# Patient Record
Sex: MALE | Race: WHITE | Employment: OTHER | URBAN - METROPOLITAN AREA
[De-identification: names, ages, dates, MRNs, and addresses within clinical notes are randomized per-mention and may not be internally consistent; named-entity substitution may affect disease eponyms.]

---

## 2018-03-02 RX ORDER — IBUPROFEN 600 MG/1
TABLET ORAL
Refills: 0 | COMMUNITY
Start: 2018-02-01 | End: 2018-05-08 | Stop reason: SDUPTHER

## 2018-03-02 RX ORDER — TIZANIDINE 4 MG/1
4 TABLET ORAL 3 TIMES DAILY PRN
Refills: 0 | Status: ON HOLD | COMMUNITY
Start: 2018-02-01 | End: 2020-11-05 | Stop reason: ALTCHOICE

## 2018-03-02 NOTE — PROGRESS NOTES
registration    [x] You can expect a call the business day prior to procedure to notify you of your arrival time    [x] If you receive a survey after surgery we would greatly appreciate your comments    [] Parent/guardian of a minor must accompany their child and remain on the premises  the entire time they are under our care     [] Pediatric patients may bring favorite toy, blanket or comfort item with them    [] A caregiver or family member must remain with the patient during their stay if they are mentally handicapped, have dementia, disoriented or unable to use a call light or would be a safety concern if left unattended    [x] Please notify surgeon if you develop any illness between now and time of surgery (cold, cough, sore throat, fever, nausea, vomiting) or any signs of infections  including skin, wounds, and dental.    [] Other instructions    EDUCATIONAL MATERIALS PROVIDED:    [] PAT Preoperative Education Packet/Booklet     [] Medication List    [] Fluoroscopy Information Pamphlet    [] Transfusion bracelet applied with instructions    [] Joint replacement video reviewed    [] Shower with soap, lather and rinse well, and use CHG wipes provided the evening before surgery as instructed

## 2018-03-13 RX ORDER — DICYCLOMINE HYDROCHLORIDE 10 MG/1
10 CAPSULE ORAL
Qty: 120 CAPSULE | Refills: 1 | Status: CANCELLED | OUTPATIENT
Start: 2018-03-13

## 2018-03-15 ENCOUNTER — ANESTHESIA (OUTPATIENT)
Dept: OPERATING ROOM | Age: 43
End: 2018-03-15
Payer: COMMERCIAL

## 2018-03-15 ENCOUNTER — ANESTHESIA EVENT (OUTPATIENT)
Dept: OPERATING ROOM | Age: 43
End: 2018-03-15
Payer: COMMERCIAL

## 2018-03-15 ENCOUNTER — HOSPITAL ENCOUNTER (OUTPATIENT)
Age: 43
Setting detail: OUTPATIENT SURGERY
Discharge: HOME | End: 2018-03-15
Attending: SURGERY | Admitting: SURGERY
Payer: COMMERCIAL

## 2018-03-15 VITALS
SYSTOLIC BLOOD PRESSURE: 115 MMHG | WEIGHT: 147 LBS | TEMPERATURE: 97.9 F | OXYGEN SATURATION: 97 % | HEIGHT: 68 IN | HEART RATE: 81 BPM | BODY MASS INDEX: 22.28 KG/M2 | RESPIRATION RATE: 16 BRPM | DIASTOLIC BLOOD PRESSURE: 80 MMHG

## 2018-03-15 VITALS — SYSTOLIC BLOOD PRESSURE: 99 MMHG | DIASTOLIC BLOOD PRESSURE: 54 MMHG | OXYGEN SATURATION: 99 %

## 2018-03-15 DIAGNOSIS — G89.18 ACUTE POST-OPERATIVE PAIN: Primary | ICD-10-CM

## 2018-03-15 PROCEDURE — 6370000000 HC RX 637 (ALT 250 FOR IP): Performed by: ANESTHESIOLOGY

## 2018-03-15 PROCEDURE — 3600000002 HC SURGERY LEVEL 2 BASE: Performed by: SURGERY

## 2018-03-15 PROCEDURE — 6360000002 HC RX W HCPCS: Performed by: SURGERY

## 2018-03-15 PROCEDURE — 2500000003 HC RX 250 WO HCPCS: Performed by: NURSE ANESTHETIST, CERTIFIED REGISTERED

## 2018-03-15 PROCEDURE — 3700000000 HC ANESTHESIA ATTENDED CARE: Performed by: SURGERY

## 2018-03-15 PROCEDURE — 2500000003 HC RX 250 WO HCPCS: Performed by: SURGERY

## 2018-03-15 PROCEDURE — 2580000003 HC RX 258: Performed by: SURGERY

## 2018-03-15 PROCEDURE — 7100000010 HC PHASE II RECOVERY - FIRST 15 MIN: Performed by: SURGERY

## 2018-03-15 PROCEDURE — 7100000011 HC PHASE II RECOVERY - ADDTL 15 MIN: Performed by: SURGERY

## 2018-03-15 PROCEDURE — 3700000001 HC ADD 15 MINUTES (ANESTHESIA): Performed by: SURGERY

## 2018-03-15 PROCEDURE — 3600000012 HC SURGERY LEVEL 2 ADDTL 15MIN: Performed by: SURGERY

## 2018-03-15 PROCEDURE — 6360000002 HC RX W HCPCS: Performed by: NURSE ANESTHETIST, CERTIFIED REGISTERED

## 2018-03-15 PROCEDURE — 10120 INC&RMVL FB SUBQ TISS SMPL: CPT | Performed by: SURGERY

## 2018-03-15 RX ORDER — OXYCODONE HYDROCHLORIDE AND ACETAMINOPHEN 5; 325 MG/1; MG/1
1 TABLET ORAL EVERY 6 HOURS PRN
Qty: 12 TABLET | Refills: 0 | Status: SHIPPED | OUTPATIENT
Start: 2018-03-15 | End: 2018-03-22

## 2018-03-15 RX ORDER — OXYCODONE HYDROCHLORIDE AND ACETAMINOPHEN 5; 325 MG/1; MG/1
1 TABLET ORAL ONCE
Status: COMPLETED | OUTPATIENT
Start: 2018-03-15 | End: 2018-03-15

## 2018-03-15 RX ORDER — ONDANSETRON 2 MG/ML
INJECTION INTRAMUSCULAR; INTRAVENOUS PRN
Status: DISCONTINUED | OUTPATIENT
Start: 2018-03-15 | End: 2018-03-15 | Stop reason: SDUPTHER

## 2018-03-15 RX ORDER — MIDAZOLAM HYDROCHLORIDE 1 MG/ML
INJECTION INTRAMUSCULAR; INTRAVENOUS PRN
Status: DISCONTINUED | OUTPATIENT
Start: 2018-03-15 | End: 2018-03-15 | Stop reason: SDUPTHER

## 2018-03-15 RX ORDER — FENTANYL CITRATE 50 UG/ML
INJECTION, SOLUTION INTRAMUSCULAR; INTRAVENOUS PRN
Status: DISCONTINUED | OUTPATIENT
Start: 2018-03-15 | End: 2018-03-15 | Stop reason: SDUPTHER

## 2018-03-15 RX ORDER — LIDOCAINE HYDROCHLORIDE 20 MG/ML
INJECTION, SOLUTION INFILTRATION; PERINEURAL PRN
Status: DISCONTINUED | OUTPATIENT
Start: 2018-03-15 | End: 2018-03-15 | Stop reason: SDUPTHER

## 2018-03-15 RX ORDER — SODIUM CHLORIDE 0.9 % (FLUSH) 0.9 %
10 SYRINGE (ML) INJECTION PRN
Status: DISCONTINUED | OUTPATIENT
Start: 2018-03-15 | End: 2018-03-15 | Stop reason: HOSPADM

## 2018-03-15 RX ORDER — ONDANSETRON 2 MG/ML
4 INJECTION INTRAMUSCULAR; INTRAVENOUS
Status: DISCONTINUED | OUTPATIENT
Start: 2018-03-15 | End: 2018-03-15 | Stop reason: HOSPADM

## 2018-03-15 RX ORDER — DEXAMETHASONE SODIUM PHOSPHATE 4 MG/ML
INJECTION, SOLUTION INTRA-ARTICULAR; INTRALESIONAL; INTRAMUSCULAR; INTRAVENOUS; SOFT TISSUE PRN
Status: DISCONTINUED | OUTPATIENT
Start: 2018-03-15 | End: 2018-03-15 | Stop reason: SDUPTHER

## 2018-03-15 RX ORDER — SODIUM CHLORIDE 9 MG/ML
INJECTION, SOLUTION INTRAVENOUS CONTINUOUS
Status: DISCONTINUED | OUTPATIENT
Start: 2018-03-15 | End: 2018-03-15 | Stop reason: HOSPADM

## 2018-03-15 RX ORDER — CEPHALEXIN 500 MG/1
500 CAPSULE ORAL 4 TIMES DAILY
Qty: 20 CAPSULE | Refills: 0 | Status: SHIPPED | OUTPATIENT
Start: 2018-03-15 | End: 2018-03-20

## 2018-03-15 RX ORDER — SODIUM CHLORIDE 0.9 % (FLUSH) 0.9 %
10 SYRINGE (ML) INJECTION EVERY 12 HOURS SCHEDULED
Status: DISCONTINUED | OUTPATIENT
Start: 2018-03-15 | End: 2018-03-15 | Stop reason: HOSPADM

## 2018-03-15 RX ORDER — PROPOFOL 10 MG/ML
INJECTION, EMULSION INTRAVENOUS CONTINUOUS PRN
Status: DISCONTINUED | OUTPATIENT
Start: 2018-03-15 | End: 2018-03-15 | Stop reason: SDUPTHER

## 2018-03-15 RX ORDER — BUPIVACAINE HYDROCHLORIDE 5 MG/ML
INJECTION, SOLUTION EPIDURAL; INTRACAUDAL PRN
Status: DISCONTINUED | OUTPATIENT
Start: 2018-03-15 | End: 2018-03-15 | Stop reason: HOSPADM

## 2018-03-15 RX ADMIN — SODIUM CHLORIDE: 9 INJECTION, SOLUTION INTRAVENOUS at 12:30

## 2018-03-15 RX ADMIN — PROPOFOL 130 MCG/KG/MIN: 10 INJECTION, EMULSION INTRAVENOUS at 12:44

## 2018-03-15 RX ADMIN — LIDOCAINE HYDROCHLORIDE 40 MG: 20 INJECTION, SOLUTION INFILTRATION; PERINEURAL at 12:34

## 2018-03-15 RX ADMIN — ONDANSETRON 4 MG: 2 INJECTION, SOLUTION INTRAMUSCULAR; INTRAVENOUS at 12:30

## 2018-03-15 RX ADMIN — MIDAZOLAM HYDROCHLORIDE 2 MG: 1 INJECTION, SOLUTION INTRAMUSCULAR; INTRAVENOUS at 12:34

## 2018-03-15 RX ADMIN — FENTANYL CITRATE 50 MCG: 50 INJECTION, SOLUTION INTRAMUSCULAR; INTRAVENOUS at 12:30

## 2018-03-15 RX ADMIN — DEXAMETHASONE SODIUM PHOSPHATE 10 MG: 4 INJECTION, SOLUTION INTRA-ARTICULAR; INTRALESIONAL; INTRAMUSCULAR; INTRAVENOUS; SOFT TISSUE at 12:35

## 2018-03-15 RX ADMIN — CEFAZOLIN SODIUM 2 G: 2 SOLUTION INTRAVENOUS at 12:30

## 2018-03-15 RX ADMIN — OXYCODONE HYDROCHLORIDE AND ACETAMINOPHEN 1 TABLET: 5; 325 TABLET ORAL at 13:38

## 2018-03-15 RX ADMIN — FENTANYL CITRATE 50 MCG: 50 INJECTION, SOLUTION INTRAMUSCULAR; INTRAVENOUS at 12:34

## 2018-03-15 ASSESSMENT — PAIN DESCRIPTION - PAIN TYPE
TYPE: SURGICAL PAIN

## 2018-03-15 ASSESSMENT — PULMONARY FUNCTION TESTS
PIF_VALUE: 0
PIF_VALUE: 1
PIF_VALUE: 0

## 2018-03-15 ASSESSMENT — PAIN DESCRIPTION - DESCRIPTORS: DESCRIPTORS: SORE

## 2018-03-15 ASSESSMENT — LIFESTYLE VARIABLES: SMOKING_STATUS: 1

## 2018-03-15 ASSESSMENT — PAIN SCALES - GENERAL
PAINLEVEL_OUTOF10: 4
PAINLEVEL_OUTOF10: 2

## 2018-03-15 ASSESSMENT — PAIN DESCRIPTION - LOCATION
LOCATION: ABDOMEN
LOCATION: ABDOMEN

## 2018-03-15 NOTE — H&P
Progress Notes  Encounter Date: 2/26/2018  Marion Tanner MD   General Surgery   Expand All Collapse All    []Hide copied text  []Hover for attribution information     Patient's Name/Date of Birth: Greg Hunt / 1975     Date: 2/26/2018     PCP: Sarah Dial DO          Chief Complaint   Patient presents with    Hernia       pt. complains of a hard plastic object protruding out from hernia repair very painful         HPI:  Patient seen for evaluation of 2 problems:   1: epigastric pain , bloating and weight loss (30 lbs over the past 6 months). Denies N&V, but C/O heartburn, no dysphagia.   2: suture granuloma umbilical region at site of previous umbilical hernia repair     Patient's medications, allergies, past medical, surgical, social and family histories were reviewed and updated as appropriate.           Allergies   Allergen Reactions    Effexor [Venlafaxine Hydrochloride] Palpitations       Passed out         Past Medical History        Past Medical History:   Diagnosis Date    Depression      Hand pain, right      Panic attacks      Schizophrenia ([facility] [address]       mild             Past Surgical History         Past Surgical History:   Procedure Laterality Date    COLONOSCOPY   2016    HERNIA REPAIR   09/19/2017                   Social History   Substance Use Topics    Smoking status: Current Every Day Smoker       Packs/day: 1.00       Years: 18.00       Types: Cigarettes    Smokeless tobacco: Never Used         Comment: Before up to 2 ppd    Alcohol use No         Comment: occasional; quit heavy drinking         Current Facility-Administered Medications          Current Outpatient Prescriptions   Medication Sig Dispense Refill    QUEtiapine (SEROQUEL XR) 200 MG extended release tablet TK 1 T PO QHS   2    rOPINIRole (REQUIP) 0.5 MG tablet TK 1 T PO ONE HOUR PRIOR TO BED   3    omeprazole (PRILOSEC) 40 MG delayed release capsule          ibuprofen (ADVIL;MOTRIN) 800 MG tablet Take 1 tablet by mouth every 6 hours as needed for Pain 60 tablet 1    dicyclomine (BENTYL) 10 MG capsule Take 1 capsule by mouth 4 times daily (before meals and nightly) 120 capsule 3    clonazePAM (KLONOPIN) 1 MG tablet Take 1 mg by mouth 2 times daily as needed Indications: uses at night Take am of surgery if needed        quetiapine (SEROQUEL) 200 MG tablet Take 200 mg by mouth nightly         naproxen (NAPROSYN) 500 MG tablet Take 1 tablet by mouth 2 times daily for 7 days 14 tablet 0      No current facility-administered medications for this visit.             Review of Systems  Constitutional: negative  Eyes: negative  Ears, nose, mouth, throat, and face: negative  Respiratory: negative  Cardiovascular: negative  Gastrointestinal: negative  Genitourinary:negative  Integument/breast: negative  Hematologic/lymphatic: negative  Musculoskeletal:negative  Neurological: negative  Allergic/Immunologic: negative     Physical exam:  /79   Pulse 74   Temp 98 °F (36.7 °C) (Oral)   Resp 16   Ht 5' 8\" (1.727 m)   Wt 147 lb (66.7 kg)   BMI 22.35 kg/m²   General appearance: no acute distress  Head:NCAT, EOMI, PERRLA, conjunctiva pink  Neck: no masses, supple  Lungs: CTABL  Heart: RRR  Abdomen: soft, nondistended, diffusely tender, no guarding, no peritoneal signs, normoactive bowel sounds. Suture granuloma umbilicus  Extremities:no edema  Neuro exam: normal  Skin: no lesions, no rashes  Assessment/Plan:  .1: excision suture granuloma umbilicus  2: EGD     The procedure risks, benfits, possible complications and alternative options where explained to the patient, he understands and agrees to proceed with surgery.     Return in about 4 weeks (around 3/26/2018).      Liborio Garza MD        Send copy of H&P to PCP, Letty Zapata DO                   Office Visit on 2/26/2018            Detailed Report        Progress Notes Info     Author Note Status Last Update User   Liborio Garza MD Signed Liborio Garza MD   Last Update Date/Time: 2/26/2018  5:34 PM   Chart Review Routing History     Routing history could not be found for this note. This is because the note has never been routed or because communication     Patient's History and Physical was reviewed. Patient examined. There has been no change.

## 2018-04-04 ENCOUNTER — OFFICE VISIT (OUTPATIENT)
Dept: SURGERY | Age: 43
End: 2018-04-04
Payer: COMMERCIAL

## 2018-04-04 VITALS
DIASTOLIC BLOOD PRESSURE: 86 MMHG | HEART RATE: 92 BPM | RESPIRATION RATE: 16 BRPM | BODY MASS INDEX: 23.19 KG/M2 | WEIGHT: 153 LBS | TEMPERATURE: 97.1 F | SYSTOLIC BLOOD PRESSURE: 116 MMHG | HEIGHT: 68 IN | OXYGEN SATURATION: 97 %

## 2018-04-04 DIAGNOSIS — R63.4 WEIGHT LOSS: ICD-10-CM

## 2018-04-04 DIAGNOSIS — G89.29 ABDOMINAL PAIN, CHRONIC, EPIGASTRIC: Primary | ICD-10-CM

## 2018-04-04 DIAGNOSIS — R10.13 ABDOMINAL PAIN, CHRONIC, EPIGASTRIC: Primary | ICD-10-CM

## 2018-04-04 PROCEDURE — G8420 CALC BMI NORM PARAMETERS: HCPCS | Performed by: SURGERY

## 2018-04-04 PROCEDURE — 99213 OFFICE O/P EST LOW 20 MIN: CPT | Performed by: SURGERY

## 2018-04-04 PROCEDURE — G8427 DOCREV CUR MEDS BY ELIG CLIN: HCPCS | Performed by: SURGERY

## 2018-04-04 PROCEDURE — 4004F PT TOBACCO SCREEN RCVD TLK: CPT | Performed by: SURGERY

## 2018-04-04 RX ORDER — CHLORDIAZEPOXIDE HYDROCHLORIDE AND CLIDINIUM BROMIDE 5; 2.5 MG/1; MG/1
1 CAPSULE ORAL
Qty: 120 CAPSULE | Refills: 3 | OUTPATIENT
Start: 2018-04-04 | End: 2018-05-08 | Stop reason: SDUPTHER

## 2018-04-09 ENCOUNTER — HOSPITAL ENCOUNTER (OUTPATIENT)
Age: 43
Setting detail: OUTPATIENT SURGERY
Discharge: HOME | End: 2018-04-09
Attending: SURGERY | Admitting: SURGERY
Payer: COMMERCIAL

## 2018-04-09 ENCOUNTER — ANESTHESIA (OUTPATIENT)
Dept: ENDOSCOPY | Age: 43
End: 2018-04-09
Payer: COMMERCIAL

## 2018-04-09 ENCOUNTER — ANESTHESIA EVENT (OUTPATIENT)
Dept: ENDOSCOPY | Age: 43
End: 2018-04-09
Payer: COMMERCIAL

## 2018-04-09 VITALS
BODY MASS INDEX: 23.19 KG/M2 | RESPIRATION RATE: 16 BRPM | OXYGEN SATURATION: 95 % | HEART RATE: 82 BPM | SYSTOLIC BLOOD PRESSURE: 101 MMHG | TEMPERATURE: 97 F | HEIGHT: 68 IN | DIASTOLIC BLOOD PRESSURE: 72 MMHG | WEIGHT: 153 LBS

## 2018-04-09 VITALS — DIASTOLIC BLOOD PRESSURE: 66 MMHG | OXYGEN SATURATION: 97 % | SYSTOLIC BLOOD PRESSURE: 101 MMHG

## 2018-04-09 PROCEDURE — 43239 EGD BIOPSY SINGLE/MULTIPLE: CPT | Performed by: SURGERY

## 2018-04-09 PROCEDURE — 2500000003 HC RX 250 WO HCPCS: Performed by: NURSE ANESTHETIST, CERTIFIED REGISTERED

## 2018-04-09 PROCEDURE — 3700000000 HC ANESTHESIA ATTENDED CARE: Performed by: SURGERY

## 2018-04-09 PROCEDURE — 88342 IMHCHEM/IMCYTCHM 1ST ANTB: CPT

## 2018-04-09 PROCEDURE — 2580000003 HC RX 258: Performed by: SURGERY

## 2018-04-09 PROCEDURE — 88305 TISSUE EXAM BY PATHOLOGIST: CPT

## 2018-04-09 PROCEDURE — 7100000010 HC PHASE II RECOVERY - FIRST 15 MIN: Performed by: SURGERY

## 2018-04-09 PROCEDURE — 6360000002 HC RX W HCPCS: Performed by: NURSE ANESTHETIST, CERTIFIED REGISTERED

## 2018-04-09 PROCEDURE — 3609012400 HC EGD TRANSORAL BIOPSY SINGLE/MULTIPLE: Performed by: SURGERY

## 2018-04-09 PROCEDURE — 2709999900 HC NON-CHARGEABLE SUPPLY: Performed by: SURGERY

## 2018-04-09 PROCEDURE — 7100000011 HC PHASE II RECOVERY - ADDTL 15 MIN: Performed by: SURGERY

## 2018-04-09 PROCEDURE — 3700000001 HC ADD 15 MINUTES (ANESTHESIA): Performed by: SURGERY

## 2018-04-09 RX ORDER — 0.9 % SODIUM CHLORIDE 0.9 %
10 VIAL (ML) INJECTION EVERY 12 HOURS SCHEDULED
Status: DISCONTINUED | OUTPATIENT
Start: 2018-04-09 | End: 2018-04-09 | Stop reason: HOSPADM

## 2018-04-09 RX ORDER — MIDAZOLAM HYDROCHLORIDE 1 MG/ML
INJECTION INTRAMUSCULAR; INTRAVENOUS PRN
Status: DISCONTINUED | OUTPATIENT
Start: 2018-04-09 | End: 2018-04-09 | Stop reason: SDUPTHER

## 2018-04-09 RX ORDER — LIDOCAINE HYDROCHLORIDE 20 MG/ML
INJECTION, SOLUTION INFILTRATION; PERINEURAL PRN
Status: DISCONTINUED | OUTPATIENT
Start: 2018-04-09 | End: 2018-04-09 | Stop reason: SDUPTHER

## 2018-04-09 RX ORDER — ACETAMINOPHEN 325 MG/1
650 TABLET ORAL EVERY 6 HOURS PRN
Status: ON HOLD | COMMUNITY
End: 2021-07-24 | Stop reason: HOSPADM

## 2018-04-09 RX ORDER — SODIUM CHLORIDE 9 MG/ML
INJECTION, SOLUTION INTRAVENOUS CONTINUOUS
Status: DISCONTINUED | OUTPATIENT
Start: 2018-04-09 | End: 2018-04-09 | Stop reason: HOSPADM

## 2018-04-09 RX ORDER — ONDANSETRON 2 MG/ML
INJECTION INTRAMUSCULAR; INTRAVENOUS PRN
Status: DISCONTINUED | OUTPATIENT
Start: 2018-04-09 | End: 2018-04-09 | Stop reason: SDUPTHER

## 2018-04-09 RX ORDER — PROPOFOL 10 MG/ML
INJECTION, EMULSION INTRAVENOUS PRN
Status: DISCONTINUED | OUTPATIENT
Start: 2018-04-09 | End: 2018-04-09 | Stop reason: SDUPTHER

## 2018-04-09 RX ORDER — 0.9 % SODIUM CHLORIDE 0.9 %
10 VIAL (ML) INJECTION PRN
Status: DISCONTINUED | OUTPATIENT
Start: 2018-04-09 | End: 2018-04-09 | Stop reason: HOSPADM

## 2018-04-09 RX ADMIN — LIDOCAINE HYDROCHLORIDE 60 MG: 20 INJECTION, SOLUTION INFILTRATION; PERINEURAL at 10:22

## 2018-04-09 RX ADMIN — PROPOFOL 150 MG: 10 INJECTION, EMULSION INTRAVENOUS at 10:44

## 2018-04-09 RX ADMIN — ONDANSETRON 4 MG: 2 INJECTION, SOLUTION INTRAMUSCULAR; INTRAVENOUS at 10:15

## 2018-04-09 RX ADMIN — SODIUM CHLORIDE: 9 INJECTION, SOLUTION INTRAVENOUS at 10:19

## 2018-04-09 RX ADMIN — MIDAZOLAM HYDROCHLORIDE 2 MG: 1 INJECTION, SOLUTION INTRAMUSCULAR; INTRAVENOUS at 10:15

## 2018-04-09 ASSESSMENT — PAIN - FUNCTIONAL ASSESSMENT: PAIN_FUNCTIONAL_ASSESSMENT: 0-10

## 2018-04-09 ASSESSMENT — LIFESTYLE VARIABLES: SMOKING_STATUS: 1

## 2018-04-12 ENCOUNTER — HOSPITAL ENCOUNTER (OUTPATIENT)
Dept: ULTRASOUND IMAGING | Age: 43
Discharge: HOME | End: 2018-04-14
Payer: COMMERCIAL

## 2018-04-12 DIAGNOSIS — R10.13 ABDOMINAL PAIN, EPIGASTRIC: ICD-10-CM

## 2018-04-12 DIAGNOSIS — R10.13 EPIGASTRIC PAIN: Primary | ICD-10-CM

## 2018-04-12 PROCEDURE — 76705 ECHO EXAM OF ABDOMEN: CPT

## 2018-04-18 ENCOUNTER — OFFICE VISIT (OUTPATIENT)
Dept: SURGERY | Age: 43
End: 2018-04-18
Payer: COMMERCIAL

## 2018-04-18 VITALS
WEIGHT: 151 LBS | DIASTOLIC BLOOD PRESSURE: 78 MMHG | RESPIRATION RATE: 16 BRPM | BODY MASS INDEX: 22.88 KG/M2 | TEMPERATURE: 98.2 F | SYSTOLIC BLOOD PRESSURE: 118 MMHG | OXYGEN SATURATION: 97 % | HEIGHT: 68 IN | HEART RATE: 89 BPM

## 2018-04-18 DIAGNOSIS — K80.20 SYMPTOMATIC CHOLELITHIASIS: ICD-10-CM

## 2018-04-18 DIAGNOSIS — B96.81 H PYLORI ULCER: Primary | ICD-10-CM

## 2018-04-18 DIAGNOSIS — K27.9 H PYLORI ULCER: Primary | ICD-10-CM

## 2018-04-18 PROCEDURE — G8427 DOCREV CUR MEDS BY ELIG CLIN: HCPCS | Performed by: SURGERY

## 2018-04-18 PROCEDURE — 4004F PT TOBACCO SCREEN RCVD TLK: CPT | Performed by: SURGERY

## 2018-04-18 PROCEDURE — G8420 CALC BMI NORM PARAMETERS: HCPCS | Performed by: SURGERY

## 2018-04-18 PROCEDURE — 99214 OFFICE O/P EST MOD 30 MIN: CPT | Performed by: SURGERY

## 2018-04-20 ENCOUNTER — TELEPHONE (OUTPATIENT)
Dept: SURGERY | Age: 43
End: 2018-04-20

## 2018-04-25 ENCOUNTER — TELEPHONE (OUTPATIENT)
Dept: SURGERY | Age: 43
End: 2018-04-25

## 2018-05-06 ENCOUNTER — HOSPITAL ENCOUNTER (EMERGENCY)
Age: 43
Discharge: HOME | End: 2018-05-06
Attending: EMERGENCY MEDICINE
Payer: COMMERCIAL

## 2018-05-06 VITALS
DIASTOLIC BLOOD PRESSURE: 64 MMHG | HEIGHT: 68 IN | TEMPERATURE: 98.1 F | WEIGHT: 157 LBS | BODY MASS INDEX: 23.79 KG/M2 | RESPIRATION RATE: 16 BRPM | SYSTOLIC BLOOD PRESSURE: 99 MMHG | HEART RATE: 94 BPM | OXYGEN SATURATION: 96 %

## 2018-05-06 DIAGNOSIS — S05.02XA ABRASION OF LEFT CORNEA, INITIAL ENCOUNTER: Primary | ICD-10-CM

## 2018-05-06 PROCEDURE — 6370000000 HC RX 637 (ALT 250 FOR IP): Performed by: EMERGENCY MEDICINE

## 2018-05-06 PROCEDURE — 99283 EMERGENCY DEPT VISIT LOW MDM: CPT

## 2018-05-06 RX ORDER — TETRACAINE HYDROCHLORIDE 5 MG/ML
2 SOLUTION OPHTHALMIC ONCE
Status: COMPLETED | OUTPATIENT
Start: 2018-05-06 | End: 2018-05-06

## 2018-05-06 RX ORDER — MOXIFLOXACIN 5 MG/ML
1 SOLUTION/ DROPS OPHTHALMIC 3 TIMES DAILY
Status: DISCONTINUED | OUTPATIENT
Start: 2018-05-06 | End: 2018-05-06 | Stop reason: HOSPADM

## 2018-05-06 RX ADMIN — FLUORESCEIN SODIUM 1 STRIP: 0.6 STRIP OPHTHALMIC at 09:42

## 2018-05-06 RX ADMIN — TETRACAINE HYDROCHLORIDE 2 DROP: 5 SOLUTION OPHTHALMIC at 09:41

## 2018-05-06 RX ADMIN — Medication 1 DROP: at 10:14

## 2018-05-06 ASSESSMENT — PAIN DESCRIPTION - FREQUENCY: FREQUENCY: CONTINUOUS

## 2018-05-06 ASSESSMENT — VISUAL ACUITY
OU: 20/40
OS: 20/40
OD: 20/70

## 2018-05-06 ASSESSMENT — PAIN DESCRIPTION - DESCRIPTORS: DESCRIPTORS: BURNING

## 2018-05-06 ASSESSMENT — PAIN DESCRIPTION - ORIENTATION: ORIENTATION: LEFT

## 2018-05-06 ASSESSMENT — PAIN DESCRIPTION - LOCATION: LOCATION: EYE

## 2018-05-06 ASSESSMENT — PAIN SCALES - GENERAL: PAINLEVEL_OUTOF10: 5

## 2018-05-06 ASSESSMENT — PAIN DESCRIPTION - PAIN TYPE: TYPE: ACUTE PAIN

## 2018-05-08 RX ORDER — OMEPRAZOLE 20 MG/1
40 CAPSULE, DELAYED RELEASE ORAL DAILY
COMMUNITY
End: 2019-10-31

## 2018-05-08 RX ORDER — IBUPROFEN 600 MG/1
600 TABLET ORAL EVERY 6 HOURS PRN
COMMUNITY
End: 2019-08-04 | Stop reason: ALTCHOICE

## 2018-05-08 RX ORDER — CHLORDIAZEPOXIDE HYDROCHLORIDE AND CLIDINIUM BROMIDE 5; 2.5 MG/1; MG/1
1 CAPSULE ORAL
COMMUNITY
End: 2018-09-13

## 2018-05-21 ENCOUNTER — ANESTHESIA EVENT (OUTPATIENT)
Dept: ENDOSCOPY | Age: 43
End: 2018-05-21
Payer: COMMERCIAL

## 2018-05-21 ENCOUNTER — ANESTHESIA (OUTPATIENT)
Dept: ENDOSCOPY | Age: 43
End: 2018-05-21
Payer: COMMERCIAL

## 2018-05-21 ENCOUNTER — HOSPITAL ENCOUNTER (OUTPATIENT)
Age: 43
Setting detail: OUTPATIENT SURGERY
Discharge: HOME | End: 2018-05-21
Attending: SURGERY | Admitting: SURGERY
Payer: COMMERCIAL

## 2018-05-21 VITALS
WEIGHT: 150 LBS | DIASTOLIC BLOOD PRESSURE: 76 MMHG | BODY MASS INDEX: 22.73 KG/M2 | RESPIRATION RATE: 16 BRPM | HEART RATE: 76 BPM | SYSTOLIC BLOOD PRESSURE: 108 MMHG | TEMPERATURE: 96.8 F | OXYGEN SATURATION: 96 % | HEIGHT: 68 IN

## 2018-05-21 VITALS
RESPIRATION RATE: 26 BRPM | OXYGEN SATURATION: 99 % | DIASTOLIC BLOOD PRESSURE: 56 MMHG | SYSTOLIC BLOOD PRESSURE: 103 MMHG

## 2018-05-21 PROCEDURE — 3609012400 HC EGD TRANSORAL BIOPSY SINGLE/MULTIPLE: Performed by: SURGERY

## 2018-05-21 PROCEDURE — 43239 EGD BIOPSY SINGLE/MULTIPLE: CPT | Performed by: SURGERY

## 2018-05-21 PROCEDURE — 3700000001 HC ADD 15 MINUTES (ANESTHESIA): Performed by: SURGERY

## 2018-05-21 PROCEDURE — 3700000000 HC ANESTHESIA ATTENDED CARE: Performed by: SURGERY

## 2018-05-21 PROCEDURE — 88305 TISSUE EXAM BY PATHOLOGIST: CPT

## 2018-05-21 PROCEDURE — 7100000011 HC PHASE II RECOVERY - ADDTL 15 MIN: Performed by: SURGERY

## 2018-05-21 PROCEDURE — 2580000003 HC RX 258: Performed by: SURGERY

## 2018-05-21 PROCEDURE — 6360000002 HC RX W HCPCS: Performed by: NURSE ANESTHETIST, CERTIFIED REGISTERED

## 2018-05-21 PROCEDURE — 88342 IMHCHEM/IMCYTCHM 1ST ANTB: CPT

## 2018-05-21 PROCEDURE — 7100000010 HC PHASE II RECOVERY - FIRST 15 MIN: Performed by: SURGERY

## 2018-05-21 RX ORDER — SODIUM CHLORIDE 9 MG/ML
INJECTION, SOLUTION INTRAVENOUS CONTINUOUS
Status: DISCONTINUED | OUTPATIENT
Start: 2018-05-21 | End: 2018-05-21 | Stop reason: HOSPADM

## 2018-05-21 RX ORDER — FENTANYL CITRATE 50 UG/ML
INJECTION, SOLUTION INTRAMUSCULAR; INTRAVENOUS PRN
Status: DISCONTINUED | OUTPATIENT
Start: 2018-05-21 | End: 2018-05-21 | Stop reason: SDUPTHER

## 2018-05-21 RX ORDER — 0.9 % SODIUM CHLORIDE 0.9 %
10 VIAL (ML) INJECTION PRN
Status: DISCONTINUED | OUTPATIENT
Start: 2018-05-21 | End: 2018-05-21 | Stop reason: HOSPADM

## 2018-05-21 RX ORDER — 0.9 % SODIUM CHLORIDE 0.9 %
10 VIAL (ML) INJECTION EVERY 12 HOURS SCHEDULED
Status: DISCONTINUED | OUTPATIENT
Start: 2018-05-21 | End: 2018-05-21 | Stop reason: HOSPADM

## 2018-05-21 RX ORDER — PROPOFOL 10 MG/ML
INJECTION, EMULSION INTRAVENOUS PRN
Status: DISCONTINUED | OUTPATIENT
Start: 2018-05-21 | End: 2018-05-21 | Stop reason: SDUPTHER

## 2018-05-21 RX ADMIN — SODIUM CHLORIDE: 9 INJECTION, SOLUTION INTRAVENOUS at 09:02

## 2018-05-21 RX ADMIN — FENTANYL CITRATE 50 MCG: 50 INJECTION, SOLUTION INTRAMUSCULAR; INTRAVENOUS at 09:07

## 2018-05-21 RX ADMIN — PROPOFOL 100 MG: 10 INJECTION, EMULSION INTRAVENOUS at 09:02

## 2018-05-21 RX ADMIN — FENTANYL CITRATE 50 MCG: 50 INJECTION, SOLUTION INTRAMUSCULAR; INTRAVENOUS at 09:02

## 2018-05-21 ASSESSMENT — PAIN SCALES - GENERAL: PAINLEVEL_OUTOF10: 0

## 2018-05-21 ASSESSMENT — LIFESTYLE VARIABLES: SMOKING_STATUS: 1

## 2018-05-28 ENCOUNTER — ANESTHESIA EVENT (OUTPATIENT)
Dept: OPERATING ROOM | Age: 43
End: 2018-05-28
Payer: COMMERCIAL

## 2018-05-29 ENCOUNTER — HOSPITAL ENCOUNTER (OUTPATIENT)
Age: 43
Setting detail: OUTPATIENT SURGERY
Discharge: HOME | End: 2018-05-29
Attending: SURGERY | Admitting: SURGERY
Payer: COMMERCIAL

## 2018-05-29 ENCOUNTER — ANESTHESIA (OUTPATIENT)
Dept: OPERATING ROOM | Age: 43
End: 2018-05-29
Payer: COMMERCIAL

## 2018-05-29 ENCOUNTER — HOSPITAL ENCOUNTER (OUTPATIENT)
Dept: GENERAL RADIOLOGY | Age: 43
Discharge: HOME | End: 2018-05-31
Attending: SURGERY
Payer: COMMERCIAL

## 2018-05-29 VITALS
BODY MASS INDEX: 22.73 KG/M2 | HEART RATE: 82 BPM | OXYGEN SATURATION: 98 % | RESPIRATION RATE: 14 BRPM | SYSTOLIC BLOOD PRESSURE: 131 MMHG | DIASTOLIC BLOOD PRESSURE: 77 MMHG | TEMPERATURE: 97.2 F | HEIGHT: 68 IN | WEIGHT: 150 LBS

## 2018-05-29 VITALS
OXYGEN SATURATION: 92 % | TEMPERATURE: 95.4 F | SYSTOLIC BLOOD PRESSURE: 111 MMHG | RESPIRATION RATE: 2 BRPM | DIASTOLIC BLOOD PRESSURE: 58 MMHG

## 2018-05-29 DIAGNOSIS — R52 PAIN: ICD-10-CM

## 2018-05-29 DIAGNOSIS — K80.20 SYMPTOMATIC CHOLELITHIASIS: Primary | ICD-10-CM

## 2018-05-29 DIAGNOSIS — Z90.49 S/P LAPAROSCOPIC CHOLECYSTECTOMY: ICD-10-CM

## 2018-05-29 PROCEDURE — 6360000002 HC RX W HCPCS: Performed by: SURGERY

## 2018-05-29 PROCEDURE — 2500000003 HC RX 250 WO HCPCS: Performed by: NURSE ANESTHETIST, CERTIFIED REGISTERED

## 2018-05-29 PROCEDURE — 7100000001 HC PACU RECOVERY - ADDTL 15 MIN: Performed by: SURGERY

## 2018-05-29 PROCEDURE — 7100000011 HC PHASE II RECOVERY - ADDTL 15 MIN: Performed by: SURGERY

## 2018-05-29 PROCEDURE — 3600000004 HC SURGERY LEVEL 4 BASE: Performed by: SURGERY

## 2018-05-29 PROCEDURE — 3700000000 HC ANESTHESIA ATTENDED CARE: Performed by: SURGERY

## 2018-05-29 PROCEDURE — 7100000010 HC PHASE II RECOVERY - FIRST 15 MIN: Performed by: SURGERY

## 2018-05-29 PROCEDURE — 3600000014 HC SURGERY LEVEL 4 ADDTL 15MIN: Performed by: SURGERY

## 2018-05-29 PROCEDURE — 2500000003 HC RX 250 WO HCPCS: Performed by: SURGERY

## 2018-05-29 PROCEDURE — 3700000001 HC ADD 15 MINUTES (ANESTHESIA): Performed by: SURGERY

## 2018-05-29 PROCEDURE — 7100000000 HC PACU RECOVERY - FIRST 15 MIN: Performed by: SURGERY

## 2018-05-29 PROCEDURE — 2580000003 HC RX 258: Performed by: SURGERY

## 2018-05-29 PROCEDURE — 88304 TISSUE EXAM BY PATHOLOGIST: CPT

## 2018-05-29 PROCEDURE — 2709999900 HC NON-CHARGEABLE SUPPLY: Performed by: SURGERY

## 2018-05-29 PROCEDURE — 47562 LAPAROSCOPIC CHOLECYSTECTOMY: CPT | Performed by: SURGERY

## 2018-05-29 PROCEDURE — 6360000002 HC RX W HCPCS: Performed by: NURSE ANESTHETIST, CERTIFIED REGISTERED

## 2018-05-29 PROCEDURE — 6360000002 HC RX W HCPCS: Performed by: ANESTHESIOLOGY

## 2018-05-29 RX ORDER — LIDOCAINE HYDROCHLORIDE 20 MG/ML
INJECTION, SOLUTION EPIDURAL; INFILTRATION; INTRACAUDAL; PERINEURAL PRN
Status: DISCONTINUED | OUTPATIENT
Start: 2018-05-29 | End: 2018-05-29 | Stop reason: SDUPTHER

## 2018-05-29 RX ORDER — PROPOFOL 10 MG/ML
INJECTION, EMULSION INTRAVENOUS PRN
Status: DISCONTINUED | OUTPATIENT
Start: 2018-05-29 | End: 2018-05-29 | Stop reason: SDUPTHER

## 2018-05-29 RX ORDER — SODIUM CHLORIDE 0.9 % (FLUSH) 0.9 %
10 SYRINGE (ML) INJECTION PRN
Status: DISCONTINUED | OUTPATIENT
Start: 2018-05-29 | End: 2018-05-29 | Stop reason: HOSPADM

## 2018-05-29 RX ORDER — FENTANYL CITRATE 50 UG/ML
INJECTION, SOLUTION INTRAMUSCULAR; INTRAVENOUS PRN
Status: DISCONTINUED | OUTPATIENT
Start: 2018-05-29 | End: 2018-05-29 | Stop reason: SDUPTHER

## 2018-05-29 RX ORDER — BUPIVACAINE HYDROCHLORIDE AND EPINEPHRINE 2.5; 5 MG/ML; UG/ML
INJECTION, SOLUTION EPIDURAL; INFILTRATION; INTRACAUDAL; PERINEURAL PRN
Status: DISCONTINUED | OUTPATIENT
Start: 2018-05-29 | End: 2018-05-29 | Stop reason: HOSPADM

## 2018-05-29 RX ORDER — ONDANSETRON 2 MG/ML
INJECTION INTRAMUSCULAR; INTRAVENOUS PRN
Status: DISCONTINUED | OUTPATIENT
Start: 2018-05-29 | End: 2018-05-29 | Stop reason: SDUPTHER

## 2018-05-29 RX ORDER — SODIUM CHLORIDE 9 MG/ML
INJECTION, SOLUTION INTRAVENOUS CONTINUOUS
Status: DISCONTINUED | OUTPATIENT
Start: 2018-05-29 | End: 2018-05-29 | Stop reason: HOSPADM

## 2018-05-29 RX ORDER — MEPERIDINE HYDROCHLORIDE 25 MG/ML
12.5 INJECTION INTRAMUSCULAR; INTRAVENOUS; SUBCUTANEOUS EVERY 5 MIN PRN
Status: DISCONTINUED | OUTPATIENT
Start: 2018-05-29 | End: 2018-05-29 | Stop reason: HOSPADM

## 2018-05-29 RX ORDER — DEXAMETHASONE SODIUM PHOSPHATE 4 MG/ML
INJECTION, SOLUTION INTRA-ARTICULAR; INTRALESIONAL; INTRAMUSCULAR; INTRAVENOUS; SOFT TISSUE PRN
Status: DISCONTINUED | OUTPATIENT
Start: 2018-05-29 | End: 2018-05-29 | Stop reason: SDUPTHER

## 2018-05-29 RX ORDER — NEOSTIGMINE METHYLSULFATE 1 MG/ML
INJECTION, SOLUTION INTRAVENOUS PRN
Status: DISCONTINUED | OUTPATIENT
Start: 2018-05-29 | End: 2018-05-29 | Stop reason: SDUPTHER

## 2018-05-29 RX ORDER — ROCURONIUM BROMIDE 10 MG/ML
INJECTION, SOLUTION INTRAVENOUS PRN
Status: DISCONTINUED | OUTPATIENT
Start: 2018-05-29 | End: 2018-05-29 | Stop reason: SDUPTHER

## 2018-05-29 RX ORDER — OXYCODONE HYDROCHLORIDE AND ACETAMINOPHEN 5; 325 MG/1; MG/1
1 TABLET ORAL EVERY 6 HOURS PRN
Qty: 28 TABLET | Refills: 0 | Status: SHIPPED | OUTPATIENT
Start: 2018-05-29 | End: 2018-06-08

## 2018-05-29 RX ORDER — OXYCODONE HYDROCHLORIDE AND ACETAMINOPHEN 5; 325 MG/1; MG/1
1 TABLET ORAL
Status: DISCONTINUED | OUTPATIENT
Start: 2018-05-29 | End: 2018-05-29 | Stop reason: HOSPADM

## 2018-05-29 RX ORDER — SODIUM CHLORIDE 0.9 % (FLUSH) 0.9 %
10 SYRINGE (ML) INJECTION EVERY 12 HOURS SCHEDULED
Status: DISCONTINUED | OUTPATIENT
Start: 2018-05-29 | End: 2018-05-29 | Stop reason: HOSPADM

## 2018-05-29 RX ORDER — PROMETHAZINE HYDROCHLORIDE 25 MG/ML
6.25 INJECTION, SOLUTION INTRAMUSCULAR; INTRAVENOUS
Status: DISCONTINUED | OUTPATIENT
Start: 2018-05-29 | End: 2018-05-29 | Stop reason: HOSPADM

## 2018-05-29 RX ORDER — MIDAZOLAM HYDROCHLORIDE 1 MG/ML
INJECTION INTRAMUSCULAR; INTRAVENOUS PRN
Status: DISCONTINUED | OUTPATIENT
Start: 2018-05-29 | End: 2018-05-29 | Stop reason: SDUPTHER

## 2018-05-29 RX ORDER — GLYCOPYRROLATE 0.2 MG/ML
INJECTION INTRAMUSCULAR; INTRAVENOUS PRN
Status: DISCONTINUED | OUTPATIENT
Start: 2018-05-29 | End: 2018-05-29 | Stop reason: SDUPTHER

## 2018-05-29 RX ORDER — FENTANYL CITRATE 50 UG/ML
50 INJECTION, SOLUTION INTRAMUSCULAR; INTRAVENOUS EVERY 5 MIN PRN
Status: COMPLETED | OUTPATIENT
Start: 2018-05-29 | End: 2018-05-29

## 2018-05-29 RX ADMIN — PROPOFOL 200 MG: 10 INJECTION, EMULSION INTRAVENOUS at 09:56

## 2018-05-29 RX ADMIN — HYDROMORPHONE HYDROCHLORIDE 0.5 MG: 1 INJECTION, SOLUTION INTRAMUSCULAR; INTRAVENOUS; SUBCUTANEOUS at 11:27

## 2018-05-29 RX ADMIN — Medication 3 MG: at 10:36

## 2018-05-29 RX ADMIN — SODIUM CHLORIDE: 9 INJECTION, SOLUTION INTRAVENOUS at 09:55

## 2018-05-29 RX ADMIN — Medication 0.6 MG: at 10:36

## 2018-05-29 RX ADMIN — ROCURONIUM BROMIDE 30 MG: 10 SOLUTION INTRAVENOUS at 09:56

## 2018-05-29 RX ADMIN — MIDAZOLAM HYDROCHLORIDE 2 MG: 1 INJECTION, SOLUTION INTRAMUSCULAR; INTRAVENOUS at 09:55

## 2018-05-29 RX ADMIN — FENTANYL CITRATE 50 MCG: 50 INJECTION, SOLUTION INTRAMUSCULAR; INTRAVENOUS at 10:15

## 2018-05-29 RX ADMIN — FENTANYL CITRATE: 50 INJECTION, SOLUTION INTRAMUSCULAR; INTRAVENOUS at 11:19

## 2018-05-29 RX ADMIN — HYDROMORPHONE HYDROCHLORIDE 0.5 MG: 1 INJECTION, SOLUTION INTRAMUSCULAR; INTRAVENOUS; SUBCUTANEOUS at 11:42

## 2018-05-29 RX ADMIN — CEFAZOLIN SODIUM 2 G: 2 SOLUTION INTRAVENOUS at 10:12

## 2018-05-29 RX ADMIN — FENTANYL CITRATE 100 MCG: 50 INJECTION, SOLUTION INTRAMUSCULAR; INTRAVENOUS at 09:56

## 2018-05-29 RX ADMIN — LIDOCAINE HYDROCHLORIDE 100 MG: 20 INJECTION, SOLUTION EPIDURAL; INFILTRATION; INTRACAUDAL; PERINEURAL at 09:56

## 2018-05-29 RX ADMIN — DEXAMETHASONE SODIUM PHOSPHATE 8 MG: 4 INJECTION, SOLUTION INTRA-ARTICULAR; INTRALESIONAL; INTRAMUSCULAR; INTRAVENOUS; SOFT TISSUE at 10:11

## 2018-05-29 RX ADMIN — ONDANSETRON 4 MG: 2 INJECTION, SOLUTION INTRAMUSCULAR; INTRAVENOUS at 10:11

## 2018-05-29 RX ADMIN — HYDROMORPHONE HYDROCHLORIDE 0.5 MG: 1 INJECTION, SOLUTION INTRAMUSCULAR; INTRAVENOUS; SUBCUTANEOUS at 11:57

## 2018-05-29 RX ADMIN — FENTANYL CITRATE: 50 INJECTION, SOLUTION INTRAMUSCULAR; INTRAVENOUS at 11:12

## 2018-05-29 ASSESSMENT — PAIN SCALES - GENERAL
PAINLEVEL_OUTOF10: 8
PAINLEVEL_OUTOF10: 6
PAINLEVEL_OUTOF10: 8
PAINLEVEL_OUTOF10: 8
PAINLEVEL_OUTOF10: 5

## 2018-05-29 ASSESSMENT — PULMONARY FUNCTION TESTS
PIF_VALUE: 24
PIF_VALUE: 19
PIF_VALUE: 25
PIF_VALUE: 19
PIF_VALUE: 16
PIF_VALUE: 19
PIF_VALUE: 22
PIF_VALUE: 25
PIF_VALUE: 19
PIF_VALUE: 26
PIF_VALUE: 19
PIF_VALUE: 25
PIF_VALUE: 16
PIF_VALUE: 25
PIF_VALUE: 24
PIF_VALUE: 17
PIF_VALUE: 19
PIF_VALUE: 29
PIF_VALUE: 16
PIF_VALUE: 0
PIF_VALUE: 22
PIF_VALUE: 18
PIF_VALUE: 0
PIF_VALUE: 24
PIF_VALUE: 25
PIF_VALUE: 21
PIF_VALUE: 20
PIF_VALUE: 17
PIF_VALUE: 2
PIF_VALUE: 21
PIF_VALUE: 16
PIF_VALUE: 26
PIF_VALUE: 0
PIF_VALUE: 15
PIF_VALUE: 24
PIF_VALUE: 17
PIF_VALUE: 25
PIF_VALUE: 19
PIF_VALUE: 17
PIF_VALUE: 24
PIF_VALUE: 26
PIF_VALUE: 26
PIF_VALUE: 2
PIF_VALUE: 16
PIF_VALUE: 18
PIF_VALUE: 26
PIF_VALUE: 26
PIF_VALUE: 16
PIF_VALUE: 19
PIF_VALUE: 26
PIF_VALUE: 24

## 2018-05-29 ASSESSMENT — PAIN - FUNCTIONAL ASSESSMENT: PAIN_FUNCTIONAL_ASSESSMENT: 0-10

## 2018-05-29 ASSESSMENT — LIFESTYLE VARIABLES: SMOKING_STATUS: 1

## 2018-05-30 ENCOUNTER — TELEPHONE (OUTPATIENT)
Dept: SURGERY | Age: 43
End: 2018-05-30

## 2018-05-31 RX ORDER — DOCUSATE SODIUM 100 MG/1
100 CAPSULE, LIQUID FILLED ORAL 2 TIMES DAILY
Qty: 30 CAPSULE | Refills: 1 | Status: CANCELLED | OUTPATIENT
Start: 2018-05-31

## 2018-06-12 ENCOUNTER — OFFICE VISIT (OUTPATIENT)
Dept: SURGERY | Age: 43
End: 2018-06-12

## 2018-06-12 VITALS
SYSTOLIC BLOOD PRESSURE: 111 MMHG | HEIGHT: 68 IN | RESPIRATION RATE: 16 BRPM | OXYGEN SATURATION: 97 % | HEART RATE: 84 BPM | TEMPERATURE: 98 F | WEIGHT: 154 LBS | DIASTOLIC BLOOD PRESSURE: 71 MMHG | BODY MASS INDEX: 23.34 KG/M2

## 2018-06-12 DIAGNOSIS — Z09 FOLLOW UP: Primary | ICD-10-CM

## 2018-06-12 PROCEDURE — 99024 POSTOP FOLLOW-UP VISIT: CPT | Performed by: SURGERY

## 2018-08-07 ENCOUNTER — HOSPITAL ENCOUNTER (EMERGENCY)
Age: 43
Discharge: HOME | End: 2018-08-07
Attending: EMERGENCY MEDICINE
Payer: COMMERCIAL

## 2018-08-07 VITALS
RESPIRATION RATE: 18 BRPM | OXYGEN SATURATION: 97 % | BODY MASS INDEX: 24.25 KG/M2 | TEMPERATURE: 98.4 F | WEIGHT: 160 LBS | HEIGHT: 68 IN | HEART RATE: 100 BPM | DIASTOLIC BLOOD PRESSURE: 73 MMHG | SYSTOLIC BLOOD PRESSURE: 104 MMHG

## 2018-08-07 DIAGNOSIS — Z20.2 STD EXPOSURE: Primary | ICD-10-CM

## 2018-08-07 LAB
BACTERIA: ABNORMAL /HPF
BILIRUBIN URINE: NEGATIVE
BLOOD, URINE: NEGATIVE
CLARITY: CLEAR
COLOR: YELLOW
EPITHELIAL CELLS, UA: ABNORMAL /HPF
GLUCOSE URINE: NEGATIVE MG/DL
KETONES, URINE: NEGATIVE MG/DL
LEUKOCYTE ESTERASE, URINE: ABNORMAL
NITRITE, URINE: NEGATIVE
PH UA: 7 (ref 5–9)
PROTEIN UA: NEGATIVE MG/DL
RBC UA: ABNORMAL /HPF (ref 0–2)
SPECIFIC GRAVITY UA: 1.01 (ref 1–1.03)
UROBILINOGEN, URINE: 0.2 E.U./DL
WBC UA: ABNORMAL /HPF (ref 0–5)

## 2018-08-07 PROCEDURE — 87140 CULTURE TYPE IMMUNOFLUORESC: CPT

## 2018-08-07 PROCEDURE — 87088 URINE BACTERIA CULTURE: CPT

## 2018-08-07 PROCEDURE — 99283 EMERGENCY DEPT VISIT LOW MDM: CPT

## 2018-08-07 PROCEDURE — 87255 GENET VIRUS ISOLATE HSV: CPT

## 2018-08-07 PROCEDURE — 87491 CHLMYD TRACH DNA AMP PROBE: CPT

## 2018-08-07 PROCEDURE — 96372 THER/PROPH/DIAG INJ SC/IM: CPT

## 2018-08-07 PROCEDURE — 87591 N.GONORRHOEAE DNA AMP PROB: CPT

## 2018-08-07 PROCEDURE — 6360000002 HC RX W HCPCS: Performed by: PHYSICIAN ASSISTANT

## 2018-08-07 PROCEDURE — 81001 URINALYSIS AUTO W/SCOPE: CPT

## 2018-08-07 RX ORDER — ACYCLOVIR 400 MG/1
400 TABLET ORAL
Qty: 50 TABLET | Refills: 0 | Status: SHIPPED | OUTPATIENT
Start: 2018-08-07 | End: 2018-08-17

## 2018-08-07 RX ORDER — DOXYCYCLINE HYCLATE 100 MG
100 TABLET ORAL 2 TIMES DAILY
Qty: 20 TABLET | Refills: 0 | Status: SHIPPED | OUTPATIENT
Start: 2018-08-07 | End: 2018-08-17

## 2018-08-07 RX ORDER — CEFTRIAXONE SODIUM 250 MG/1
250 INJECTION, POWDER, FOR SOLUTION INTRAMUSCULAR; INTRAVENOUS ONCE
Status: COMPLETED | OUTPATIENT
Start: 2018-08-07 | End: 2018-08-07

## 2018-08-07 RX ADMIN — CEFTRIAXONE SODIUM 250 MG: 250 INJECTION, POWDER, FOR SOLUTION INTRAMUSCULAR; INTRAVENOUS at 11:04

## 2018-08-07 NOTE — ED PROVIDER NOTES
-------------------------------------------------  All laboratory and radiology results have been personally reviewed by myself   LABS:  Results for orders placed or performed during the hospital encounter of 08/07/18   Urinalysis   Result Value Ref Range    Color, UA Yellow Straw/Yellow    Clarity, UA Clear Clear    Glucose, Ur Negative Negative mg/dL    Bilirubin Urine Negative Negative    Ketones, Urine Negative Negative mg/dL    Specific Gravity, UA 1.010 1.005 - 1.030    Blood, Urine Negative Negative    pH, UA 7.0 5.0 - 9.0    Protein, UA Negative Negative mg/dL    Urobilinogen, Urine 0.2 <2.0 E.U./dL    Nitrite, Urine Negative Negative    Leukocyte Esterase, Urine SMALL (A) Negative   Microscopic Urinalysis   Result Value Ref Range    WBC, UA 5-10 0 - 5 /HPF    RBC, UA NONE 0 - 2 /HPF    Epi Cells NONE /HPF    Bacteria, UA FEW (A) /HPF       RADIOLOGY:  Interpreted by Radiologist.  No orders to display       ------------------------- NURSING NOTES AND VITALS REVIEWED ---------------------------   The nursing notes within the ED encounter and vital signs as below have been reviewed. /73   Pulse 100   Temp 98.4 °F (36.9 °C) (Oral)   Resp 18   Ht 5' 8\" (1.727 m)   Wt 160 lb (72.6 kg)   SpO2 97%   BMI 24.33 kg/m²   Oxygen Saturation Interpretation: Normal      ---------------------------------------------------PHYSICAL EXAM--------------------------------------      Constitutional/General: Alert and oriented x3, well appearing, non toxic in NAD  Head: NC/AT  Eyes: PERRL, EOMI  Mouth: Oropharynx clear, handling secretions, no trismus  Neck: Supple, full ROM, no meningeal signs  Pulmonary: Lungs clear to auscultation bilaterally, no wheezes, rales, or rhonchi. Not in respiratory distress  Cardiovascular:  Regular rate and rhythm, no murmurs, gallops, or rubs. 2+ distal pulses  Abdomen: Soft, non tender, non distended,   : there are 3 very small erythematous areas that are flat on the penile shaft.

## 2018-08-09 LAB
ORGANISM: ABNORMAL
URINE CULTURE, ROUTINE: NORMAL

## 2018-08-10 LAB
CHLAMYDIA TRACHOMATIS AMPLIFIED DET: NORMAL
N GONORRHOEAE AMPLIFIED DET: NORMAL

## 2018-09-13 ENCOUNTER — OFFICE VISIT (OUTPATIENT)
Dept: SURGERY | Age: 43
End: 2018-09-13
Payer: COMMERCIAL

## 2018-09-13 VITALS
DIASTOLIC BLOOD PRESSURE: 88 MMHG | BODY MASS INDEX: 24.25 KG/M2 | HEART RATE: 100 BPM | WEIGHT: 160 LBS | OXYGEN SATURATION: 97 % | RESPIRATION RATE: 18 BRPM | SYSTOLIC BLOOD PRESSURE: 130 MMHG | HEIGHT: 68 IN | TEMPERATURE: 97.9 F

## 2018-09-13 DIAGNOSIS — R10.33 UMBILICAL PAIN: ICD-10-CM

## 2018-09-13 DIAGNOSIS — K58.2 IRRITABLE BOWEL SYNDROME WITH BOTH CONSTIPATION AND DIARRHEA: ICD-10-CM

## 2018-09-13 DIAGNOSIS — R10.13 EPIGASTRIC PAIN: Primary | ICD-10-CM

## 2018-09-13 PROCEDURE — G8420 CALC BMI NORM PARAMETERS: HCPCS | Performed by: SURGERY

## 2018-09-13 PROCEDURE — 4004F PT TOBACCO SCREEN RCVD TLK: CPT | Performed by: SURGERY

## 2018-09-13 PROCEDURE — G8427 DOCREV CUR MEDS BY ELIG CLIN: HCPCS | Performed by: SURGERY

## 2018-09-13 PROCEDURE — 99213 OFFICE O/P EST LOW 20 MIN: CPT | Performed by: SURGERY

## 2018-09-13 RX ORDER — DICYCLOMINE HYDROCHLORIDE 10 MG/1
10 CAPSULE ORAL 4 TIMES DAILY
Qty: 120 CAPSULE | Refills: 3 | Status: SHIPPED | OUTPATIENT
Start: 2018-09-13 | End: 2019-10-31

## 2018-09-23 ENCOUNTER — HOSPITAL ENCOUNTER (EMERGENCY)
Age: 43
Discharge: HOME | End: 2018-09-23
Attending: EMERGENCY MEDICINE
Payer: COMMERCIAL

## 2018-09-23 VITALS
WEIGHT: 163 LBS | RESPIRATION RATE: 14 BRPM | BODY MASS INDEX: 24.71 KG/M2 | OXYGEN SATURATION: 97 % | HEART RATE: 76 BPM | DIASTOLIC BLOOD PRESSURE: 74 MMHG | SYSTOLIC BLOOD PRESSURE: 133 MMHG | HEIGHT: 68 IN | TEMPERATURE: 98.4 F

## 2018-09-23 DIAGNOSIS — E86.0 DEHYDRATION: ICD-10-CM

## 2018-09-23 DIAGNOSIS — R42 DIZZINESS: Primary | ICD-10-CM

## 2018-09-23 LAB
ALBUMIN SERPL-MCNC: 4.2 G/DL (ref 3.5–5.2)
ALP BLD-CCNC: 84 U/L (ref 40–129)
ALT SERPL-CCNC: 32 U/L (ref 0–40)
AMPHETAMINE SCREEN, URINE: NOT DETECTED
ANION GAP SERPL CALCULATED.3IONS-SCNC: 13 MMOL/L (ref 7–16)
AST SERPL-CCNC: 22 U/L (ref 0–39)
BARBITURATE SCREEN URINE: NOT DETECTED
BASOPHILS ABSOLUTE: 0.07 E9/L (ref 0–0.2)
BASOPHILS RELATIVE PERCENT: 0.6 % (ref 0–2)
BENZODIAZEPINE SCREEN, URINE: NOT DETECTED
BILIRUB SERPL-MCNC: 0.4 MG/DL (ref 0–1.2)
BILIRUBIN URINE: NEGATIVE
BLOOD, URINE: NEGATIVE
BUN BLDV-MCNC: 12 MG/DL (ref 6–20)
CALCIUM SERPL-MCNC: 8.9 MG/DL (ref 8.6–10.2)
CANNABINOID SCREEN URINE: NOT DETECTED
CHLORIDE BLD-SCNC: 100 MMOL/L (ref 98–107)
CHP ED QC CHECK: NORMAL
CLARITY: CLEAR
CO2: 24 MMOL/L (ref 22–29)
COCAINE METABOLITE SCREEN URINE: NOT DETECTED
COLOR: YELLOW
CREAT SERPL-MCNC: 0.9 MG/DL (ref 0.7–1.2)
EKG ATRIAL RATE: 81 BPM
EKG P AXIS: 88 DEGREES
EKG P-R INTERVAL: 144 MS
EKG Q-T INTERVAL: 356 MS
EKG QRS DURATION: 86 MS
EKG QTC CALCULATION (BAZETT): 413 MS
EKG R AXIS: 88 DEGREES
EKG T AXIS: 65 DEGREES
EKG VENTRICULAR RATE: 81 BPM
EOSINOPHILS ABSOLUTE: 0.11 E9/L (ref 0.05–0.5)
EOSINOPHILS RELATIVE PERCENT: 0.9 % (ref 0–6)
GFR AFRICAN AMERICAN: >60
GFR NON-AFRICAN AMERICAN: >60 ML/MIN/1.73
GLUCOSE BLD-MCNC: 89 MG/DL (ref 74–109)
GLUCOSE BLD-MCNC: 94 MG/DL
GLUCOSE URINE: NEGATIVE MG/DL
HCT VFR BLD CALC: 44.2 % (ref 37–54)
HEMOGLOBIN: 15.1 G/DL (ref 12.5–16.5)
IMMATURE GRANULOCYTES #: 0.09 E9/L
IMMATURE GRANULOCYTES %: 0.7 % (ref 0–5)
KETONES, URINE: NEGATIVE MG/DL
LACTIC ACID: 0.9 MMOL/L (ref 0.5–2.2)
LEUKOCYTE ESTERASE, URINE: NEGATIVE
LIPASE: 20 U/L (ref 13–60)
LYMPHOCYTES ABSOLUTE: 2.66 E9/L (ref 1.5–4)
LYMPHOCYTES RELATIVE PERCENT: 21.3 % (ref 20–42)
MCH RBC QN AUTO: 30.9 PG (ref 26–35)
MCHC RBC AUTO-ENTMCNC: 34.2 % (ref 32–34.5)
MCV RBC AUTO: 90.6 FL (ref 80–99.9)
METER GLUCOSE: 94 MG/DL (ref 70–110)
METHADONE SCREEN, URINE: NOT DETECTED
MONOCYTES ABSOLUTE: 0.66 E9/L (ref 0.1–0.95)
MONOCYTES RELATIVE PERCENT: 5.3 % (ref 2–12)
NEUTROPHILS ABSOLUTE: 8.87 E9/L (ref 1.8–7.3)
NEUTROPHILS RELATIVE PERCENT: 71.2 % (ref 43–80)
NITRITE, URINE: NEGATIVE
OPIATE SCREEN URINE: NOT DETECTED
PDW BLD-RTO: 14.2 FL (ref 11.5–15)
PH UA: 6 (ref 5–9)
PHENCYCLIDINE SCREEN URINE: NOT DETECTED
PLATELET # BLD: 304 E9/L (ref 130–450)
PMV BLD AUTO: 9.8 FL (ref 7–12)
POTASSIUM SERPL-SCNC: 3.9 MMOL/L (ref 3.5–5)
PROPOXYPHENE SCREEN: NOT DETECTED
PROTEIN UA: NEGATIVE MG/DL
RBC # BLD: 4.88 E12/L (ref 3.8–5.8)
SODIUM BLD-SCNC: 137 MMOL/L (ref 132–146)
SPECIFIC GRAVITY UA: 1.01 (ref 1–1.03)
TOTAL PROTEIN: 7.1 G/DL (ref 6.4–8.3)
TROPONIN: <0.01 NG/ML (ref 0–0.03)
UROBILINOGEN, URINE: 0.2 E.U./DL
WBC # BLD: 12.5 E9/L (ref 4.5–11.5)

## 2018-09-23 PROCEDURE — 36415 COLL VENOUS BLD VENIPUNCTURE: CPT

## 2018-09-23 PROCEDURE — 84484 ASSAY OF TROPONIN QUANT: CPT

## 2018-09-23 PROCEDURE — 99284 EMERGENCY DEPT VISIT MOD MDM: CPT

## 2018-09-23 PROCEDURE — 81003 URINALYSIS AUTO W/O SCOPE: CPT

## 2018-09-23 PROCEDURE — 82962 GLUCOSE BLOOD TEST: CPT

## 2018-09-23 PROCEDURE — 2580000003 HC RX 258: Performed by: EMERGENCY MEDICINE

## 2018-09-23 PROCEDURE — 85025 COMPLETE CBC W/AUTO DIFF WBC: CPT

## 2018-09-23 PROCEDURE — 83605 ASSAY OF LACTIC ACID: CPT

## 2018-09-23 PROCEDURE — 80307 DRUG TEST PRSMV CHEM ANLYZR: CPT

## 2018-09-23 PROCEDURE — 96360 HYDRATION IV INFUSION INIT: CPT

## 2018-09-23 PROCEDURE — 83690 ASSAY OF LIPASE: CPT

## 2018-09-23 PROCEDURE — 80053 COMPREHEN METABOLIC PANEL: CPT

## 2018-09-23 RX ORDER — 0.9 % SODIUM CHLORIDE 0.9 %
1000 INTRAVENOUS SOLUTION INTRAVENOUS ONCE
Status: COMPLETED | OUTPATIENT
Start: 2018-09-23 | End: 2018-09-23

## 2018-09-23 RX ORDER — ONDANSETRON 4 MG/1
4 TABLET, ORALLY DISINTEGRATING ORAL EVERY 8 HOURS PRN
Qty: 24 TABLET | Refills: 0 | Status: ON HOLD | OUTPATIENT
Start: 2018-09-23 | End: 2020-11-05 | Stop reason: ALTCHOICE

## 2018-09-23 RX ORDER — SODIUM CHLORIDE 0.9 % (FLUSH) 0.9 %
10 SYRINGE (ML) INJECTION PRN
Status: DISCONTINUED | OUTPATIENT
Start: 2018-09-23 | End: 2018-09-23 | Stop reason: HOSPADM

## 2018-09-23 RX ADMIN — SODIUM CHLORIDE 1000 ML: 9 INJECTION, SOLUTION INTRAVENOUS at 16:27

## 2018-09-23 ASSESSMENT — ENCOUNTER SYMPTOMS
COUGH: 0
WHEEZING: 0
EYE REDNESS: 0
BACK PAIN: 0
SHORTNESS OF BREATH: 0
VOMITING: 0
EYE DISCHARGE: 0
ABDOMINAL PAIN: 1
DIARRHEA: 1
EYE PAIN: 0
SORE THROAT: 0
NAUSEA: 1
SINUS PRESSURE: 0

## 2018-09-23 NOTE — ED PROVIDER NOTES
(09/19/2017); pr secd clos surg wnd exten/complic (N/A, 0/37/4797); Upper gastrointestinal endoscopy (4/9/2018); Abdomen surgery (N/A, 03/15/2018); Upper gastrointestinal endoscopy (N/A, 5/21/2018); and pr lap,cholecystectomy (N/A, 5/29/2018). Social History:  reports that he has been smoking Cigarettes. He has a 13.50 pack-year smoking history. He has never used smokeless tobacco. He reports that he drinks alcohol. He reports that he does not use drugs. Family History: family history includes Crohn's Disease in his sister; High Blood Pressure in his father; Mental Illness in his father. The patients home medications have been reviewed.     Allergies: Effexor [venlafaxine hydrochloride] and Biaxin [clarithromycin]    -------------------------------------------------- RESULTS -------------------------------------------------  Labs:  Results for orders placed or performed during the hospital encounter of 09/23/18   CBC Auto Differential   Result Value Ref Range    WBC 12.5 (H) 4.5 - 11.5 E9/L    RBC 4.88 3.80 - 5.80 E12/L    Hemoglobin 15.1 12.5 - 16.5 g/dL    Hematocrit 44.2 37.0 - 54.0 %    MCV 90.6 80.0 - 99.9 fL    MCH 30.9 26.0 - 35.0 pg    MCHC 34.2 32.0 - 34.5 %    RDW 14.2 11.5 - 15.0 fL    Platelets 765 293 - 617 E9/L    MPV 9.8 7.0 - 12.0 fL    Neutrophils % 71.2 43.0 - 80.0 %    Immature Granulocytes % 0.7 0.0 - 5.0 %    Lymphocytes % 21.3 20.0 - 42.0 %    Monocytes % 5.3 2.0 - 12.0 %    Eosinophils % 0.9 0.0 - 6.0 %    Basophils % 0.6 0.0 - 2.0 %    Neutrophils # 8.87 (H) 1.80 - 7.30 E9/L    Immature Granulocytes # 0.09 E9/L    Lymphocytes # 2.66 1.50 - 4.00 E9/L    Monocytes # 0.66 0.10 - 0.95 E9/L    Eosinophils # 0.11 0.05 - 0.50 E9/L    Basophils # 0.07 0.00 - 0.20 E9/L   Comprehensive Metabolic Panel   Result Value Ref Range    Sodium 137 132 - 146 mmol/L    Potassium 3.9 3.5 - 5.0 mmol/L    Chloride 100 98 - 107 mmol/L    CO2 24 22 - 29 mmol/L    Anion Gap 13 7 - 16 mmol/L    Glucose 89 74

## 2018-11-27 ENCOUNTER — APPOINTMENT (OUTPATIENT)
Dept: GENERAL RADIOLOGY | Age: 43
End: 2018-11-27
Payer: COMMERCIAL

## 2018-11-27 ENCOUNTER — HOSPITAL ENCOUNTER (EMERGENCY)
Age: 43
Discharge: HOME | End: 2018-11-27
Attending: EMERGENCY MEDICINE
Payer: COMMERCIAL

## 2018-11-27 VITALS
BODY MASS INDEX: 24.71 KG/M2 | WEIGHT: 163 LBS | HEIGHT: 68 IN | DIASTOLIC BLOOD PRESSURE: 62 MMHG | SYSTOLIC BLOOD PRESSURE: 132 MMHG | HEART RATE: 77 BPM | RESPIRATION RATE: 18 BRPM | OXYGEN SATURATION: 100 % | TEMPERATURE: 98.2 F

## 2018-11-27 DIAGNOSIS — R07.89 CHEST WALL PAIN: ICD-10-CM

## 2018-11-27 DIAGNOSIS — R00.2 PALPITATIONS: Primary | ICD-10-CM

## 2018-11-27 LAB
ACETAMINOPHEN LEVEL: <5 MCG/ML (ref 10–30)
ALBUMIN SERPL-MCNC: 4.6 G/DL (ref 3.5–5.2)
ALP BLD-CCNC: 80 U/L (ref 40–129)
ALT SERPL-CCNC: 27 U/L (ref 0–40)
ANION GAP SERPL CALCULATED.3IONS-SCNC: 13 MMOL/L (ref 7–16)
AST SERPL-CCNC: 16 U/L (ref 0–39)
BASOPHILS ABSOLUTE: 0.08 E9/L (ref 0–0.2)
BASOPHILS RELATIVE PERCENT: 0.9 % (ref 0–2)
BILIRUB SERPL-MCNC: 0.2 MG/DL (ref 0–1.2)
BILIRUBIN DIRECT: <0.2 MG/DL (ref 0–0.3)
BILIRUBIN, INDIRECT: NORMAL MG/DL (ref 0–1)
BUN BLDV-MCNC: 11 MG/DL (ref 6–20)
CALCIUM SERPL-MCNC: 9.1 MG/DL (ref 8.6–10.2)
CHLORIDE BLD-SCNC: 103 MMOL/L (ref 98–107)
CO2: 22 MMOL/L (ref 22–29)
CREAT SERPL-MCNC: 1 MG/DL (ref 0.7–1.2)
D DIMER: <200 NG/ML DDU
EKG ATRIAL RATE: 92 BPM
EKG P AXIS: 71 DEGREES
EKG P-R INTERVAL: 138 MS
EKG Q-T INTERVAL: 346 MS
EKG QRS DURATION: 88 MS
EKG QTC CALCULATION (BAZETT): 427 MS
EKG R AXIS: 75 DEGREES
EKG T AXIS: 57 DEGREES
EKG VENTRICULAR RATE: 92 BPM
EOSINOPHILS ABSOLUTE: 0.23 E9/L (ref 0.05–0.5)
EOSINOPHILS RELATIVE PERCENT: 2.5 % (ref 0–6)
ETHANOL: <10 MG/DL (ref 0–0.08)
GFR AFRICAN AMERICAN: >60
GFR NON-AFRICAN AMERICAN: >60 ML/MIN/1.73
GLUCOSE BLD-MCNC: 117 MG/DL (ref 74–99)
HCT VFR BLD CALC: 45.7 % (ref 37–54)
HEMOGLOBIN: 15 G/DL (ref 12.5–16.5)
IMMATURE GRANULOCYTES #: 0.05 E9/L
IMMATURE GRANULOCYTES %: 0.5 % (ref 0–5)
LYMPHOCYTES ABSOLUTE: 2.81 E9/L (ref 1.5–4)
LYMPHOCYTES RELATIVE PERCENT: 30.5 % (ref 20–42)
MCH RBC QN AUTO: 29.9 PG (ref 26–35)
MCHC RBC AUTO-ENTMCNC: 32.8 % (ref 32–34.5)
MCV RBC AUTO: 91.2 FL (ref 80–99.9)
MONOCYTES ABSOLUTE: 0.61 E9/L (ref 0.1–0.95)
MONOCYTES RELATIVE PERCENT: 6.6 % (ref 2–12)
NEUTROPHILS ABSOLUTE: 5.43 E9/L (ref 1.8–7.3)
NEUTROPHILS RELATIVE PERCENT: 59 % (ref 43–80)
PDW BLD-RTO: 13.7 FL (ref 11.5–15)
PLATELET # BLD: 283 E9/L (ref 130–450)
PMV BLD AUTO: 10.4 FL (ref 7–12)
POTASSIUM REFLEX MAGNESIUM: 4.1 MMOL/L (ref 3.5–5)
RBC # BLD: 5.01 E12/L (ref 3.8–5.8)
SALICYLATE, SERUM: <0.3 MG/DL (ref 0–30)
SODIUM BLD-SCNC: 138 MMOL/L (ref 132–146)
TOTAL PROTEIN: 7.1 G/DL (ref 6.4–8.3)
TRICYCLIC ANTIDEPRESSANTS SCREEN SERUM: NEGATIVE NG/ML
TROPONIN: <0.01 NG/ML (ref 0–0.03)
TSH SERPL DL<=0.05 MIU/L-ACNC: 1.13 UIU/ML (ref 0.27–4.2)
WBC # BLD: 9.2 E9/L (ref 4.5–11.5)

## 2018-11-27 PROCEDURE — 99285 EMERGENCY DEPT VISIT HI MDM: CPT

## 2018-11-27 PROCEDURE — 71046 X-RAY EXAM CHEST 2 VIEWS: CPT

## 2018-11-27 PROCEDURE — 80076 HEPATIC FUNCTION PANEL: CPT

## 2018-11-27 PROCEDURE — 85025 COMPLETE CBC W/AUTO DIFF WBC: CPT

## 2018-11-27 PROCEDURE — 84443 ASSAY THYROID STIM HORMONE: CPT

## 2018-11-27 PROCEDURE — 85378 FIBRIN DEGRADE SEMIQUANT: CPT

## 2018-11-27 PROCEDURE — 84484 ASSAY OF TROPONIN QUANT: CPT

## 2018-11-27 PROCEDURE — 80048 BASIC METABOLIC PNL TOTAL CA: CPT

## 2018-11-27 PROCEDURE — 80307 DRUG TEST PRSMV CHEM ANLYZR: CPT

## 2018-11-27 PROCEDURE — G0480 DRUG TEST DEF 1-7 CLASSES: HCPCS

## 2018-11-27 ASSESSMENT — PAIN DESCRIPTION - LOCATION: LOCATION: CHEST

## 2018-11-27 ASSESSMENT — PAIN SCALES - GENERAL: PAINLEVEL_OUTOF10: 5

## 2018-11-27 ASSESSMENT — PAIN DESCRIPTION - PAIN TYPE: TYPE: ACUTE PAIN

## 2018-11-27 NOTE — ED PROVIDER NOTES
Pressure in his father; Mental Illness in his father. The patients home medications have been reviewed.     Allergies: Effexor [venlafaxine hydrochloride] and Biaxin [clarithromycin]    -------------------------------------------------- RESULTS -------------------------------------------------  All laboratory and radiology results have been personally reviewed by myself   LABS:  Results for orders placed or performed during the hospital encounter of 11/27/18   CBC Auto Differential   Result Value Ref Range    WBC 9.2 4.5 - 11.5 E9/L    RBC 5.01 3.80 - 5.80 E12/L    Hemoglobin 15.0 12.5 - 16.5 g/dL    Hematocrit 45.7 37.0 - 54.0 %    MCV 91.2 80.0 - 99.9 fL    MCH 29.9 26.0 - 35.0 pg    MCHC 32.8 32.0 - 34.5 %    RDW 13.7 11.5 - 15.0 fL    Platelets 699 343 - 806 E9/L    MPV 10.4 7.0 - 12.0 fL    Neutrophils % 59.0 43.0 - 80.0 %    Immature Granulocytes % 0.5 0.0 - 5.0 %    Lymphocytes % 30.5 20.0 - 42.0 %    Monocytes % 6.6 2.0 - 12.0 %    Eosinophils % 2.5 0.0 - 6.0 %    Basophils % 0.9 0.0 - 2.0 %    Neutrophils # 5.43 1.80 - 7.30 E9/L    Immature Granulocytes # 0.05 E9/L    Lymphocytes # 2.81 1.50 - 4.00 E9/L    Monocytes # 0.61 0.10 - 0.95 E9/L    Eosinophils # 0.23 0.05 - 0.50 E9/L    Basophils # 0.08 0.00 - 0.20 X2/H   Basic Metabolic Panel w/ Reflex to MG   Result Value Ref Range    Sodium 138 132 - 146 mmol/L    Potassium reflex Magnesium 4.1 3.5 - 5.0 mmol/L    Chloride 103 98 - 107 mmol/L    CO2 22 22 - 29 mmol/L    Anion Gap 13 7 - 16 mmol/L    Glucose 117 (H) 74 - 99 mg/dL    BUN 11 6 - 20 mg/dL    CREATININE 1.0 0.7 - 1.2 mg/dL    GFR Non-African American >60 >=60 mL/min/1.73    GFR African American >60     Calcium 9.1 8.6 - 10.2 mg/dL   Troponin   Result Value Ref Range    Troponin <0.01 0.00 - 0.03 ng/mL   D-Dimer, Quantitative   Result Value Ref Range    D-Dimer, Quant <200 ng/mL DDU   TSH without Reflex   Result Value Ref Range    TSH 1.130 0.270 - 4.200 uIU/mL   Serum Drug Screen   Result Value perfused, a negative Homans and Kyle sign no lower extremity edema  Skin: warm and dry without rash  Neurologic: GCS 15, normal gait and ambulation. Psych: Anxious and odd      ------------------------------ ED COURSE/MEDICAL DECISION MAKING----------------------  Medications - No data to display    EKG: This EKG is signed and interpreted by me. [id number]  Rate: 92  Rhythm: Sinus  Interpretation: no acute changes  Comparison: no previous EKG available      ED COURSE:       Medical Decision Making:    Patient has chest wall pain is reproducible touch. He's also had palpitations. Telemetry and all testing here were unremarkable. We instructed patient follow-up in 2-3 days with his primary care physician for reevaluation and recheck. We instructed him that he should discuss with his PCP Holter monitoring. We gave him warning signs for when to return. Counseling: The emergency provider has spoken with the patient and discussed todays results, in addition to providing specific details for the plan of care and counseling regarding the diagnosis and prognosis. Questions are answered at this time and they are agreeable with the plan.      --------------------------------- IMPRESSION AND DISPOSITION ---------------------------------    IMPRESSION  1. Palpitations    2. Chest wall pain        DISPOSITION  Disposition: Discharge to home  Patient condition is good      NOTE: This report was transcribed using voice recognition software.  Every effort was made to ensure accuracy; however, inadvertent computerized transcription errors may be present       David Fraser DO  11/27/18 [id number]

## 2019-08-04 ENCOUNTER — HOSPITAL ENCOUNTER (EMERGENCY)
Age: 44
Discharge: HOME | End: 2019-08-04
Payer: COMMERCIAL

## 2019-08-04 VITALS
SYSTOLIC BLOOD PRESSURE: 132 MMHG | TEMPERATURE: 98 F | RESPIRATION RATE: 18 BRPM | OXYGEN SATURATION: 98 % | DIASTOLIC BLOOD PRESSURE: 89 MMHG | HEIGHT: 68 IN | BODY MASS INDEX: 27.58 KG/M2 | HEART RATE: 76 BPM | WEIGHT: 182 LBS
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
## 2019-08-04 DIAGNOSIS — Z23 NEED FOR TDAP VACCINATION: ICD-10-CM

## 2019-08-04 DIAGNOSIS — S60.862A: Primary | ICD-10-CM

## 2019-08-04 PROCEDURE — 6370000000 HC RX 637 (ALT 250 FOR IP): Performed by: NURSE PRACTITIONER

## 2019-08-04 PROCEDURE — 90471 IMMUNIZATION ADMIN: CPT | Performed by: NURSE PRACTITIONER

## 2019-08-04 PROCEDURE — 90715 TDAP VACCINE 7 YRS/> IM: CPT | Performed by: NURSE PRACTITIONER

## 2019-08-04 PROCEDURE — 6360000002 HC RX W HCPCS: Performed by: NURSE PRACTITIONER

## 2019-08-04 PROCEDURE — 99283 EMERGENCY DEPT VISIT LOW MDM: CPT

## 2019-08-04 RX ORDER — IBUPROFEN 800 MG/1
800 TABLET ORAL ONCE
Status: COMPLETED | OUTPATIENT
Start: 2019-08-04 | End: 2019-08-04

## 2019-08-04 RX ORDER — DIPHENHYDRAMINE HCL 25 MG
25 TABLET ORAL EVERY 6 HOURS PRN
Qty: 10 TABLET | Refills: 0 | Status: SHIPPED | OUTPATIENT
Start: 2019-08-04 | End: 2019-10-31

## 2019-08-04 RX ORDER — SULFAMETHOXAZOLE AND TRIMETHOPRIM 800; 160 MG/1; MG/1
1 TABLET ORAL 2 TIMES DAILY
Qty: 20 TABLET | Refills: 0 | Status: SHIPPED | OUTPATIENT
Start: 2019-08-04 | End: 2019-08-14

## 2019-08-04 RX ORDER — NAPROXEN 500 MG/1
500 TABLET ORAL 2 TIMES DAILY PRN
Qty: 14 TABLET | Refills: 0 | Status: ON HOLD | OUTPATIENT
Start: 2019-08-04 | End: 2020-11-06 | Stop reason: HOSPADM

## 2019-08-04 RX ADMIN — IBUPROFEN 800 MG: 800 TABLET, FILM COATED ORAL at 08:56

## 2019-08-04 RX ADMIN — TETANUS TOXOID, REDUCED DIPHTHERIA TOXOID AND ACELLULAR PERTUSSIS VACCINE, ADSORBED 0.5 ML: 5; 2.5; 8; 8; 2.5 SUSPENSION INTRAMUSCULAR at 08:57

## 2019-08-04 ASSESSMENT — PAIN SCALES - GENERAL
PAINLEVEL_OUTOF10: 2
PAINLEVEL_OUTOF10: 2

## 2019-08-04 NOTE — ED PROVIDER NOTES
[facility]           Department of Emergency Medicine   ED  Provider Note  Admit Date/RoomTime: 8/4/2019  8:44 AM  ED Room: 35/  Chief Complaint   Insect Bite (possible spider bite to left wrist yesterday- hand N/T) and Hand Numbness    History of Present Illness   Source of history provided by:  patient. History/Exam Limitations: none. Obdulia Dudley is a 41 y.o. old male presenting to the emergency department by private vehicle, for insect bite to left wrist on the ulnar aspect that is has slight erythema and states that he woke up this morning and he had some swelling to the hand with numbness and tingling in the fingers. Cause of complaint: He was working outside yesterday thinks he was bitten by a spider. There has been a history of no prior problems with this area in the past.   He is right handed. The patients tetanus status is unknown. Since onset the symptoms have been minimal in degree. His denies any pain. He states he did not want to come to the ED but his girlfriend made him come said there was some drainage to the site. Denies any previous history of MRSA. He denies any fever, chills, shortness of breath, wheezing, tongue swelling or any other symptoms. He denies any tick removals. ROS    Pertinent positives and negatives are stated within HPI, all other systems reviewed and are negative. Past Surgical History:  has a past surgical history that includes Colonoscopy (2016); hernia repair (09/19/2017); pr secd clos surg wnd exten/complic (N/A, 8/95/6946); Upper gastrointestinal endoscopy (4/9/2018); Abdomen surgery (N/A, 03/15/2018); Upper gastrointestinal endoscopy (N/A, 5/21/2018); and pr lap,cholecystectomy (N/A, 5/29/2018). Social History:  reports that he has been smoking cigarettes. He has a 9.00 pack-year smoking history. He has never used smokeless tobacco. He reports that he drinks alcohol. He reports that he does not use drugs.   Family History: family history 8/4/19 [id number])        Consult(s):   None    Procedure(s):   none    MDM:    Insect bite with local reaction patient concern for infection we will treat with Bactroban and Bactrim there is no cellulitis. Full sensation intact to the fingers even though patient reports he has numbness tingling. His tetanus was updated today. He was instructed to follow-up with his PCP for reevaluation of any worsening symptoms. Patient has no signs of allergic reaction or respiratory distress. Counseling: The emergency provider has spoken with the patient and discussed todays results, in addition to providing specific details for the plan of care and counseling regarding the diagnosis and prognosis. Questions are answered at this time and they are agreeable with the plan. Assessment      1. Insect bite of left wrist with local reaction, initial encounter    2. Need for Tdap vaccination      Plan   Discharge to home  Patient condition is good    New Medications     New Prescriptions    DIPHENHYDRAMINE (BENADRYL ALLERGY) 25 MG TABLET    Take 1 tablet by mouth every 6 hours as needed for Itching    MUPIROCIN (BACTROBAN) 2 % OINTMENT    Apply topically to affected area 3 times daily for 10 days. Dispense QS. No refills    NAPROXEN (NAPROSYN) 500 MG TABLET    Take 1 tablet by mouth 2 times daily as needed for Pain (take with food and full glass of water.)    SULFAMETHOXAZOLE-TRIMETHOPRIM (BACTRIM DS) 800-160 MG PER TABLET    Take 1 tablet by mouth 2 times daily for 10 days     Electronically signed by BECK Pinedo CNP   DD: 8/4/19  **This report was transcribed using voice recognition software. Every effort was made to ensure accuracy; however, inadvertent computerized transcription errors may be present.   END OF ED PROVIDER NOTE      BECK Pinedo CNP  08/04/19 [id number]

## 2019-10-31 ENCOUNTER — OFFICE VISIT (OUTPATIENT)
Dept: SURGERY | Age: 44
End: 2019-10-31
Payer: COMMERCIAL

## 2019-10-31 VITALS
WEIGHT: 185.2 LBS | TEMPERATURE: 98.4 F | HEIGHT: 68 IN | BODY MASS INDEX: 28.07 KG/M2 | RESPIRATION RATE: 16 BRPM | DIASTOLIC BLOOD PRESSURE: 86 MMHG | HEART RATE: 106 BPM | OXYGEN SATURATION: 96 % | SYSTOLIC BLOOD PRESSURE: 125 MMHG

## 2019-10-31 DIAGNOSIS — K90.9 DIARRHEA DUE TO MALABSORPTION: ICD-10-CM

## 2019-10-31 DIAGNOSIS — R19.7 DIARRHEA DUE TO MALABSORPTION: ICD-10-CM

## 2019-10-31 DIAGNOSIS — R10.84 ABDOMINAL PAIN, GENERALIZED: Primary | ICD-10-CM

## 2019-10-31 PROCEDURE — G8419 CALC BMI OUT NRM PARAM NOF/U: HCPCS | Performed by: SURGERY

## 2019-10-31 PROCEDURE — 4004F PT TOBACCO SCREEN RCVD TLK: CPT | Performed by: SURGERY

## 2019-10-31 PROCEDURE — 99214 OFFICE O/P EST MOD 30 MIN: CPT | Performed by: SURGERY

## 2019-10-31 PROCEDURE — G8427 DOCREV CUR MEDS BY ELIG CLIN: HCPCS | Performed by: SURGERY

## 2019-10-31 PROCEDURE — G8484 FLU IMMUNIZE NO ADMIN: HCPCS | Performed by: SURGERY

## 2019-10-31 RX ORDER — DIPHENHYDRAMINE HYDROCHLORIDE 25 MG/1
CAPSULE ORAL
Refills: 0 | Status: ON HOLD | COMMUNITY
Start: 2019-08-04 | End: 2020-11-05 | Stop reason: ALTCHOICE

## 2019-10-31 RX ORDER — IBUPROFEN 600 MG/1
TABLET ORAL
Refills: 0 | Status: ON HOLD | COMMUNITY
Start: 2019-09-11 | End: 2020-11-06 | Stop reason: HOSPADM

## 2019-10-31 RX ORDER — OMEPRAZOLE 40 MG/1
40 CAPSULE, DELAYED RELEASE ORAL
Refills: 0 | COMMUNITY
Start: 2019-10-25

## 2019-10-31 RX ORDER — DEXAMETHASONE 4 MG/1
TABLET ORAL
Refills: 5 | COMMUNITY
Start: 2019-10-16 | End: 2021-01-30

## 2019-10-31 RX ORDER — AMOXICILLIN 500 MG/1
CAPSULE ORAL
Refills: 0 | Status: ON HOLD | COMMUNITY
Start: 2019-09-11 | End: 2020-11-05 | Stop reason: ALTCHOICE

## 2019-10-31 RX ORDER — METFORMIN HYDROCHLORIDE 500 MG/1
500 TABLET, EXTENDED RELEASE ORAL
COMMUNITY
Start: 2019-10-29 | End: 2021-01-30

## 2019-10-31 RX ORDER — CHOLESTYRAMINE 4 G/9G
1 POWDER, FOR SUSPENSION ORAL 2 TIMES DAILY
Qty: 90 PACKET | Refills: 3 | Status: SHIPPED | OUTPATIENT
Start: 2019-10-31 | End: 2020-08-26

## 2019-10-31 RX ORDER — CHLORHEXIDINE GLUCONATE 0.12 MG/ML
RINSE ORAL
Refills: 0 | Status: ON HOLD | COMMUNITY
Start: 2019-09-11 | End: 2020-11-05 | Stop reason: ALTCHOICE

## 2019-10-31 RX ORDER — BUSPIRONE HYDROCHLORIDE 15 MG/1
15 TABLET ORAL NIGHTLY
COMMUNITY
Start: 2019-10-30

## 2019-10-31 RX ORDER — ACETAMINOPHEN AND CODEINE PHOSPHATE 300; 30 MG/1; MG/1
TABLET ORAL
Refills: 0 | Status: ON HOLD | COMMUNITY
Start: 2019-09-11 | End: 2020-11-05 | Stop reason: ALTCHOICE

## 2019-11-13 ENCOUNTER — HOSPITAL ENCOUNTER (OUTPATIENT)
Dept: CT IMAGING | Age: 44
Discharge: HOME | End: 2019-11-15
Payer: COMMERCIAL

## 2019-11-13 DIAGNOSIS — R10.84 ABDOMINAL PAIN, GENERALIZED: ICD-10-CM

## 2019-11-13 PROCEDURE — 6360000004 HC RX CONTRAST MEDICATION: Performed by: RADIOLOGY

## 2019-11-13 PROCEDURE — 2580000003 HC RX 258: Performed by: RADIOLOGY

## 2019-11-13 PROCEDURE — 74177 CT ABD & PELVIS W/CONTRAST: CPT

## 2019-11-13 RX ORDER — SODIUM CHLORIDE 0.9 % (FLUSH) 0.9 %
10 SYRINGE (ML) INJECTION PRN
Status: COMPLETED | OUTPATIENT
Start: 2019-11-13 | End: 2019-11-13

## 2019-11-13 RX ADMIN — Medication 10 ML: at 16:19

## 2019-11-13 RX ADMIN — IOPAMIDOL 110 ML: 755 INJECTION, SOLUTION INTRAVENOUS at 16:21

## 2019-11-13 RX ADMIN — IOHEXOL 50 ML: 240 INJECTION, SOLUTION INTRATHECAL; INTRAVASCULAR; INTRAVENOUS; ORAL at 15:20

## 2019-11-14 ENCOUNTER — HOSPITAL ENCOUNTER (OUTPATIENT)
Age: 44
Discharge: HOME | End: 2019-11-16
Payer: COMMERCIAL

## 2019-11-14 ENCOUNTER — OFFICE VISIT (OUTPATIENT)
Dept: SURGERY | Age: 44
End: 2019-11-14
Payer: COMMERCIAL

## 2019-11-14 VITALS
DIASTOLIC BLOOD PRESSURE: 76 MMHG | HEART RATE: 94 BPM | TEMPERATURE: 98.3 F | OXYGEN SATURATION: 93 % | SYSTOLIC BLOOD PRESSURE: 119 MMHG | HEIGHT: 68 IN | RESPIRATION RATE: 18 BRPM | WEIGHT: 178 LBS | BODY MASS INDEX: 26.98 KG/M2

## 2019-11-14 DIAGNOSIS — K90.9 DIARRHEA DUE TO MALABSORPTION: ICD-10-CM

## 2019-11-14 DIAGNOSIS — R11.0 NAUSEA: ICD-10-CM

## 2019-11-14 DIAGNOSIS — R10.84 ABDOMINAL PAIN, GENERALIZED: Primary | ICD-10-CM

## 2019-11-14 DIAGNOSIS — R14.0 BLOATING: Primary | ICD-10-CM

## 2019-11-14 DIAGNOSIS — R19.7 DIARRHEA DUE TO MALABSORPTION: ICD-10-CM

## 2019-11-14 PROCEDURE — 4004F PT TOBACCO SCREEN RCVD TLK: CPT | Performed by: SURGERY

## 2019-11-14 PROCEDURE — 99213 OFFICE O/P EST LOW 20 MIN: CPT | Performed by: SURGERY

## 2019-11-14 PROCEDURE — G8419 CALC BMI OUT NRM PARAM NOF/U: HCPCS | Performed by: SURGERY

## 2019-11-14 PROCEDURE — G8427 DOCREV CUR MEDS BY ELIG CLIN: HCPCS | Performed by: SURGERY

## 2019-11-14 PROCEDURE — G8484 FLU IMMUNIZE NO ADMIN: HCPCS | Performed by: SURGERY

## 2019-11-15 ENCOUNTER — TELEPHONE (OUTPATIENT)
Dept: SURGERY | Age: 44
End: 2019-11-15

## 2019-11-27 ENCOUNTER — HOSPITAL ENCOUNTER (OUTPATIENT)
Age: 44
Discharge: HOME | End: 2019-11-27
Payer: COMMERCIAL

## 2019-11-27 ENCOUNTER — HOSPITAL ENCOUNTER (OUTPATIENT)
Dept: NUCLEAR MEDICINE | Age: 44
Discharge: HOME | End: 2019-11-27
Payer: COMMERCIAL

## 2019-11-27 DIAGNOSIS — R11.0 NAUSEA: ICD-10-CM

## 2019-11-27 DIAGNOSIS — R14.0 BLOATING: ICD-10-CM

## 2019-11-27 DIAGNOSIS — R10.84 ABDOMINAL PAIN, GENERALIZED: ICD-10-CM

## 2019-11-27 PROCEDURE — 78264 GASTRIC EMPTYING IMG STUDY: CPT

## 2019-11-27 PROCEDURE — A9541 TC99M SULFUR COLLOID: HCPCS | Performed by: RADIOLOGY

## 2019-11-27 PROCEDURE — 80074 ACUTE HEPATITIS PANEL: CPT

## 2019-11-27 PROCEDURE — 36415 COLL VENOUS BLD VENIPUNCTURE: CPT

## 2019-11-27 PROCEDURE — 3430000000 HC RX DIAGNOSTIC RADIOPHARMACEUTICAL: Performed by: RADIOLOGY

## 2019-11-27 RX ADMIN — Medication 1 MILLICURIE: at 10:10

## 2019-11-29 LAB
HAV IGM SER IA-ACNC: NORMAL
HEPATITIS B CORE IGM ANTIBODY: NORMAL
HEPATITIS B SURFACE ANTIGEN INTERPRETATION: NORMAL
HEPATITIS C ANTIBODY INTERPRETATION: NORMAL

## 2019-12-05 ENCOUNTER — OFFICE VISIT (OUTPATIENT)
Dept: SURGERY | Age: 44
End: 2019-12-05
Payer: COMMERCIAL

## 2019-12-05 VITALS
BODY MASS INDEX: 27.28 KG/M2 | SYSTOLIC BLOOD PRESSURE: 129 MMHG | HEIGHT: 68 IN | HEART RATE: 102 BPM | WEIGHT: 180 LBS | TEMPERATURE: 97.2 F | OXYGEN SATURATION: 97 % | RESPIRATION RATE: 20 BRPM | DIASTOLIC BLOOD PRESSURE: 77 MMHG

## 2019-12-05 DIAGNOSIS — K31.84 GASTROPARESIS: Primary | ICD-10-CM

## 2019-12-05 PROCEDURE — 99213 OFFICE O/P EST LOW 20 MIN: CPT | Performed by: SURGERY

## 2019-12-05 PROCEDURE — 4004F PT TOBACCO SCREEN RCVD TLK: CPT | Performed by: SURGERY

## 2019-12-05 PROCEDURE — G8427 DOCREV CUR MEDS BY ELIG CLIN: HCPCS | Performed by: SURGERY

## 2019-12-05 PROCEDURE — G8484 FLU IMMUNIZE NO ADMIN: HCPCS | Performed by: SURGERY

## 2019-12-05 PROCEDURE — G8419 CALC BMI OUT NRM PARAM NOF/U: HCPCS | Performed by: SURGERY

## 2019-12-05 RX ORDER — METOCLOPRAMIDE 10 MG/1
10 TABLET ORAL 4 TIMES DAILY PRN
Qty: 120 TABLET | Refills: 3 | Status: SHIPPED | OUTPATIENT
Start: 2019-12-05 | End: 2021-08-23

## 2020-08-19 ENCOUNTER — TELEPHONE (OUTPATIENT)
Dept: SURGERY | Age: 45
End: 2020-08-19

## 2020-08-26 RX ORDER — CHOLESTYRAMINE 4 G/9G
1 POWDER, FOR SUSPENSION ORAL DAILY
Qty: 90 PACKET | Refills: 0 | OUTPATIENT
Start: 2020-08-26 | End: 2020-11-10

## 2020-11-05 ENCOUNTER — HOSPITAL ENCOUNTER (OUTPATIENT)
Age: 45
Setting detail: OBSERVATION
Discharge: HOME | End: 2020-11-06
Attending: EMERGENCY MEDICINE | Admitting: HOSPITALIST
Payer: COMMERCIAL

## 2020-11-05 ENCOUNTER — APPOINTMENT (OUTPATIENT)
Dept: GENERAL RADIOLOGY | Age: 45
End: 2020-11-05
Payer: COMMERCIAL

## 2020-11-05 PROBLEM — I48.91 ATRIAL FIBRILLATION (HCC): Status: ACTIVE | Noted: 2020-11-05

## 2020-11-05 PROBLEM — I48.91 A-FIB (HCC): Status: ACTIVE | Noted: 2020-11-05

## 2020-11-05 LAB
ANION GAP SERPL CALCULATED.3IONS-SCNC: 11 MMOL/L (ref 7–16)
BASOPHILS ABSOLUTE: 0.1 E9/L (ref 0–0.2)
BASOPHILS RELATIVE PERCENT: 0.7 % (ref 0–2)
BUN BLDV-MCNC: 13 MG/DL (ref 6–20)
CALCIUM SERPL-MCNC: 9.6 MG/DL (ref 8.6–10.2)
CHLORIDE BLD-SCNC: 102 MMOL/L (ref 98–107)
CO2: 24 MMOL/L (ref 22–29)
CREAT SERPL-MCNC: 1.1 MG/DL (ref 0.7–1.2)
EOSINOPHILS ABSOLUTE: 0.34 E9/L (ref 0.05–0.5)
EOSINOPHILS RELATIVE PERCENT: 2.5 % (ref 0–6)
GFR AFRICAN AMERICAN: >60
GFR NON-AFRICAN AMERICAN: >60 ML/MIN/1.73
GLUCOSE BLD-MCNC: 88 MG/DL (ref 74–99)
HCT VFR BLD CALC: 46.9 % (ref 37–54)
HEMOGLOBIN: 15.9 G/DL (ref 12.5–16.5)
IMMATURE GRANULOCYTES #: 0.05 E9/L
IMMATURE GRANULOCYTES %: 0.4 % (ref 0–5)
LYMPHOCYTES ABSOLUTE: 4.97 E9/L (ref 1.5–4)
LYMPHOCYTES RELATIVE PERCENT: 35.9 % (ref 20–42)
MCH RBC QN AUTO: 30.1 PG (ref 26–35)
MCHC RBC AUTO-ENTMCNC: 33.9 % (ref 32–34.5)
MCV RBC AUTO: 88.7 FL (ref 80–99.9)
MONOCYTES ABSOLUTE: 0.88 E9/L (ref 0.1–0.95)
MONOCYTES RELATIVE PERCENT: 6.3 % (ref 2–12)
NEUTROPHILS ABSOLUTE: 7.52 E9/L (ref 1.8–7.3)
NEUTROPHILS RELATIVE PERCENT: 54.2 % (ref 43–80)
PDW BLD-RTO: 14.1 FL (ref 11.5–15)
PLATELET # BLD: 334 E9/L (ref 130–450)
PMV BLD AUTO: 10.4 FL (ref 7–12)
POTASSIUM REFLEX MAGNESIUM: 3.7 MMOL/L (ref 3.5–5)
RBC # BLD: 5.29 E12/L (ref 3.8–5.8)
SODIUM BLD-SCNC: 137 MMOL/L (ref 132–146)
TROPONIN: <0.01 NG/ML (ref 0–0.03)
TSH SERPL DL<=0.05 MIU/L-ACNC: 1.66 UIU/ML (ref 0.27–4.2)
WBC # BLD: 13.9 E9/L (ref 4.5–11.5)

## 2020-11-05 PROCEDURE — 2580000003 HC RX 258: Performed by: STUDENT IN AN ORGANIZED HEALTH CARE EDUCATION/TRAINING PROGRAM

## 2020-11-05 PROCEDURE — 99220 PR INITIAL OBSERVATION CARE/DAY 70 MINUTES: CPT | Performed by: HOSPITALIST

## 2020-11-05 PROCEDURE — 80048 BASIC METABOLIC PNL TOTAL CA: CPT

## 2020-11-05 PROCEDURE — 99284 EMERGENCY DEPT VISIT MOD MDM: CPT

## 2020-11-05 PROCEDURE — 93005 ELECTROCARDIOGRAM TRACING: CPT | Performed by: STUDENT IN AN ORGANIZED HEALTH CARE EDUCATION/TRAINING PROGRAM

## 2020-11-05 PROCEDURE — 84443 ASSAY THYROID STIM HORMONE: CPT

## 2020-11-05 PROCEDURE — 6370000000 HC RX 637 (ALT 250 FOR IP): Performed by: HOSPITALIST

## 2020-11-05 PROCEDURE — 85025 COMPLETE CBC W/AUTO DIFF WBC: CPT

## 2020-11-05 PROCEDURE — 84484 ASSAY OF TROPONIN QUANT: CPT

## 2020-11-05 PROCEDURE — 2500000003 HC RX 250 WO HCPCS: Performed by: STUDENT IN AN ORGANIZED HEALTH CARE EDUCATION/TRAINING PROGRAM

## 2020-11-05 PROCEDURE — 96374 THER/PROPH/DIAG INJ IV PUSH: CPT

## 2020-11-05 PROCEDURE — 96365 THER/PROPH/DIAG IV INF INIT: CPT

## 2020-11-05 PROCEDURE — G0378 HOSPITAL OBSERVATION PER HR: HCPCS

## 2020-11-05 PROCEDURE — 71046 X-RAY EXAM CHEST 2 VIEWS: CPT

## 2020-11-05 PROCEDURE — 2580000003 HC RX 258: Performed by: HOSPITALIST

## 2020-11-05 RX ORDER — ROPINIROLE 0.5 MG/1
0.5 TABLET, FILM COATED ORAL NIGHTLY
Status: DISCONTINUED | OUTPATIENT
Start: 2020-11-05 | End: 2020-11-06 | Stop reason: HOSPADM

## 2020-11-05 RX ORDER — PROMETHAZINE HYDROCHLORIDE 25 MG/1
12.5 TABLET ORAL EVERY 6 HOURS PRN
Status: DISCONTINUED | OUTPATIENT
Start: 2020-11-05 | End: 2020-11-06 | Stop reason: HOSPADM

## 2020-11-05 RX ORDER — POLYETHYLENE GLYCOL 3350 17 G/17G
17 POWDER, FOR SOLUTION ORAL DAILY PRN
Status: DISCONTINUED | OUTPATIENT
Start: 2020-11-05 | End: 2020-11-06 | Stop reason: HOSPADM

## 2020-11-05 RX ORDER — TIZANIDINE 4 MG/1
4 TABLET ORAL 3 TIMES DAILY PRN
Status: DISCONTINUED | OUTPATIENT
Start: 2020-11-05 | End: 2020-11-06 | Stop reason: HOSPADM

## 2020-11-05 RX ORDER — CHOLESTYRAMINE 4 G/9G
1 POWDER, FOR SUSPENSION ORAL DAILY
Status: DISCONTINUED | OUTPATIENT
Start: 2020-11-06 | End: 2020-11-06 | Stop reason: HOSPADM

## 2020-11-05 RX ORDER — ACETAMINOPHEN 650 MG/1
650 SUPPOSITORY RECTAL EVERY 6 HOURS PRN
Status: DISCONTINUED | OUTPATIENT
Start: 2020-11-05 | End: 2020-11-06 | Stop reason: HOSPADM

## 2020-11-05 RX ORDER — SODIUM CHLORIDE 0.9 % (FLUSH) 0.9 %
10 SYRINGE (ML) INJECTION EVERY 12 HOURS SCHEDULED
Status: DISCONTINUED | OUTPATIENT
Start: 2020-11-05 | End: 2020-11-06 | Stop reason: HOSPADM

## 2020-11-05 RX ORDER — ACETAMINOPHEN 325 MG/1
650 TABLET ORAL EVERY 6 HOURS PRN
Status: DISCONTINUED | OUTPATIENT
Start: 2020-11-05 | End: 2020-11-06 | Stop reason: HOSPADM

## 2020-11-05 RX ORDER — BUSPIRONE HYDROCHLORIDE 5 MG/1
15 TABLET ORAL 2 TIMES DAILY
Status: DISCONTINUED | OUTPATIENT
Start: 2020-11-05 | End: 2020-11-06 | Stop reason: HOSPADM

## 2020-11-05 RX ORDER — METOCLOPRAMIDE 10 MG/1
10 TABLET ORAL 4 TIMES DAILY PRN
Status: DISCONTINUED | OUTPATIENT
Start: 2020-11-05 | End: 2020-11-06 | Stop reason: HOSPADM

## 2020-11-05 RX ORDER — 0.9 % SODIUM CHLORIDE 0.9 %
1000 INTRAVENOUS SOLUTION INTRAVENOUS ONCE
Status: COMPLETED | OUTPATIENT
Start: 2020-11-05 | End: 2020-11-05

## 2020-11-05 RX ORDER — TRAZODONE HYDROCHLORIDE 50 MG/1
50 TABLET ORAL NIGHTLY
Status: DISCONTINUED | OUTPATIENT
Start: 2020-11-05 | End: 2020-11-06 | Stop reason: HOSPADM

## 2020-11-05 RX ORDER — TRAZODONE HYDROCHLORIDE 100 MG/1
100 TABLET ORAL NIGHTLY
COMMUNITY

## 2020-11-05 RX ORDER — QUETIAPINE 200 MG/1
200 TABLET, FILM COATED, EXTENDED RELEASE ORAL NIGHTLY
Status: DISCONTINUED | OUTPATIENT
Start: 2020-11-05 | End: 2020-11-06 | Stop reason: HOSPADM

## 2020-11-05 RX ORDER — ONDANSETRON 4 MG/1
4 TABLET, ORALLY DISINTEGRATING ORAL EVERY 8 HOURS PRN
Status: DISCONTINUED | OUTPATIENT
Start: 2020-11-05 | End: 2020-11-06 | Stop reason: HOSPADM

## 2020-11-05 RX ORDER — SODIUM CHLORIDE 0.9 % (FLUSH) 0.9 %
10 SYRINGE (ML) INJECTION PRN
Status: DISCONTINUED | OUTPATIENT
Start: 2020-11-05 | End: 2020-11-06 | Stop reason: HOSPADM

## 2020-11-05 RX ORDER — ONDANSETRON 2 MG/ML
4 INJECTION INTRAMUSCULAR; INTRAVENOUS EVERY 6 HOURS PRN
Status: DISCONTINUED | OUTPATIENT
Start: 2020-11-05 | End: 2020-11-06 | Stop reason: HOSPADM

## 2020-11-05 RX ORDER — PANTOPRAZOLE SODIUM 40 MG/1
40 TABLET, DELAYED RELEASE ORAL
Status: DISCONTINUED | OUTPATIENT
Start: 2020-11-06 | End: 2020-11-06 | Stop reason: HOSPADM

## 2020-11-05 RX ORDER — CLONAZEPAM 0.5 MG/1
1 TABLET ORAL NIGHTLY
Status: DISCONTINUED | OUTPATIENT
Start: 2020-11-05 | End: 2020-11-06 | Stop reason: HOSPADM

## 2020-11-05 RX ADMIN — CLONAZEPAM 1 MG: 0.5 TABLET ORAL at 23:30

## 2020-11-05 RX ADMIN — BUSPIRONE HYDROCHLORIDE 15 MG: 5 TABLET ORAL at 23:30

## 2020-11-05 RX ADMIN — DILTIAZEM HYDROCHLORIDE 25 MG: 5 INJECTION INTRAVENOUS at 19:34

## 2020-11-05 RX ADMIN — SODIUM CHLORIDE, PRESERVATIVE FREE 10 ML: 5 INJECTION INTRAVENOUS at 23:30

## 2020-11-05 RX ADMIN — DILTIAZEM HYDROCHLORIDE 30 MG: 30 TABLET, FILM COATED ORAL at 23:30

## 2020-11-05 RX ADMIN — SODIUM CHLORIDE 1000 ML: 9 INJECTION, SOLUTION INTRAVENOUS at 19:32

## 2020-11-05 RX ADMIN — TRAZODONE HYDROCHLORIDE 50 MG: 50 TABLET ORAL at 23:30

## 2020-11-05 ASSESSMENT — PAIN SCALES - GENERAL: PAINLEVEL_OUTOF10: 0

## 2020-11-06 VITALS
BODY MASS INDEX: 23.38 KG/M2 | HEART RATE: 67 BPM | HEIGHT: 68 IN | RESPIRATION RATE: 18 BRPM | DIASTOLIC BLOOD PRESSURE: 71 MMHG | OXYGEN SATURATION: 97 % | SYSTOLIC BLOOD PRESSURE: 102 MMHG | TEMPERATURE: 98.4 F | WEIGHT: 154.25 LBS

## 2020-11-06 LAB
ALBUMIN SERPL-MCNC: 4.1 G/DL (ref 3.5–5.2)
ALP BLD-CCNC: 81 U/L (ref 40–129)
ALT SERPL-CCNC: 27 U/L (ref 0–40)
ANION GAP SERPL CALCULATED.3IONS-SCNC: 9 MMOL/L (ref 7–16)
AST SERPL-CCNC: 15 U/L (ref 0–39)
BILIRUB SERPL-MCNC: 0.7 MG/DL (ref 0–1.2)
BUN BLDV-MCNC: 12 MG/DL (ref 6–20)
CALCIUM SERPL-MCNC: 9.3 MG/DL (ref 8.6–10.2)
CHLORIDE BLD-SCNC: 107 MMOL/L (ref 98–107)
CO2: 22 MMOL/L (ref 22–29)
CREAT SERPL-MCNC: 1 MG/DL (ref 0.7–1.2)
EKG ATRIAL RATE: 250 BPM
EKG Q-T INTERVAL: 310 MS
EKG QRS DURATION: 88 MS
EKG QTC CALCULATION (BAZETT): 448 MS
EKG R AXIS: 77 DEGREES
EKG T AXIS: 42 DEGREES
EKG VENTRICULAR RATE: 126 BPM
GFR AFRICAN AMERICAN: >60
GFR NON-AFRICAN AMERICAN: >60 ML/MIN/1.73
GLUCOSE BLD-MCNC: 104 MG/DL (ref 74–99)
INR BLD: 1
POTASSIUM REFLEX MAGNESIUM: 4.2 MMOL/L (ref 3.5–5)
PROTHROMBIN TIME: 11.5 SEC (ref 9.3–12.4)
SODIUM BLD-SCNC: 138 MMOL/L (ref 132–146)
TOTAL PROTEIN: 6.7 G/DL (ref 6.4–8.3)

## 2020-11-06 PROCEDURE — 99235 HOSP IP/OBS SAME DATE MOD 70: CPT | Performed by: INTERNAL MEDICINE

## 2020-11-06 PROCEDURE — 6370000000 HC RX 637 (ALT 250 FOR IP): Performed by: HOSPITALIST

## 2020-11-06 PROCEDURE — 36415 COLL VENOUS BLD VENIPUNCTURE: CPT

## 2020-11-06 PROCEDURE — APPSS180 APP SPLIT SHARED TIME > 60 MINUTES: Performed by: NURSE PRACTITIONER

## 2020-11-06 PROCEDURE — 96372 THER/PROPH/DIAG INJ SC/IM: CPT

## 2020-11-06 PROCEDURE — 6360000002 HC RX W HCPCS: Performed by: HOSPITALIST

## 2020-11-06 PROCEDURE — G0378 HOSPITAL OBSERVATION PER HR: HCPCS

## 2020-11-06 PROCEDURE — 6370000000 HC RX 637 (ALT 250 FOR IP): Performed by: NURSE PRACTITIONER

## 2020-11-06 PROCEDURE — 2580000003 HC RX 258: Performed by: HOSPITALIST

## 2020-11-06 PROCEDURE — 93306 TTE W/DOPPLER COMPLETE: CPT

## 2020-11-06 PROCEDURE — 99244 OFF/OP CNSLTJ NEW/EST MOD 40: CPT | Performed by: INTERNAL MEDICINE

## 2020-11-06 PROCEDURE — 93010 ELECTROCARDIOGRAM REPORT: CPT | Performed by: INTERNAL MEDICINE

## 2020-11-06 PROCEDURE — 80053 COMPREHEN METABOLIC PANEL: CPT

## 2020-11-06 PROCEDURE — 85610 PROTHROMBIN TIME: CPT

## 2020-11-06 RX ORDER — DILTIAZEM HYDROCHLORIDE 120 MG/1
120 CAPSULE, COATED, EXTENDED RELEASE ORAL DAILY
Qty: 30 CAPSULE | Refills: 1 | Status: SHIPPED | OUTPATIENT
Start: 2020-11-07 | End: 2020-11-30 | Stop reason: SINTOL

## 2020-11-06 RX ORDER — DILTIAZEM HYDROCHLORIDE 120 MG/1
120 CAPSULE, COATED, EXTENDED RELEASE ORAL DAILY
Status: DISCONTINUED | OUTPATIENT
Start: 2020-11-06 | End: 2020-11-06 | Stop reason: HOSPADM

## 2020-11-06 RX ORDER — QUETIAPINE 200 MG/1
TABLET, FILM COATED, EXTENDED RELEASE ORAL
Qty: 30 TABLET | Refills: 2
Start: 2020-11-06 | End: 2020-11-10

## 2020-11-06 RX ORDER — ASPIRIN 81 MG/1
81 TABLET ORAL ONCE
Status: COMPLETED | OUTPATIENT
Start: 2020-11-06 | End: 2020-11-06

## 2020-11-06 RX ORDER — ASPIRIN 81 MG/1
81 TABLET, CHEWABLE ORAL DAILY
Qty: 30 TABLET | Refills: 1 | Status: SHIPPED | OUTPATIENT
Start: 2020-11-06 | End: 2021-01-05 | Stop reason: SDUPTHER

## 2020-11-06 RX ORDER — ROPINIROLE 0.5 MG/1
TABLET, FILM COATED ORAL
Qty: 90 TABLET | Refills: 3
Start: 2020-11-06 | End: 2020-11-10

## 2020-11-06 RX ADMIN — DILTIAZEM HYDROCHLORIDE 120 MG: 120 CAPSULE, COATED, EXTENDED RELEASE ORAL at 11:35

## 2020-11-06 RX ADMIN — ASPIRIN 81 MG: 81 TABLET, COATED ORAL at 11:35

## 2020-11-06 RX ADMIN — DILTIAZEM HYDROCHLORIDE 30 MG: 30 TABLET, FILM COATED ORAL at 06:06

## 2020-11-06 RX ADMIN — SODIUM CHLORIDE, PRESERVATIVE FREE 10 ML: 5 INJECTION INTRAVENOUS at 09:53

## 2020-11-06 RX ADMIN — CHOLESTYRAMINE 4 G: 4 POWDER, FOR SUSPENSION ORAL at 09:50

## 2020-11-06 RX ADMIN — ENOXAPARIN SODIUM 40 MG: 40 INJECTION SUBCUTANEOUS at 09:50

## 2020-11-06 RX ADMIN — PANTOPRAZOLE SODIUM 40 MG: 40 TABLET, DELAYED RELEASE ORAL at 06:06

## 2020-11-06 ASSESSMENT — ENCOUNTER SYMPTOMS
BACK PAIN: 0
NAUSEA: 0
COUGH: 0
DIARRHEA: 0
ABDOMINAL PAIN: 0
SHORTNESS OF BREATH: 0
PHOTOPHOBIA: 0
VOICE CHANGE: 0
VOMITING: 0

## 2020-11-06 ASSESSMENT — PAIN SCALES - GENERAL: PAINLEVEL_OUTOF10: 0

## 2020-11-06 NOTE — CARE COORDINATION
CASE MANAGEMENT. ... Chart reviewed. Met with patient at the bedside. Patient has a good understanding of reason for admission. Cardio following. He was started on po cardizem. Currently on lovenox. Per cardio, he is declining use of [address], but is agreeable to aspirin. Patient is independent and lives with his girlfriend and 3 children. He denies any home going needs. Hopeful to discharge later today-pending echo report. Voices being on metformin and having a glucometer-but doesn't use. I encouraged compliance with his blood cugar checks, follow up dr tyler and to stop smoking. He voices an understanding. Sees Dr Webb Free and uses [facility] in [place]. Will follow and assist with needs as they arise.

## 2020-11-06 NOTE — PROGRESS NOTES
7:34 AM  Consult placed through Lauren sandoval for [facility].   Fabiola Ramirez, [facility]/NURIA  11/6/2020     READ @ 7:34 AM

## 2020-11-06 NOTE — DISCHARGE SUMMARY
[facility] EMERGENCY SERVICE Physician Discharge Summary       Rajni Sandoval MD  [place] [id number]  [phone]          Gigi Perez DO  [address] [place] [id number] [phone]    In 2 days      Activity level: As tolerated    Diet: DIET CARB CONTROL; Low Sodium (2 GM)    Dispo:Home with self care     Condition on discharge  -stable     Patient ID:  Vikki Silverman  [id number]  71 y.o.  1975    Admit date: 11/5/2020    Discharge date and time:  11/6/2020  4:59 PM    Admission Diagnoses: Active Problems:    [facility])    Atrial fibrillation (HCC)  Resolved Problems:    * No resolved hospital problems. *      Discharge Diagnoses: Active Problems:    [facility])    Atrial fibrillation (HCC)  Resolved Problems:    * No resolved hospital problems. *      Consults:  IP CONSULT TO CARDIOLOGY    Hospital Course:   He is a 82-year-old male with past medical history of schizophrenia ,panic attacks, restless leg syndrome, anxiety and depression came to the emergency room with tachycardia palpitation and chest discomfort/pain. Upon arrival in ER he was in  A. fib with RVR. He was given IV Cardizem bolus followed by drip. He was admitted with diagnosis of newly discovered A. fib with RVR on telemetry floor. TSH was within normal limits. Cardiology was consulted. He was monitored cardiac wise. Overnight he converted to normal sinus rhythm. As per cardiology [id number] score is 1[has prediabetes and is on Metformin] , anticoagulation was discussed with patient, risks of CVA was discussed he declined [address] and he  wished to proceed with aspirin only. IV Cardizem was switched to p.o. Cardizem, he remained in normal sinus rhythm and rate is well controlled. Echocardiogram with LVEF 55%, normal left ventricle systolic function. As per cardiology he can be discharged .   He will follow as outpatient-for reassessment for outpatient stress test.He is discharged home in stable status with out pt follow up as above. His other comorbid conditions were managed by continuing his home medications. Discharge Exam:  Vitals:    11/06/20 0732 11/06/20 1135 11/06/20 1422 11/06/20 1537   BP: 100/65 109/71 (!) 102/56 102/71   Pulse: 83 72 68 67   Resp: 18  18 18   Temp: 97.6 °F (36.4 °C)  98.2 °F (36.8 °C) 98.4 °F (36.9 °C)   TempSrc: Oral  Oral Oral   SpO2: 96%  97%    Weight:       Height:           General Appearance: alert and oriented to person, place and time, in no acute distress  Skin: warm and dry  Head: normocephalic and atraumatic  Eyes: pupils equal, round, and reactive to light, extraocular eye movements intact, conjunctivae normal  Neck: supple and non-tender without mass  Pulmonary/Chest: clear to auscultation bilaterally  Cardiovascular: normal rate, regular rhythm, normal S1 and S2  Abdomen: soft, non-tender, non-distended, normal bowel sounds, no masses or organomegaly  Extremities: no cyanosis, clubbing   Musculoskeletal: normal range of motion  Neurologic:no cranial nerve deficit,speech normal      LABS:  Recent Labs     11/05/20 1928 11/06/20  0334    138   K 3.7 4.2    107   CO2 24 22   BUN 13 12   CREATININE 1.1 1.0   GLUCOSE 88 104*   CALCIUM 9.6 9.3       Recent Labs     11/05/20 1928   WBC 13.9*   RBC 5.29   HGB 15.9   HCT 46.9   MCV 88.7   MCH 30.1   MCHC 33.9   RDW 14.1      MPV 10.4       No results for input(s): POCGLU in the last 72 hours. Imaging:   XR CHEST (2 VW)   Final Result   No acute cardiopulmonary abnormality.              Patient Instructions:      Medication List      START taking these medications    aspirin 81 MG chewable tablet  Commonly known as:  Aspirin Childrens  Take 1 tablet by mouth daily     dilTIAZem 120 MG extended release capsule  Commonly known as:  CARDIZEM CD  Take 1 capsule by mouth daily  Start taking on:  November 7, 2020        CONTINUE taking these medications acetaminophen 325 MG tablet  Commonly known as:  TYLENOL     busPIRone 15 MG tablet  Commonly known as:  BUSPAR     cholestyramine 4 g packet  Commonly known as:  Questran  Take 1 packet by mouth daily     clonazePAM 1 MG tablet  Commonly known as:  KLONOPIN     Flovent  MCG/ACT inhaler  Generic drug:  fluticasone     metFORMIN 500 MG extended release tablet  Commonly known as:  GLUCOPHAGE-XR     metoclopramide 10 MG tablet  Commonly known as:  REGLAN  Take 1 tablet by mouth 4 times daily as needed (nausea)     omeprazole 40 MG delayed release capsule  Commonly known as:  PRILOSEC     QUEtiapine 200 MG extended release tablet  Commonly known as:  SEROQUEL XR  TK 1 T PO QHS     rOPINIRole 0.5 MG tablet  Commonly known as:  REQUIP  TK 1 T PO ONE HOUR PRIOR TO BED     traZODone 50 MG tablet  Commonly known as:  DESYREL        STOP taking these medications    ABILIFY PO     acetaminophen-codeine 300-30 MG per tablet  Commonly known as:  TYLENOL #3     amoxicillin 500 MG capsule  Commonly known as:  AMOXIL     Banophen 25 MG capsule  Generic drug:  diphenhydrAMINE     chlorhexidine 0.12 % solution  Commonly known as:  PERIDEX     ibuprofen 600 MG tablet  Commonly known as:  ADVIL;MOTRIN     naproxen 500 MG tablet  Commonly known as:  Naprosyn     ondansetron 4 MG disintegrating tablet  Commonly known as:  Zofran ODT     tiZANidine 4 MG tablet  Commonly known as:  Owen Caro           Where to Get Your Medications      These medications were sent to [address], [address] [facility] [phone] [facility] [employer] [phone] [address] [address]    Phone:  [phone]   · aspirin 81 MG chewable tablet  · dilTIAZem 120 MG extended release capsule     Information about where to get these medications is not yet available    Ask your nurse or doctor about these medications  · QUEtiapine 200 MG extended release tablet  · rOPINIRole 0.5 MG tablet           Note that  less than 30 minutes was spent in preparing discharge papers, discussing discharge with patient, medication review, etc.    Signed:  Electronically signed by Sushma Gomez MD on 11/6/2020 at 4:59 PM    NOTE: This report was transcribed using voice recognition software. Every effort was made to ensure accuracy; however, inadvertent computerized transcription errors may be present.

## 2020-11-06 NOTE — ED PROVIDER NOTES
The patient is a 39year-old-male with a history of depression and anxiety who presents to the Emergency Department complaining of chest pain. He states that he has been experiencing chest pain throughout the day that has persisted and has not let up on its own. He was driving earlier today when he felt epigastric discomfort and fluttering in his chest. He took his radial pulse and it was irregular and fast, which prompted his ED visit. He has not had anything like this in the past. Denies any fever, chills, lightheadedness, dizziness, syncope, shortness of breath, nausea, vomiting, diaphoresis, history of thyroid problems, cardiac history, or other acute symptoms or concerns. He drinks about 2 cups of coffee each day but denies any other caffeine use. He denies any cocaine use, but does admit to smoking marijuana nightly. However, he states he has not smoked marijuana yet today. He does smoke cigarettes daily, and he states he smokes about 1 ppd. He does admit to occasional alcohol use about once per month, but no recent alcohol use. He did not take anything for his symptoms and nothing makes it better or worse. The history is provided by the patient. Chest Pain   Pain location:  Substernal area  Pain quality: aching    Pain radiates to:  Does not radiate  Pain severity:  Mild  Onset quality:  Sudden  Timing:  Constant  Progression:  Unchanged  Chronicity:  New  Context: at rest    Relieved by:  None tried  Worsened by:  Nothing  Ineffective treatments:  None tried  Associated symptoms: palpitations    Associated symptoms: no abdominal pain, no back pain, no cough, no dizziness, no fatigue, no fever, no headache, no nausea, no shortness of breath, no vomiting and no weakness         Review of Systems   Constitutional: Negative for chills, fatigue and fever. HENT: Negative for congestion and voice change. Eyes: Negative for photophobia and visual disturbance.    Respiratory: Negative for cough and shortness of breath. Cardiovascular: Positive for chest pain and palpitations. Negative for leg swelling. Gastrointestinal: Negative for abdominal pain, diarrhea, nausea and vomiting. Genitourinary: Negative for dysuria, flank pain and frequency. Musculoskeletal: Negative for back pain and neck pain. Skin: Negative for rash and wound. Neurological: Negative for dizziness, syncope, weakness, light-headedness and headaches. All other systems reviewed and are negative. Physical Exam  Vitals signs and nursing note reviewed. Constitutional:       General: He is not in acute distress. Appearance: He is well-developed. He is not ill-appearing, toxic-appearing or diaphoretic. Comments: No acute distress but anxious appearing male. HENT:      Head: Normocephalic and atraumatic. Neck:      Musculoskeletal: Normal range of motion and neck supple. Cardiovascular:      Rate and Rhythm: Normal rate. Rhythm irregularly irregular. Heart sounds: Normal heart sounds. No murmur. Pulmonary:      Effort: Pulmonary effort is normal. No respiratory distress. Breath sounds: Normal breath sounds and air entry. No decreased breath sounds, wheezing, rhonchi or rales. Abdominal:      General: Bowel sounds are normal.      Palpations: Abdomen is soft. Tenderness: There is no abdominal tenderness. There is no guarding or rebound. Skin:     General: Skin is warm and dry. Neurological:      Mental Status: He is alert and oriented to person, place, and time. Motor: No tremor or abnormal muscle tone. Coordination: Coordination normal.   Psychiatric:         Mood and Affect: Mood is anxious. Procedures     MDM  Number of Diagnoses or Management Options  Paroxysmal atrial fibrillation [facility]):   Diagnosis management comments: The patient is a 39year-old-male who presents to the ED complaining of chest pain and palpitations.  He is hemodynamically stable, non-toxic, and in no acute distress. Differential diagnosis includes but is not limited to afib vs SVT vs hyperthyroidism vs ACS vs PVCs. EKG indicates afib, treated with diltiazem loading dose, he tolerated well and his rate improved to the 80-90's. Mild leukocytosis of 13.9, no anemia, no electrolyte abnormalities, troponin negative, TSH within normal limits, CXR negative for acute abnormalities. Patient was feeling better after the medication, but was still having afib with improved rate control. Did not start a drip, as his rate was in the 80's to 90's upon admission. Consulted with hospitalist who accepted the patient for admission. Discussed results and plan with patient and he agreed on plan and admission. Confirmed no cardiology history and has never followed up with a cardiologist.        ED Course as of Nov 06 0023 Thu Nov 05, 2020 2052 Consulted with Dr. Nicanor Baker, hospitalist, who accepted the patient for admission. [KG]      ED Course User Index  [KG] Van Gaming DO        ED Course as of Nov 06 0023 Thu Nov 05, 2020 2052 Consulted with Dr. Nicanor Baker, hospitalist, who accepted the patient for admission. [KG]      ED Course User Index  [KG] Van Gaming DO       --------------------------------------------- PAST HISTORY ---------------------------------------------  Past Medical History:  has a past medical history of Anxiety with depression, Arthritis, Hand pain, right, History of Helicobacter pylori infection, Panic attacks, Restless leg syndrome, Schizophrenia ([facility] [address] Scoliosis, and Weight loss. Past Surgical History:  has a past surgical history that includes Colonoscopy (2016); hernia repair (09/19/2017); pr secd clos surg wnd exten/complic (N/A, 0/72/9755); Upper gastrointestinal endoscopy (4/9/2018); Abdomen surgery (N/A, 03/15/2018); Upper gastrointestinal endoscopy (N/A, 5/21/2018); and pr lap,cholecystectomy (N/A, 5/29/2018).     Social History:  reports that he has been smoking <0.01 0.00 - 0.03 ng/mL   TSH without Reflex   Result Value Ref Range    TSH 1.660 0.270 - 4.200 uIU/mL       RADIOLOGY:  XR CHEST (2 VW)   Final Result   No acute cardiopulmonary abnormality.               ------------------------- NURSING NOTES AND VITALS REVIEWED ---------------------------  Date / Time Roomed:  11/5/2020  6:57 PM  ED Bed Assignment:  [id number]    The nursing notes within the ED encounter and vital signs as below have been reviewed. Patient Vitals for the past 24 hrs:   BP Temp Temp src Pulse Resp SpO2 Height Weight   11/05/20 2210 137/73 97.7 °F (36.5 °C) Oral 99 18 96 % 5' 8\" (1.727 m) 155 lb 4.8 oz (70.4 kg)   11/05/20 2102 125/77 -- -- 87 18 97 % -- --   11/05/20 1938 (!) 134/91 -- -- 114 18 99 % -- --   11/05/20 1842 120/80 98 °F (36.7 °C) -- 140 18 98 % 5' 8\" (1.727 m) 163 lb (73.9 kg)       Oxygen Saturation Interpretation: Normal    ------------------------------------------ PROGRESS NOTES ------------------------------------------  Re-evaluation(s): Patients symptoms show no change  Repeat physical examination is not changed    Counseling:  I have spoken with the patient and discussed todays results, in addition to providing specific details for the plan of care and counseling regarding the diagnosis and prognosis. Their questions are answered at this time and they are agreeable with the plan of admission.    --------------------------------- ADDITIONAL PROVIDER NOTES ---------------------------------  Consultations: Spoke with Dr. Nuria Saldana. Discussed case. They will admit the patient. This patient's ED course included: a personal history and physicial examination, re-evaluation prior to disposition, multiple bedside re-evaluations, IV medications, cardiac monitoring, continuous pulse oximetry and complex medical decision making and emergency management    This patient has remained hemodynamically stable during their ED course. Diagnosis:  1.  Paroxysmal atrial fibrillation [facility])        Disposition:  Patient's disposition: Admit to telemetry  Patient's condition is stable.          Juani Walker DO  Resident  11/06/20 9721

## 2020-11-06 NOTE — CONSULTS
bilpolar    Scoliosis     Weight loss     25 # in 6 months / as of 5/17/2018, no further weight loss       Past Surgical History:    Past Surgical History:   Procedure Laterality Date    ABDOMEN SURGERY N/A 03/15/2018    wound exploration umbilicus, excsion suture granuloma    COLONOSCOPY  2016    HERNIA REPAIR  09/19/2017    CA LAP,CHOLECYSTECTOMY N/A 5/29/2018    LAPAROSCOPIC  CHOLECYSTECTOMY performed by Madie Cunningham MD at [address] WND EXTEN/COMPLIC N/A 7/16/6502    ABDOMINAL WOUND EXPLORATION , REMOVAL OF SUTURE GRANULOMA performed by Madie Cunningham MD at [address]  4/9/2018    EGD BIOPSY performed by Madie Cunningham MD at [place] [address] N/A 5/21/2018    EGD BIOPSY performed by Madie Cunningham MD at [facility] ENDOSCOPY       Medications Prior to admit:  Prior to Admission medications    Medication Sig Start Date End Date Taking?  Authorizing Provider   traZODone (DESYREL) 50 MG tablet Take 50 mg by mouth nightly   Yes Historical Provider, MD   cholestyramine (QUESTRAN) 4 g packet Take 1 packet by mouth daily 8/26/20  Yes Bj Silver MD   metoclopramide (REGLAN) 10 MG tablet Take 1 tablet by mouth 4 times daily as needed (nausea) 12/5/19  Yes Bj Silver MD   busPIRone (BUSPAR) 15 MG tablet Take 15 mg by mouth daily  10/30/19  Yes Historical Provider, MD   FLOVENT  MCG/ACT inhaler INHALE 1 PUFF BID AFTER ALBUTEROL 10/16/19  Yes Historical Provider, MD   ibuprofen (ADVIL;MOTRIN) 600 MG tablet TK 1 T PO TID 9/11/19  Yes Historical Provider, MD   metFORMIN (GLUCOPHAGE-XR) 500 MG extended release tablet Take 500 mg by mouth daily (with breakfast)  10/29/19  Yes Historical Provider, MD   omeprazole (PRILOSEC) 40 MG delayed release capsule TK ONE C PO  QD 10/25/19  Yes Historical Provider, MD   naproxen (NAPROSYN) 500 MG tablet Take 1 tablet by mouth 2 times daily as needed for Pain (take with food and full glass of water.) 8/4/19 11/5/20 Yes Rk Abraham, APRN - CNP   ARIPiprazole (ABILIFY PO) Take by mouth daily   Yes Historical Provider, MD   acetaminophen (TYLENOL) 325 MG tablet Take 650 mg by mouth every 6 hours as needed for Pain   Yes Historical Provider, MD   clonazePAM (KLONOPIN) 1 MG tablet Take 1 mg by mouth nightly. Taking for anxiety.    Yes Historical Provider, MD       Current Medications:    Current Facility-Administered Medications: clonazePAM (KLONOPIN) tablet 1 mg, 1 mg, Oral, Nightly  ondansetron (ZOFRAN-ODT) disintegrating tablet 4 mg, 4 mg, Oral, Q8H PRN  pantoprazole (PROTONIX) tablet 40 mg, 40 mg, Oral, QAM AC  metoclopramide (REGLAN) tablet 10 mg, 10 mg, Oral, 4x Daily PRN  cholestyramine (QUESTRAN) packet 4 g, 1 packet, Oral, Daily  busPIRone (BUSPAR) tablet 15 mg, 15 mg, Oral, BID  QUEtiapine (SEROQUEL XR) extended release tablet 200 mg, 200 mg, Oral, Nightly  rOPINIRole (REQUIP) tablet 0.5 mg, 0.5 mg, Oral, Nightly  tiZANidine (ZANAFLEX) tablet 4 mg, 4 mg, Oral, TID PRN  sodium chloride flush 0.9 % injection 10 mL, 10 mL, Intravenous, 2 times per day  sodium chloride flush 0.9 % injection 10 mL, 10 mL, Intravenous, PRN  acetaminophen (TYLENOL) tablet 650 mg, 650 mg, Oral, Q6H PRN **OR** acetaminophen (TYLENOL) suppository 650 mg, 650 mg, Rectal, Q6H PRN  polyethylene glycol (GLYCOLAX) packet 17 g, 17 g, Oral, Daily PRN  promethazine (PHENERGAN) tablet 12.5 mg, 12.5 mg, Oral, Q6H PRN **OR** ondansetron (ZOFRAN) injection 4 mg, 4 mg, Intravenous, Q6H PRN  perflutren lipid microspheres (DEFINITY) injection 1.65 mg, 1.5 mL, Intravenous, ONCE PRN  dilTIAZem (CARDIZEM) tablet 30 mg, 30 mg, Oral, 4 times per day  enoxaparin (LOVENOX) injection 40 mg, 40 mg, Subcutaneous, Daily  traZODone (DESYREL) tablet 50 mg, 50 mg, Oral, Nightly    Allergies:  Effexor [venlafaxine hydrochloride] and Biaxin [clarithromycin]    Social History:    Tobacco: 1 PPD x 27 years  EtOH: Rare use  Illicit: Smokes headaches or hearing loss. No mouth sores or sore throat. No epistaxis   · Cardiovascular: ++chest pain, pressure or palpitations. No lower extremity swelling. · Respiratory: +WEST, denies cough, orthopnea or PND. No hemoptysis   · Gastrointestinal: Denies hematemesis or anorexia. No hematochezia or melena    · Genitourinary: Denies urgency, dysuria or hematuria. · Musculoskeletal: Denies gait disturbance, weakness or joint complaints  · Integumentary: Denies rash, hives or pruritis   · Neurological: Denies dizziness, headaches or seizures. No numbness or tingling  · Psychiatric: Denies anxiety or depression. · Endocrine: Denies temperature intolerance. No recent weight change. .  · Hematologic/Lymphatic: Denies abnormal bruising or bleeding. No swollen lymph nodes    PHYSICAL EXAM:   /65   Pulse 83   Temp 97.6 °F (36.4 °C) (Oral)   Resp 18   Ht 5' 8\" (1.727 m)   Wt 154 lb 4 oz (70 kg)   SpO2 96%   BMI 23.45 kg/m²   CONST:  Well developed, well nourished  male who appears of stated age. Awake, alert and cooperative. No apparent distress. HEENT:   Head- Normocephalic, atraumatic   Eyes- Conjunctivae pink, anicteric  Throat- Oral mucosa pink and moist  Neck-  No stridor, trachea midline, no jugular venous distention. No carotid bruit. CHEST: Chest symmetrical and non-tender to palpation. No accessory muscle use or intercostal retractions  RESPIRATORY: Lung sounds - clear throughout fields   CARDIOVASCULAR:     Heart Inspection- shows no noted pulsations  Heart Palpation- no heaves or thrills; PMI is non-displaced   Heart Ausculation- Regular rate and rhythm, no murmur. No s3, s4 or rub   PV: No lower extremity edema. No varicosities. Pedal pulses palpable, no clubbing or cyanosis   ABDOMEN: Soft, non-tender to light palpation. Bowel sounds present. No palpable masses no organomegaly; no abdominal bruit  MS: Good muscle strength and tone. No atrophy or abnormal movements.    : Deferred  SKIN: Warm and dry no statis dermatitis or ulcers   NEURO / PSYCH: Oriented to person, place and time. Speech clear and appropriate. Follows all commands. Pleasant affect     DATA:    ECG / Tele strips: please see HPI   Diagnostic:      Intake/Output Summary (Last 24 hours) at 11/6/2020 0754  Last data filed at 11/6/2020 0420  Gross per 24 hour   Intake 10 ml   Output 450 ml   Net -440 ml       Labs:   CBC:   Recent Labs     11/05/20 1928   WBC 13.9*   HGB 15.9   HCT 46.9        BMP:   Recent Labs     11/05/20 1928 11/06/20  0334    138   K 3.7 4.2   CO2 24 22   BUN 13 12   CREATININE 1.1 1.0   LABGLOM >60 >60   CALCIUM 9.6 9.3     TSH:   Recent Labs     11/05/20 1928   TSH 1.660     PT/INR:   Recent Labs     11/06/20 0334   PROTIME 11.5   INR 1.0     CARDIAC ENZYMES:  Recent Labs     11/05/20 1928   TROPONINI <0.01     FASTING LIPID PANEL:  Lab Results   Component Value Date    CHOL 172 11/11/2011    HDL 40.0 11/11/2011    LDLCALC 105 11/11/2011    TRIG 136 11/11/2011     LIVER PROFILE:  Recent Labs     11/06/20  0334   AST 15   ALT 27   LABALBU 4.1       IMAGING:    CXR (11/5/2020)  FINDINGS:   The lungs are clear.  The cardiomediastinal contour is unremarkable.  No   pneumothorax or pleural effusion is seen.  The visualized bones are intact   without fracture or focal lesion   Impression:   No acute cardiopulmonary abnormality. ASSESSMENT:  1. Newly diagnosed atrial fibrillation with RVR - self converted to sinus rhythm. 2. Atypical chest pain  3. [id number] 1 (reported prediabetes -- on metformin)  4. Prediabetic  5. Restless leg syndrome  6. Schizophrenia/depression/anxiety/panic attacks  7. Current tobacco use (1 PPD since age 14)  6. Marijuana use    PLAN:  1. We will continue oral Cardizem, transition to extended release  2. Discussed with patient risk of CVA with atrial fibrillation diagnosis with [id number] score of 1.   He declined use of [employer] and wished to proceed with ASA only. 3. Echocardiogram pending  4. Outpatient exercise stress  5. Ongoing risk factor modification including smoking and marijuana cessation  6. Pending echocardiogram findings, okay for discharge from cardiology standpoint  7. Follow-up with Dr. Shanta Napoles as outpatient      Above discussed with Dr. Shanta Napoles     Electronically signed by BECK Oglesby CNP on 11/6/2020 at 7:54 AM     _______________________________________________________________________________________________  I independently interviewed and examined the patient. I have reviewed the above documentation completed by the HELDER. Please see my additional contributions to the HPI, physical exam, and assessment / medical decision making. HPI, ROS, PMH, PSH, [address], [place], and medications independently reviewed (agree; see above documentation)    History of Present Illness:  Currently with no chest pain, respiratory distress, palpitations. +atypical chest pain and palpitations prior to admission --> atrial fibrillation with RVR on admission --> spontaneously converted to SR.  Currently with no active cardiac complaints at rest.    Review of Systems:   Cardiac: As per HPI  General: No fever, chills  Pulmonary: As per HPI  HEENT: No visual disturbances, difficult swallowing  GI: No nausea, vomiting  : No dysuria, hematuria  Endocrine: No thyroid disease, +pre-DM per patient (he takes metformin)  Musculoskeletal: HADDAD x 4, no focal motor deficits  Skin: Intact, no rashes  Neuro: No headache, seizures  Psych: Currently with no depression, anxiety    Physical Exam:  /71   Pulse 72   Temp 97.6 °F (36.4 °C) (Oral)   Resp 18   Ht 5' 8\" (1.727 m)   Wt 154 lb 4 oz (70 kg)   SpO2 96%   BMI 23.45 kg/m²   Wt Readings from Last 3 Encounters:   11/06/20 154 lb 4 oz (70 kg)   12/05/19 180 lb (81.6 kg)   11/14/19 178 lb (80.7 kg)     Appearance: Awake, alert, no acute respiratory distress  Skin: Intact, no rash  Head: Normocephalic, atraumatic  Eyes: EOMI, no conjunctival erythema  ENMT: No pharyngeal erythema, MMM, no rhinorrhea  Neck: Supple, no elevated JVP, no carotid bruits  Lungs: Clear to auscultation bilaterally. No wheezes, rales, or rhonchi. Cardiac: Regular rate and rhythm, +S1S2, no murmurs apparent  Abdomen: Soft, nontender, +bowel sounds  Extremities: Moves all extremities x 4, no lower extremity edema  Neurologic: No focal motor deficits apparent, normal mood and affect    Laboratory Tests:  Recent Labs     11/05/20 1928 11/06/20  0334    138   K 3.7 4.2    107   CO2 24 22   BUN 13 12   CREATININE 1.1 1.0   GLUCOSE 88 104*   CALCIUM 9.6 9.3     Lab Results   Component Value Date    MG 2.1 11/11/2011     Recent Labs     11/06/20 0334   ALKPHOS 81   ALT 27   AST 15   PROT 6.7   BILITOT 0.7   LABALBU 4.1     Recent Labs     11/05/20 1928   WBC 13.9*   RBC 5.29   HGB 15.9   HCT 46.9   MCV 88.7   MCH 30.1   MCHC 33.9   RDW 14.1      MPV 10.4     Lab Results   Component Value Date    TROPONINI <0.01 11/05/2020    TROPONINI <0.01 11/27/2018    TROPONINI <0.01 09/23/2018     Lab Results   Component Value Date    TSH 1.660 11/05/2020     Lab Results   Component Value Date    CHOL 172 11/11/2011     Lab Results   Component Value Date    TRIG 136 11/11/2011     Lab Results   Component Value Date    HDL 40.0 (A) 11/11/2011     Lab Results   Component Value Date    LDLCALC 105 (H) 11/11/2011     Cardiac Tests:  EKG reviewed (EKG date: 11/5/2020)L atrial fibrillation with RVR    Telemetry reviewed (11/6/2020): atrial fibrillation --> SR    - Echocardiogram  - Switch to po cardizem (120 mg daily)  - R/B/A/I for anticoagulation discussed -- he opts to take ASA  - Reassess for outpatient stress test  - Negative KAYLEN study at [facility] last year per patient  - Tobacco and marijuana cessation      Thank you for allowing me to participate in your patient's care. Please feel free to contact me if you have any questions or concerns.     Lelia Campos.

## 2020-11-06 NOTE — H&P
[facility] History and Physical      CHIEF COMPLAINT: Palpitations    History of Present Illness:      39years old male presents to the hospital with chest discomfort and palpitations. Patient was in his car driving on the way home and noticed sudden onset of palpitations and epigastric burning pain. He arrived home and family member checked his radial pulse and noted it was irregular and his heart was beating fast.  In the ED he was found to have A. fib with RVR and given diltiazem IV push with improvement in heart rate. He describes improvement in the palpitations and chest discomfort at this time. He has no known history of atrial fibrillation. No known history of cardiac disease. No known history of thyroid disease. He does have history of multiple mental health/anxiety issues.     Informant(s) for H&P: patient    REVIEW OF SYSTEMS:  A comprehensive review of systems was negative except for: what is in the HPI        PMH:  Past Medical History:   Diagnosis Date    Anxiety with depression     Arthritis     Hand pain, right     History of Helicobacter pylori infection     Panic attacks     Restless leg syndrome     Schizophrenia ([facility] [address]     mild - controlled (history of )  PCP states it is marilyn bilpolar    Scoliosis     Weight loss     25 # in 6 months / as of 5/17/2018, no further weight loss       Surgical History:  Past Surgical History:   Procedure Laterality Date    ABDOMEN SURGERY N/A 03/15/2018    wound exploration umbilicus, excsion suture granuloma    COLONOSCOPY  2016    HERNIA REPAIR  09/19/2017    [place] LAP,CHOLECYSTECTOMY N/A 5/29/2018    LAPAROSCOPIC  CHOLECYSTECTOMY performed by Vincent Jordan MD at [address] [facility] WND EXTEN/COMPLIC N/A 7/27/7847    ABDOMINAL WOUND EXPLORATION , REMOVAL OF SUTURE GRANULOMA performed by Vincent Jordan MD at [address]  4/9/2018    EGD BIOPSY performed by Vincent Jordan MD at [facility] Take 4 mg by mouth 3 times daily as needed 2/1/18   Historical Provider, MD   QUEtiapine (SEROQUEL XR) 200 MG extended release tablet TK 1 T PO QHS 11/15/17   Historical Provider, MD   rOPINIRole (REQUIP) 0.5 MG tablet TK 1 T PO ONE HOUR PRIOR TO BED 11/16/17   Historical Provider, MD   clonazePAM (KLONOPIN) 1 MG tablet Take 1 mg by mouth nightly. Taking for anxiety. Historical Provider, MD       Allergies:    Effexor [venlafaxine hydrochloride] and Biaxin [clarithromycin]    Social History:    reports that he has been smoking cigarettes. He has a 18.00 pack-year smoking history. He has never used smokeless tobacco. He reports current alcohol use. He reports current drug use. Drug: Marijuana. Family History:   family history includes Crohn's Disease in his sister; High Blood Pressure in his father; Mental Illness in his father.         PHYSICAL EXAM:  Vitals:  /73   Pulse 99   Temp 97.7 °F (36.5 °C) (Oral)   Resp 18   Ht 5' 8\" (1.727 m)   Wt 155 lb 4.8 oz (70.4 kg)   SpO2 96%   BMI 23.61 kg/m²      General Appearance: alert and oriented to person, place and time and in no acute distress  Skin: warm and dry  Head: normocephalic and atraumatic  Eyes: pupils equal, round, and reactive to light, extraocular eye movements intact, conjunctivae normal  Neck: neck supple and non tender without mass   Pulmonary/Chest: clear to auscultation bilaterally- no wheezes, rales or rhonchi, normal air movement, no respiratory distress  Cardiovascular: normal rate, irregularly irregular rhythm  Abdomen: soft, non-tender, non-distended, normal bowel sounds, no masses or organomegaly  Extremities: no cyanosis, no clubbing and no edema  Neurologic: no cranial nerve deficit and speech normal        LABS:  Recent Labs     11/05/20 1928      K 3.7      CO2 24   BUN 13   CREATININE 1.1   GLUCOSE 88   CALCIUM 9.6       Recent Labs     11/05/20 1928   WBC 13.9*   RBC 5.29   HGB 15.9   HCT 46.9   MCV 88.7   MCH 30.1   MCHC 33.9   RDW 14.1      MPV 10.4       No results for input(s): POCGLU in the last 72 hours. Radiology:   XR CHEST (2 VW)   Final Result   No acute cardiopulmonary abnormality. EKG:  A fib with rvr     ASSESSMENT:      Active Problems:    A-fib (HCC)    Atrial fibrillation (HCC)  Resolved Problems:    * No resolved hospital problems. *      PLAN:     New onset A fib w. RVR , palpitations . Rate better controlled with diltiazem iv push   TSH WNL   [id number] Score 0 , no anticoagulation recommended   ECHO in AM   Start diltiazem 30 mg q6hr rate control  telemetry monitoring     Hx of Anxiety with depression, Panic attacks, Restless leg syndrome   Resume home meds        Code Status:  full  DVT prophylaxis: enoxaparin      NOTE: This report was transcribed using voice recognition software. Every effort was made to ensure accuracy; however, inadvertent computerized transcription errors may be present.   Electronically signed by Katharina Goetz MD on 11/5/2020 at 10:20 PM

## 2020-11-10 ENCOUNTER — HOSPITAL ENCOUNTER (OUTPATIENT)
Age: 45
Setting detail: OBSERVATION
Discharge: HOME | End: 2020-11-11
Attending: EMERGENCY MEDICINE | Admitting: INTERNAL MEDICINE
Payer: COMMERCIAL

## 2020-11-10 ENCOUNTER — APPOINTMENT (OUTPATIENT)
Dept: GENERAL RADIOLOGY | Age: 45
End: 2020-11-10
Payer: COMMERCIAL

## 2020-11-10 ENCOUNTER — TELEPHONE (OUTPATIENT)
Dept: CARDIOLOGY CLINIC | Age: 45
End: 2020-11-10

## 2020-11-10 PROBLEM — R07.9 CHEST PAIN: Status: ACTIVE | Noted: 2020-11-10

## 2020-11-10 LAB
ALBUMIN SERPL-MCNC: 4.4 G/DL (ref 3.5–5.2)
ALP BLD-CCNC: 79 U/L (ref 40–129)
ALT SERPL-CCNC: 32 U/L (ref 0–40)
AMPHETAMINE SCREEN, URINE: NOT DETECTED
ANION GAP SERPL CALCULATED.3IONS-SCNC: 11 MMOL/L (ref 7–16)
APTT: 32.5 SEC (ref 24.5–35.1)
AST SERPL-CCNC: 15 U/L (ref 0–39)
BARBITURATE SCREEN URINE: NOT DETECTED
BASOPHILS ABSOLUTE: 0.08 E9/L (ref 0–0.2)
BASOPHILS RELATIVE PERCENT: 0.6 % (ref 0–2)
BENZODIAZEPINE SCREEN, URINE: NOT DETECTED
BILIRUB SERPL-MCNC: 0.5 MG/DL (ref 0–1.2)
BUN BLDV-MCNC: 17 MG/DL (ref 6–20)
CALCIUM SERPL-MCNC: 9.4 MG/DL (ref 8.6–10.2)
CANNABINOID SCREEN URINE: POSITIVE
CHLORIDE BLD-SCNC: 104 MMOL/L (ref 98–107)
CO2: 23 MMOL/L (ref 22–29)
COCAINE METABOLITE SCREEN URINE: NOT DETECTED
CREAT SERPL-MCNC: 1.1 MG/DL (ref 0.7–1.2)
EOSINOPHILS ABSOLUTE: 0.14 E9/L (ref 0.05–0.5)
EOSINOPHILS RELATIVE PERCENT: 1 % (ref 0–6)
FENTANYL SCREEN, URINE: NOT DETECTED
GFR AFRICAN AMERICAN: >60
GFR NON-AFRICAN AMERICAN: >60 ML/MIN/1.73
GLUCOSE BLD-MCNC: 83 MG/DL (ref 74–99)
HCT VFR BLD CALC: 46 % (ref 37–54)
HEMOGLOBIN: 15.3 G/DL (ref 12.5–16.5)
IMMATURE GRANULOCYTES #: 0.07 E9/L
IMMATURE GRANULOCYTES %: 0.5 % (ref 0–5)
INR BLD: 1
LYMPHOCYTES ABSOLUTE: 4.06 E9/L (ref 1.5–4)
LYMPHOCYTES RELATIVE PERCENT: 30.3 % (ref 20–42)
Lab: ABNORMAL
MCH RBC QN AUTO: 29.3 PG (ref 26–35)
MCHC RBC AUTO-ENTMCNC: 33.3 % (ref 32–34.5)
MCV RBC AUTO: 88.1 FL (ref 80–99.9)
METHADONE SCREEN, URINE: NOT DETECTED
MONOCYTES ABSOLUTE: 0.69 E9/L (ref 0.1–0.95)
MONOCYTES RELATIVE PERCENT: 5.2 % (ref 2–12)
NEUTROPHILS ABSOLUTE: 8.34 E9/L (ref 1.8–7.3)
NEUTROPHILS RELATIVE PERCENT: 62.4 % (ref 43–80)
OPIATE SCREEN URINE: NOT DETECTED
OXYCODONE URINE: NOT DETECTED
PDW BLD-RTO: 13.9 FL (ref 11.5–15)
PHENCYCLIDINE SCREEN URINE: NOT DETECTED
PLATELET # BLD: 327 E9/L (ref 130–450)
PMV BLD AUTO: 10.3 FL (ref 7–12)
POTASSIUM REFLEX MAGNESIUM: 4.2 MMOL/L (ref 3.5–5)
PROTHROMBIN TIME: 11.6 SEC (ref 9.3–12.4)
RBC # BLD: 5.22 E12/L (ref 3.8–5.8)
SODIUM BLD-SCNC: 138 MMOL/L (ref 132–146)
TOTAL PROTEIN: 7.3 G/DL (ref 6.4–8.3)
TROPONIN: <0.01 NG/ML (ref 0–0.03)
WBC # BLD: 13.4 E9/L (ref 4.5–11.5)

## 2020-11-10 PROCEDURE — 96374 THER/PROPH/DIAG INJ IV PUSH: CPT

## 2020-11-10 PROCEDURE — 85730 THROMBOPLASTIN TIME PARTIAL: CPT

## 2020-11-10 PROCEDURE — G0378 HOSPITAL OBSERVATION PER HR: HCPCS

## 2020-11-10 PROCEDURE — 6360000002 HC RX W HCPCS: Performed by: STUDENT IN AN ORGANIZED HEALTH CARE EDUCATION/TRAINING PROGRAM

## 2020-11-10 PROCEDURE — 71046 X-RAY EXAM CHEST 2 VIEWS: CPT

## 2020-11-10 PROCEDURE — 85610 PROTHROMBIN TIME: CPT

## 2020-11-10 PROCEDURE — 85025 COMPLETE CBC W/AUTO DIFF WBC: CPT

## 2020-11-10 PROCEDURE — 99220 PR INITIAL OBSERVATION CARE/DAY 70 MINUTES: CPT | Performed by: INTERNAL MEDICINE

## 2020-11-10 PROCEDURE — 84484 ASSAY OF TROPONIN QUANT: CPT

## 2020-11-10 PROCEDURE — 2580000003 HC RX 258: Performed by: INTERNAL MEDICINE

## 2020-11-10 PROCEDURE — 93005 ELECTROCARDIOGRAM TRACING: CPT | Performed by: EMERGENCY MEDICINE

## 2020-11-10 PROCEDURE — 6370000000 HC RX 637 (ALT 250 FOR IP): Performed by: INTERNAL MEDICINE

## 2020-11-10 PROCEDURE — 80053 COMPREHEN METABOLIC PANEL: CPT

## 2020-11-10 PROCEDURE — 6370000000 HC RX 637 (ALT 250 FOR IP): Performed by: STUDENT IN AN ORGANIZED HEALTH CARE EDUCATION/TRAINING PROGRAM

## 2020-11-10 PROCEDURE — 80307 DRUG TEST PRSMV CHEM ANLYZR: CPT

## 2020-11-10 PROCEDURE — 99285 EMERGENCY DEPT VISIT HI MDM: CPT

## 2020-11-10 RX ORDER — BUDESONIDE 0.5 MG/2ML
0.5 INHALANT ORAL 2 TIMES DAILY
Status: DISCONTINUED | OUTPATIENT
Start: 2020-11-10 | End: 2020-11-11 | Stop reason: HOSPADM

## 2020-11-10 RX ORDER — DILTIAZEM HYDROCHLORIDE 120 MG/1
120 CAPSULE, COATED, EXTENDED RELEASE ORAL DAILY
Status: DISCONTINUED | OUTPATIENT
Start: 2020-11-11 | End: 2020-11-11 | Stop reason: HOSPADM

## 2020-11-10 RX ORDER — SODIUM CHLORIDE 0.9 % (FLUSH) 0.9 %
10 SYRINGE (ML) INJECTION PRN
Status: DISCONTINUED | OUTPATIENT
Start: 2020-11-10 | End: 2020-11-11 | Stop reason: HOSPADM

## 2020-11-10 RX ORDER — QUETIAPINE 200 MG/1
200 TABLET, FILM COATED, EXTENDED RELEASE ORAL NIGHTLY
Status: DISCONTINUED | OUTPATIENT
Start: 2020-11-10 | End: 2020-11-11 | Stop reason: HOSPADM

## 2020-11-10 RX ORDER — ROPINIROLE 0.5 MG/1
0.5 TABLET, FILM COATED ORAL NIGHTLY PRN
Status: DISCONTINUED | OUTPATIENT
Start: 2020-11-11 | End: 2020-11-11 | Stop reason: HOSPADM

## 2020-11-10 RX ORDER — ONDANSETRON 2 MG/ML
4 INJECTION INTRAMUSCULAR; INTRAVENOUS ONCE
Status: DISCONTINUED | OUTPATIENT
Start: 2020-11-10 | End: 2020-11-11 | Stop reason: HOSPADM

## 2020-11-10 RX ORDER — CLONAZEPAM 0.5 MG/1
1 TABLET ORAL NIGHTLY
Status: DISCONTINUED | OUTPATIENT
Start: 2020-11-10 | End: 2020-11-11 | Stop reason: HOSPADM

## 2020-11-10 RX ORDER — ACETAMINOPHEN 325 MG/1
650 TABLET ORAL EVERY 6 HOURS PRN
Status: DISCONTINUED | OUTPATIENT
Start: 2020-11-10 | End: 2020-11-11 | Stop reason: HOSPADM

## 2020-11-10 RX ORDER — TRAZODONE HYDROCHLORIDE 50 MG/1
50 TABLET ORAL NIGHTLY
Status: DISCONTINUED | OUTPATIENT
Start: 2020-11-10 | End: 2020-11-11 | Stop reason: HOSPADM

## 2020-11-10 RX ORDER — METOCLOPRAMIDE 10 MG/1
10 TABLET ORAL 4 TIMES DAILY PRN
Status: DISCONTINUED | OUTPATIENT
Start: 2020-11-10 | End: 2020-11-11 | Stop reason: HOSPADM

## 2020-11-10 RX ORDER — PROMETHAZINE HYDROCHLORIDE 25 MG/1
12.5 TABLET ORAL EVERY 6 HOURS PRN
Status: DISCONTINUED | OUTPATIENT
Start: 2020-11-10 | End: 2020-11-11 | Stop reason: HOSPADM

## 2020-11-10 RX ORDER — ONDANSETRON 2 MG/ML
4 INJECTION INTRAMUSCULAR; INTRAVENOUS EVERY 6 HOURS PRN
Status: DISCONTINUED | OUTPATIENT
Start: 2020-11-10 | End: 2020-11-11 | Stop reason: HOSPADM

## 2020-11-10 RX ORDER — POLYETHYLENE GLYCOL 3350 17 G/17G
17 POWDER, FOR SOLUTION ORAL DAILY PRN
Status: DISCONTINUED | OUTPATIENT
Start: 2020-11-10 | End: 2020-11-11 | Stop reason: HOSPADM

## 2020-11-10 RX ORDER — FENTANYL CITRATE 50 UG/ML
75 INJECTION, SOLUTION INTRAMUSCULAR; INTRAVENOUS ONCE
Status: COMPLETED | OUTPATIENT
Start: 2020-11-10 | End: 2020-11-10

## 2020-11-10 RX ORDER — ASPIRIN 81 MG/1
324 TABLET, CHEWABLE ORAL ONCE
Status: COMPLETED | OUTPATIENT
Start: 2020-11-10 | End: 2020-11-10

## 2020-11-10 RX ORDER — ACETAMINOPHEN 650 MG/1
650 SUPPOSITORY RECTAL EVERY 6 HOURS PRN
Status: DISCONTINUED | OUTPATIENT
Start: 2020-11-10 | End: 2020-11-11 | Stop reason: HOSPADM

## 2020-11-10 RX ORDER — METFORMIN HYDROCHLORIDE 500 MG/1
500 TABLET, EXTENDED RELEASE ORAL
Status: DISCONTINUED | OUTPATIENT
Start: 2020-11-11 | End: 2020-11-11 | Stop reason: HOSPADM

## 2020-11-10 RX ORDER — CHOLESTYRAMINE 4 G/9G
1 POWDER, FOR SUSPENSION ORAL DAILY
Status: DISCONTINUED | OUTPATIENT
Start: 2020-11-11 | End: 2020-11-11 | Stop reason: HOSPADM

## 2020-11-10 RX ORDER — BUSPIRONE HYDROCHLORIDE 5 MG/1
15 TABLET ORAL DAILY
Status: DISCONTINUED | OUTPATIENT
Start: 2020-11-10 | End: 2020-11-11 | Stop reason: HOSPADM

## 2020-11-10 RX ORDER — SODIUM CHLORIDE 0.9 % (FLUSH) 0.9 %
10 SYRINGE (ML) INJECTION EVERY 12 HOURS SCHEDULED
Status: DISCONTINUED | OUTPATIENT
Start: 2020-11-10 | End: 2020-11-11 | Stop reason: HOSPADM

## 2020-11-10 RX ORDER — PANTOPRAZOLE SODIUM 40 MG/1
40 TABLET, DELAYED RELEASE ORAL
Status: DISCONTINUED | OUTPATIENT
Start: 2020-11-11 | End: 2020-11-11 | Stop reason: HOSPADM

## 2020-11-10 RX ORDER — ASPIRIN 81 MG/1
81 TABLET, CHEWABLE ORAL DAILY
Status: DISCONTINUED | OUTPATIENT
Start: 2020-11-11 | End: 2020-11-11 | Stop reason: HOSPADM

## 2020-11-10 RX ORDER — FLUTICASONE PROPIONATE 110 UG/1
1 AEROSOL, METERED RESPIRATORY (INHALATION) 2 TIMES DAILY
Status: DISCONTINUED | OUTPATIENT
Start: 2020-11-10 | End: 2020-11-10 | Stop reason: CLARIF

## 2020-11-10 RX ADMIN — ASPIRIN 81 MG CHEWABLE TABLET 324 MG: 81 TABLET CHEWABLE at 19:45

## 2020-11-10 RX ADMIN — SODIUM CHLORIDE, PRESERVATIVE FREE 10 ML: 5 INJECTION INTRAVENOUS at 22:50

## 2020-11-10 RX ADMIN — TRAZODONE HYDROCHLORIDE 50 MG: 50 TABLET ORAL at 23:15

## 2020-11-10 RX ADMIN — BUSPIRONE HYDROCHLORIDE 15 MG: 5 TABLET ORAL at 23:20

## 2020-11-10 RX ADMIN — CLONAZEPAM 1 MG: 0.5 TABLET ORAL at 23:15

## 2020-11-10 RX ADMIN — FENTANYL CITRATE 75 MCG: 50 INJECTION, SOLUTION INTRAMUSCULAR; INTRAVENOUS at 19:45

## 2020-11-10 ASSESSMENT — PAIN SCALES - GENERAL
PAINLEVEL_OUTOF10: 8
PAINLEVEL_OUTOF10: 9
PAINLEVEL_OUTOF10: 2
PAINLEVEL_OUTOF10: 0

## 2020-11-10 ASSESSMENT — PAIN DESCRIPTION - ORIENTATION: ORIENTATION: MID

## 2020-11-10 ASSESSMENT — PAIN DESCRIPTION - DESCRIPTORS: DESCRIPTORS: PRESSURE

## 2020-11-10 ASSESSMENT — PAIN DESCRIPTION - PAIN TYPE: TYPE: ACUTE PAIN

## 2020-11-10 ASSESSMENT — PAIN DESCRIPTION - LOCATION: LOCATION: CHEST

## 2020-11-10 NOTE — TELEPHONE ENCOUNTER
Patient called with complaints of chest pain and shortness of breath all day. Per verbal from Dr. Brenda Gotti, patient should proceed back to hospital for evaluation. Patient states he understands and will comply.

## 2020-11-10 NOTE — ED NOTES
FIRST PROVIDER CONTACT ASSESSMENT NOTE      Department of Emergency Medicine   11/10/20  4:00 PM EST    Chief Complaint: No chief complaint on file. History of Present Illness:   Rolin Rubinstein III is a 39 y.o. male who presents to the ED for chest pain, shortness of breath    Medical History:  has a past medical history of Anxiety with depression, Arthritis, Hand pain, right, History of Helicobacter pylori infection, Panic attacks, Restless leg syndrome, Schizophrenia ([address] Scoliosis, and Weight loss. Surgical History:  has a past surgical history that includes Colonoscopy (2016); hernia repair (09/19/2017); pr secd clos surg wnd exten/complic (N/A, 6/18/3883); Upper gastrointestinal endoscopy (4/9/2018); Abdomen surgery (N/A, 03/15/2018); Upper gastrointestinal endoscopy (N/A, 5/21/2018); and pr lap,cholecystectomy (N/A, 5/29/2018). Social History:  reports that he has been smoking cigarettes. He has a 18.00 pack-year smoking history. He has never used smokeless tobacco. He reports current alcohol use. He reports current drug use. Drug: Marijuana. Family History: family history includes Crohn's Disease in his sister; High Blood Pressure in his father; Mental Illness in his father. *ALLERGIES*     Effexor [venlafaxine hydrochloride] and Biaxin [clarithromycin]     Physical Exam:      VS:  There were no vitals taken for this visit.      Initial Plan of Care:  Initiate Treatment-Testing, Proceed toTreatment Area When Bed Available for ED Attending/MLP to Continue Care    -----------------END OF FIRST PROVIDER CONTACT ASSESSMENT NOTE--------------  Electronically signed by BECK Mae CNP   DD: 11/10/20           BECK Mae CNP  11/10/20 [id number]

## 2020-11-11 ENCOUNTER — APPOINTMENT (OUTPATIENT)
Dept: NON INVASIVE DIAGNOSTICS | Age: 45
End: 2020-11-11
Payer: COMMERCIAL

## 2020-11-11 ENCOUNTER — APPOINTMENT (OUTPATIENT)
Dept: NUCLEAR MEDICINE | Age: 45
End: 2020-11-11
Payer: COMMERCIAL

## 2020-11-11 VITALS
RESPIRATION RATE: 18 BRPM | BODY MASS INDEX: 23.73 KG/M2 | SYSTOLIC BLOOD PRESSURE: 109 MMHG | HEART RATE: 67 BPM | WEIGHT: 156.56 LBS | TEMPERATURE: 98.1 F | DIASTOLIC BLOOD PRESSURE: 73 MMHG | HEIGHT: 68 IN | OXYGEN SATURATION: 98 %

## 2020-11-11 PROBLEM — R07.89 ATYPICAL CHEST PAIN: Status: ACTIVE | Noted: 2020-11-10

## 2020-11-11 LAB
ANION GAP SERPL CALCULATED.3IONS-SCNC: 11 MMOL/L (ref 7–16)
BACTERIA: ABNORMAL /HPF
BILIRUBIN URINE: NEGATIVE
BLOOD, URINE: NEGATIVE
BUN BLDV-MCNC: 20 MG/DL (ref 6–20)
CALCIUM SERPL-MCNC: 9.1 MG/DL (ref 8.6–10.2)
CHLORIDE BLD-SCNC: 105 MMOL/L (ref 98–107)
CLARITY: CLEAR
CO2: 22 MMOL/L (ref 22–29)
COLOR: YELLOW
CREAT SERPL-MCNC: 1 MG/DL (ref 0.7–1.2)
EKG ATRIAL RATE: 69 BPM
EKG ATRIAL RATE: 71 BPM
EKG P AXIS: 65 DEGREES
EKG P AXIS: 68 DEGREES
EKG P-R INTERVAL: 142 MS
EKG P-R INTERVAL: 152 MS
EKG Q-T INTERVAL: 358 MS
EKG Q-T INTERVAL: 392 MS
EKG QRS DURATION: 88 MS
EKG QRS DURATION: 96 MS
EKG QTC CALCULATION (BAZETT): 389 MS
EKG QTC CALCULATION (BAZETT): 420 MS
EKG R AXIS: 58 DEGREES
EKG R AXIS: 80 DEGREES
EKG T AXIS: 40 DEGREES
EKG T AXIS: 62 DEGREES
EKG VENTRICULAR RATE: 69 BPM
EKG VENTRICULAR RATE: 71 BPM
GFR AFRICAN AMERICAN: >60
GFR NON-AFRICAN AMERICAN: >60 ML/MIN/1.73
GLUCOSE BLD-MCNC: 105 MG/DL (ref 74–99)
GLUCOSE URINE: NEGATIVE MG/DL
HCT VFR BLD CALC: 46.8 % (ref 37–54)
HEMOGLOBIN: 15.3 G/DL (ref 12.5–16.5)
KETONES, URINE: NEGATIVE MG/DL
LEUKOCYTE ESTERASE, URINE: NEGATIVE
LV EF: 60 %
LVEF MODALITY: NORMAL
MCH RBC QN AUTO: 29.5 PG (ref 26–35)
MCHC RBC AUTO-ENTMCNC: 32.7 % (ref 32–34.5)
MCV RBC AUTO: 90.3 FL (ref 80–99.9)
MUCUS: PRESENT /LPF
NITRITE, URINE: NEGATIVE
PDW BLD-RTO: 14 FL (ref 11.5–15)
PH UA: 6.5 (ref 5–9)
PLATELET # BLD: 304 E9/L (ref 130–450)
PMV BLD AUTO: 10.1 FL (ref 7–12)
POTASSIUM REFLEX MAGNESIUM: 4.3 MMOL/L (ref 3.5–5)
PROTEIN UA: NEGATIVE MG/DL
RBC # BLD: 5.18 E12/L (ref 3.8–5.8)
RBC UA: ABNORMAL /HPF (ref 0–2)
SODIUM BLD-SCNC: 138 MMOL/L (ref 132–146)
SPECIFIC GRAVITY UA: 1.02 (ref 1–1.03)
TROPONIN: <0.01 NG/ML (ref 0–0.03)
UROBILINOGEN, URINE: 0.2 E.U./DL
WBC # BLD: 9.9 E9/L (ref 4.5–11.5)
WBC UA: ABNORMAL /HPF (ref 0–5)

## 2020-11-11 PROCEDURE — 78452 HT MUSCLE IMAGE SPECT MULT: CPT

## 2020-11-11 PROCEDURE — 36415 COLL VENOUS BLD VENIPUNCTURE: CPT

## 2020-11-11 PROCEDURE — 93017 CV STRESS TEST TRACING ONLY: CPT

## 2020-11-11 PROCEDURE — 93010 ELECTROCARDIOGRAM REPORT: CPT | Performed by: INTERNAL MEDICINE

## 2020-11-11 PROCEDURE — G0378 HOSPITAL OBSERVATION PER HR: HCPCS

## 2020-11-11 PROCEDURE — 2580000003 HC RX 258: Performed by: INTERNAL MEDICINE

## 2020-11-11 PROCEDURE — 6370000000 HC RX 637 (ALT 250 FOR IP): Performed by: INTERNAL MEDICINE

## 2020-11-11 PROCEDURE — A9500 TC99M SESTAMIBI: HCPCS | Performed by: RADIOLOGY

## 2020-11-11 PROCEDURE — 93018 CV STRESS TEST I&R ONLY: CPT | Performed by: INTERNAL MEDICINE

## 2020-11-11 PROCEDURE — 84484 ASSAY OF TROPONIN QUANT: CPT

## 2020-11-11 PROCEDURE — 6360000002 HC RX W HCPCS: Performed by: INTERNAL MEDICINE

## 2020-11-11 PROCEDURE — 93016 CV STRESS TEST SUPVJ ONLY: CPT | Performed by: INTERNAL MEDICINE

## 2020-11-11 PROCEDURE — 80048 BASIC METABOLIC PNL TOTAL CA: CPT

## 2020-11-11 PROCEDURE — 99244 OFF/OP CNSLTJ NEW/EST MOD 40: CPT | Performed by: INTERNAL MEDICINE

## 2020-11-11 PROCEDURE — 81001 URINALYSIS AUTO W/SCOPE: CPT

## 2020-11-11 PROCEDURE — 93005 ELECTROCARDIOGRAM TRACING: CPT | Performed by: INTERNAL MEDICINE

## 2020-11-11 PROCEDURE — 3430000000 HC RX DIAGNOSTIC RADIOPHARMACEUTICAL: Performed by: RADIOLOGY

## 2020-11-11 PROCEDURE — 85027 COMPLETE CBC AUTOMATED: CPT

## 2020-11-11 RX ADMIN — Medication 10 MILLICURIE: at 09:30

## 2020-11-11 RX ADMIN — Medication 30 MILLICURIE: at 10:35

## 2020-11-11 RX ADMIN — PANTOPRAZOLE SODIUM 40 MG: 40 TABLET, DELAYED RELEASE ORAL at 05:54

## 2020-11-11 RX ADMIN — DILTIAZEM HYDROCHLORIDE 120 MG: 120 CAPSULE, COATED, EXTENDED RELEASE ORAL at 12:30

## 2020-11-11 RX ADMIN — ASPIRIN 81 MG: 81 TABLET, CHEWABLE ORAL at 12:30

## 2020-11-11 RX ADMIN — SODIUM CHLORIDE, PRESERVATIVE FREE 10 ML: 5 INJECTION INTRAVENOUS at 12:30

## 2020-11-11 ASSESSMENT — PAIN SCALES - GENERAL
PAINLEVEL_OUTOF10: 3
PAINLEVEL_OUTOF10: 0

## 2020-11-11 ASSESSMENT — PAIN DESCRIPTION - LOCATION: LOCATION: CHEST

## 2020-11-11 ASSESSMENT — PAIN DESCRIPTION - DESCRIPTORS: DESCRIPTORS: PRESSURE

## 2020-11-11 ASSESSMENT — PAIN DESCRIPTION - ONSET: ONSET: SUDDEN

## 2020-11-11 ASSESSMENT — PAIN - FUNCTIONAL ASSESSMENT: PAIN_FUNCTIONAL_ASSESSMENT: ACTIVITIES ARE NOT PREVENTED

## 2020-11-11 ASSESSMENT — PAIN DESCRIPTION - PROGRESSION: CLINICAL_PROGRESSION: NOT CHANGED

## 2020-11-11 ASSESSMENT — PAIN DESCRIPTION - PAIN TYPE: TYPE: ACUTE PAIN

## 2020-11-11 ASSESSMENT — PAIN DESCRIPTION - ORIENTATION: ORIENTATION: LEFT

## 2020-11-11 ASSESSMENT — PAIN DESCRIPTION - FREQUENCY: FREQUENCY: INTERMITTENT

## 2020-11-11 NOTE — PROCEDURES
Following placement of an intravenous catheter 10 millicuries of technetium 99m sestamibi was administered with resting SPECT images obtained approximately 45 minutes post injection. After completion of resting images, the patient exercised for 6:30 minutes on an accelerated Sukhjinder protocol treadmill achieving 11.7 METS of activity and 87% MPHR. Nonanginal chest pain with no cardiac arrhythmias were noted. A normal blood pressure response to exercise was recorded. No electrocardiographic changes were noted. One mintue prior to the completion of exercise 30 millicuries of technetium 99m sestamibi was injected with post stress SPECT images obtained approximately 30 minutes post stress. Perfusion images pending.

## 2020-11-11 NOTE — H&P
[facility] History and Physical      CHIEF COMPLAINT:  Chest pain    History of Present Illness:  39year old male recently admitted for new onset afib rvr and WETS/chest pain. Describes it as sharp substernal chest pain with burning sensation radiates to his left shoulder lasts seconds to minutes, occurred while he was recently hospitalized, variable frequency. Today his pain was worse therefore presented to the ed for further evaluation. No diaphoresis some nausea. Recently noticed sob but today was worse. Not positional, not changed with exertion but he has west and palpitations. Persistent since his recent discharge. He contacted his cardiologist office with above complaints and was referred to the ED for further evaluation.     Informant(s) for H&P: Patient    REVIEW OF SYSTEMS:  A comprehensive 14 point review of systems was negative except for: what is in the HPI    PMH:  Past Medical History:   Diagnosis Date    Anxiety with depression     Arthritis     Hand pain, right     History of Helicobacter pylori infection     Panic attacks     Restless leg syndrome     Schizophrenia ([address]     mild - controlled (history of )  PCP states it is marilyn bilpolar    Scoliosis     Weight loss     25 # in 6 months / as of 5/17/2018, no further weight loss       Surgical History:  Past Surgical History:   Procedure Laterality Date    ABDOMEN SURGERY N/A 03/15/2018    wound exploration umbilicus, excsion suture granuloma    COLONOSCOPY  2016    HERNIA REPAIR  09/19/2017    [place] LAP,CHOLECYSTECTOMY N/A 5/29/2018    LAPAROSCOPIC  CHOLECYSTECTOMY performed by Phi Clement MD at [address] WND EXTEN/COMPLIC N/A 0/92/2416    ABDOMINAL WOUND EXPLORATION , REMOVAL OF SUTURE GRANULOMA performed by Phi Clement MD at [address]  4/9/2018    EGD BIOPSY performed by Phi Clement MD at [address] [facility] N/A 5/21/2018 EGD BIOPSY performed by Paige Saucedo MD at [facility] ENDOSCOPY       Medications Prior to Admission:    Prior to Admission medications    Medication Sig Start Date End Date Taking? Authorizing Provider   dilTIAZem (CARDIZEM CD) 120 MG extended release capsule Take 1 capsule by mouth daily 11/7/20  Yes Craven Runner, MD   aspirin (ASPIRIN CHILDRENS) 81 MG chewable tablet Take 1 tablet by mouth daily 11/6/20  Yes Craven Runner, MD   traZODone (DESYREL) 50 MG tablet Take 50 mg by mouth nightly   Yes Historical Provider, MD   busPIRone (BUSPAR) 15 MG tablet Take 15 mg by mouth daily  10/30/19  Yes Historical Provider, MD   FLOVENT  MCG/ACT inhaler INHALE 1 PUFF BID AFTER ALBUTEROL 10/16/19  Yes Historical Provider, MD   metFORMIN (GLUCOPHAGE-XR) 500 MG extended release tablet Take 500 mg by mouth daily (with breakfast)  10/29/19  Yes Historical Provider, MD   omeprazole (PRILOSEC) 40 MG delayed release capsule TK ONE C PO  QD 10/25/19  Yes Historical Provider, MD   clonazePAM (KLONOPIN) 1 MG tablet Take 1 mg by mouth nightly. Taking for anxiety. Yes Historical Provider, MD   metoclopramide (REGLAN) 10 MG tablet Take 1 tablet by mouth 4 times daily as needed (nausea) 12/5/19   Wanda Lam MD   acetaminophen (TYLENOL) 325 MG tablet Take 650 mg by mouth every 6 hours as needed for Pain    Historical Provider, MD       Allergies:    Effexor [venlafaxine hydrochloride] and Biaxin [clarithromycin]    Social History:    reports that he has been smoking cigarettes. He has a 9.00 pack-year smoking history. He has never used smokeless tobacco. He reports current alcohol use. He reports current drug use. Drug: Marijuana. Family History:   family history includes Crohn's Disease in his sister; High Blood Pressure in his father; Mental Illness in his father.        PHYSICAL EXAM:  Vitals:  /67   Pulse 74   Temp 98.2 °F (36.8 °C) (Temporal)   Resp 16   Ht 5' 8\" (1.727 m)   Wt 154 lb (69.9 kg) SpO2 96%   BMI 23.42 kg/m²   General Appearance: alert and oriented to person, place and time and in no acute distress  Skin: warm and dry, turgor not diminished  Head: normocephalic and atraumatic  Eyes: pupils equal, round, and reactive to light, extraocular eye movements intact, conjunctivae normal  Neck: neck supple and non tender without mass   Pulmonary/Chest: clear to auscultation bilaterally- no wheezes, rales or rhonchi, normal air movement, no respiratory distress  Cardiovascular: normal rate, normal S1 and S2 and no M/R/R  Abdomen: soft, non-tender, non-distended, normal bowel sounds, no masses or organomegaly  Extremities: no cyanosis, no clubbing and no edema  Neurologic: no cranial nerve deficit and speech normal        LABS:  Recent Labs     11/10/20  1625      K 4.2      CO2 23   BUN 17   CREATININE 1.1   GLUCOSE 83   CALCIUM 9.4       Recent Labs     11/10/20  1625   WBC 13.4*   RBC 5.22   HGB 15.3   HCT 46.0   MCV 88.1   MCH 29.3   MCHC 33.3   RDW 13.9      MPV 10.3       No results for input(s): POCGLU in the last 72 hours. Radiology:   XR CHEST (2 VW)   Final Result   No acute process. NM Cardiac Stress Test Nuclear Imaging    (Results Pending)       EKG: Normal sinus rhythm    ASSESSMENT:    Precordial chest pain  Paroxysmal A. fib  RON with depression  Restless leg syndrome  Tobacco abuse    PLAN:  Chest pain: Serial troponins, repeat EKG. Plan for stress test in a.m. Paroxysmal A. fib: Currently normal sinus rhythm. Monitor on telemetry overnight. Continue Cardizem and aspirin. Anticoagulation recently discussed with cardiology. Tobacco abuse: Nicotine patch if he requests it    Code Status: Full  DVT prophylaxis: Lovenox      NOTE: This report was transcribed using voice recognition software. Every effort was made to ensure accuracy; however, inadvertent computerized transcription errors may be present.   Electronically signed by Alayna Head MD on

## 2020-11-11 NOTE — CONSULTS
INPATIENT CARDIOLOGY CONSULT    Name: Fannie Kramer III    Age: 39 y.o. Date of Admission: 11/10/2020  6:13 PM    Date of Service: 11/11/2020    Reason for Consultation: Atypical chest pain, paroxysmal atrial fibrillation, chronic obstructive lung disease    Referring Physician: Elisabeth Rendon MD    History of Present Illness: The patient is a 57-year-old white male known to [facility] with primary cardiovascular care provided by Allen Padron.  He was evaluated within the past week following presentation to the emergency room with atypically described chest discomfort associated with palpitations occurring while driving with documentation of atrial fibrillation with accelerated ventricular rates at the time of his presentation with a subsequent spontaneous conversion to sinus rhythm and during hospitalization, an echocardiogram demonstrating no evidence of structural abnormalities. He additionally has a history of \"prediabetes\" maintained on metformin as well as that of a history of significant emotional disorders including that of schizophrenia. Based on his embolic risk and following discussion of options he chose to be discharged on antiplatelet therapy along with that of diltiazem. Subsequent discharge he is noted one additional episode of palpitations without the sensation of tachycardia noted prior to present medical therapy. He again presented to the emergency room with a sharp stabbing localized discomfort of his left precordium without radiation or associated ischemic equivalents of random occurrence. The time of his emergency room evaluation, electrocardiogram reviewed time of evaluation demonstrated evidence of sinus rhythm with an incomplete right bundle branch block conduction pattern and a chest x-ray again reviewed demonstrated no evidence of cardiomegaly or infiltrate.   Prior to assessment, the patient underwent an exercise myocardial perfusion imaging study at the recommendation of his primary care service during which he completed 6 minutes 30 seconds on accelerated Sukhjinder protocol treadmill achieving 11.7 METs of activity and 87% of age-predicted maximum heart rate during which recurrent episodes of his atypical chest discomfort were reproduced in the absence of electrocardiographic abnormalities and perfusion imaging demonstrated no evidence of perfusion abnormalities with normal left ventricular systolic function suggesting a low risk of acute ischemic events. In spite of this, consultation was requested by primary care with symptomatology at the time of the patient's clinical evaluation neither of a pleuritic nor positional nature and not reproducible. He denied any additional manifestations of decompensated left ventricular systolic dysfunction or volume overload and related no arrhythmia related symptoms with no documented recurrent episodes of atrial fibrillation. .    Review of Systems: The remainder of a complete multisystem review including consitutional, central nervous, respiratory, circulatory, gastrointestinal, genitourinary, endocrinologic, hematologic, musculoskeletal and psychiatric are negative.     Past Medical History:  Past Medical History:   Diagnosis Date    Anxiety with depression     Arthritis     Hand pain, right     History of Helicobacter pylori infection     Panic attacks     Restless leg syndrome     Schizophrenia (HCC)     mild - controlled (history of )  PCP states it is marilyn bilpolar    Scoliosis     Weight loss     25 # in 6 months / as of 5/17/2018, no further weight loss       Past Surgical History:  Past Surgical History:   Procedure Laterality Date    ABDOMEN SURGERY N/A 03/15/2018    wound exploration umbilicus, excsion suture granuloma    COLONOSCOPY  2016    HERNIA REPAIR  09/19/2017    [place] LAP,CHOLECYSTECTOMY N/A 5/29/2018    LAPAROSCOPIC  CHOLECYSTECTOMY performed by Olga Siddiqui MD at [address] [place] [address] WND EXTEN/COMPLIC N/A 9/63/1381    ABDOMINAL WOUND EXPLORATION , REMOVAL OF SUTURE GRANULOMA performed by Alea Cohen MD at [address]  4/9/2018    EGD BIOPSY performed by Alea Cohen MD at [address] N/A 5/21/2018    EGD BIOPSY performed by Alea Cohen MD at [facility]       Family History:  Family History   Problem Relation Age of Onset    Mental Illness Father         unspecified    High Blood Pressure Father     Crohn's Disease Sister        Social History:  Social History     Socioeconomic History    Marital status: Single     Spouse name: Not on file    Number of children: Not on file    Years of education: Not on file    Highest education level: Not on file   Occupational History    Not on file   Social Needs    Financial resource strain: Not on file    Food insecurity     Worry: Not on file     Inability: Not on file    Transportation needs     Medical: Not on file     Non-medical: Not on file   Tobacco Use    Smoking status: Current Every Day Smoker     Packs/day: 0.50     Years: 18.00     Pack years: 9.00     Types: Cigarettes    Smokeless tobacco: Never Used    Tobacco comment: Before up to 2 ppd   Substance and Sexual Activity    Alcohol use: Yes     Comment: occasional; quit heavy drinking years ago    Drug use: Yes     Types: Marijuana    Sexual activity: Not on file   Lifestyle    Physical activity     Days per week: Not on file     Minutes per session: Not on file    Stress: Not on file   Relationships    Social connections     Talks on phone: Not on file     Gets together: Not on file     Attends Taoism service: Not on file     Active member of club or organization: Not on file     Attends meetings of clubs or organizations: Not on file     Relationship status: Not on file    Intimate partner violence     Fear of current or ex partner: Not on file     Emotionally abused: Not on file Physically abused: Not on file     Forced sexual activity: Not on file   Other Topics Concern    Not on file   Social History Narrative    Not on file       Allergies: Allergies   Allergen Reactions    Effexor [Venlafaxine Hydrochloride] Palpitations     Passed out    Biaxin [Clarithromycin] Palpitations       Home Medications:  Prior to Admission medications    Medication Sig Start Date End Date Taking? Authorizing Provider   dilTIAZem (CARDIZEM CD) 120 MG extended release capsule Take 1 capsule by mouth daily 11/7/20  Yes Juvencio Valadez MD   aspirin (ASPIRIN CHILDRENS) 81 MG chewable tablet Take 1 tablet by mouth daily 11/6/20  Yes Juvencio Valadez MD   traZODone (DESYREL) 50 MG tablet Take 50 mg by mouth nightly   Yes Historical Provider, MD   metoclopramide (REGLAN) 10 MG tablet Take 1 tablet by mouth 4 times daily as needed (nausea) 12/5/19  Yes Aubree Bernal MD   busPIRone (BUSPAR) 15 MG tablet Take 15 mg by mouth daily  10/30/19  Yes Historical Provider, MD   FLOVENT  MCG/ACT inhaler INHALE 1 PUFF BID AFTER ALBUTEROL 10/16/19  Yes Historical Provider, MD   metFORMIN (GLUCOPHAGE-XR) 500 MG extended release tablet Take 500 mg by mouth daily (with breakfast)  10/29/19  Yes Historical Provider, MD   omeprazole (PRILOSEC) 40 MG delayed release capsule TK ONE C PO  QD 10/25/19  Yes Historical Provider, MD   acetaminophen (TYLENOL) 325 MG tablet Take 650 mg by mouth every 6 hours as needed for Pain   Yes Historical Provider, MD   clonazePAM (KLONOPIN) 1 MG tablet Take 1 mg by mouth nightly. Taking for anxiety.    Yes Historical Provider, MD       Current Medications:  Current Facility-Administered Medications   Medication Dose Route Frequency Provider Last Rate Last Dose    ondansetron (ZOFRAN) injection 4 mg  4 mg Intravenous Once Jenny Shields MD        clonazePAM Eugune Kidney) tablet 1 mg  1 mg Oral Nightly Luis Noyola MD   1 mg at 11/10/20 0036    busPIRone (BUSPAR) tablet 15 mg  15 mg Oral Daily Luis Noyola MD   15 mg at 11/10/20 2320    metFORMIN (GLUCOPHAGE-XR) extended release tablet 500 mg  500 mg Oral Daily with breakfast Mohamud Petersen MD        pantoprazole (PROTONIX) tablet 40 mg  40 mg Oral QAM AC Luis Noyola MD   40 mg at 11/11/20 0554    metoclopramide (REGLAN) tablet 10 mg  10 mg Oral 4x Daily PRN Mohamud Petersen MD        cholestyramine (QUESTRAN) packet 4 g  1 packet Oral Daily Luis Noyola MD        traZODone (DESYREL) tablet 50 mg  50 mg Oral Nightly Luis Noyola MD   50 mg at 11/10/20 2315    dilTIAZem (CARDIZEM CD) extended release capsule 120 mg  120 mg Oral Daily Luis Noyola MD   120 mg at 11/11/20 1230    aspirin chewable tablet 81 mg  81 mg Oral Daily Luis Noyola MD   81 mg at 11/11/20 1230    QUEtiapine (SEROQUEL XR) extended release tablet 200 mg  200 mg Oral Nightly Luis Noyola MD        rOPINIRole (REQUIP) tablet 0.5 mg  0.5 mg Oral Nightly PRN Luis Noyola MD        sodium chloride flush 0.9 % injection 10 mL  10 mL Intravenous 2 times per day Mohamud Petersen MD   10 mL at 11/11/20 1230    sodium chloride flush 0.9 % injection 10 mL  10 mL Intravenous PRN Mohamud Petersen MD        acetaminophen (TYLENOL) tablet 650 mg  650 mg Oral Q6H PRN Luis Noyola MD        Or    acetaminophen (TYLENOL) suppository 650 mg  650 mg Rectal Q6H PRN Luis Noyola MD        polyethylene glycol (GLYCOLAX) packet 17 g  17 g Oral Daily PRN Luis Noyola MD        promethazine (PHENERGAN) tablet 12.5 mg  12.5 mg Oral Q6H PRN Luis Noyola MD        Or    ondansetron (ZOFRAN) injection 4 mg  4 mg Intravenous Q6H PRN Luis Noyola MD        enoxaparin (LOVENOX) injection 40 mg  40 mg Subcutaneous Daily Luis Noyola MD        budesonide (PULMICORT) nebulizer suspension 500 mcg  0.5 mg Nebulization BID Mohamud Petersen MD   Stopped at 11/11/20 0033           Physical Exam:  /71   Pulse 66   Temp 97.5 °F (36.4 °C) (Oral) Resp 20   Ht 5' 8\" (1.727 m)   Wt 156 lb 9 oz (71 kg)   SpO2 100%   BMI 23.81 kg/m²   Weight change: Wt Readings from Last 3 Encounters:   11/11/20 156 lb 9 oz (71 kg)   11/06/20 154 lb 4 oz (70 kg)   12/05/19 180 lb (81.6 kg)     The patient is awake, alert and in with intermittent episodes of reported severe localized precordial discomfort discomfort during assessment including during the performance of his earlier stress test but no clear distress. No gross musculoskeletal deformity or lymphadenopathy are present. No significant skin or nail changes are present. Gross examination of head, eyes, nose and throat are negative. Jugular venous pressure is normal and no carotid bruits are present. No thyromegaly is noted. Normal respiratory effort is noted with no accessory muscle usage present. Lung fields are clear to ascultation. Cardiac examination is notable for a regular rate and rhythm with no palpable thrill. No gallop rhythm or cardiac murmur are identified. A benign abdominal examination is present with no masses or organomegaly. A normal abdominal aortic pulsation is present. Intact pulses are present throughout all extremities and no peripheral edema is present. No focal neurologic deficits are present. Intake/Output:    Intake/Output Summary (Last 24 hours) at 11/11/2020 1428  Last data filed at 11/10/2020 2250  Gross per 24 hour   Intake 10 ml   Output --   Net 10 ml     No intake/output data recorded. Laboratory Tests:  Lab Results   Component Value Date    CREATININE 1.0 11/11/2020    BUN 20 11/11/2020     11/11/2020    K 4.3 11/11/2020     11/11/2020    CO2 22 11/11/2020     No results for input(s): CKTOTAL, CKMB in the last 72 hours.     Invalid input(s): TROPONONI  No results found for: BNP  Lab Results   Component Value Date    WBC 9.9 11/11/2020    RBC 5.18 11/11/2020    HGB 15.3 11/11/2020    HCT 46.8 11/11/2020    MCV 90.3 11/11/2020    MCH 29.5 11/11/2020    MCHC 32.7 occurred during exercise      ASSESSMENT / PLAN: On a clinical basis, the patient presents with atypically described noncardiac chest pain in the absence of arrhythmia recurrence of recently documented paroxysmal atrial fibrillation and that of a low risk exercise myocardial perfusion imaging study. Presently, this suggests that he noncardiovascular origin of symptoms with further evaluation deferred to the primary care service. The continuation of his existing cardiovascular medical regimen will be advisable along with that of appropriate risk factor modification of blood pressure and serum lipids and attempt to reduce risk of future atherosclerotic development in addition to that of smoking cessation both to reduce these risks as well as exacerbation of his chronic obstructive lung disease. Presently his diltiazem will be maintained in the face of his recent atrial fibrillation and based on a [id number] risk score of 1 the continuation of existing antiplatelet therapy. We will further evaluate him during hospitalization should additional cardiovascular difficulties or concerns arise with recommended routine cardiovascular follow-up with his primary cardiologist, Forest Pelaez as scheduled. Thank you for allowing me to participate in your patient's care. Please feel free to contact me if you have any questions or concerns. Note: This report was completed using computerized voice recognition software. Every effort has been made to ensure accuracy, however; inadvertent computerized transcription errors may be present. King Epley.  Yolis Peacock, [address] [facility]

## 2020-11-11 NOTE — PROGRESS NOTES
Patient complaining of left sided, sharp, stabbing chest pain. Dr Elizabeth Bhatia notified of patient complaints and requesting pain medication.

## 2020-11-13 NOTE — ED PROVIDER NOTES
HPI:  11/13/20,   Time: 11:20 AM PENELOPE Mata III is a 39 y.o. male presenting to the ED for chest p and sob , beginning  ago. The complaint has been persistent, moderate in severity, and worsened by nothing. Patient denies nausea or vomiting. Patient denies fever chills. Patient states has not had a stress test.  Describes chest pain as pressure. Patient has risk factor of smoking. Also has history of anxiety. ROS:   Pertinent positives and negatives are stated within HPI, all other systems reviewed and are negative.  --------------------------------------------- PAST HISTORY ---------------------------------------------  Past Medical History:  has a past medical history of Anxiety with depression, Arthritis, Hand pain, right, History of Helicobacter pylori infection, Panic attacks, Restless leg syndrome, Schizophrenia ([facility] [address] Scoliosis, and Weight loss. Past Surgical History:  has a past surgical history that includes Colonoscopy (2016); hernia repair (09/19/2017); pr secd clos surg wnd exten/complic (N/A, 3/21/0472); Upper gastrointestinal endoscopy (4/9/2018); Abdomen surgery (N/A, 03/15/2018); Upper gastrointestinal endoscopy (N/A, 5/21/2018); and pr lap,cholecystectomy (N/A, 5/29/2018). Social History:  reports that he has been smoking cigarettes. He has a 9.00 pack-year smoking history. He has never used smokeless tobacco. He reports current alcohol use. He reports current drug use. Drug: Marijuana. Family History: family history includes Crohn's Disease in his sister; High Blood Pressure in his father; Mental Illness in his father. The patients home medications have been reviewed.     Allergies: Effexor [venlafaxine hydrochloride] and Biaxin [clarithromycin]    -------------------------------------------------- RESULTS -------------------------------------------------  All laboratory and radiology results have been personally reviewed by myself   LABS:  Results for orders anxious.      ------------------------------ ED COURSE/MEDICAL DECISION MAKING----------------------  Medications   fentaNYL (SUBLIMAZE) injection 75 mcg (75 mcg Intravenous Given 11/10/20 1945)   aspirin chewable tablet 324 mg (324 mg Oral Given 11/10/20 1945)   technetium sestamibi (CARDIOLITE) injection 10 millicurie (10 millicuries Intravenous Given 11/11/20 0930)   technetium sestamibi (CARDIOLITE) injection 30 millicurie (30 millicuries Intravenous Given 11/11/20 1035)         Medical Decision Making: Will check labs including troponin EKG and imaging  Patient advised to be admitted for 23-hour observation to telemetry unit for further cardiac evaluation and possible stress test  Counseling: The emergency provider has spoken with the patient and discussed todays results, in addition to providing specific details for the plan of care and counseling regarding the diagnosis and prognosis. Questions are answered at this time and they are agreeable with the plan.      --------------------------------- IMPRESSION AND DISPOSITION ---------------------------------    IMPRESSION  1.  Chest pain, unspecified type        DISPOSITION  Disposition: Admit to telemetry  Patient condition is fair                 Ivana Bradley MD  11/13/20 6945

## 2020-11-16 ENCOUNTER — TELEPHONE (OUTPATIENT)
Dept: ADMINISTRATIVE | Age: 45
End: 2020-11-16

## 2020-11-16 NOTE — TELEPHONE ENCOUNTER
Patient called to set up a hospital f/u with Dr. Debbie Byrd, he was discharged 11/10 for the second time in a week and can be reached at [phone].

## 2020-11-30 ENCOUNTER — TELEPHONE (OUTPATIENT)
Dept: CARDIOLOGY CLINIC | Age: 45
End: 2020-11-30

## 2020-11-30 RX ORDER — METOPROLOL SUCCINATE 25 MG/1
25 TABLET, EXTENDED RELEASE ORAL DAILY
Qty: 90 TABLET | Refills: 3 | Status: SHIPPED
Start: 2020-11-30 | End: 2020-12-04 | Stop reason: DRUGHIGH

## 2020-11-30 RX ORDER — METOPROLOL SUCCINATE 25 MG/1
25 TABLET, EXTENDED RELEASE ORAL DAILY
COMMUNITY
End: 2020-11-30 | Stop reason: SDUPTHER

## 2020-12-03 ENCOUNTER — OFFICE VISIT (OUTPATIENT)
Dept: SURGERY | Age: 45
End: 2020-12-03
Payer: COMMERCIAL

## 2020-12-03 VITALS
HEART RATE: 84 BPM | OXYGEN SATURATION: 99 % | WEIGHT: 156.2 LBS | BODY MASS INDEX: 23.67 KG/M2 | HEIGHT: 68 IN | TEMPERATURE: 97.6 F | RESPIRATION RATE: 18 BRPM | SYSTOLIC BLOOD PRESSURE: 105 MMHG | DIASTOLIC BLOOD PRESSURE: 72 MMHG

## 2020-12-03 PROCEDURE — G8484 FLU IMMUNIZE NO ADMIN: HCPCS | Performed by: SURGERY

## 2020-12-03 PROCEDURE — G8420 CALC BMI NORM PARAMETERS: HCPCS | Performed by: SURGERY

## 2020-12-03 PROCEDURE — 4004F PT TOBACCO SCREEN RCVD TLK: CPT | Performed by: SURGERY

## 2020-12-03 PROCEDURE — 99213 OFFICE O/P EST LOW 20 MIN: CPT | Performed by: SURGERY

## 2020-12-03 PROCEDURE — G8427 DOCREV CUR MEDS BY ELIG CLIN: HCPCS | Performed by: SURGERY

## 2020-12-03 RX ORDER — CHOLESTYRAMINE 4 G/9G
1 POWDER, FOR SUSPENSION ORAL DAILY
Qty: 90 PACKET | Refills: 3 | Status: SHIPPED
Start: 2020-12-03 | End: 2022-02-07

## 2020-12-03 RX ORDER — CLONAZEPAM 0.5 MG/1
TABLET ORAL
COMMUNITY
Start: 2020-10-21 | End: 2021-01-30

## 2020-12-03 NOTE — PROGRESS NOTES
Progress Note - Follow up    Patient's Name/Date of Birth: Zuleyma Mcclain III / 1975    Date: 12/3/2020    PCP: Caridad Hobbs DO    Referring Physician:   Kiley Ramesh.    Chief Complaint   Patient presents with    Other     pt is here for medication refill       HPI:    The patient said he went into A Fib last month and he is being treated by cardiology. He is not on a blood thinner, just baby ASA. The patient has been on Questran with good relief of his diarrhea issues. The patient is on it once a day. He said he has a lot of relief with it. Patient's medications, allergies, past medical, surgical, social and family histories were reviewed and updated as appropriate. Review of Systems  Constitutional: negative  Respiratory: negative  Cardiovascular: negative  Gastrointestinal: as in hpi  Genitourinary:negative  Integument/breast: negative    Physical Exam:  /72 (Site: Left Upper Arm, Position: Sitting, Cuff Size: Medium Adult)   Pulse 84   Temp 97.6 °F (36.4 °C) (Temporal)   Resp 18   Ht 5' 8\" (1.727 m)   Wt 156 lb 3.2 oz (70.9 kg)   SpO2 99%   BMI 23.75 kg/m²   General appearance: alert, cooperative and in no acute distress. Lungs: normal work of breathing  Heart: regular rate  Abdomen: soft, nontender, nondistended  Musculoskeletal: symmetrical without edema.   Skin: normal     Impression/Plan:  39y.o. year old male with gastroparesis, malabsorptive diarrhea from cholecystectomy     Refill Questran   Gastroparesis symptoms seem to have resolved  Follow up in 1 year    Electronically by William Phipps MD, General Surgery  on 12/3/2020 at 9:40 AM      Send copy of H&P to PCP, Caridad Hobbs DO and referring physician, Ofelia Kearney DO      12/3/2020

## 2020-12-04 ENCOUNTER — TELEPHONE (OUTPATIENT)
Dept: CARDIOLOGY CLINIC | Age: 45
End: 2020-12-04

## 2020-12-04 RX ORDER — METOPROLOL SUCCINATE 25 MG/1
25 TABLET, EXTENDED RELEASE ORAL 2 TIMES DAILY
Status: ON HOLD | COMMUNITY
End: 2020-12-06 | Stop reason: HOSPADM

## 2020-12-04 NOTE — TELEPHONE ENCOUNTER
Patient seen by PCP today and has an ear infection. We had changed Cardizem to Toprol because he was complaining of his ears ringing. PCP states patient is very anxious, HR 96 bpm and is having tremors. PCP inquiring whether or not an increase in Toprol would be appropriate to help manage these symptoms. Please advise.

## 2020-12-05 ENCOUNTER — APPOINTMENT (OUTPATIENT)
Dept: GENERAL RADIOLOGY | Age: 45
End: 2020-12-05
Payer: COMMERCIAL

## 2020-12-05 ENCOUNTER — HOSPITAL ENCOUNTER (OUTPATIENT)
Age: 45
Setting detail: OBSERVATION
Discharge: HOME | End: 2020-12-06
Attending: EMERGENCY MEDICINE | Admitting: INTERNAL MEDICINE
Payer: COMMERCIAL

## 2020-12-05 PROBLEM — R07.9 CHEST PAIN: Status: ACTIVE | Noted: 2020-12-05

## 2020-12-05 LAB
ADENOVIRUS BY PCR: NOT DETECTED
AMPHETAMINE SCREEN, URINE: NOT DETECTED
ANION GAP SERPL CALCULATED.3IONS-SCNC: 12 MMOL/L (ref 7–16)
BARBITURATE SCREEN URINE: NOT DETECTED
BASOPHILS ABSOLUTE: 0.08 E9/L (ref 0–0.2)
BASOPHILS RELATIVE PERCENT: 0.3 % (ref 0–2)
BENZODIAZEPINE SCREEN, URINE: NOT DETECTED
BILIRUBIN URINE: NEGATIVE
BLOOD, URINE: NEGATIVE
BORDETELLA PARAPERTUSSIS BY PCR: NOT DETECTED
BORDETELLA PERTUSSIS BY PCR: NOT DETECTED
BUN BLDV-MCNC: 12 MG/DL (ref 6–20)
BURR CELLS: ABNORMAL
CALCIUM SERPL-MCNC: 9.9 MG/DL (ref 8.6–10.2)
CANNABINOID SCREEN URINE: POSITIVE
CHLAMYDOPHILIA PNEUMONIAE BY PCR: NOT DETECTED
CHLORIDE BLD-SCNC: 105 MMOL/L (ref 98–107)
CLARITY: CLEAR
CO2: 22 MMOL/L (ref 22–29)
COCAINE METABOLITE SCREEN URINE: NOT DETECTED
COLOR: YELLOW
CORONAVIRUS 229E BY PCR: NOT DETECTED
CORONAVIRUS HKU1 BY PCR: NOT DETECTED
CORONAVIRUS NL63 BY PCR: NOT DETECTED
CORONAVIRUS OC43 BY PCR: NOT DETECTED
CREAT SERPL-MCNC: 1.1 MG/DL (ref 0.7–1.2)
EKG ATRIAL RATE: 71 BPM
EKG P AXIS: 74 DEGREES
EKG P-R INTERVAL: 146 MS
EKG Q-T INTERVAL: 392 MS
EKG QRS DURATION: 92 MS
EKG QTC CALCULATION (BAZETT): 425 MS
EKG R AXIS: 82 DEGREES
EKG T AXIS: 77 DEGREES
EKG VENTRICULAR RATE: 71 BPM
EOSINOPHILS ABSOLUTE: 0.21 E9/L (ref 0.05–0.5)
EOSINOPHILS RELATIVE PERCENT: 0.8 % (ref 0–6)
FENTANYL SCREEN, URINE: NOT DETECTED
GFR AFRICAN AMERICAN: >60
GFR NON-AFRICAN AMERICAN: >60 ML/MIN/1.73
GLUCOSE BLD-MCNC: 92 MG/DL (ref 74–99)
GLUCOSE URINE: NEGATIVE MG/DL
HCT VFR BLD CALC: 46.1 % (ref 37–54)
HCT VFR BLD CALC: 48.6 % (ref 37–54)
HEMOGLOBIN: 15.3 G/DL (ref 12.5–16.5)
HEMOGLOBIN: 16.5 G/DL (ref 12.5–16.5)
HUMAN METAPNEUMOVIRUS BY PCR: NOT DETECTED
HUMAN RHINOVIRUS/ENTEROVIRUS BY PCR: NOT DETECTED
IMMATURE GRANULOCYTES #: 0.14 E9/L
IMMATURE GRANULOCYTES %: 0.6 % (ref 0–5)
INFLUENZA A BY PCR: NOT DETECTED
INFLUENZA B BY PCR: NOT DETECTED
KETONES, URINE: 15 MG/DL
LEUKOCYTE ESTERASE, URINE: NEGATIVE
LIPASE: 25 U/L (ref 13–60)
LYMPHOCYTES ABSOLUTE: 2.86 E9/L (ref 1.5–4)
LYMPHOCYTES RELATIVE PERCENT: 11.5 % (ref 20–42)
Lab: ABNORMAL
MCH RBC QN AUTO: 29.5 PG (ref 26–35)
MCH RBC QN AUTO: 30.3 PG (ref 26–35)
MCHC RBC AUTO-ENTMCNC: 33.2 % (ref 32–34.5)
MCHC RBC AUTO-ENTMCNC: 34 % (ref 32–34.5)
MCV RBC AUTO: 88.8 FL (ref 80–99.9)
MCV RBC AUTO: 89.3 FL (ref 80–99.9)
METHADONE SCREEN, URINE: NOT DETECTED
MONOCYTES ABSOLUTE: 1.55 E9/L (ref 0.1–0.95)
MONOCYTES RELATIVE PERCENT: 6.2 % (ref 2–12)
MYCOPLASMA PNEUMONIAE BY PCR: NOT DETECTED
NEUTROPHILS ABSOLUTE: 20.06 E9/L (ref 1.8–7.3)
NEUTROPHILS RELATIVE PERCENT: 80.6 % (ref 43–80)
NITRITE, URINE: NEGATIVE
OPIATE SCREEN URINE: NOT DETECTED
OXYCODONE URINE: NOT DETECTED
PARAINFLUENZA VIRUS 1 BY PCR: NOT DETECTED
PARAINFLUENZA VIRUS 2 BY PCR: NOT DETECTED
PARAINFLUENZA VIRUS 3 BY PCR: NOT DETECTED
PARAINFLUENZA VIRUS 4 BY PCR: NOT DETECTED
PDW BLD-RTO: 13.7 FL (ref 11.5–15)
PDW BLD-RTO: 13.8 FL (ref 11.5–15)
PH UA: 6 (ref 5–9)
PHENCYCLIDINE SCREEN URINE: NOT DETECTED
PLATELET # BLD: 262 E9/L (ref 130–450)
PLATELET # BLD: 314 E9/L (ref 130–450)
PMV BLD AUTO: 10.4 FL (ref 7–12)
PMV BLD AUTO: 11.1 FL (ref 7–12)
POIKILOCYTES: ABNORMAL
POTASSIUM REFLEX MAGNESIUM: 5 MMOL/L (ref 3.5–5)
PROTEIN UA: NEGATIVE MG/DL
RBC # BLD: 5.19 E12/L (ref 3.8–5.8)
RBC # BLD: 5.44 E12/L (ref 3.8–5.8)
RESPIRATORY SYNCYTIAL VIRUS BY PCR: NOT DETECTED
SARS-COV-2, PCR: NOT DETECTED
SODIUM BLD-SCNC: 139 MMOL/L (ref 132–146)
SPECIFIC GRAVITY UA: >=1.03 (ref 1–1.03)
TROPONIN: <0.01 NG/ML (ref 0–0.03)
UROBILINOGEN, URINE: 0.2 E.U./DL
WBC # BLD: 16.9 E9/L (ref 4.5–11.5)
WBC # BLD: 24.9 E9/L (ref 4.5–11.5)

## 2020-12-05 PROCEDURE — 81003 URINALYSIS AUTO W/O SCOPE: CPT

## 2020-12-05 PROCEDURE — 93005 ELECTROCARDIOGRAM TRACING: CPT | Performed by: EMERGENCY MEDICINE

## 2020-12-05 PROCEDURE — G0378 HOSPITAL OBSERVATION PER HR: HCPCS

## 2020-12-05 PROCEDURE — 36415 COLL VENOUS BLD VENIPUNCTURE: CPT

## 2020-12-05 PROCEDURE — 99244 OFF/OP CNSLTJ NEW/EST MOD 40: CPT | Performed by: INTERNAL MEDICINE

## 2020-12-05 PROCEDURE — 0202U NFCT DS 22 TRGT SARS-COV-2: CPT

## 2020-12-05 PROCEDURE — 85027 COMPLETE CBC AUTOMATED: CPT

## 2020-12-05 PROCEDURE — 6370000000 HC RX 637 (ALT 250 FOR IP): Performed by: NURSE PRACTITIONER

## 2020-12-05 PROCEDURE — 96360 HYDRATION IV INFUSION INIT: CPT

## 2020-12-05 PROCEDURE — 80048 BASIC METABOLIC PNL TOTAL CA: CPT

## 2020-12-05 PROCEDURE — 99220 PR INITIAL OBSERVATION CARE/DAY 70 MINUTES: CPT | Performed by: INTERNAL MEDICINE

## 2020-12-05 PROCEDURE — 87040 BLOOD CULTURE FOR BACTERIA: CPT

## 2020-12-05 PROCEDURE — 85025 COMPLETE CBC W/AUTO DIFF WBC: CPT

## 2020-12-05 PROCEDURE — 6370000000 HC RX 637 (ALT 250 FOR IP): Performed by: INTERNAL MEDICINE

## 2020-12-05 PROCEDURE — 93010 ELECTROCARDIOGRAM REPORT: CPT | Performed by: INTERNAL MEDICINE

## 2020-12-05 PROCEDURE — 71045 X-RAY EXAM CHEST 1 VIEW: CPT

## 2020-12-05 PROCEDURE — 2580000003 HC RX 258: Performed by: EMERGENCY MEDICINE

## 2020-12-05 PROCEDURE — APPSS60 APP SPLIT SHARED TIME 46-60 MINUTES: Performed by: PHYSICIAN ASSISTANT

## 2020-12-05 PROCEDURE — 84484 ASSAY OF TROPONIN QUANT: CPT

## 2020-12-05 PROCEDURE — 80307 DRUG TEST PRSMV CHEM ANLYZR: CPT

## 2020-12-05 PROCEDURE — 83690 ASSAY OF LIPASE: CPT

## 2020-12-05 PROCEDURE — 99284 EMERGENCY DEPT VISIT MOD MDM: CPT

## 2020-12-05 RX ORDER — METFORMIN HYDROCHLORIDE 500 MG/1
500 TABLET, EXTENDED RELEASE ORAL
Status: DISCONTINUED | OUTPATIENT
Start: 2020-12-06 | End: 2020-12-06 | Stop reason: HOSPADM

## 2020-12-05 RX ORDER — METOPROLOL SUCCINATE 25 MG/1
25 TABLET, EXTENDED RELEASE ORAL 2 TIMES DAILY
Status: DISCONTINUED | OUTPATIENT
Start: 2020-12-05 | End: 2020-12-05

## 2020-12-05 RX ORDER — 0.9 % SODIUM CHLORIDE 0.9 %
1000 INTRAVENOUS SOLUTION INTRAVENOUS ONCE
Status: COMPLETED | OUTPATIENT
Start: 2020-12-05 | End: 2020-12-05

## 2020-12-05 RX ORDER — PANTOPRAZOLE SODIUM 40 MG/1
40 TABLET, DELAYED RELEASE ORAL
Status: DISCONTINUED | OUTPATIENT
Start: 2020-12-05 | End: 2020-12-06 | Stop reason: HOSPADM

## 2020-12-05 RX ORDER — ASPIRIN 81 MG/1
81 TABLET, CHEWABLE ORAL DAILY
Status: DISCONTINUED | OUTPATIENT
Start: 2020-12-05 | End: 2020-12-06 | Stop reason: HOSPADM

## 2020-12-05 RX ORDER — DILTIAZEM HYDROCHLORIDE 120 MG/1
120 CAPSULE, COATED, EXTENDED RELEASE ORAL DAILY
Status: DISCONTINUED | OUTPATIENT
Start: 2020-12-05 | End: 2020-12-06 | Stop reason: HOSPADM

## 2020-12-05 RX ORDER — METOCLOPRAMIDE 10 MG/1
10 TABLET ORAL 4 TIMES DAILY PRN
Status: DISCONTINUED | OUTPATIENT
Start: 2020-12-05 | End: 2020-12-06 | Stop reason: HOSPADM

## 2020-12-05 RX ORDER — BUSPIRONE HYDROCHLORIDE 5 MG/1
15 TABLET ORAL DAILY
Status: DISCONTINUED | OUTPATIENT
Start: 2020-12-05 | End: 2020-12-06 | Stop reason: HOSPADM

## 2020-12-05 RX ORDER — TRAZODONE HYDROCHLORIDE 50 MG/1
50 TABLET ORAL NIGHTLY
Status: DISCONTINUED | OUTPATIENT
Start: 2020-12-05 | End: 2020-12-06 | Stop reason: HOSPADM

## 2020-12-05 RX ORDER — AMOXICILLIN 250 MG/1
500 CAPSULE ORAL EVERY 12 HOURS SCHEDULED
Status: DISCONTINUED | OUTPATIENT
Start: 2020-12-05 | End: 2020-12-06 | Stop reason: HOSPADM

## 2020-12-05 RX ORDER — FLUTICASONE PROPIONATE 110 UG/1
1 AEROSOL, METERED RESPIRATORY (INHALATION) 2 TIMES DAILY
Status: DISCONTINUED | OUTPATIENT
Start: 2020-12-05 | End: 2020-12-06 | Stop reason: HOSPADM

## 2020-12-05 RX ORDER — DILTIAZEM HYDROCHLORIDE 120 MG/1
120 CAPSULE, COATED, EXTENDED RELEASE ORAL DAILY
Status: DISCONTINUED | OUTPATIENT
Start: 2020-12-06 | End: 2020-12-05

## 2020-12-05 RX ORDER — CHOLESTYRAMINE 4 G/9G
1 POWDER, FOR SUSPENSION ORAL DAILY
Status: DISCONTINUED | OUTPATIENT
Start: 2020-12-05 | End: 2020-12-06 | Stop reason: HOSPADM

## 2020-12-05 RX ORDER — PANTOPRAZOLE SODIUM 40 MG/1
40 TABLET, DELAYED RELEASE ORAL
Status: DISCONTINUED | OUTPATIENT
Start: 2020-12-06 | End: 2020-12-05

## 2020-12-05 RX ADMIN — DILTIAZEM HYDROCHLORIDE 120 MG: 120 CAPSULE, COATED, EXTENDED RELEASE ORAL at 16:03

## 2020-12-05 RX ADMIN — ASPIRIN 81 MG CHEWABLE TABLET 81 MG: 81 TABLET CHEWABLE at 12:30

## 2020-12-05 RX ADMIN — BUSPIRONE HYDROCHLORIDE 15 MG: 5 TABLET ORAL at 21:47

## 2020-12-05 RX ADMIN — PANTOPRAZOLE SODIUM 40 MG: 40 TABLET, DELAYED RELEASE ORAL at 18:03

## 2020-12-05 RX ADMIN — SODIUM CHLORIDE 1000 ML: 9 INJECTION, SOLUTION INTRAVENOUS at 07:08

## 2020-12-05 RX ADMIN — TRAZODONE HYDROCHLORIDE 50 MG: 50 TABLET ORAL at 21:47

## 2020-12-05 RX ADMIN — AMOXICILLIN 500 MG: 250 CAPSULE ORAL at 21:47

## 2020-12-05 ASSESSMENT — PAIN SCALES - GENERAL
PAINLEVEL_OUTOF10: 0
PAINLEVEL_OUTOF10: 6
PAINLEVEL_OUTOF10: 0

## 2020-12-05 ASSESSMENT — PAIN DESCRIPTION - PAIN TYPE: TYPE: ACUTE PAIN

## 2020-12-05 NOTE — ED PROVIDER NOTES
HPI:  12/5/20,   Time: 6:57 AM PENELOPE Menezes III is a 39 y.o. male presenting to the ED for Chest pain and Palpitations, beginning 24 hours ago. The complaint has been intermittent, moderate in severity, and worsened by nothing. Patient stated he started feeling palpitations and chest burning around 5 PM yesterday. Is been intermittent. Radiates up into his chest.  He does have a history of A. fib. He did speak to his PCP yesterday who contacted his cardiologist and increased his dose of metoprolol. Patient does have history of A. fib also chronic obstructive pulmonary disease and anxiety. No fevers chills or Covid exposures reported. Does state his heart rate was above 100. He has no cough or shortness of breath. He denies nausea vomiting diarrhea. He does complain epigastric pain. 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 And symptomatic cholelithiasis. ROS:   Pertinent positives and negatives are stated within HPI, all other systems reviewed and are negative.  --------------------------------------------- PAST HISTORY ---------------------------------------------  Past Medical History:  has a past medical history of Anxiety with depression, Arthritis, Hand pain, right, History of Helicobacter pylori infection, Panic attacks, Restless leg syndrome, Schizophrenia ([facility] [address] Scoliosis, and Weight loss. Past Surgical History:  has a past surgical history that includes Colonoscopy (2016); hernia repair (09/19/2017); pr secd clos surg wnd exten/complic (N/A, 5/27/3896); Upper gastrointestinal endoscopy (4/9/2018); Abdomen surgery (N/A, 03/15/2018); Upper gastrointestinal endoscopy (N/A, 5/21/2018); and pr lap,cholecystectomy (N/A, 5/29/2018). Social History:  reports that he has been smoking cigarettes. He has a 9.00 pack-year smoking history. He has never used smokeless tobacco. He reports current alcohol use. He reports current drug use. Drug: Marijuana.     Family History: family history includes Crohn's Disease in his sister; High Blood Pressure in his father; Mental Illness in his father. The patients home medications have been reviewed.     Allergies: Effexor [venlafaxine hydrochloride]; Cardizem [diltiazem hcl]; and Biaxin [clarithromycin]    -------------------------------------------------- RESULTS -------------------------------------------------  All laboratory and radiology results have been personally reviewed by myself   LABS:  Results for orders placed or performed during the hospital encounter of 12/05/20   CBC Auto Differential   Result Value Ref Range    WBC 24.9 (H) 4.5 - 11.5 E9/L    RBC 5.44 3.80 - 5.80 E12/L    Hemoglobin 16.5 12.5 - 16.5 g/dL    Hematocrit 48.6 37.0 - 54.0 %    MCV 89.3 80.0 - 99.9 fL    MCH 30.3 26.0 - 35.0 pg    MCHC 34.0 32.0 - 34.5 %    RDW 13.8 11.5 - 15.0 fL    Platelets 417 788 - 987 E9/L    MPV 11.1 7.0 - 12.0 fL   Basic Metabolic Panel w/ Reflex to MG   Result Value Ref Range    Sodium 139 132 - 146 mmol/L    Potassium reflex Magnesium 5.0 3.5 - 5.0 mmol/L    Chloride 105 98 - 107 mmol/L    CO2 22 22 - 29 mmol/L    Anion Gap 12 7 - 16 mmol/L    Glucose 92 74 - 99 mg/dL    BUN 12 6 - 20 mg/dL    CREATININE 1.1 0.7 - 1.2 mg/dL    GFR Non-African American >60 >=60 mL/min/1.73    GFR African American >60     Calcium 9.9 8.6 - 10.2 mg/dL   Troponin   Result Value Ref Range    Troponin <0.01 0.00 - 0.03 ng/mL   EKG 12 Lead   Result Value Ref Range    Ventricular Rate 71 BPM    Atrial Rate 71 BPM    P-R Interval 146 ms    QRS Duration 92 ms    Q-T Interval 392 ms    QTc Calculation (Bazett) 425 ms    P Axis 74 degrees    R Axis 82 degrees    T Axis 77 degrees       RADIOLOGY:  Interpreted by Radiologist.  XR CHEST PORTABLE   Final Result   No acute cardiopulmonary disease.          ------------------------- NURSING NOTES AND VITALS REVIEWED ---------------------------   The nursing notes within the ED encounter and vital signs as below have been reviewed. /63   Pulse 84   Temp 97.1 °F (36.2 °C)   Resp 18   Ht 5' 8\" (1.727 m)   Wt 156 lb (70.8 kg)   SpO2 97%   BMI 23.72 kg/m²   Oxygen Saturation Interpretation: Normal      ---------------------------------------------------PHYSICAL EXAM--------------------------------------      Constitutional/General: Alert and oriented x3, well appearing, non toxic in NAD  Head: NC/AT  Eyes: PERRL, EOMI  Mouth: Oropharynx clear, handling secretions, no trismus  Neck: Supple, full ROM, no meningeal signs  Pulmonary: Lungs clear to auscultation bilaterally, no wheezes, rales, or rhonchi. Not in respiratory distress  Cardiovascular:  Regular rate and rhythm, no murmurs, gallops, or rubs. 2+ distal pulses  Abdomen: Soft, non tender, non distended,   Extremities: Moves all extremities x 4. Warm and well perfused  Skin: warm and dry without rash  Neurologic: GCS 15,  Psych: Normal Affect, Anxious       ------------------------------ ED COURSE/MEDICAL DECISION MAKING----------------------  Medications   0.9 % sodium chloride bolus (1,000 mLs Intravenous New Bag 12/5/20 0708)         Medical Decision Making:    EKG #1:  Interpreted by emergency department physician unless otherwise noted. Time:  0525    Rate: 71 BPM  Rhythm: Sinus. Interpretation: normal sinus rhythm. Patient presents with chest pain rating up into his chest describes it as a burning sensation. Onset was 24 hours ago. He did speak with his PCP yesterday they increased his metoprolol to twice daily because of ongoing concerns of palpitations. 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 Patient's work-up was concerning for large leukocytosis. Cardiac work-up was negative EKG is described as above. Patient did take aspirin prior to arrival.  Recommend admission to the hospital for further cardiac evaluation and repeat CBC to evaluate his leukocytosis. He has no fevers chills or Covid exposures.     Discussed admission with hospitalist service patient be admitted to telemetry for further evaluation. Counseling: The emergency provider has spoken with the patient and discussed todays results, in addition to providing specific details for the plan of care and counseling regarding the diagnosis and prognosis. Questions are answered at this time and they are agreeable with the plan.      --------------------------------- IMPRESSION AND DISPOSITION ---------------------------------    IMPRESSION  1. Chest pain, unspecified type    2.  Leukocytosis, unspecified type        DISPOSITION  Disposition: Admit to telemetry  Patient condition is stable                Susan Ricks DO  12/05/20 9942

## 2020-12-05 NOTE — PROGRESS NOTES
Per Diego Craig, MARY, she spoke to Dr. Kirstie De La Vega cardiology already, no need to call out consult.

## 2020-12-05 NOTE — PROGRESS NOTES
Perfect served, Valencia Covarrubias for Dr Jose Villanueva, okay to give cardizem, please start today. Patient refused, let Valencia Covarrubias know, no new orders received.

## 2020-12-05 NOTE — H&P
[facility] History and Physical      CHIEF COMPLAINT:  Chest pain, palpitations, \" anxiety\"     History of Present Illness:     Patient is a 38 yo male patient with a past medical history of afib, schizophrenia, panic attacks, restless leg syndrome, gastroparesis, anxiety, and depression. Patient presented to the ER around 4 am. Per patient he saw his PCP yesterday am and \" felt fine. \"He was told that his heart rate was running \" fast.\" His PCP contacted his cardiologist and he was told to double dose of his beta blocker. He also reported that he has been having ringing in his ears and was started on amoxicillin for double ear infections. Per patient he started having a lot of anxiety regarding dosage increase and felt concerned that \" it was going to be too much medication. \" He reported he took the increased dosage around 5pm last night and anxiety worsened with chest pain/ palpitations. He woke up at 4 am and chest pain was midsternal and he was having palpitations. He then presented to ER for evaluation. Pt reporting he feels like \" it is anxiety. \" He is upset that he is hospitalized and feels that the \" isolation\" is making his anxiety thru the roof. Denies ill contacts. Reporting he has been staying at home. Denies fevers, chills, nausea, vomiting, dysuria, hematuria, hematochezia. He had a stress test 11/11/20 which was normal per pt. Reporting he has been eating and drinking well at home.      Informant(s) for H&P:patient, chart review     REVIEW OF SYSTEMS:  A comprehensive review of systems was negative except for: what is in the HPI      PMH:  Past Medical History:   Diagnosis Date    Anxiety with depression     Arthritis     Hand pain, right     History of Helicobacter pylori infection     Panic attacks     Restless leg syndrome     Schizophrenia (HCC)     mild - controlled (history of )  PCP states it is marilyn bilpolar    Scoliosis     Weight loss     25 # in 6 months / capsule TK ONE C PO  QD 10/25/19   Historical Provider, MD   acetaminophen (TYLENOL) 325 MG tablet Take 650 mg by mouth every 6 hours as needed for Pain    Historical Provider, MD       Allergies:    Effexor [venlafaxine hydrochloride]; Cardizem [diltiazem hcl]; and Biaxin [clarithromycin]    Social History:    reports that he has been smoking cigarettes. He has a 9.00 pack-year smoking history. He has never used smokeless tobacco. He reports current alcohol use. He reports current drug use. Drug: Marijuana. Reporting he smokes marijuana. Family History:   family history includes Crohn's Disease in his sister; High Blood Pressure in his father; Mental Illness in his father. PHYSICAL EXAM:  Vitals:  /64   Pulse 61   Temp 98 °F (36.7 °C) (Oral)   Resp 16   Ht 5' 8\" (1.727 m)   Wt 156 lb (70.8 kg)   SpO2 96%   BMI 23.72 kg/m²   General Appearance: alert and oriented to person, place and time and in no acute distress  Skin: warm and dry  Head: normocephalic and atraumatic  Neck: neck supple and non tender without mass   Pulmonary/Chest: clear to auscultation bilaterally- faint exp wheeze   Cardiovascular: normal rate, normal S1 and S2 and no carotid bruits  Abdomen: soft, non-tender, non-distended, normal bowel sounds, no masses or organomegaly  Extremities: no cyanosis, no clubbing and no edema  Neurologic: speech normal         LABS:  Recent Labs     12/05/20  0527      K 5.0      CO2 22   BUN 12   CREATININE 1.1   GLUCOSE 92   CALCIUM 9.9       Recent Labs     12/05/20  0527   WBC 24.9*   RBC 5.44   HGB 16.5   HCT 48.6   MCV 89.3   MCH 30.3   MCHC 34.0   RDW 13.8      MPV 11.1       No results for input(s): POCGLU in the last 72 hours. Radiology:   XR CHEST PORTABLE   Final Result   No acute cardiopulmonary disease. ASSESSMENT:      Active Problems:    Chest pain  Resolved Problems:    * No resolved hospital problems. *      PLAN:    1.   Leukocytosis: etiology unknown: pt reported to ER due to chest pain/ palpitations starting last night around 5 pm after taking increased dose of BB as instructed by cardiology. He saw PCP earlier in the day/ who called cardiology and was told to increase BB dosing. Per pt this made him very anxious. Reporting he was started on amoxicillin for bilateral ear infections. Reporting ringing in his ears. WBC 24.9 on arrival. CXR clear/ [facility] sent. Check . UA. Being treated for bilateral ear infection. Repeat CBC now. 2. Palpitations / chest discomfort: may be related to anxiety. Recent negative stress test. However- he was noted to have tachycardia in PCP office. Cardiology instructed to change his metoprolol 25mg from daily to BID- which made him very anxious. He is asking to speak with cardiology. 3. H/o paroxysmal afib: currently SR on monitor    4. R/o COVID- 19 low suspicion: isolation is making pt very anxious/ tearful    5. + cannabis use: cessation    6. Schizophrenia/ panic attacks: continue meds    Code Status: FULL  DVT prophylaxis: lovenox    NOTE: This report was transcribed using voice recognition software. Every effort was made to ensure accuracy; however, inadvertent computerized transcription errors may be present.   Electronically signed by MASON Bran on 12/5/2020 at 9:41 AM

## 2020-12-05 NOTE — CONSULTS
Inpatient Cardiology Consultation      Reason for Consult: Atypical chest pain, palpitations    Consulting Physician: Dr. Flaca Edge    Requesting Physician: MASON Sparks     Date of Consultation: 12/5/2020    HISTORY OF PRESENT ILLNESS:   Patient is a 39year old WM who known to Dr. Flaca Edge through inpatient consultation in November 2020. During that hospitalization, he was noted to have new onset atrial fibrillation with RVR. He converted spontaneously to normal sinus rhythm. Echocardiogram during that admission revealed no significant abnormalities. [facility] was discussed with the patient given his CHADsVASc score of one, however patient refused and wanted to proceed with aspirin therapy only. He was also initiated on oral Cardizem therapy. He was discharged home in stable condition. He then represented to the hospital on November 10, 2020 due to complaints of atypical chest discomfort. He was noted to be in normal sinus rhythm at that time. He underwent exercise nuclear stress testing, which revealed no significant abnormality. He again was discharged home in stable condition, and chest discomfort was deemed noncardiac in etiology. Through chart review, patient called into our [facility] office stating that he was having tinnitus, which he deemed a side effect of his Cardizem. He was switched to Toprol 25 mg daily at that time as per Dr. Flaca Edge.  He was then seen in the office yesterday by his PCP 12/4 per the patient, and was noted to have an ear infection. Patient's primary care provider also noted that patient was very anxious, and Toprol-XL was increased to BID. Patient then presented to [facility] ED due to atypical chest discomfort on December 5, 2020. Due to patient being in droplet plus isolation for COVID-19 rule out, telephone interview was performed in order to limit exposure and preserve PPE. Case was also discussed with patient's nurse.   Per the patient, after he took his second dose of Toprol-XL yesterday, shortly after he began to notice chest discomfort -- describes it as a pressure and burning sensation located mid-sternally no radiation. He also began to feel palpitations, but denied any other associated symptoms. Overnight, he continued to have intermittent episodes of chest discomfort, and when he continued to notice it early this AM (12/5), he became concerned and decided to come to the ED for evaluation. Patient states he is currently asymptomatic from a cardiac perspective and denies any current chest pain or palpitations. He states he is very anxious and is upset about him being COVID tested. Social and family history as noted below. Labs and diagnostic testing as noted below. Please note: past medical records were reviewed per electronic medical record (EMR) - see detailed reports under Past Medical/ Surgical History. PAST MEDICAL HISTORY:    1. History of schizophrenia and depression. 2. Severe anxiety with history of panic attacks. 3. Prediabetes, on metformin. 4. Gastroesophageal reflux disease. 5. Restless leg syndrome. 6. Tobacco and marijuana abuse. 7. Paroxysmal atrial fibrillation. 8. Newly diagnosed atrial fibrillation with RVR 11/6/2020. Converted spontaneously to normal sinus rhythm. CHADsVASc score of 1 (prediabetes) -- this was discussed with the patient during his prior hospitalization and patient declined [facility] and wished to proceed only with aspirin. He was initiated on oral Cardizem therapy. 9. Rehospitalization on 11/11/2020 for atypical chest discomfort. Underwent stress testing as noted below. He was noted to be in normal sinus rhythm at that time. CARDIAC TESTING:  Echocardiogram 11/6/2020 (Dr. Viet Chavis)   Summary:   Normal left ventricular systolic function. Ejection fraction is visually estimated at 55%. Normal right ventricular size and function (TAPSE 1.8 cm).    Normal left ventricular diastolic filling pattern for age. No evidence of hemodynamically significant valve disease. Exercise Nuclear Stress Test 11/11/2020  Impression: The myocardial perfusion imaging was normal   Overall left ventricular systolic function was normal, EF 60%   No recent stress test available for comparison. Following placement of an intravenous catheter 10 millicuries of technetium 99m sestamibi was administered with resting SPECT images obtained approximately 45 minutes post injection. After completion of resting images, the patient exercised for 6:30 minutes on an accelerated Sukhjinder protocol treadmill achieving 11.7 METS of activity and 87% MPHR. Nonanginal chest pain with no cardiac arrhythmias were noted. A normal blood pressure response to exercise was recorded. No electrocardiographic changes were noted. One mintue prior to the completion of exercise 30 millicuries of technetium 99m sestamibi was injected with post stress SPECT images obtained approximately 30 minutes post stress. Perfusion images pending. PAST SURGICAL HISTORY:    Past Surgical History:   Procedure Laterality Date    ABDOMEN SURGERY N/A 03/15/2018    wound exploration umbilicus, excsion suture granuloma    COLONOSCOPY  2016    HERNIA REPAIR  09/19/2017    DE LAP,CHOLECYSTECTOMY N/A 5/29/2018    LAPAROSCOPIC  CHOLECYSTECTOMY performed by Paige Saucedo MD at [facility] WND EXTEN/COMPLIC N/A 2/99/4366    ABDOMINAL WOUND EXPLORATION , REMOVAL OF SUTURE GRANULOMA performed by Paige Saucedo MD at [address]  4/9/2018    EGD BIOPSY performed by Paige Saucedo MD at [address] [facility] N/A 5/21/2018    EGD BIOPSY performed by Paige Saucedo MD at [address] [place] [address] Drive:  Prior to Admission medications    Medication Sig Start Date End Date Taking?  Authorizing Provider   metoprolol succinate (TOPROL XL) 25 MG extended release tablet Take 25 mg by mouth 2 times daily mg, 50 mg, Oral, Nightly, MASON Mary    metoprolol succinate (TOPROL XL) extended release tablet 25 mg, 25 mg, Oral, BID, MASON Mary      ALLERGIES:  Effexor [venlafaxine hydrochloride]; Cardizem [diltiazem hcl]; and Biaxin [clarithromycin]    SOCIAL HISTORY:    Tobacco: 1 PPD x 27 years  EtOH: Rare use  Illicit: Smokes marijuana  Drinks 2 cups of coffee daily, denies any energy drinks. FAMILY HISTORY:   No pertinent cardiac family medical history per the patient. REVIEW OF SYSTEMS:     · Constitutional: Denies fevers, chills, night sweats, and generalized fatigue. Denies significant weight loss or weight gain. · HEENT: Denies headaches, nose bleeds, rhinorrhea, sore throat. Denies blurred vision. Denies dysphagia, odynophagia. · Musculoskeletal: Denies falls, pain to BLE with ambulation. Denies muscle weakness. · Neurological: Denies dizziness and lightheadedness, numbness and tingling. Denies focal neurological deficits. · Cardiovascular: +chest pressure/burning, +palpitations. Denies diaphoresis. Denies syncope. Denies PND, orthopnea, peripheral edema. · Respiratory: Denies shortness of breath at rest or with exertion. Denies cough, hemoptysis. · Gastrointestinal: Denies nausea/vomiting, diarrhea and constipation, black/bloody, and tarry stools. · Genitourinary: Denies dysuria and hematuria. · Hematologic: Denies excessive bruising or bleeding. · Endocrine: Denies excessive thirst. Denies intolerance to hot and cold. · Psychiatric: +anxiety and depression. +schizophrenia. PHYSICAL EXAM:   /64   Pulse 62   Temp 99 °F (37.2 °C) (Oral)   Resp 14   Ht 5' 8\" (1.727 m)   Wt 156 lb (70.8 kg)   SpO2 96%   BMI 23.72 kg/m²   Due to patient's COVID-19 rule out status and him being in isolation, physical exam was not performed in order to preserve PPE and limit exposure.       DATA:    Telemetry: SB with HR in the 50s/NSR with HR in the 60s    Diagnostic:  All diagnostic testing and lab work thus far this admission reviewed in detail. CXR 12/5/2020: Impression:  No acute cardiopulmonary disease. No intake or output data in the 24 hours ending 12/05/20 1056    Labs:   CBC:   Recent Labs     12/05/20 0527   WBC 24.9*   HGB 16.5   HCT 48.6        BMP:   Recent Labs     12/05/20 0527      K 5.0   CO2 22   BUN 12   CREATININE 1.1   LABGLOM >60   CALCIUM 9.9     CARDIAC ENZYMES:  Recent Labs     12/05/20 0527   TROPONINI <0.01     FASTING LIPID PANEL:  Lab Results   Component Value Date    CHOL 172 11/11/2011    HDL 40.0 11/11/2011    LDLCALC 105 11/11/2011    TRIG 136 11/11/2011       ASSESSMENT:  1. Atypical chest pain. Troponin negative x 1. No acute ST changes on EKG. Exercise nuclear stress test 11/2020 revealed no ischemia, normal LV function and no wall motion abnormalities. Patient is currently chest pain free. 2. Leukocytosis (improved). COVID-19 result pending. 3. Paroxysmal atrial fibrillation. Currently maintaining NSR/SB. On ASA therapy. 4. Pre-diabetes mellitus. 5. Anxiety, depression, schizophrenia. 6. Tobacco and marijuana abuse. 7. Gastroesophageal reflux disease. 8. Restless leg syndrome. RECOMMENDATIONS:  1. Stop Toprol-XL. 2. Patient is requesting to re-try Cardizem  mg daily. 3. Recent echocardiogram and stress testing has been reviewed. 4. No need for additional cardiac testing at this time. Patient is okay for discharge from a cardiology standpoint. 5. Cardiology service will sign off. Please call with any questions or concerns. The above case and recommendations were made in collaboration with Dr. Josh Garduno -- further recommendations as per him.     Loren White, [address] [place] [address], [place] [address] Cardiology    Electronically signed by Harrison Madrid PA-C on 12/5/2020 at 10:56 AM     ______________________________________________________________________________________________________________  I independently interviewed and examined the patient. I have reviewed the above documentation completed by the HELDER. Please see my additional contributions to the HPI, physical exam, and assessment / medical decision making.     HPI, ROS, PMH, PSH, [address], [place], and medications independently reviewed (agree; see above documentation)     History of Present Illness:  No exertional chest pain or respiratory distress. +atypical chest pain and palpitations this admission (SR this admission). +recent increase in anxiety per patient.     Review of Systems:   Cardiac: As per HPI  General: No fever, chills  Pulmonary: As per HPI  HEENT: No visual disturbances, difficult swallowing  GI: No nausea, vomiting  : No dysuria, hematuria  Endocrine: No thyroid disease, +pre-DM per patient (he takes metformin)  Musculoskeletal: HADDAD x 4, no focal motor deficits  Skin: Intact, no rashes  Neuro: No headache, seizures  Psych: Currently with no depression, +anxiety     Physical Exam:  /64   Pulse 57   Temp 99 °F (37.2 °C) (Oral)   Resp 14   Ht 5' 8\" (1.727 m)   Wt 156 lb (70.8 kg)   SpO2 96%   BMI 23.72 kg/m²   Appearance: Awake, alert, no acute respiratory distress  Skin: Intact, no rash  Head: Normocephalic, atraumatic  Eyes: EOMI, no conjunctival erythema  ENMT: No pharyngeal erythema, MMM, no rhinorrhea  Neck: Supple, no elevated JVP, no carotid bruits  Lungs: Clear to auscultation bilaterally. No wheezes, rales, or rhonchi. Cardiac: Regular rate and rhythm, +S1S2, no murmurs apparent  Abdomen: Soft, nontender, +bowel sounds  Extremities: Moves all extremities x 4, no lower extremity edema  Neurologic: No focal motor deficits apparent, normal mood and affect    Cardiac Tests:  Telemetry reviewed (11/6/2020): atrial fibrillation --> SR    Telemetry reviewed (12/5/2020): SR, rate 60's    Echocardiogram: 11/6/2020   Normal left ventricular systolic function. Ejection fraction is visually estimated at 55%.    Normal right ventricular size and function (TAPSE 1.8 cm). Normal left ventricular diastolic filling pattern for age. No evidence of hemodynamically significant valve disease. Exercise nuclear stress test: 11/11/2020  The patient exercised for 6:30 minutes on an accelerated Sukhjinder protocol treadmill achieving 11.7 METS of activity and 87% MPHR. Nonanginal chest pain with no cardiac arrhythmias were noted. A normal blood pressure response to exercise was recorded. No electrocardiographic changes were noted. The myocardial perfusion imaging was normal         Overall left ventricular systolic function was normal, EF 60%        - Cardizem  mg recently switched to Toprol XL as an outpatient because the patient thought cardizem was contributing to ringing in his ears (\"felt better on cardizem\"). He was subsequently diagnosed with an ear infection by his PCP per patient. - Admitted for work-up of leukocytosis. COVID-19 result pending.  - Recent stress test and echocardiogram results outlined above  - Will switch back to cardizem 120 mg daily  - R/B/A/I for anticoagulation recently discussed -- he opts to continue ASA  - Negative KAYLEN study at [facility] last year per patient  - Tobacco and marijuana cessation  - Outpatient cardiology follow-up / will follow PRN        Thank you for allowing me to participate in your patient's care.  Please feel free to contact me if you have any questions or concerns.     Paddy Hector MD  [facility]) Cardiology

## 2020-12-06 VITALS
OXYGEN SATURATION: 98 % | SYSTOLIC BLOOD PRESSURE: 107 MMHG | TEMPERATURE: 98.5 F | WEIGHT: 151.13 LBS | DIASTOLIC BLOOD PRESSURE: 65 MMHG | HEIGHT: 68 IN | HEART RATE: 80 BPM | RESPIRATION RATE: 16 BRPM | BODY MASS INDEX: 22.9 KG/M2

## 2020-12-06 LAB
HCT VFR BLD CALC: 46.2 % (ref 37–54)
HEMOGLOBIN: 15 G/DL (ref 12.5–16.5)
MCH RBC QN AUTO: 29.4 PG (ref 26–35)
MCHC RBC AUTO-ENTMCNC: 32.5 % (ref 32–34.5)
MCV RBC AUTO: 90.6 FL (ref 80–99.9)
PDW BLD-RTO: 13.6 FL (ref 11.5–15)
PLATELET # BLD: 263 E9/L (ref 130–450)
PMV BLD AUTO: 10.5 FL (ref 7–12)
RBC # BLD: 5.1 E12/L (ref 3.8–5.8)
WBC # BLD: 16.9 E9/L (ref 4.5–11.5)

## 2020-12-06 PROCEDURE — 6370000000 HC RX 637 (ALT 250 FOR IP): Performed by: INTERNAL MEDICINE

## 2020-12-06 PROCEDURE — 96374 THER/PROPH/DIAG INJ IV PUSH: CPT

## 2020-12-06 PROCEDURE — 6370000000 HC RX 637 (ALT 250 FOR IP): Performed by: NURSE PRACTITIONER

## 2020-12-06 PROCEDURE — 99217 PR OBSERVATION CARE DISCHARGE MANAGEMENT: CPT | Performed by: INTERNAL MEDICINE

## 2020-12-06 PROCEDURE — 6360000002 HC RX W HCPCS: Performed by: INTERNAL MEDICINE

## 2020-12-06 PROCEDURE — 85027 COMPLETE CBC AUTOMATED: CPT

## 2020-12-06 PROCEDURE — G0378 HOSPITAL OBSERVATION PER HR: HCPCS

## 2020-12-06 PROCEDURE — 36415 COLL VENOUS BLD VENIPUNCTURE: CPT

## 2020-12-06 RX ORDER — DILTIAZEM HYDROCHLORIDE 120 MG/1
120 CAPSULE, COATED, EXTENDED RELEASE ORAL DAILY
Qty: 30 CAPSULE | Refills: 0 | Status: SHIPPED | OUTPATIENT
Start: 2020-12-07 | End: 2020-12-22 | Stop reason: SINTOL

## 2020-12-06 RX ORDER — LORAZEPAM 2 MG/ML
0.5 INJECTION INTRAMUSCULAR ONCE
Status: COMPLETED | OUTPATIENT
Start: 2020-12-06 | End: 2020-12-06

## 2020-12-06 RX ORDER — CLONAZEPAM 0.5 MG/1
0.5 TABLET ORAL PRN
Status: DISCONTINUED | OUTPATIENT
Start: 2020-12-06 | End: 2020-12-06

## 2020-12-06 RX ORDER — CLONAZEPAM 0.5 MG/1
0.5 TABLET ORAL DAILY PRN
Status: DISCONTINUED | OUTPATIENT
Start: 2020-12-06 | End: 2020-12-06 | Stop reason: HOSPADM

## 2020-12-06 RX ORDER — AMOXICILLIN 500 MG/1
500 CAPSULE ORAL EVERY 12 HOURS SCHEDULED
Qty: 20 CAPSULE | Refills: 0
Start: 2020-12-06 | End: 2020-12-16

## 2020-12-06 RX ADMIN — AMOXICILLIN 500 MG: 250 CAPSULE ORAL at 08:45

## 2020-12-06 RX ADMIN — LORAZEPAM 0.5 MG: 2 INJECTION INTRAMUSCULAR; INTRAVENOUS at 09:01

## 2020-12-06 RX ADMIN — CLONAZEPAM 0.5 MG: 0.5 TABLET ORAL at 01:12

## 2020-12-06 RX ADMIN — ASPIRIN 81 MG CHEWABLE TABLET 81 MG: 81 TABLET CHEWABLE at 08:45

## 2020-12-06 RX ADMIN — BUSPIRONE HYDROCHLORIDE 15 MG: 5 TABLET ORAL at 08:45

## 2020-12-06 RX ADMIN — DILTIAZEM HYDROCHLORIDE 120 MG: 120 CAPSULE, COATED, EXTENDED RELEASE ORAL at 08:45

## 2020-12-06 NOTE — DISCHARGE SUMMARY
[facility] EMERGENCY SERVICE Physician Discharge Summary             cardiology as suggested    Apoorva Barrios DO  [address] [place] [address]  [phone]            Activity level: As tolerated    Diet: DIET CARDIAC; Dispo: Home with self    Condition on discharge-stable    Patient ID:  Duyen Mercado  [id number]  89 y.o.  1975    Admit date: 12/5/2020    Discharge date and time:  12/6/2020  9:30 AM    Admission Diagnoses: Active Problems:    Chest pain    Leukocytosis  Resolved Problems:    * No resolved hospital problems. *      Discharge Diagnoses: Active Problems:    Chest pain    Leukocytosis  Resolved Problems:    * No resolved hospital problems. *      Consults:  IP CONSULT TO CARDIOLOGY     Hospital Course:   He is a 61-year-old male with past medical history of atrial fibrillation, schizophrenia, panic attacks, restless leg syndrome, gastroparesis, anxiety and depression, came to ER with tachycardia palpitations/chest discomfort in spite of doubling the dose of beta-blockers as suggested by his cardiology. He was recently started on amoxicillin for bilateral ear infections. It was noted that on admission as well as during hospital stay he had high level of anxiety. He felt that because he took increased dose of beta-blockers -his anxiety worsened as well as had midsternal chest pain/discomfort with palpitations. He denied any fever chills cough short of breath nausea vomiting or abdominal pain. Initial work-up with labs unremarkable, troponin neg, chest x-ray negative, but was noted to have leukocytosis with white count 24.9. He was increasingly anxious and wanted to speak to cardiology. So Dr. Can Calles was consulted. He was switch back to Cardizem  mg daily. He recently had a stress test which was negative. Recent echocardiogram with LVEF 55% no regional wall motion abnormality. He had negative KAYLEN study at [facility] last year.   He is advised to follow-up with cardiology as outpatient. He remained stable on telemetry floor and a normal sinus rhythm with well-controlled rate. His white count down trended to 16.9 . Which could be secondary to his ear infections. He is advised to follow-up with PCP for both elevated white count as well as ear infection. He was advised to completed amoxicillin course. Covid was negative. Drug screen was positive for cannabinoid. Urine analysis was clear. Other comorbid conditions were managed by continuing home medications. He is discharged with outpatient follow-up as above.     Discharge Exam:  Vitals:    12/05/20 2145 12/05/20 2345 12/06/20 0247 12/06/20 0830   BP: 98/66 113/70  107/65   Pulse: 54 76  80   Resp: 16 16  16   Temp: 98.7 °F (37.1 °C)   98.5 °F (36.9 °C)   TempSrc: Oral   Oral   SpO2: 96% 98%  98%   Weight:   151 lb 2 oz (68.5 kg)    Height:           General Appearance: alert and oriented to person, place and time, in no acute distress  Skin: warm and dry  Head: normocephalic and atraumatic  Eyes: pupils equal, round, and reactive to light, extraocular eye movements intact, conjunctivae normal  Neck: supple and non-tender without mass  Pulmonary/Chest: clear to auscultation bilaterally  Cardiovascular: normal rate, regular rhythm, normal S1 and S2  Abdomen: soft, non-tender, non-distended, normal bowel sounds, no masses or organomegaly  Extremities: no cyanosis, clubbing   Musculoskeletal: normal range of motion  Neurologic:no cranial nerve deficit,speech normal        LABS:  Recent Labs     12/05/20  0527      K 5.0      CO2 22   BUN 12   CREATININE 1.1   GLUCOSE 92   CALCIUM 9.9       Recent Labs     12/05/20  0527 12/05/20  1144 12/06/20  0405   WBC 24.9* 16.9* 16.9*   RBC 5.44 5.19 5.10   HGB 16.5 15.3 15.0   HCT 48.6 46.1 46.2   MCV 89.3 88.8 90.6   MCH 30.3 29.5 29.4   MCHC 34.0 33.2 32.5   RDW 13.8 13.7 13.6    262 263   MPV 11.1 10.4 10.5       No results for input(s): POCGLU in the last 72 hours. Imaging:   XR CHEST PORTABLE   Final Result   No acute cardiopulmonary disease.           Patient Instructions:      Medication List      START taking these medications    amoxicillin 500 MG capsule  Commonly known as:  AMOXIL  Take 1 capsule by mouth every 12 hours for 10 days     dilTIAZem 120 MG extended release capsule  Commonly known as:  CARDIZEM CD  Take 1 capsule by mouth daily  Start taking on:  December 7, 2020        CONTINUE taking these medications    acetaminophen 325 MG tablet  Commonly known as:  TYLENOL     aspirin 81 MG chewable tablet  Commonly known as:  Aspirin Childrens  Take 1 tablet by mouth daily     busPIRone 15 MG tablet  Commonly known as:  BUSPAR     cholestyramine 4 g packet  Commonly known as:  Questran  Take 1 packet by mouth daily     clonazePAM 0.5 MG tablet  Commonly known as:  KLONOPIN     Flovent  MCG/ACT inhaler  Generic drug:  fluticasone     metFORMIN 500 MG extended release tablet  Commonly known as:  GLUCOPHAGE-XR     metoclopramide 10 MG tablet  Commonly known as:  REGLAN  Take 1 tablet by mouth 4 times daily as needed (nausea)     omeprazole 40 MG delayed release capsule  Commonly known as:  PRILOSEC     traZODone 50 MG tablet  Commonly known as:  DESYREL        STOP taking these medications    metoprolol succinate 25 MG extended release tablet  Commonly known as:  TOPROL XL           Where to Get Your Medications      These medications were sent to [address], [address] [place] [address] [address] [facility]    Phone:  [phone]   · dilTIAZem 120 MG extended release capsule     Information about where to get these medications is not yet available    Ask your nurse or doctor about these medications  · amoxicillin 500 MG capsule           Note that  30 minutes was spent in preparing discharge papers, discussing discharge with patient, medication review, etc.    Signed:  Electronically signed by Paco Jimenez MD on 12/6/2020 at 9:30 AM    NOTE: This report was transcribed using voice recognition software. Every effort was made to ensure accuracy; however, inadvertent computerized transcription errors may be present.

## 2020-12-10 LAB
BLOOD CULTURE, ROUTINE: NORMAL
CULTURE, BLOOD 2: NORMAL

## 2020-12-21 ENCOUNTER — TELEPHONE (OUTPATIENT)
Dept: CARDIOLOGY CLINIC | Age: 45
End: 2020-12-21

## 2020-12-21 NOTE — TELEPHONE ENCOUNTER
Patient notified of Dr. Vahid Avery recommendations. Patient will think about it and get back to me tomorrow.

## 2020-12-21 NOTE — TELEPHONE ENCOUNTER
Patient called stating he was on Cardizem, started having ringing in his ears and was changed to Toprol. Was found to have an ear infection, had a reaction to Toprol and was seen in the ER. Ear infection has been resolved for a while and he has now started having the ringing in his ears again on the Cardizem. Please advise.

## 2020-12-22 RX ORDER — METOPROLOL SUCCINATE 25 MG/1
25 TABLET, EXTENDED RELEASE ORAL DAILY
Qty: 30 TABLET | Refills: 11 | Status: SHIPPED
Start: 2020-12-22 | End: 2021-05-03 | Stop reason: DRUGHIGH

## 2020-12-22 RX ORDER — METOPROLOL SUCCINATE 25 MG/1
25 TABLET, EXTENDED RELEASE ORAL DAILY
COMMUNITY
End: 2020-12-22 | Stop reason: SDUPTHER

## 2021-01-04 ENCOUNTER — OFFICE VISIT (OUTPATIENT)
Dept: CARDIOLOGY CLINIC | Age: 46
End: 2021-01-04
Payer: COMMERCIAL

## 2021-01-04 VITALS
DIASTOLIC BLOOD PRESSURE: 64 MMHG | HEIGHT: 68 IN | RESPIRATION RATE: 18 BRPM | HEART RATE: 72 BPM | WEIGHT: 148.6 LBS | SYSTOLIC BLOOD PRESSURE: 102 MMHG | BODY MASS INDEX: 22.52 KG/M2

## 2021-01-04 DIAGNOSIS — R07.89 ATYPICAL CHEST PAIN: ICD-10-CM

## 2021-01-04 DIAGNOSIS — Z72.0 TOBACCO ABUSE: ICD-10-CM

## 2021-01-04 DIAGNOSIS — R00.2 PALPITATIONS: ICD-10-CM

## 2021-01-04 DIAGNOSIS — F20.9 SCHIZOPHRENIA, UNSPECIFIED TYPE (HCC): ICD-10-CM

## 2021-01-04 DIAGNOSIS — I48.0 PAROXYSMAL ATRIAL FIBRILLATION (HCC): Primary | ICD-10-CM

## 2021-01-04 PROCEDURE — 99214 OFFICE O/P EST MOD 30 MIN: CPT | Performed by: INTERNAL MEDICINE

## 2021-01-04 PROCEDURE — 93000 ELECTROCARDIOGRAM COMPLETE: CPT | Performed by: INTERNAL MEDICINE

## 2021-01-04 PROCEDURE — G8484 FLU IMMUNIZE NO ADMIN: HCPCS | Performed by: INTERNAL MEDICINE

## 2021-01-04 PROCEDURE — G8427 DOCREV CUR MEDS BY ELIG CLIN: HCPCS | Performed by: INTERNAL MEDICINE

## 2021-01-04 PROCEDURE — G8420 CALC BMI NORM PARAMETERS: HCPCS | Performed by: INTERNAL MEDICINE

## 2021-01-04 PROCEDURE — 4004F PT TOBACCO SCREEN RCVD TLK: CPT | Performed by: INTERNAL MEDICINE

## 2021-01-04 RX ORDER — DIAZEPAM 5 MG/1
10 TABLET ORAL NIGHTLY
COMMUNITY

## 2021-01-04 NOTE — PROGRESS NOTES
OUTPATIENT CARDIOLOGY FOLLOW-UP    Name: Roxana Romero III    Age: 39 y.o. Primary Care Physician: Michaela Betancur DO    Date of Service: 1/4/2021    Chief Complaint: Follow-up for paroxysmal atrial fibrillation, atypical chest pain    Interim History:  +recent increase in anxiety per patient. No exertional chest pain or SOB. +occasional palpitations. He denies recent syncope, PND, or orthopnea. SR on EKG. He feels better on Toprol XL (+sided effects on cardizem CD).     Review of Systems:   Cardiac: As per HPI  General: No fever, chills  Pulmonary: As per HPI  HEENT: No visual disturbances, difficult swallowing  GI: No nausea, vomiting  : No dysuria, hematuria  Endocrine: No thyroid disease, +pre-DM per patient (he takes metformin)  Musculoskeletal: HADDAD x 4, no focal motor deficits  Skin: Intact, no rashes  Neuro: No headache, seizures  Psych: Currently with no depression, +anxiety    Past Medical History:  Past Medical History:   Diagnosis Date    Anxiety with depression     Arthritis     Hand pain, right     History of Helicobacter pylori infection     Panic attacks     Restless leg syndrome     Schizophrenia (HCC)     mild - controlled (history of )  PCP states it is marilyn bilpolar    Scoliosis     Weight loss     25 # in 6 months / as of 5/17/2018, no further weight loss       Past Surgical History:  Past Surgical History:   Procedure Laterality Date    ABDOMEN SURGERY N/A 03/15/2018    wound exploration umbilicus, excsion suture granuloma    COLONOSCOPY  2016    HERNIA REPAIR  09/19/2017    AR LAP,CHOLECYSTECTOMY N/A 5/29/2018    LAPAROSCOPIC  CHOLECYSTECTOMY performed by Zoraida Beaulieu MD at [address] [facility] WND EXTEN/COMPLIC N/A 8/46/7048    ABDOMINAL WOUND EXPLORATION , REMOVAL OF SUTURE GRANULOMA performed by Zoraida Beaulieu MD at [address]  4/9/2018    EGD BIOPSY performed by Zoraida Beaulieu MD at [address] [facility] ENDOSCOPY N/A 5/21/2018    EGD BIOPSY performed by Homar Aguirre MD at [facility] ENDOSCOPY       Family History:  Family History   Problem Relation Age of Onset    Mental Illness Father         unspecified    High Blood Pressure Father     Crohn's Disease Sister        Social History:  Social History     Socioeconomic History    Marital status: Single     Spouse name: Not on file    Number of children: Not on file    Years of education: Not on file    Highest education level: Not on file   Occupational History    Not on file   Social Needs    Financial resource strain: Not on file    Food insecurity     Worry: Not on file     Inability: Not on file    Transportation needs     Medical: Not on file     Non-medical: Not on file   Tobacco Use    Smoking status: Current Every Day Smoker     Packs/day: 1.00     Years: 18.00     Pack years: 18.00     Types: Cigarettes    Smokeless tobacco: Never Used    Tobacco comment: Before up to 2 ppd   Substance and Sexual Activity    Alcohol use: Not Currently    Drug use: Yes     Types: Marijuana    Sexual activity: Not on file   Lifestyle    Physical activity     Days per week: Not on file     Minutes per session: Not on file    Stress: Not on file   Relationships    Social connections     Talks on phone: Not on file     Gets together: Not on file     Attends Church service: Not on file     Active member of club or organization: Not on file     Attends meetings of clubs or organizations: Not on file     Relationship status: Not on file    Intimate partner violence     Fear of current or ex partner: Not on file     Emotionally abused: Not on file     Physically abused: Not on file     Forced sexual activity: Not on file   Other Topics Concern    Not on file   Social History Narrative    Not on file       Allergies:   Allergies   Allergen Reactions    Effexor [Venlafaxine Hydrochloride] Palpitations     Passed out    Cardizem [Diltiazem Hcl] Other (See Comments) Ringing in the ears    Biaxin [Clarithromycin] Palpitations       Current Medications:  Current Outpatient Medications   Medication Sig Dispense Refill    diazePAM (VALIUM) 5 MG tablet Take 5 mg by mouth 2 times daily.  metoprolol succinate (TOPROL XL) 25 MG extended release tablet Take 1 tablet by mouth daily 30 tablet 11    cholestyramine (QUESTRAN) 4 g packet Take 1 packet by mouth daily 90 packet 3    aspirin (ASPIRIN CHILDRENS) 81 MG chewable tablet Take 1 tablet by mouth daily 30 tablet 1    traZODone (DESYREL) 50 MG tablet Take 50 mg by mouth nightly      metoclopramide (REGLAN) 10 MG tablet Take 1 tablet by mouth 4 times daily as needed (nausea) 120 tablet 3    busPIRone (BUSPAR) 15 MG tablet Take 15 mg by mouth daily       FLOVENT  MCG/ACT inhaler INHALE 1 PUFF BID AFTER ALBUTEROL  5    omeprazole (PRILOSEC) 40 MG delayed release capsule TK ONE C PO  QD  0    acetaminophen (TYLENOL) 325 MG tablet Take 650 mg by mouth every 6 hours as needed for Pain      clonazePAM (KLONOPIN) 0.5 MG tablet take 1 tablet by mouth at bedtime      metFORMIN (GLUCOPHAGE-XR) 500 MG extended release tablet Take 500 mg by mouth daily (with breakfast)        No current facility-administered medications for this visit. Physical Exam:  /64   Pulse 72   Resp 18   Ht 5' 8\" (1.727 m)   Wt 148 lb 9.6 oz (67.4 kg)   BMI 22.59 kg/m²   Wt Readings from Last 3 Encounters:   01/04/21 148 lb 9.6 oz (67.4 kg)   12/06/20 151 lb 2 oz (68.5 kg)   12/03/20 156 lb 3.2 oz (70.9 kg)     Appearance: Awake, alert and oriented x 3, no acute respiratory distress  Skin: Intact, no rash  Head: Normocephalic, atraumatic  Eyes: EOMI, no conjunctival erythema  ENMT: No pharyngeal erythema, MMM, no rhinorrhea  Neck: Supple, no elevated JVP, no carotid bruits  Lungs: Clear to auscultation bilaterally. No wheezes, rales, or rhonchi.   Cardiac: Regular rate and rhythm, +S1S2, no murmurs apparent  Abdomen: Soft, nontender, +bowel sounds  Extremities: Moves all extremities x 4, no lower extremity edema  Neurologic: No focal motor deficits apparent, normal mood and affect, alert and oriented x 3  Peripheral Pulses: Intact posterior tibial pulses bilaterally    Laboratory Tests:  Lab Results   Component Value Date    CREATININE 1.1 12/05/2020    BUN 12 12/05/2020     12/05/2020    K 5.0 12/05/2020     12/05/2020    CO2 22 12/05/2020     Lab Results   Component Value Date    MG 2.1 11/11/2011     Lab Results   Component Value Date    WBC 16.9 (H) 12/06/2020    HGB 15.0 12/06/2020    HCT 46.2 12/06/2020    MCV 90.6 12/06/2020     12/06/2020     Lab Results   Component Value Date    ALT 32 11/10/2020    AST 15 11/10/2020    ALKPHOS 79 11/10/2020    BILITOT 0.5 11/10/2020     Lab Results   Component Value Date    TROPONINI <0.01 12/05/2020    TROPONINI <0.01 11/11/2020    TROPONINI <0.01 11/10/2020     Lab Results   Component Value Date    INR 1.0 11/10/2020    INR 1.0 11/06/2020    PROTIME 11.6 11/10/2020    PROTIME 11.5 11/06/2020     Lab Results   Component Value Date    TSH 1.660 11/05/2020     No results found for: LABA1C  No results found for: EAG  Lab Results   Component Value Date    CHOL 172 11/11/2011     Lab Results   Component Value Date    TRIG 136 11/11/2011     Lab Results   Component Value Date    HDL 40.0 (A) 11/11/2011     Lab Results   Component Value Date    LDLCALC 105 (H) 11/11/2011     No results found for: LABVLDL, VLDL  No results found for: CHOLHDLRATIO  No results for input(s): PROBNP in the last 72 hours.     Cardiac Tests:  EKG reviewed (EKG date: 1/4/2021): SR, rate 72, no acute STT changes    Telemetry reviewed (11/6/2020): atrial fibrillation --> SR     Telemetry reviewed (12/5/2020): SR, rate 60's     Echocardiogram: 11/6/2020   Normal left ventricular systolic function.   Ejection fraction is visually estimated at 55%.   Normal right ventricular size and function (TAPSE 1.8 cm).   Normal left ventricular diastolic filling pattern for age.  Annye Novato evidence of hemodynamically significant valve disease.     Exercise nuclear stress test: 11/11/2020  The patient exercised for 6:30 minutes on an accelerated Sukhjinder protocol treadmill achieving 11.7 METS of activity and 87% MPHR. Nonanginal chest pain with no cardiac arrhythmias were noted. A normal blood pressure response to exercise was recorded. No electrocardiographic changes were noted. The myocardial perfusion imaging was normal         Overall left ventricular systolic function was normal, EF 60%        ASSESSMENT / PLAN:  1. Atypical chest pain  2. Paroxysmal atrial fibrillation (diagnosed in 11/2020; spontaneously converted to SR) -- maintaining SR  3. Pre-DM  4. Anxiety, depression, schizophrenia  5. Tobacco (1 PPD since age 14) and marijuana use  6. Gastroesophageal reflux disease  7.  Restless leg syndrome    - Cardizem  mg recently switched to Toprol XL as an outpatient because the patient thought cardizem was contributing to ringing in his ears  - Recent stress test and echocardiogram results reviewed in detail with the patient today  - R/B/A/I for anticoagulation recently discussed -- he opts to continue ASA  - Negative KAYLEN study at [facility] last year per patient -- obtain results  - Tobacco and marijuana cessation    Annabelle Wilson MD  [facility] Cardiology

## 2021-01-05 RX ORDER — ASPIRIN 81 MG/1
81 TABLET, CHEWABLE ORAL DAILY
Qty: 30 TABLET | Refills: 11 | Status: SHIPPED
Start: 2021-01-05 | End: 2021-01-18 | Stop reason: SINTOL

## 2021-01-11 ENCOUNTER — TELEPHONE (OUTPATIENT)
Dept: CARDIOLOGY CLINIC | Age: 46
End: 2021-01-11

## 2021-01-11 ENCOUNTER — APPOINTMENT (OUTPATIENT)
Dept: CT IMAGING | Age: 46
End: 2021-01-11
Payer: COMMERCIAL

## 2021-01-11 ENCOUNTER — HOSPITAL ENCOUNTER (EMERGENCY)
Age: 46
Discharge: HOME | End: 2021-01-11
Payer: COMMERCIAL

## 2021-01-11 VITALS
HEART RATE: 82 BPM | DIASTOLIC BLOOD PRESSURE: 76 MMHG | TEMPERATURE: 97.4 F | SYSTOLIC BLOOD PRESSURE: 104 MMHG | HEIGHT: 68 IN | WEIGHT: 148 LBS | RESPIRATION RATE: 14 BRPM | BODY MASS INDEX: 22.43 KG/M2 | OXYGEN SATURATION: 97 %

## 2021-01-11 DIAGNOSIS — H93.13 TINNITUS OF BOTH EARS: Primary | ICD-10-CM

## 2021-01-11 PROCEDURE — 99283 EMERGENCY DEPT VISIT LOW MDM: CPT

## 2021-01-11 PROCEDURE — 70450 CT HEAD/BRAIN W/O DYE: CPT

## 2021-01-11 ASSESSMENT — PAIN SCALES - GENERAL: PAINLEVEL_OUTOF10: 5

## 2021-01-11 NOTE — ED PROVIDER NOTES
[facility]  Department of Emergency Medicine   ED  Encounter Note  Admit Date/RoomTime: 2021 11:58 AM  ED Room: 36/36    NAME: Isrrael Bee III  : 1975  MRN: [id number]     Chief Complaint:  Ear Fullness (for past two months, recent ear infection that was treated, having ringing in ears.)    History of Present Illness        Isrrael Bee III is a 39 y.o. old male who presenting to the emergency department with complaint of gradual onset Bilateral ear fullness and tinnitus. Symptoms began 2 months ago and have been persistent since that time. Patient denies chills, dyspnea, eye irritation, fever, myalgias, nonproductive cough, productive cough, sneezing, sore throat, sweats, weight loss and wheezing. His symptoms are relieved by nothing. He has recent history of otitis media. Patient states that \"a few weeks ago I was treated with amoxicillin due to a possible ear infection which did not help the ringing it has persisted. \"  Patient states that for the last 2 months he has noticed the ringing and progressively getting worse and it is now causing him to have an inability to sleep. Patient states he did call make an ear nose and throat appointment but the appointment is not for a month. Patient denies any falls or trauma to the area. Patient denies any recent change in altitude such as flying or diving. Patient is had no blood coming from the ears. Patient denies any new medications except for the ones that his cardiologist placed him on. Patient does state that his medications were adjusted to see if that would stop the ringing and it has not. Patient states he does not take NSAIDs per his cardiologist but does take an aspirin per day. Patient is also on benzodiazepines. ROS   Pertinent positives and negatives are stated within HPI, all other systems reviewed and are negative.     Past Medical History:  has a past medical history of Anxiety with lymphangitis or adenopathy noted. Neurological:  Oriented. Motor functions intact. Lab / Imaging Results   (All laboratory and radiology results have been personally reviewed by myself)  Labs:  No results found for this visit on 01/11/21. Imaging: All Radiology results interpreted by Radiologist unless otherwise noted. CT HEAD WO CONTRAST   Final Result   No acute intracranial abnormality. ED Course / Medical Decision Making   Medications - No data to display     Consult(s):   None    Procedure(s):   none    MDM:   Patient is a 82-year-old male that presented to the emergency department with bilateral tinnitus for the last 2 months with associated ear fullness and pressure. Patient had a full head, ears, nose, throat examination which was found to be completely unremarkable. Tympanic membranes are not bulging, erythematous, edematous or any pus noted. Patient has no pain with movement of the pinna. Patient has no mastoid tenderness or evidence of parotiditis. Patient did have a CT scan which was found to be completely negative for any cranial mass or bleed. Patient was educated there is a possibility it could be due to his daily aspirin intake or the benzodiazepines that he is been on long-term. Patient was educated this has a chance of causing tinnitus. Patient was educated the only way to know if it is these medications causing it is to have his family doctor stop the meds for a few weeks to see if the ringing goes away. Patient was educated that this should be done by his family doctor and not be done by himself. Patient was educated that if his symptoms worsen and he develops any associated symptoms with the ringing such as headaches, blurry vision, gait abnormality or any neurological deficits or worsening symptoms to return to the emergency department immediately. He voiced understanding and agreed with the plan of management.   Patient is vitally stable and is agreed with the plan of management. Patient was explicitly instructed on specific signs and symptoms on which to return to the emergency room for. Patient was instructed to return to the ER for any new or worsening symptoms. Additional discharge instructions were given verbally. All questions were answered. Patient is comfortable and agreeable with discharge plan. Patient in no acute distress and non-toxic in appearance. Plan of Care/Counseling:  I reviewed today's visit with the patient in addition to providing specific details for the plan of care and counseling regarding the diagnosis and prognosis. Questions are answered at this time and are agreeable with the plan. Assessment      1. Tinnitus of both ears      Plan   Discharged home. Patient condition is stable    New Medications     New Prescriptions    No medications on file     Electronically signed by Ethan Mobley PA-C   DD: 1/11/21  **This report was transcribed using voice recognition software. Every effort was made to ensure accuracy; however, inadvertent computerized transcription errors may be present.   END OF ED PROVIDER NOTE     Ethan Mobley PA-C  01/11/21 8530

## 2021-01-18 NOTE — TELEPHONE ENCOUNTER
Spoke with patient. He states the ringing in his ears is starting to improve and his ears are able to \"pop\". He does not hear it all of the time during the day, but it still seems pretty loud at night. He has been holding the Aspirin as recommended. Please advise.

## 2021-01-30 ENCOUNTER — HOSPITAL ENCOUNTER (EMERGENCY)
Age: 46
Discharge: HOME | End: 2021-01-30
Attending: EMERGENCY MEDICINE
Payer: COMMERCIAL

## 2021-01-30 ENCOUNTER — APPOINTMENT (OUTPATIENT)
Dept: GENERAL RADIOLOGY | Age: 46
End: 2021-01-30
Payer: COMMERCIAL

## 2021-01-30 VITALS
TEMPERATURE: 98 F | HEIGHT: 68 IN | RESPIRATION RATE: 18 BRPM | WEIGHT: 140 LBS | HEART RATE: 68 BPM | BODY MASS INDEX: 21.22 KG/M2 | OXYGEN SATURATION: 98 % | DIASTOLIC BLOOD PRESSURE: 82 MMHG | SYSTOLIC BLOOD PRESSURE: 116 MMHG

## 2021-01-30 DIAGNOSIS — R00.2 PALPITATIONS: Primary | ICD-10-CM

## 2021-01-30 DIAGNOSIS — R07.9 CHEST PAIN, UNSPECIFIED TYPE: ICD-10-CM

## 2021-01-30 LAB
ALBUMIN SERPL-MCNC: 4 G/DL (ref 3.5–5.2)
ALP BLD-CCNC: 75 U/L (ref 40–129)
ALT SERPL-CCNC: 19 U/L (ref 0–40)
ANION GAP SERPL CALCULATED.3IONS-SCNC: 9 MMOL/L (ref 7–16)
AST SERPL-CCNC: 15 U/L (ref 0–39)
BASOPHILS ABSOLUTE: 0.09 E9/L (ref 0–0.2)
BASOPHILS RELATIVE PERCENT: 0.8 % (ref 0–2)
BILIRUB SERPL-MCNC: 0.3 MG/DL (ref 0–1.2)
BUN BLDV-MCNC: 13 MG/DL (ref 6–20)
CALCIUM SERPL-MCNC: 9 MG/DL (ref 8.6–10.2)
CHLORIDE BLD-SCNC: 102 MMOL/L (ref 98–107)
CO2: 23 MMOL/L (ref 22–29)
CREAT SERPL-MCNC: 1 MG/DL (ref 0.7–1.2)
D DIMER: <200 NG/ML DDU
EOSINOPHILS ABSOLUTE: 0.25 E9/L (ref 0.05–0.5)
EOSINOPHILS RELATIVE PERCENT: 2.2 % (ref 0–6)
GFR AFRICAN AMERICAN: >60
GFR NON-AFRICAN AMERICAN: >60 ML/MIN/1.73
GLUCOSE BLD-MCNC: 101 MG/DL (ref 74–99)
HCT VFR BLD CALC: 42.2 % (ref 37–54)
HEMOGLOBIN: 14.3 G/DL (ref 12.5–16.5)
IMMATURE GRANULOCYTES #: 0.04 E9/L
IMMATURE GRANULOCYTES %: 0.4 % (ref 0–5)
LIPASE: 30 U/L (ref 13–60)
LYMPHOCYTES ABSOLUTE: 4.16 E9/L (ref 1.5–4)
LYMPHOCYTES RELATIVE PERCENT: 37.2 % (ref 20–42)
MAGNESIUM: 1.9 MG/DL (ref 1.6–2.6)
MCH RBC QN AUTO: 30.1 PG (ref 26–35)
MCHC RBC AUTO-ENTMCNC: 33.9 % (ref 32–34.5)
MCV RBC AUTO: 88.8 FL (ref 80–99.9)
MONOCYTES ABSOLUTE: 0.61 E9/L (ref 0.1–0.95)
MONOCYTES RELATIVE PERCENT: 5.5 % (ref 2–12)
NEUTROPHILS ABSOLUTE: 6.02 E9/L (ref 1.8–7.3)
NEUTROPHILS RELATIVE PERCENT: 53.9 % (ref 43–80)
PDW BLD-RTO: 14 FL (ref 11.5–15)
PLATELET # BLD: 297 E9/L (ref 130–450)
PMV BLD AUTO: 10.1 FL (ref 7–12)
POTASSIUM SERPL-SCNC: 3.8 MMOL/L (ref 3.5–5)
RBC # BLD: 4.75 E12/L (ref 3.8–5.8)
SODIUM BLD-SCNC: 134 MMOL/L (ref 132–146)
TOTAL PROTEIN: 6.6 G/DL (ref 6.4–8.3)
TROPONIN: <0.01 NG/ML (ref 0–0.03)
WBC # BLD: 11.2 E9/L (ref 4.5–11.5)

## 2021-01-30 PROCEDURE — 85025 COMPLETE CBC W/AUTO DIFF WBC: CPT

## 2021-01-30 PROCEDURE — 83690 ASSAY OF LIPASE: CPT

## 2021-01-30 PROCEDURE — 84484 ASSAY OF TROPONIN QUANT: CPT

## 2021-01-30 PROCEDURE — 2500000003 HC RX 250 WO HCPCS: Performed by: EMERGENCY MEDICINE

## 2021-01-30 PROCEDURE — 93005 ELECTROCARDIOGRAM TRACING: CPT | Performed by: EMERGENCY MEDICINE

## 2021-01-30 PROCEDURE — 99283 EMERGENCY DEPT VISIT LOW MDM: CPT

## 2021-01-30 PROCEDURE — 71045 X-RAY EXAM CHEST 1 VIEW: CPT

## 2021-01-30 PROCEDURE — 80053 COMPREHEN METABOLIC PANEL: CPT

## 2021-01-30 PROCEDURE — 96374 THER/PROPH/DIAG INJ IV PUSH: CPT

## 2021-01-30 PROCEDURE — 85378 FIBRIN DEGRADE SEMIQUANT: CPT

## 2021-01-30 PROCEDURE — 83735 ASSAY OF MAGNESIUM: CPT

## 2021-01-30 RX ADMIN — FAMOTIDINE 20 MG: 10 INJECTION INTRAVENOUS at 18:18

## 2021-01-30 NOTE — ED PROVIDER NOTES
HPI:  1/30/21, Time: 5:41 PM PENELOPE Hodgson III is a 39 y.o. male presenting to the ED after an episode of chest pain and heart palpitations occurring prior to arrival.  Patient states that he was sitting in his car when he suddenly felt his heart racing and felt tightness in his chest.  Symptoms lasted a few minutes, and they resolve spontaneously. They were moderate in severity and intermittent. He is currently asymptomatic. He denies syncope, shortness of breath, abdominal pain, nausea, vomiting, or diarrhea. Patient has a history of paroxysmal A. fib. He is currently on metoprolol. He states that he was taken off his aspirin because he was having tinnitus. He is not on any anticoagulation. He denies recent travel or immobilization, known exposures to COVID-19, hemoptysis, history of CAD, and history of DVT/PE. I reviewed his chart. Patient had a normal cardiac stress test in November of last year. Patient states that he has had a lot of burping an acid reflux. States he has a history of GERD. Denies exertional symptoms. Review of Systems:   Pertinent positives and negatives are stated within HPI, all other systems reviewed and are negative.          --------------------------------------------- PAST HISTORY ---------------------------------------------  Past Medical History:  has a past medical history of Anxiety with depression, Arthritis, Hand pain, right, History of Helicobacter pylori infection, Panic attacks, Restless leg syndrome, Schizophrenia ([facility] [address] Scoliosis, and Weight loss. Past Surgical History:  has a past surgical history that includes Colonoscopy (2016); hernia repair (09/19/2017); pr secd clos surg wnd exten/complic (N/A, 1/39/7288); Upper gastrointestinal endoscopy (4/9/2018); Abdomen surgery (N/A, 03/15/2018); Upper gastrointestinal endoscopy (N/A, 5/21/2018); and pr lap,cholecystectomy (N/A, 5/29/2018).     Social History:  reports that he has been smoking cigarettes. He has a 18.00 pack-year smoking history. He has never used smokeless tobacco. He reports previous alcohol use. He reports current drug use. Drug: Marijuana. Family History: family history includes Crohn's Disease in his sister; High Blood Pressure in his father; Mental Illness in his father. The patients home medications have been reviewed.     Allergies: Effexor [venlafaxine hydrochloride], Aspirin, Cardizem [diltiazem hcl], and Biaxin [clarithromycin]    -------------------------------------------------- RESULTS -------------------------------------------------  All laboratory and radiology results have been personally reviewed by myself   LABS:  Results for orders placed or performed during the hospital encounter of 01/30/21   CBC Auto Differential   Result Value Ref Range    WBC 11.2 4.5 - 11.5 E9/L    RBC 4.75 3.80 - 5.80 E12/L    Hemoglobin 14.3 12.5 - 16.5 g/dL    Hematocrit 42.2 37.0 - 54.0 %    MCV 88.8 80.0 - 99.9 fL    MCH 30.1 26.0 - 35.0 pg    MCHC 33.9 32.0 - 34.5 %    RDW 14.0 11.5 - 15.0 fL    Platelets 721 287 - 003 E9/L    MPV 10.1 7.0 - 12.0 fL    Neutrophils % 53.9 43.0 - 80.0 %    Immature Granulocytes % 0.4 0.0 - 5.0 %    Lymphocytes % 37.2 20.0 - 42.0 %    Monocytes % 5.5 2.0 - 12.0 %    Eosinophils % 2.2 0.0 - 6.0 %    Basophils % 0.8 0.0 - 2.0 %    Neutrophils Absolute 6.02 1.80 - 7.30 E9/L    Immature Granulocytes # 0.04 E9/L    Lymphocytes Absolute 4.16 (H) 1.50 - 4.00 E9/L    Monocytes Absolute 0.61 0.10 - 0.95 E9/L    Eosinophils Absolute 0.25 0.05 - 0.50 E9/L    Basophils Absolute 0.09 0.00 - 0.20 E9/L   Troponin   Result Value Ref Range    Troponin <0.01 0.00 - 0.03 ng/mL   Comprehensive Metabolic Panel   Result Value Ref Range    Sodium 134 132 - 146 mmol/L    Potassium 3.8 3.5 - 5.0 mmol/L    Chloride 102 98 - 107 mmol/L    CO2 23 22 - 29 mmol/L    Anion Gap 9 7 - 16 mmol/L    Glucose 101 (H) 74 - 99 mg/dL    BUN 13 6 - 20 mg/dL    CREATININE 1.0 0.7 - 1.2 mg/dL GFR Non-African American >60 >=60 mL/min/1.73    GFR African American >60     Calcium 9.0 8.6 - 10.2 mg/dL    Total Protein 6.6 6.4 - 8.3 g/dL    Albumin 4.0 3.5 - 5.2 g/dL    Total Bilirubin 0.3 0.0 - 1.2 mg/dL    Alkaline Phosphatase 75 40 - 129 U/L    ALT 19 0 - 40 U/L    AST 15 0 - 39 U/L   Magnesium   Result Value Ref Range    Magnesium 1.9 1.6 - 2.6 mg/dL   D-Dimer, Quantitative   Result Value Ref Range    D-Dimer, Quant <200 ng/mL DDU   Lipase   Result Value Ref Range    Lipase 30 13 - 60 U/L       RADIOLOGY:  Interpreted by Radiologist.  XR CHEST PORTABLE   Final Result   No acute cardiopulmonary abnormality. The in          ------------------------- NURSING NOTES AND VITALS REVIEWED ---------------------------   The nursing notes within the ED encounter and vital signs as below have been reviewed. /82   Pulse 68   Temp 98 °F (36.7 °C)   Resp 18   Ht 5' 8\" (1.727 m)   Wt 140 lb (63.5 kg)   SpO2 98%   BMI 21.29 kg/m²   Oxygen Saturation Interpretation: Normal      ---------------------------------------------------PHYSICAL EXAM--------------------------------------      Constitutional/General: Alert and oriented x3, well appearing, non toxic in NAD  Head: Normocephalic and atraumatic  Eyes: PERRL, EOMI  Mouth: Oropharynx clear, handling secretions, no trismus  Neck: Supple, full ROM,   Pulmonary: Lungs clear to auscultation bilaterally, no wheezes, rales, or rhonchi. Not in respiratory distress  Cardiovascular:  Regular rate and rhythm, no murmurs, gallops, or rubs. 2+ distal pulses  Abdomen: Soft, non tender, non distended,   Extremities: Moves all extremities x 4. Warm and well perfused. No leg edema. No calf tenderness.   Skin: warm and dry without rash  Neurologic: GCS 15, no focal motor or sensory deficits   Psych: Normal Affect      ------------------------------ ED COURSE/MEDICAL DECISION MAKING----------------------  Medications   famotidine (PEPCID) injection 20 mg (20 mg Intravenous Given 1/30/21 1818)       Medical Decision Making/ED COURSE:   Patient is a 51-year-old male with h/o paroxysmal A. Fib presenting after episode of chest pain and heart palpitations. In the ED, patient was hemodynamically stable and afebrile. On exam, patient was well-appearing. He was asymptomatic on my exam. Patient was placed on the cardiac monitor. I interpreted findings. Rhythm - sinus. Labs and CXR obtained. Patient later reported acid reflux. He was administered pepcid with resolution of pain. I reviewed and interpreted labs. Labs unremarkable. D-dimer negative. PE not suspected. Troponin negative. Electrolytes normal.  Patient with low cardiac risk and recent negative cardiac stress test. CXR clear. Patient in sinus rhythm. No acute findings in the ED. Appropriate for discharge home with PCP follow up and cardiology follow up as needed. Strict ED return precautions discussed. Patient remained hemodynamically stable throughout ED course. ED Course as of Jan 31 0054   Sat Jan 30, 2021   1722 EKG: This EKG is signed and interpreted by me. Rate: 64  Rhythm: Sinus  Interpretation: Normal sinus rhythm, low voltage QRS, incomplete right bundle branch block, normal FL interval, normal QTC, no acute ST or T wave changes  Comparison: stable as compared to patient's most recent EKG      [JA]   1838 Patient asymptomatic on reevaluation. Labs are normal.  D-dimer negative. Patient states that he is active at baseline. States that he frequently runs up and down his stairs, he does not get exertional symptoms. States he feels well for discharge home. [JA]      ED Course User Index  [JA] Max Chase MD         Counseling: The emergency provider has spoken with the patient and family and discussed todays results, in addition to providing specific details for the plan of care and counseling regarding the diagnosis and prognosis.   Questions are answered at this time and they

## 2021-01-31 LAB
EKG ATRIAL RATE: 64 BPM
EKG P AXIS: 67 DEGREES
EKG P-R INTERVAL: 154 MS
EKG Q-T INTERVAL: 392 MS
EKG QRS DURATION: 96 MS
EKG QTC CALCULATION (BAZETT): 404 MS
EKG R AXIS: 56 DEGREES
EKG T AXIS: 58 DEGREES
EKG VENTRICULAR RATE: 64 BPM

## 2021-01-31 PROCEDURE — 93010 ELECTROCARDIOGRAM REPORT: CPT | Performed by: INTERNAL MEDICINE

## 2021-02-16 ENCOUNTER — HOSPITAL ENCOUNTER (EMERGENCY)
Age: 46
Discharge: HOME | End: 2021-02-16
Attending: EMERGENCY MEDICINE
Payer: COMMERCIAL

## 2021-02-16 VITALS
SYSTOLIC BLOOD PRESSURE: 106 MMHG | HEIGHT: 68 IN | DIASTOLIC BLOOD PRESSURE: 73 MMHG | TEMPERATURE: 98.5 F | RESPIRATION RATE: 16 BRPM | OXYGEN SATURATION: 96 % | WEIGHT: 149 LBS | HEART RATE: 64 BPM | BODY MASS INDEX: 22.58 KG/M2

## 2021-02-16 DIAGNOSIS — S05.02XA ABRASION OF LEFT CORNEA, INITIAL ENCOUNTER: ICD-10-CM

## 2021-02-16 DIAGNOSIS — H57.12 LEFT EYE PAIN: Primary | ICD-10-CM

## 2021-02-16 PROCEDURE — 99284 EMERGENCY DEPT VISIT MOD MDM: CPT

## 2021-02-16 RX ORDER — ERYTHROMYCIN 5 MG/G
OINTMENT OPHTHALMIC
Qty: 1 TUBE | Refills: 0 | Status: SHIPPED | OUTPATIENT
Start: 2021-02-16 | End: 2021-02-26

## 2021-02-16 RX ORDER — POLYMYXIN B SULFATE AND TRIMETHOPRIM 1; 10000 MG/ML; [USP'U]/ML
1 SOLUTION OPHTHALMIC EVERY 4 HOURS
Qty: 1 BOTTLE | Refills: 0 | Status: SHIPPED | OUTPATIENT
Start: 2021-02-16 | End: 2021-02-16

## 2021-02-16 ASSESSMENT — PAIN DESCRIPTION - ONSET: ONSET: SUDDEN

## 2021-02-16 ASSESSMENT — PAIN DESCRIPTION - ORIENTATION: ORIENTATION: LEFT

## 2021-02-16 ASSESSMENT — PAIN SCALES - GENERAL: PAINLEVEL_OUTOF10: 8

## 2021-02-16 ASSESSMENT — VISUAL ACUITY: OD: 20/40

## 2021-02-16 NOTE — ED PROVIDER NOTES
HPI:  2/16/21, Time: 3:52 PM PENELOPE Hilton III is a 39 y.o. male presenting to the ED for left eye pain beginning suddenly overnight. Symptoms have been moderate in severity and constant since onset. Describes the pain as a scratchiness in his eye. He reports associated symptoms of blurry vision and tearing. Denies trauma to his eye. States that he was fixing a toilet yesterday, but he did not get anything into his eye at that time that he was aware of. He does not wear contact lenses. He denies recent illness, fevers, cough, nasal congestion, nausea, and vomiting. Review of Systems:   Pertinent positives and negatives are stated within HPI, all other systems reviewed and are negative.          --------------------------------------------- PAST HISTORY ---------------------------------------------  Past Medical History:  has a past medical history of Anxiety with depression, Arthritis, Hand pain, right, History of Helicobacter pylori infection, Panic attacks, Restless leg syndrome, Schizophrenia ([facility] [address] Scoliosis, and Weight loss. Past Surgical History:  has a past surgical history that includes Colonoscopy (2016); hernia repair (09/19/2017); pr secd clos surg wnd exten/complic (N/A, 6/55/1566); Upper gastrointestinal endoscopy (4/9/2018); Abdomen surgery (N/A, 03/15/2018); Upper gastrointestinal endoscopy (N/A, 5/21/2018); and pr lap,cholecystectomy (N/A, 5/29/2018). Social History:  reports that he has been smoking cigarettes. He has a 18.00 pack-year smoking history. He has never used smokeless tobacco. He reports previous alcohol use. He reports current drug use. Drug: Marijuana. Family History: family history includes Crohn's Disease in his sister; High Blood Pressure in his father; Mental Illness in his father. The patients home medications have been reviewed.     Allergies: Effexor [venlafaxine hydrochloride], Aspirin, Cardizem [diltiazem hcl], and Biaxin [clarithromycin]    -------------------------------------------------- RESULTS -------------------------------------------------  All laboratory and radiology results have been personally reviewed by myself   LABS:  No results found for this visit on 02/16/21. RADIOLOGY:  Interpreted by Radiologist.  No orders to display       ------------------------- NURSING NOTES AND VITALS REVIEWED ---------------------------   The nursing notes within the ED encounter and vital signs as below have been reviewed. /73   Pulse 64   Temp 98.5 °F (36.9 °C) (Oral)   Resp 16   Ht 5' 8\" (1.727 m)   Wt 149 lb (67.6 kg)   SpO2 96%   BMI 22.66 kg/m²   Oxygen Saturation Interpretation: Normal      ---------------------------------------------------PHYSICAL EXAM--------------------------------------      Constitutional/General: Alert and oriented x3, well appearing, non toxic in NAD  Head: Normocephalic and atraumatic  Eyes: PERRL, EOMI. Right eye-normal, no conjunctival injection. Left eye-actable injection with tearing, no cloudy cornea, no foreign body  Mouth: Oropharynx clear, handling secretions, no trismus  Neck: Supple, full ROM,   Pulmonary:Not in respiratory distress  Extremities: Moves all extremities x 4. Warm and well perfused  Skin: warm and dry without rash  Psych: Normal Affect      ------------------------------ ED COURSE/MEDICAL DECISION MAKING----------------------  Medications - No data to display    Medical Decision Making/ED COURSE:   Patient is a 51-year-old male presenting with left eye pain and blurriness. In the ED, patient had conjunctival injection and tearing to his left eye. No foreign body seen on exam.  Fluorescein exam performed with mild scattered increased uptake. Normal eye pressures. Patient uncooperative with visual acuity, so unable to obtain complete visual acuity. Ophthalmology consulted, and they can follow-up with the patient tomorrow if no improvement of symptoms.   Will treat with antibiotic ointment. Patient has Biaxin listed as an allergy; however, patient's reaction is palpitations. He cannot tolerate erythromycin ointment. Patient remained hemodynamically stable throughout ED course. ED Course as of Feb 16 1558   Tue Feb 16, 2021   0813 Eye Pressure left eye 18    Fluorescein exam performed showing mild increase scattered uptake. No foreign body. Patient would not open his eye for visual acuity. [JA]   [id number] Ophthalmology consulted. Spoke with Dr. Danilo Telles. Recommended erythromycin ointment. Patient does not have a true allergy. Will follow up with the patient tomorrow if no improvement. [JA]      ED Course User Index  [JA] Eddy Harley MD         Counseling: The emergency provider has spoken with the patient and discussed todays results, in addition to providing specific details for the plan of care and counseling regarding the diagnosis and prognosis. Questions are answered at this time and they are agreeable with the plan.      --------------------------------- IMPRESSION AND DISPOSITION ---------------------------------    IMPRESSION  1. Left eye pain    2. Abrasion of left cornea, initial encounter        DISPOSITION  Disposition: Discharge to home  Patient condition is stable      NOTE: This report was transcribed using voice recognition software.  Every effort was made to ensure accuracy; however, inadvertent computerized transcription errors may be present    IEddy MD, am the primary provider of this record       Eddy Harley MD  02/16/21 5553

## 2021-02-16 NOTE — ED NOTES
Unable to ascertain baseline visual acuity related to patient not being able to open left eye. States he has problem opening the right as well. Left eye upon quick visual was red and teary. Patient states that it feels like he has something in his eye.       Alysia Agosto RN  02/16/21 [id number]

## 2021-02-16 NOTE — ED NOTES
Assume care of pt at this time, VSS and pt free of immediate distress. Family at bedside.  Will continue to monitor     Heavenly Dodge RN  02/16/21 [id number]

## 2021-04-22 ENCOUNTER — TELEPHONE (OUTPATIENT)
Dept: CARDIOLOGY CLINIC | Age: 46
End: 2021-04-22

## 2021-04-22 NOTE — TELEPHONE ENCOUNTER
Patient called stating stating he is not sure if he took his Toprol today and he can feel his heart beating fast.  He does not have a way to check his HR at home. Per verbal from Dr. Aruna Douglas, go ahead and take the Toprol 25 mg. Patient notified. He also states he is having some pain on the left side that he did not have before. Per verbal from Dr. Aruna Douglas, take the Toprol and call us tomorrow if the symptoms do not resolve. Patient notified.

## 2021-05-03 ENCOUNTER — APPOINTMENT (OUTPATIENT)
Dept: GENERAL RADIOLOGY | Age: 46
End: 2021-05-03
Payer: COMMERCIAL

## 2021-05-03 ENCOUNTER — TELEPHONE (OUTPATIENT)
Dept: CARDIOLOGY CLINIC | Age: 46
End: 2021-05-03

## 2021-05-03 ENCOUNTER — HOSPITAL ENCOUNTER (EMERGENCY)
Age: 46
Discharge: HOME | End: 2021-05-03
Attending: EMERGENCY MEDICINE
Payer: COMMERCIAL

## 2021-05-03 VITALS
TEMPERATURE: 98.6 F | DIASTOLIC BLOOD PRESSURE: 71 MMHG | SYSTOLIC BLOOD PRESSURE: 115 MMHG | HEIGHT: 68 IN | WEIGHT: 154 LBS | HEART RATE: 58 BPM | RESPIRATION RATE: 18 BRPM | BODY MASS INDEX: 23.34 KG/M2 | OXYGEN SATURATION: 97 %

## 2021-05-03 DIAGNOSIS — R07.9 CHEST PAIN, UNSPECIFIED TYPE: Primary | ICD-10-CM

## 2021-05-03 LAB
ALBUMIN SERPL-MCNC: 4.2 G/DL (ref 3.5–5.2)
ALP BLD-CCNC: 74 U/L (ref 40–129)
ALT SERPL-CCNC: 18 U/L (ref 0–40)
ANION GAP SERPL CALCULATED.3IONS-SCNC: 10 MMOL/L (ref 7–16)
APTT: 33.9 SEC (ref 24.5–35.1)
AST SERPL-CCNC: 15 U/L (ref 0–39)
BASOPHILS ABSOLUTE: 0.09 E9/L (ref 0–0.2)
BASOPHILS RELATIVE PERCENT: 0.9 % (ref 0–2)
BILIRUB SERPL-MCNC: 0.5 MG/DL (ref 0–1.2)
BUN BLDV-MCNC: 12 MG/DL (ref 6–20)
CALCIUM SERPL-MCNC: 9.1 MG/DL (ref 8.6–10.2)
CHLORIDE BLD-SCNC: 104 MMOL/L (ref 98–107)
CO2: 24 MMOL/L (ref 22–29)
CREAT SERPL-MCNC: 1 MG/DL (ref 0.7–1.2)
EOSINOPHILS ABSOLUTE: 0.17 E9/L (ref 0.05–0.5)
EOSINOPHILS RELATIVE PERCENT: 1.7 % (ref 0–6)
GFR AFRICAN AMERICAN: >60
GFR NON-AFRICAN AMERICAN: >60 ML/MIN/1.73
GLUCOSE BLD-MCNC: 92 MG/DL (ref 74–99)
HCT VFR BLD CALC: 43.8 % (ref 37–54)
HEMOGLOBIN: 14.5 G/DL (ref 12.5–16.5)
IMMATURE GRANULOCYTES #: 0.03 E9/L
IMMATURE GRANULOCYTES %: 0.3 % (ref 0–5)
INR BLD: 1.1
LYMPHOCYTES ABSOLUTE: 3.44 E9/L (ref 1.5–4)
LYMPHOCYTES RELATIVE PERCENT: 33.9 % (ref 20–42)
MCH RBC QN AUTO: 29.4 PG (ref 26–35)
MCHC RBC AUTO-ENTMCNC: 33.1 % (ref 32–34.5)
MCV RBC AUTO: 88.8 FL (ref 80–99.9)
MONOCYTES ABSOLUTE: 0.65 E9/L (ref 0.1–0.95)
MONOCYTES RELATIVE PERCENT: 6.4 % (ref 2–12)
NEUTROPHILS ABSOLUTE: 5.76 E9/L (ref 1.8–7.3)
NEUTROPHILS RELATIVE PERCENT: 56.8 % (ref 43–80)
PDW BLD-RTO: 13.8 FL (ref 11.5–15)
PLATELET # BLD: 285 E9/L (ref 130–450)
PMV BLD AUTO: 10 FL (ref 7–12)
POTASSIUM REFLEX MAGNESIUM: 4.1 MMOL/L (ref 3.5–5)
PROTHROMBIN TIME: 11.7 SEC (ref 9.3–12.4)
RBC # BLD: 4.93 E12/L (ref 3.8–5.8)
SODIUM BLD-SCNC: 138 MMOL/L (ref 132–146)
TOTAL PROTEIN: 6.5 G/DL (ref 6.4–8.3)
TROPONIN: <0.01 NG/ML (ref 0–0.03)
WBC # BLD: 10.1 E9/L (ref 4.5–11.5)

## 2021-05-03 PROCEDURE — 93005 ELECTROCARDIOGRAM TRACING: CPT | Performed by: NURSE PRACTITIONER

## 2021-05-03 PROCEDURE — 99284 EMERGENCY DEPT VISIT MOD MDM: CPT

## 2021-05-03 PROCEDURE — 71046 X-RAY EXAM CHEST 2 VIEWS: CPT

## 2021-05-03 PROCEDURE — 85610 PROTHROMBIN TIME: CPT

## 2021-05-03 PROCEDURE — 84484 ASSAY OF TROPONIN QUANT: CPT

## 2021-05-03 PROCEDURE — 85730 THROMBOPLASTIN TIME PARTIAL: CPT

## 2021-05-03 PROCEDURE — 80053 COMPREHEN METABOLIC PANEL: CPT

## 2021-05-03 PROCEDURE — 85025 COMPLETE CBC W/AUTO DIFF WBC: CPT

## 2021-05-03 RX ORDER — METOPROLOL SUCCINATE 25 MG/1
25 TABLET, EXTENDED RELEASE ORAL 2 TIMES DAILY
COMMUNITY
End: 2021-05-07 | Stop reason: DRUGHIGH

## 2021-05-03 ASSESSMENT — PAIN DESCRIPTION - LOCATION: LOCATION: CHEST

## 2021-05-03 ASSESSMENT — PAIN DESCRIPTION - DESCRIPTORS: DESCRIPTORS: SHARP;BURNING

## 2021-05-03 ASSESSMENT — PAIN DESCRIPTION - PAIN TYPE: TYPE: ACUTE PAIN

## 2021-05-03 ASSESSMENT — PAIN SCALES - GENERAL: PAINLEVEL_OUTOF10: 4

## 2021-05-03 NOTE — TELEPHONE ENCOUNTER
Patient called stating he had an episode last night where he got up to go to the bathroom and when he got into the bathroom his heart started pounding and he could hear it in his ears. He states it felt like his heart was skipping beats. Episode lasted about (5) minutes. He states today it feels like it is beating a little faster than normal but is steady. Patient states he is taking his Toprol every morning. Please advise.

## 2021-05-03 NOTE — ED PROVIDER NOTES
56.8 43.0 - 80.0 %    Immature Granulocytes % 0.3 0.0 - 5.0 %    Lymphocytes % 33.9 20.0 - 42.0 %    Monocytes % 6.4 2.0 - 12.0 %    Eosinophils % 1.7 0.0 - 6.0 %    Basophils % 0.9 0.0 - 2.0 %    Neutrophils Absolute 5.76 1.80 - 7.30 E9/L    Immature Granulocytes # 0.03 E9/L    Lymphocytes Absolute 3.44 1.50 - 4.00 E9/L    Monocytes Absolute 0.65 0.10 - 0.95 E9/L    Eosinophils Absolute 0.17 0.05 - 0.50 E9/L    Basophils Absolute 0.09 0.00 - 0.20 E9/L   Comprehensive Metabolic Panel w/ Reflex to MG   Result Value Ref Range    Sodium 138 132 - 146 mmol/L    Potassium reflex Magnesium 4.1 3.5 - 5.0 mmol/L    Chloride 104 98 - 107 mmol/L    CO2 24 22 - 29 mmol/L    Anion Gap 10 7 - 16 mmol/L    Glucose 92 74 - 99 mg/dL    BUN 12 6 - 20 mg/dL    CREATININE 1.0 0.7 - 1.2 mg/dL    GFR Non-African American >60 >=60 mL/min/1.73    GFR African American >60     Calcium 9.1 8.6 - 10.2 mg/dL    Total Protein 6.5 6.4 - 8.3 g/dL    Albumin 4.2 3.5 - 5.2 g/dL    Total Bilirubin 0.5 0.0 - 1.2 mg/dL    Alkaline Phosphatase 74 40 - 129 U/L    ALT 18 0 - 40 U/L    AST 15 0 - 39 U/L   Troponin   Result Value Ref Range    Troponin <0.01 0.00 - 0.03 ng/mL   Protime-INR   Result Value Ref Range    Protime 11.7 9.3 - 12.4 sec    INR 1.1    APTT   Result Value Ref Range    aPTT 33.9 24.5 - 35.1 sec       RADIOLOGY:  Interpreted by Radiologist.  XR CHEST (2 VW)   Final Result   No acute process. EKG Interpretation  Interpreted by emergency department physician    Rhythm: normal sinus  and sinus arrhythmia  Rate: normal  Axis: normal  Conduction: normal  ST Segments: nonspecific changes  T Waves: non specific changes    Clinical Impression: non-specific EKG  Comparison to prior EKG: None      ------------------------- NURSING NOTES AND VITALS REVIEWED ---------------------------   The nursing notes within the ED encounter and vital signs as below have been reviewed by myself.   /70   Pulse 79   Temp 97.9 °F (36.6 °C) (Temporal)   Resp 14   Ht 5' 8\" (1.727 m)   Wt 154 lb (69.9 kg)   SpO2 98%   BMI 23.42 kg/m²   Oxygen Saturation Interpretation: Normal    The patients available past medical records and past encounters were reviewed. ------------------------------ ED COURSE/MEDICAL DECISION MAKING----------------------  Medications - No data to display          Medical Decision Making:    Patient's cardiac work-up was negative EKG was nonspecific. No ischemic changes noted. HEART Score For Major Cardiac Events  (Max Score 10 Points)  HISTORY       [x]   Slightly Suspicious  0       []   Moderately Suspicious  +1       []   Highly Suspicious  +2    EK point: No ST deviation but LBBB, LVH repolarization changes (ex:digoxin);  2 points: ST deviation not due to LBBB, LVH or digoxin         [x]   Normal  0       []   Nonspecific Repolarization Disturbance  +1       []   Significant ST Depression  +2    AGE       [x]   <45  0       []   45-64  +1       []    >65  +2    RISK FACTORS:  1. HTN    2. Hypercholesterolemia    3. DM     4. Cigarette smoking (current or cessation < 3 mos)    5. Positive family history  (parent or sibling with CVD before age 72).    6. Obesity (BMI >30kg/m2)         []   No Risk factors Known  0       [x]   1-2 Risk Factors  +1       []   >3 Risk Factors or History of Atherosclerotic Disease  +2    INITIAL TROPONIN       [x]   < Normal Limit   0       []   1-3 x Normal Limit   +1       []   >3 x Normal Limit   +2     -----------------------------------------------------------------------------------------------------------------  SCORE TOTAL:  1 POINTS     Low Score:         (0-3 Points), risk of MACE of 0.9-1.7% (discuss d/c home with f/u)  Mod Score:         (4-6 Points), risk of MACE of 12-16.6% (discuss admission for further testing)  High Score:        (7-10 Points), risk of MACE of 50-65% (Admit ALL as they are candidates for early invasive measures)    Re-Evaluations: Re-evaluation. Patients symptoms show no change      Consultations:             NONE    Critical Care: NONE        This patient's ED course included: a personal history and physicial eaxmination    This patient has remained hemodynamically stable during their ED course. Counseling: The emergency provider has spoken with the patient and discussed todays results, in addition to providing specific details for the plan of care and counseling regarding the diagnosis and prognosis. Questions are answered at this time and they are agreeable with the plan.       --------------------------------- IMPRESSION AND DISPOSITION ---------------------------------    IMPRESSION  1. Chest pain, unspecified type Stable       DISPOSITION  Disposition: Discharge to home  Patient condition is stable        NOTE: This report was transcribed using voice recognition software.  Every effort was made to ensure accuracy; however, inadvertent computerized transcription errors may be present        Roni Krueger DO  05/03/21 [id number]

## 2021-05-04 LAB
EKG ATRIAL RATE: 79 BPM
EKG P AXIS: 77 DEGREES
EKG P-R INTERVAL: 138 MS
EKG Q-T INTERVAL: 354 MS
EKG QRS DURATION: 88 MS
EKG QTC CALCULATION (BAZETT): 405 MS
EKG R AXIS: 88 DEGREES
EKG T AXIS: 66 DEGREES
EKG VENTRICULAR RATE: 79 BPM

## 2021-05-04 PROCEDURE — 93010 ELECTROCARDIOGRAM REPORT: CPT | Performed by: INTERNAL MEDICINE

## 2021-05-07 ENCOUNTER — TELEPHONE (OUTPATIENT)
Dept: ADMINISTRATIVE | Age: 46
End: 2021-05-07

## 2021-05-07 ENCOUNTER — HOSPITAL ENCOUNTER (EMERGENCY)
Age: 46
Discharge: HOME | End: 2021-05-07
Attending: EMERGENCY MEDICINE
Payer: COMMERCIAL

## 2021-05-07 VITALS
WEIGHT: 154 LBS | RESPIRATION RATE: 20 BRPM | BODY MASS INDEX: 23.34 KG/M2 | TEMPERATURE: 96.9 F | OXYGEN SATURATION: 95 % | HEART RATE: 88 BPM | DIASTOLIC BLOOD PRESSURE: 66 MMHG | HEIGHT: 68 IN | SYSTOLIC BLOOD PRESSURE: 105 MMHG

## 2021-05-07 DIAGNOSIS — I48.0 PAROXYSMAL ATRIAL FIBRILLATION (HCC): Primary | ICD-10-CM

## 2021-05-07 DIAGNOSIS — R00.2 PALPITATIONS: Primary | ICD-10-CM

## 2021-05-07 LAB
ANION GAP SERPL CALCULATED.3IONS-SCNC: 9 MMOL/L (ref 7–16)
BASOPHILS ABSOLUTE: 0.08 E9/L (ref 0–0.2)
BASOPHILS RELATIVE PERCENT: 0.7 % (ref 0–2)
BUN BLDV-MCNC: 12 MG/DL (ref 6–20)
CALCIUM SERPL-MCNC: 9.4 MG/DL (ref 8.6–10.2)
CHLORIDE BLD-SCNC: 106 MMOL/L (ref 98–107)
CO2: 25 MMOL/L (ref 22–29)
CREAT SERPL-MCNC: 1.1 MG/DL (ref 0.7–1.2)
EKG ATRIAL RATE: 88 BPM
EKG P AXIS: 73 DEGREES
EKG P-R INTERVAL: 138 MS
EKG Q-T INTERVAL: 358 MS
EKG QRS DURATION: 94 MS
EKG QTC CALCULATION (BAZETT): 433 MS
EKG R AXIS: 79 DEGREES
EKG T AXIS: 55 DEGREES
EKG VENTRICULAR RATE: 88 BPM
EOSINOPHILS ABSOLUTE: 0.35 E9/L (ref 0.05–0.5)
EOSINOPHILS RELATIVE PERCENT: 2.9 % (ref 0–6)
GFR AFRICAN AMERICAN: >60
GFR NON-AFRICAN AMERICAN: >60 ML/MIN/1.73
GLUCOSE BLD-MCNC: 122 MG/DL (ref 74–99)
HCT VFR BLD CALC: 48.4 % (ref 37–54)
HEMOGLOBIN: 16.3 G/DL (ref 12.5–16.5)
IMMATURE GRANULOCYTES #: 0.05 E9/L
IMMATURE GRANULOCYTES %: 0.4 % (ref 0–5)
LYMPHOCYTES ABSOLUTE: 4.4 E9/L (ref 1.5–4)
LYMPHOCYTES RELATIVE PERCENT: 36.2 % (ref 20–42)
MCH RBC QN AUTO: 30.1 PG (ref 26–35)
MCHC RBC AUTO-ENTMCNC: 33.7 % (ref 32–34.5)
MCV RBC AUTO: 89.5 FL (ref 80–99.9)
MONOCYTES ABSOLUTE: 0.85 E9/L (ref 0.1–0.95)
MONOCYTES RELATIVE PERCENT: 7 % (ref 2–12)
NEUTROPHILS ABSOLUTE: 6.44 E9/L (ref 1.8–7.3)
NEUTROPHILS RELATIVE PERCENT: 52.8 % (ref 43–80)
PDW BLD-RTO: 14.1 FL (ref 11.5–15)
PLATELET # BLD: 282 E9/L (ref 130–450)
PMV BLD AUTO: 9.9 FL (ref 7–12)
POTASSIUM REFLEX MAGNESIUM: 3.7 MMOL/L (ref 3.5–5)
RBC # BLD: 5.41 E12/L (ref 3.8–5.8)
SODIUM BLD-SCNC: 140 MMOL/L (ref 132–146)
TROPONIN: <0.01 NG/ML (ref 0–0.03)
WBC # BLD: 12.2 E9/L (ref 4.5–11.5)

## 2021-05-07 PROCEDURE — 84484 ASSAY OF TROPONIN QUANT: CPT

## 2021-05-07 PROCEDURE — 99284 EMERGENCY DEPT VISIT MOD MDM: CPT

## 2021-05-07 PROCEDURE — 93005 ELECTROCARDIOGRAM TRACING: CPT | Performed by: EMERGENCY MEDICINE

## 2021-05-07 PROCEDURE — 80048 BASIC METABOLIC PNL TOTAL CA: CPT

## 2021-05-07 PROCEDURE — 85025 COMPLETE CBC W/AUTO DIFF WBC: CPT

## 2021-05-07 PROCEDURE — 93010 ELECTROCARDIOGRAM REPORT: CPT | Performed by: INTERNAL MEDICINE

## 2021-05-07 RX ORDER — METOPROLOL SUCCINATE 50 MG/1
50 TABLET, EXTENDED RELEASE ORAL 2 TIMES DAILY
COMMUNITY
End: 2021-05-17 | Stop reason: SDUPTHER

## 2021-05-07 ASSESSMENT — PAIN DESCRIPTION - LOCATION: LOCATION: CHEST

## 2021-05-07 ASSESSMENT — PAIN SCALES - GENERAL: PAINLEVEL_OUTOF10: 5

## 2021-05-07 ASSESSMENT — PAIN DESCRIPTION - DESCRIPTORS: DESCRIPTORS: BURNING;SHARP

## 2021-05-07 NOTE — TELEPHONE ENCOUNTER
Patient notified of Dr. Ammon Lott recommendation. Chart amended to reflect dose adjustment. Referral placed to EP.

## 2021-05-07 NOTE — ED PROVIDER NOTES
HPI:  5/7/21,   Time: 4:32 AM EDT         Marcy Canseco III is a 39 y.o. male presenting to the ED for palpitations and lightheadedness, beginning last 1 to 2 hours ago. The complaint has been constant, moderate in severity, and worsened by nothing. Feels better now however and EKG now shows normal sinus rhythm patient denies shortness of breath or abdominal pain or vomiting or diarrhea no fever chills or night sweats    ROS:   Pertinent positives and negatives are stated within HPI, all other systems reviewed and are negative.  --------------------------------------------- PAST HISTORY ---------------------------------------------  Past Medical History:  has a past medical history of Anxiety with depression, Arthritis, Hand pain, right, History of Helicobacter pylori infection, Panic attacks, Restless leg syndrome, Schizophrenia ([facility] [address] Scoliosis, and Weight loss. Past Surgical History:  has a past surgical history that includes Colonoscopy (2016); hernia repair (09/19/2017); pr secd clos surg wnd exten/complic (N/A, 4/12/1624); Upper gastrointestinal endoscopy (4/9/2018); Abdomen surgery (N/A, 03/15/2018); Upper gastrointestinal endoscopy (N/A, 5/21/2018); and pr lap,cholecystectomy (N/A, 5/29/2018). Social History:  reports that he has been smoking cigarettes. He has a 18.00 pack-year smoking history. He has never used smokeless tobacco. He reports previous alcohol use. He reports current drug use. Drug: Marijuana. Family History: family history includes Crohn's Disease in his sister; High Blood Pressure in his father; Mental Illness in his father. The patients home medications have been reviewed.     Allergies: Effexor [venlafaxine hydrochloride], Aspirin, Cardizem [diltiazem hcl], and Biaxin [clarithromycin]    -------------------------------------------------- RESULTS -------------------------------------------------  All laboratory and radiology results have been personally reviewed by myself LABS:  Results for orders placed or performed during the hospital encounter of 05/07/21   CBC Auto Differential   Result Value Ref Range    WBC 12.2 (H) 4.5 - 11.5 E9/L    RBC 5.41 3.80 - 5.80 E12/L    Hemoglobin 16.3 12.5 - 16.5 g/dL    Hematocrit 48.4 37.0 - 54.0 %    MCV 89.5 80.0 - 99.9 fL    MCH 30.1 26.0 - 35.0 pg    MCHC 33.7 32.0 - 34.5 %    RDW 14.1 11.5 - 15.0 fL    Platelets 009 288 - 582 E9/L    MPV 9.9 7.0 - 12.0 fL    Neutrophils % 52.8 43.0 - 80.0 %    Immature Granulocytes % 0.4 0.0 - 5.0 %    Lymphocytes % 36.2 20.0 - 42.0 %    Monocytes % 7.0 2.0 - 12.0 %    Eosinophils % 2.9 0.0 - 6.0 %    Basophils % 0.7 0.0 - 2.0 %    Neutrophils Absolute 6.44 1.80 - 7.30 E9/L    Immature Granulocytes # 0.05 E9/L    Lymphocytes Absolute 4.40 (H) 1.50 - 4.00 E9/L    Monocytes Absolute 0.85 0.10 - 0.95 E9/L    Eosinophils Absolute 0.35 0.05 - 0.50 E9/L    Basophils Absolute 0.08 0.00 - 0.20 W5/S   Basic Metabolic Panel w/ Reflex to MG   Result Value Ref Range    Sodium 140 132 - 146 mmol/L    Potassium reflex Magnesium 3.7 3.5 - 5.0 mmol/L    Chloride 106 98 - 107 mmol/L    CO2 25 22 - 29 mmol/L    Anion Gap 9 7 - 16 mmol/L    Glucose 122 (H) 74 - 99 mg/dL    BUN 12 6 - 20 mg/dL    CREATININE 1.1 0.7 - 1.2 mg/dL    GFR Non-African American >60 >=60 mL/min/1.73    GFR African American >60     Calcium 9.4 8.6 - 10.2 mg/dL   Troponin   Result Value Ref Range    Troponin <0.01 0.00 - 0.03 ng/mL   EKG 12 Lead   Result Value Ref Range    Ventricular Rate 88 BPM    Atrial Rate 88 BPM    P-R Interval 138 ms    QRS Duration 94 ms    Q-T Interval 358 ms    QTc Calculation (Bazett) 433 ms    P Axis 73 degrees    R Axis 79 degrees    T Axis 55 degrees       RADIOLOGY:  Interpreted by Radiologist.  No orders to display       ------------------------- NURSING NOTES AND VITALS REVIEWED ---------------------------   The nursing notes within the ED encounter and vital signs as below have been reviewed.    /66   Pulse 88

## 2021-05-07 NOTE — TELEPHONE ENCOUNTER
ER note states, \" A. fib with RVR that spontaneously converted to sinus rhythm with a rate of 88 bpm\". Both EKG's in Epic are sinus rhythm.     Can trial on higher beta blocker dose (50 gm BID)    Can refer to EP for ablation evaluation

## 2021-05-17 ENCOUNTER — TELEPHONE (OUTPATIENT)
Dept: CARDIOLOGY CLINIC | Age: 46
End: 2021-05-17

## 2021-05-17 ENCOUNTER — OFFICE VISIT (OUTPATIENT)
Dept: NON INVASIVE DIAGNOSTICS | Age: 46
End: 2021-05-17
Payer: COMMERCIAL

## 2021-05-17 VITALS
BODY MASS INDEX: 22.73 KG/M2 | SYSTOLIC BLOOD PRESSURE: 110 MMHG | WEIGHT: 150 LBS | OXYGEN SATURATION: 98 % | HEART RATE: 56 BPM | RESPIRATION RATE: 20 BRPM | DIASTOLIC BLOOD PRESSURE: 70 MMHG | HEIGHT: 68 IN

## 2021-05-17 DIAGNOSIS — F20.9 SCHIZOPHRENIA, UNSPECIFIED TYPE (HCC): ICD-10-CM

## 2021-05-17 DIAGNOSIS — R07.89 ATYPICAL CHEST PAIN: ICD-10-CM

## 2021-05-17 DIAGNOSIS — I48.0 PAROXYSMAL ATRIAL FIBRILLATION (HCC): Primary | ICD-10-CM

## 2021-05-17 DIAGNOSIS — R94.31 ABNORMAL EKG: ICD-10-CM

## 2021-05-17 DIAGNOSIS — R00.2 PALPITATIONS: ICD-10-CM

## 2021-05-17 PROCEDURE — 99204 OFFICE O/P NEW MOD 45 MIN: CPT | Performed by: INTERNAL MEDICINE

## 2021-05-17 PROCEDURE — 4004F PT TOBACCO SCREEN RCVD TLK: CPT | Performed by: INTERNAL MEDICINE

## 2021-05-17 PROCEDURE — G8427 DOCREV CUR MEDS BY ELIG CLIN: HCPCS | Performed by: INTERNAL MEDICINE

## 2021-05-17 PROCEDURE — 93000 ELECTROCARDIOGRAM COMPLETE: CPT | Performed by: INTERNAL MEDICINE

## 2021-05-17 PROCEDURE — G8420 CALC BMI NORM PARAMETERS: HCPCS | Performed by: INTERNAL MEDICINE

## 2021-05-17 RX ORDER — METOPROLOL SUCCINATE 50 MG/1
50 TABLET, EXTENDED RELEASE ORAL 2 TIMES DAILY
Qty: 180 TABLET | Refills: 3 | Status: SHIPPED
Start: 2021-05-17 | End: 2022-02-09

## 2021-05-17 RX ORDER — FLECAINIDE ACETATE 50 MG/1
50 TABLET ORAL 2 TIMES DAILY
Qty: 60 TABLET | Refills: 6 | Status: SHIPPED
Start: 2021-05-17 | End: 2021-08-09 | Stop reason: SDUPTHER

## 2021-05-17 ASSESSMENT — ENCOUNTER SYMPTOMS
ABDOMINAL PAIN: 0
NAUSEA: 0
ABDOMINAL DISTENTION: 0
CHEST TIGHTNESS: 0
WHEEZING: 0
EYE REDNESS: 0
COLOR CHANGE: 0
DIARRHEA: 0
SINUS PRESSURE: 0
COUGH: 0
SHORTNESS OF BREATH: 0

## 2021-05-17 NOTE — TELEPHONE ENCOUNTER
Patient stopped at  to request refill on Metoprolol. Has enough for today and tomorrow.  Pharmacy is [facility]

## 2021-05-17 NOTE — PROGRESS NOTES
HISTORY     Past Surgical History:   Procedure Laterality Date    ABDOMEN SURGERY N/A 03/15/2018    wound exploration umbilicus, excsion suture granuloma    COLONOSCOPY  2016    HERNIA REPAIR  09/19/2017    [place] LAP,CHOLECYSTECTOMY N/A 5/29/2018    LAPAROSCOPIC  CHOLECYSTECTOMY performed by Jessica Worley MD at [address] [facility] WND EXTEN/COMPLIC N/A 5/60/7492    ABDOMINAL WOUND EXPLORATION , REMOVAL OF SUTURE GRANULOMA performed by Jessica Worley MD at [place] [address]  4/9/2018    EGD BIOPSY performed by Jessica Worley MD at [facility] 5/21/2018    EGD BIOPSY performed by Jessica Worley MD at [facility] ENDOSCOPY       Current Outpatient Medications   Medication Sig Dispense Refill    flecainide (TAMBOCOR) 50 MG tablet Take 1 tablet by mouth 2 times daily 60 tablet 6    diazePAM (VALIUM) 5 MG tablet Take 5 mg by mouth 2 times daily.  traZODone (DESYREL) 50 MG tablet Take 100 mg by mouth nightly       metoclopramide (REGLAN) 10 MG tablet Take 1 tablet by mouth 4 times daily as needed (nausea) 120 tablet 3    busPIRone (BUSPAR) 15 MG tablet Take 15 mg by mouth daily       omeprazole (PRILOSEC) 40 MG delayed release capsule TK ONE C PO  QD  0    acetaminophen (TYLENOL) 325 MG tablet Take 650 mg by mouth every 6 hours as needed for Pain      metoprolol succinate (TOPROL XL) 50 MG extended release tablet Take 1 tablet by mouth 2 times daily 180 tablet 3    cholestyramine (QUESTRAN) 4 g packet Take 1 packet by mouth daily (Patient taking differently: Take 1 packet by mouth daily as needed ) 90 packet 3     No current facility-administered medications for this visit.         Allergies   Allergen Reactions    Effexor [Venlafaxine Hydrochloride] Palpitations     Passed out    Aspirin Other (See Comments)     Ringing in ears    Cardizem [Diltiazem Hcl] Other (See Comments)     Ringing in the ears    Biaxin [Clarithromycin] Palpitations ROS:   Review of Systems   Constitutional: Negative for fatigue and fever. HENT: Negative for congestion, nosebleeds and sinus pressure. Eyes: Negative for redness and visual disturbance. Respiratory: Negative for cough, chest tightness, shortness of breath and wheezing. Cardiovascular: Positive for palpitations. Negative for chest pain and leg swelling. Gastrointestinal: Negative for abdominal distention, abdominal pain, diarrhea and nausea. Endocrine: Negative for cold intolerance, heat intolerance, polydipsia and polyphagia. Genitourinary: Negative for difficulty urinating, frequency and urgency. Musculoskeletal: Negative for arthralgias, joint swelling and myalgias. Skin: Negative for color change and wound. Neurological: Negative for dizziness, syncope, weakness and numbness. Psychiatric/Behavioral: Negative for agitation, behavioral problems, confusion, decreased concentration, hallucinations and suicidal ideas. The patient is not nervous/anxious. PHYSICAL EXAM:  Vitals:    05/17/21 1017   BP: 110/70   Site: Left Upper Arm   Position: Sitting   Cuff Size: Medium Adult   Pulse: 56   Resp: 20   SpO2: 98%   Weight: 150 lb (68 kg)   Height: 5' 8\" (1.727 m)     Physical Exam  Vitals reviewed. Constitutional:       Appearance: Normal appearance. HENT:      Head: Normocephalic. Mouth/Throat:      Mouth: Mucous membranes are moist.      Pharynx: Oropharynx is clear. Eyes:      Conjunctiva/sclera: Conjunctivae normal.   Neck:      Vascular: No carotid bruit. Cardiovascular:      Rate and Rhythm: Normal rate and regular rhythm. Pulses: Normal pulses. Heart sounds: Normal heart sounds. Pulmonary:      Effort: Pulmonary effort is normal.      Breath sounds: Normal breath sounds. No rales. Chest:      Chest wall: No tenderness. Abdominal:      General: Bowel sounds are normal.      Palpations: Abdomen is soft.    Musculoskeletal:         General: Normal range of motion. Cervical back: Normal range of motion and neck supple. Skin:     General: Skin is warm. Neurological:      General: No focal deficit present. Mental Status: He is alert and oriented to person, place, and time. Psychiatric:         Mood and Affect: Mood normal.         Behavior: Behavior normal.         Thought Content:  Thought content normal.          Pertinent Labs:  CBC:   WBC (E9/L)   Date Value   05/07/2021 12.2 (H)   05/03/2021 10.1   01/30/2021 11.2     Hemoglobin (g/dL)   Date Value   05/07/2021 16.3   05/03/2021 14.5   01/30/2021 14.3     Hematocrit (%)   Date Value   05/07/2021 48.4   05/03/2021 43.8   01/30/2021 42.2     Platelets (T8/G)   Date Value   05/07/2021 282   05/03/2021 285   01/30/2021 297      BMP:   Sodium (mmol/L)   Date Value   05/07/2021 140   05/03/2021 138   01/30/2021 134     Potassium (mmol/L)   Date Value   01/30/2021 3.8     Potassium reflex Magnesium (mmol/L)   Date Value   05/07/2021 3.7   05/03/2021 4.1   12/05/2020 5.0     Magnesium (mg/dL)   Date Value   01/30/2021 1.9   11/11/2011 2.1     Chloride (mmol/L)   Date Value   05/07/2021 106   05/03/2021 104   01/30/2021 102     CO2 (mmol/L)   Date Value   05/07/2021 25   05/03/2021 24   01/30/2021 23     BUN (mg/dL)   Date Value   05/07/2021 12   05/03/2021 12   01/30/2021 13     CREATININE (mg/dL)   Date Value   05/07/2021 1.1   05/03/2021 1.0   01/30/2021 1.0     Glucose (mg/dL)   Date Value   05/07/2021 122 (H)   05/03/2021 92   01/30/2021 101 (H)   11/11/2011 90     Calcium (mg/dL)   Date Value   05/07/2021 9.4   05/03/2021 9.1   01/30/2021 9.0      INR:   INR (no units)   Date Value   05/03/2021 1.1   11/10/2020 1.0   11/06/2020 1.0      BNP: No results found for: BNP   TSH:   TSH (uIU/mL)   Date Value   11/05/2020 1.660   11/27/2018 1.130   11/11/2011 0.418      Cardiac Injury Profile:   Troponin (ng/mL)   Date Value   05/07/2021 <0.01     Lipid Profile:   Triglycerides (mg/dL)   Date Value   2011 136     HDL (mg/dL)   Date Value   2011 40.0 (A)     LDL Calculated (mg/dL)   Date Value   2011 105 (H)     Cholesterol (mg/dL)   Date Value   2011 172      Hemoglobin A1C: No results found for: LABA1C        Pertinent Cardiac Testin/10/20 Stress Test       A mild defect was present in the inferior wall(s) that was small size   by quantification - likely attenuation.              The resting images no significant reversibility       Gated SPECT left ventricular ejection fraction was calculated to be   60%, with normal Myocardial wall motion. TTE 20   Left Ventricle   Left ventricle size is normal.   Normal left ventricular wall thickness. Normal left ventricular systolic function. Ejection fraction is visually estimated at 55%. Normal left ventricular diastolic filling pattern for age. ECG 2021: sinus bradycardia, Rate 56    I have independently reviewed all of the ECGs and rhythm strips per above    I have personally reviewed the laboratory, cardiac diagnostic and radiographic testing as outlined above: We have requested previous records. 1. Paroxysmal atrial fibrillation ([address]    2. Atypical chest pain    3. Abnormal EKG    4. Palpitations    5. Schizophrenia, unspecified type ([address]         ASSESSMENT & PLAN    1. PAF  - Diagnosed 2020  - On Metoprolol 50 mg bid  - recent ER visits for chest pain- touch base with cardiology  - Consider Flecainide addition to help maintain NSR if no further invasive cardiac tests to r/o CAD planned  - Discussed Rhythm control with ablation and he is not ready to consider invasive procedure at this time    2. Chest pain  - Atypical  - Discuss with Cardiology    3. Anxiety  - Medications    4. Schizophrenia      Thank you for allowing me to participate in your patient's care. I have spent a total of 45 minutes with the patient and his/her family reviewing the above stated recommendations.  A total of

## 2021-05-22 ENCOUNTER — HOSPITAL ENCOUNTER (INPATIENT)
Age: 46
LOS: 1 days | Discharge: HOME | DRG: 201 | End: 2021-05-23
Attending: EMERGENCY MEDICINE | Admitting: INTERNAL MEDICINE
Payer: COMMERCIAL

## 2021-05-22 DIAGNOSIS — I48.91 ATRIAL FIBRILLATION WITH RVR (HCC): Primary | ICD-10-CM

## 2021-05-22 LAB
AMPHETAMINE SCREEN, URINE: NOT DETECTED
ANION GAP SERPL CALCULATED.3IONS-SCNC: 10 MMOL/L (ref 7–16)
BARBITURATE SCREEN URINE: NOT DETECTED
BASOPHILS ABSOLUTE: 0.08 E9/L (ref 0–0.2)
BASOPHILS RELATIVE PERCENT: 0.7 % (ref 0–2)
BENZODIAZEPINE SCREEN, URINE: POSITIVE
BUN BLDV-MCNC: 13 MG/DL (ref 6–20)
CALCIUM SERPL-MCNC: 8.9 MG/DL (ref 8.6–10.2)
CANNABINOID SCREEN URINE: POSITIVE
CHLORIDE BLD-SCNC: 106 MMOL/L (ref 98–107)
CHOLESTEROL, FASTING: 145 MG/DL (ref 0–199)
CO2: 24 MMOL/L (ref 22–29)
COCAINE METABOLITE SCREEN URINE: NOT DETECTED
CREAT SERPL-MCNC: 1 MG/DL (ref 0.7–1.2)
EKG ATRIAL RATE: 326 BPM
EKG Q-T INTERVAL: 308 MS
EKG QRS DURATION: 80 MS
EKG QTC CALCULATION (BAZETT): 453 MS
EKG R AXIS: 77 DEGREES
EKG T AXIS: 54 DEGREES
EKG VENTRICULAR RATE: 130 BPM
EOSINOPHILS ABSOLUTE: 0.28 E9/L (ref 0.05–0.5)
EOSINOPHILS RELATIVE PERCENT: 2.5 % (ref 0–6)
FENTANYL SCREEN, URINE: NOT DETECTED
GFR AFRICAN AMERICAN: >60
GFR NON-AFRICAN AMERICAN: >60 ML/MIN/1.73
GLUCOSE BLD-MCNC: 192 MG/DL (ref 74–99)
HBA1C MFR BLD: 6.1 % (ref 4–5.6)
HCT VFR BLD CALC: 47.9 % (ref 37–54)
HDLC SERPL-MCNC: 36 MG/DL
HEMOGLOBIN: 15.4 G/DL (ref 12.5–16.5)
IMMATURE GRANULOCYTES #: 0.03 E9/L
IMMATURE GRANULOCYTES %: 0.3 % (ref 0–5)
LDL CHOLESTEROL CALCULATED: 71 MG/DL (ref 0–99)
LYMPHOCYTES ABSOLUTE: 3.53 E9/L (ref 1.5–4)
LYMPHOCYTES RELATIVE PERCENT: 31.4 % (ref 20–42)
Lab: ABNORMAL
MAGNESIUM: 2 MG/DL (ref 1.6–2.6)
MCH RBC QN AUTO: 28.9 PG (ref 26–35)
MCHC RBC AUTO-ENTMCNC: 32.2 % (ref 32–34.5)
MCV RBC AUTO: 90 FL (ref 80–99.9)
METER GLUCOSE: 95 MG/DL (ref 74–99)
METHADONE SCREEN, URINE: NOT DETECTED
MONOCYTES ABSOLUTE: 0.7 E9/L (ref 0.1–0.95)
MONOCYTES RELATIVE PERCENT: 6.2 % (ref 2–12)
NEUTROPHILS ABSOLUTE: 6.63 E9/L (ref 1.8–7.3)
NEUTROPHILS RELATIVE PERCENT: 58.9 % (ref 43–80)
OPIATE SCREEN URINE: NOT DETECTED
OXYCODONE URINE: NOT DETECTED
PDW BLD-RTO: 14.2 FL (ref 11.5–15)
PHENCYCLIDINE SCREEN URINE: NOT DETECTED
PLATELET # BLD: 310 E9/L (ref 130–450)
PMV BLD AUTO: 10.3 FL (ref 7–12)
POTASSIUM REFLEX MAGNESIUM: 4.2 MMOL/L (ref 3.5–5)
RBC # BLD: 5.32 E12/L (ref 3.8–5.8)
SODIUM BLD-SCNC: 140 MMOL/L (ref 132–146)
TRIGLYCERIDE, FASTING: 190 MG/DL (ref 0–149)
TROPONIN: <0.01 NG/ML (ref 0–0.03)
TROPONIN: <0.01 NG/ML (ref 0–0.03)
TSH SERPL DL<=0.05 MIU/L-ACNC: 1.02 UIU/ML (ref 0.27–4.2)
VLDLC SERPL CALC-MCNC: 38 MG/DL
WBC # BLD: 11.3 E9/L (ref 4.5–11.5)

## 2021-05-22 PROCEDURE — 6370000000 HC RX 637 (ALT 250 FOR IP): Performed by: INTERNAL MEDICINE

## 2021-05-22 PROCEDURE — 83036 HEMOGLOBIN GLYCOSYLATED A1C: CPT

## 2021-05-22 PROCEDURE — 84484 ASSAY OF TROPONIN QUANT: CPT

## 2021-05-22 PROCEDURE — 93010 ELECTROCARDIOGRAM REPORT: CPT | Performed by: INTERNAL MEDICINE

## 2021-05-22 PROCEDURE — 2580000003 HC RX 258: Performed by: EMERGENCY MEDICINE

## 2021-05-22 PROCEDURE — 2060000000 HC ICU INTERMEDIATE R&B

## 2021-05-22 PROCEDURE — 2580000003 HC RX 258: Performed by: INTERNAL MEDICINE

## 2021-05-22 PROCEDURE — 82962 GLUCOSE BLOOD TEST: CPT

## 2021-05-22 PROCEDURE — 99254 IP/OBS CNSLTJ NEW/EST MOD 60: CPT | Performed by: INTERNAL MEDICINE

## 2021-05-22 PROCEDURE — 99223 1ST HOSP IP/OBS HIGH 75: CPT | Performed by: INTERNAL MEDICINE

## 2021-05-22 PROCEDURE — 80048 BASIC METABOLIC PNL TOTAL CA: CPT

## 2021-05-22 PROCEDURE — 84443 ASSAY THYROID STIM HORMONE: CPT

## 2021-05-22 PROCEDURE — 93005 ELECTROCARDIOGRAM TRACING: CPT | Performed by: INTERNAL MEDICINE

## 2021-05-22 PROCEDURE — 6360000002 HC RX W HCPCS: Performed by: EMERGENCY MEDICINE

## 2021-05-22 PROCEDURE — 36415 COLL VENOUS BLD VENIPUNCTURE: CPT

## 2021-05-22 PROCEDURE — 6370000000 HC RX 637 (ALT 250 FOR IP): Performed by: NURSE PRACTITIONER

## 2021-05-22 PROCEDURE — 96374 THER/PROPH/DIAG INJ IV PUSH: CPT

## 2021-05-22 PROCEDURE — 93005 ELECTROCARDIOGRAM TRACING: CPT | Performed by: EMERGENCY MEDICINE

## 2021-05-22 PROCEDURE — 6360000002 HC RX W HCPCS: Performed by: INTERNAL MEDICINE

## 2021-05-22 PROCEDURE — APPSS60 APP SPLIT SHARED TIME 46-60 MINUTES: Performed by: NURSE PRACTITIONER

## 2021-05-22 PROCEDURE — 80307 DRUG TEST PRSMV CHEM ANLYZR: CPT

## 2021-05-22 PROCEDURE — 80061 LIPID PANEL: CPT

## 2021-05-22 PROCEDURE — 83735 ASSAY OF MAGNESIUM: CPT

## 2021-05-22 PROCEDURE — 99285 EMERGENCY DEPT VISIT HI MDM: CPT

## 2021-05-22 PROCEDURE — 85025 COMPLETE CBC W/AUTO DIFF WBC: CPT

## 2021-05-22 RX ORDER — DIAZEPAM 5 MG/1
5 TABLET ORAL 2 TIMES DAILY
Status: DISCONTINUED | OUTPATIENT
Start: 2021-05-22 | End: 2021-05-22

## 2021-05-22 RX ORDER — METOPROLOL SUCCINATE 25 MG/1
50 TABLET, EXTENDED RELEASE ORAL 2 TIMES DAILY
Status: DISCONTINUED | OUTPATIENT
Start: 2021-05-22 | End: 2021-05-23 | Stop reason: HOSPADM

## 2021-05-22 RX ORDER — DIGOXIN 0.25 MG/ML
500 INJECTION INTRAMUSCULAR; INTRAVENOUS DAILY
Status: DISCONTINUED | OUTPATIENT
Start: 2021-05-22 | End: 2021-05-22

## 2021-05-22 RX ORDER — DIGOXIN 0.25 MG/ML
500 INJECTION INTRAMUSCULAR; INTRAVENOUS ONCE
Status: COMPLETED | OUTPATIENT
Start: 2021-05-22 | End: 2021-05-22

## 2021-05-22 RX ORDER — FLECAINIDE ACETATE 50 MG/1
50 TABLET ORAL EVERY 12 HOURS SCHEDULED
Status: DISCONTINUED | OUTPATIENT
Start: 2021-05-22 | End: 2021-05-23 | Stop reason: HOSPADM

## 2021-05-22 RX ORDER — BUSPIRONE HYDROCHLORIDE 5 MG/1
15 TABLET ORAL DAILY
Status: DISCONTINUED | OUTPATIENT
Start: 2021-05-22 | End: 2021-05-23 | Stop reason: HOSPADM

## 2021-05-22 RX ORDER — ACETAMINOPHEN 650 MG/1
650 SUPPOSITORY RECTAL EVERY 6 HOURS PRN
Status: DISCONTINUED | OUTPATIENT
Start: 2021-05-22 | End: 2021-05-23 | Stop reason: HOSPADM

## 2021-05-22 RX ORDER — SODIUM CHLORIDE 0.9 % (FLUSH) 0.9 %
10 SYRINGE (ML) INJECTION EVERY 12 HOURS SCHEDULED
Status: DISCONTINUED | OUTPATIENT
Start: 2021-05-22 | End: 2021-05-23 | Stop reason: HOSPADM

## 2021-05-22 RX ORDER — FLECAINIDE ACETATE 100 MG/1
100 TABLET ORAL EVERY 12 HOURS SCHEDULED
Status: DISCONTINUED | OUTPATIENT
Start: 2021-05-22 | End: 2021-05-22

## 2021-05-22 RX ORDER — SODIUM CHLORIDE 9 MG/ML
INJECTION, SOLUTION INTRAVENOUS CONTINUOUS
Status: DISCONTINUED | OUTPATIENT
Start: 2021-05-22 | End: 2021-05-22

## 2021-05-22 RX ORDER — PANTOPRAZOLE SODIUM 40 MG/1
40 TABLET, DELAYED RELEASE ORAL
Status: DISCONTINUED | OUTPATIENT
Start: 2021-05-22 | End: 2021-05-23 | Stop reason: HOSPADM

## 2021-05-22 RX ORDER — DIAZEPAM 5 MG/1
10 TABLET ORAL NIGHTLY
Status: DISCONTINUED | OUTPATIENT
Start: 2021-05-22 | End: 2021-05-23 | Stop reason: HOSPADM

## 2021-05-22 RX ORDER — 0.9 % SODIUM CHLORIDE 0.9 %
1000 INTRAVENOUS SOLUTION INTRAVENOUS ONCE
Status: COMPLETED | OUTPATIENT
Start: 2021-05-22 | End: 2021-05-22

## 2021-05-22 RX ORDER — SODIUM CHLORIDE 0.9 % (FLUSH) 0.9 %
10 SYRINGE (ML) INJECTION PRN
Status: DISCONTINUED | OUTPATIENT
Start: 2021-05-22 | End: 2021-05-23 | Stop reason: HOSPADM

## 2021-05-22 RX ORDER — TRAZODONE HYDROCHLORIDE 50 MG/1
100 TABLET ORAL NIGHTLY
Status: DISCONTINUED | OUTPATIENT
Start: 2021-05-22 | End: 2021-05-23 | Stop reason: HOSPADM

## 2021-05-22 RX ORDER — ACETAMINOPHEN 325 MG/1
650 TABLET ORAL EVERY 6 HOURS PRN
Status: DISCONTINUED | OUTPATIENT
Start: 2021-05-22 | End: 2021-05-23 | Stop reason: HOSPADM

## 2021-05-22 RX ORDER — POLYETHYLENE GLYCOL 3350 17 G/17G
17 POWDER, FOR SOLUTION ORAL DAILY PRN
Status: DISCONTINUED | OUTPATIENT
Start: 2021-05-22 | End: 2021-05-23 | Stop reason: HOSPADM

## 2021-05-22 RX ORDER — SODIUM CHLORIDE 9 MG/ML
25 INJECTION, SOLUTION INTRAVENOUS PRN
Status: DISCONTINUED | OUTPATIENT
Start: 2021-05-22 | End: 2021-05-23 | Stop reason: HOSPADM

## 2021-05-22 RX ADMIN — TRAZODONE HYDROCHLORIDE 100 MG: 50 TABLET ORAL at 21:56

## 2021-05-22 RX ADMIN — FLECAINIDE ACETATE 50 MG: 50 TABLET ORAL at 20:46

## 2021-05-22 RX ADMIN — ENOXAPARIN SODIUM 40 MG: 40 INJECTION SUBCUTANEOUS at 09:00

## 2021-05-22 RX ADMIN — DIAZEPAM 10 MG: 5 TABLET ORAL at 21:56

## 2021-05-22 RX ADMIN — METOPROLOL SUCCINATE 50 MG: 25 TABLET, EXTENDED RELEASE ORAL at 08:59

## 2021-05-22 RX ADMIN — SODIUM CHLORIDE 1000 ML: 9 INJECTION, SOLUTION INTRAVENOUS at 04:33

## 2021-05-22 RX ADMIN — METOPROLOL SUCCINATE 50 MG: 25 TABLET, EXTENDED RELEASE ORAL at 20:46

## 2021-05-22 RX ADMIN — PANTOPRAZOLE SODIUM 40 MG: 40 TABLET, DELAYED RELEASE ORAL at 09:00

## 2021-05-22 RX ADMIN — SODIUM CHLORIDE: 9 INJECTION, SOLUTION INTRAVENOUS at 08:27

## 2021-05-22 RX ADMIN — BUSPIRONE HYDROCHLORIDE 15 MG: 5 TABLET ORAL at 21:56

## 2021-05-22 RX ADMIN — FLECAINIDE ACETATE 100 MG: 100 TABLET ORAL at 09:00

## 2021-05-22 RX ADMIN — SODIUM CHLORIDE, PRESERVATIVE FREE 10 ML: 5 INJECTION INTRAVENOUS at 08:25

## 2021-05-22 RX ADMIN — SODIUM CHLORIDE, PRESERVATIVE FREE 10 ML: 5 INJECTION INTRAVENOUS at 20:47

## 2021-05-22 RX ADMIN — DIGOXIN 500 MCG: 0.25 INJECTION INTRAMUSCULAR; INTRAVENOUS at 04:34

## 2021-05-22 ASSESSMENT — PAIN SCALES - GENERAL: PAINLEVEL_OUTOF10: 6

## 2021-05-22 ASSESSMENT — PAIN DESCRIPTION - PAIN TYPE: TYPE: ACUTE PAIN
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
## 2021-05-22 NOTE — ED PROVIDER NOTES
HPI:  5/22/21,   Time: 4:17 AM EDT         Sunday Rajendra ROSA is a 39 y.o. male presenting to the ED for palpitations and is back in A. fib for the third time in just over a week with spontaneous resolution back to sinus rhythm the last 2 times, beginning for the last few hours ago. The complaint has been constant, moderate in severity, and worsened by nothing. No alleviating factors. No chest pain or shortness of breath    ROS:   Pertinent positives and negatives are stated within HPI, all other systems reviewed and are negative.  --------------------------------------------- PAST HISTORY ---------------------------------------------  Past Medical History:  has a past medical history of Anxiety with depression, Arthritis, Hand pain, right, History of Helicobacter pylori infection, Panic attacks, Restless leg syndrome, Schizophrenia ([facility] [address] Scoliosis, and Weight loss. Past Surgical History:  has a past surgical history that includes Colonoscopy (2016); hernia repair (09/19/2017); pr secd clos surg wnd exten/complic (N/A, 1/74/4277); Upper gastrointestinal endoscopy (4/9/2018); Abdomen surgery (N/A, 03/15/2018); Upper gastrointestinal endoscopy (N/A, 5/21/2018); and pr lap,cholecystectomy (N/A, 5/29/2018). Social History:  reports that he has been smoking cigarettes. He has a 18.00 pack-year smoking history. He has never used smokeless tobacco. He reports previous alcohol use. He reports current drug use. Drug: Marijuana. Family History: family history includes Crohn's Disease in his sister; High Blood Pressure in his father; Mental Illness in his father. The patients home medications have been reviewed.     Allergies: Effexor [venlafaxine hydrochloride], Aspirin, Cardizem [diltiazem hcl], and Biaxin [clarithromycin]    -------------------------------------------------- RESULTS -------------------------------------------------  All laboratory and radiology results have been personally reviewed by myself   LABS:  Results for orders placed or performed during the hospital encounter of 05/22/21   CBC Auto Differential   Result Value Ref Range    WBC 11.3 4.5 - 11.5 E9/L    RBC 5.32 3.80 - 5.80 E12/L    Hemoglobin 15.4 12.5 - 16.5 g/dL    Hematocrit 47.9 37.0 - 54.0 %    MCV 90.0 80.0 - 99.9 fL    MCH 28.9 26.0 - 35.0 pg    MCHC 32.2 32.0 - 34.5 %    RDW 14.2 11.5 - 15.0 fL    Platelets 624 126 - 569 E9/L    MPV 10.3 7.0 - 12.0 fL    Neutrophils % 58.9 43.0 - 80.0 %    Immature Granulocytes % 0.3 0.0 - 5.0 %    Lymphocytes % 31.4 20.0 - 42.0 %    Monocytes % 6.2 2.0 - 12.0 %    Eosinophils % 2.5 0.0 - 6.0 %    Basophils % 0.7 0.0 - 2.0 %    Neutrophils Absolute 6.63 1.80 - 7.30 E9/L    Immature Granulocytes # 0.03 E9/L    Lymphocytes Absolute 3.53 1.50 - 4.00 E9/L    Monocytes Absolute 0.70 0.10 - 0.95 E9/L    Eosinophils Absolute 0.28 0.05 - 0.50 E9/L    Basophils Absolute 0.08 0.00 - 0.20 H8/C   Basic Metabolic Panel w/ Reflex to MG   Result Value Ref Range    Sodium 140 132 - 146 mmol/L    Potassium reflex Magnesium 4.2 3.5 - 5.0 mmol/L    Chloride 106 98 - 107 mmol/L    CO2 24 22 - 29 mmol/L    Anion Gap 10 7 - 16 mmol/L    Glucose 192 (H) 74 - 99 mg/dL    BUN 13 6 - 20 mg/dL    CREATININE 1.0 0.7 - 1.2 mg/dL    GFR Non-African American >60 >=60 mL/min/1.73    GFR African American >60     Calcium 8.9 8.6 - 10.2 mg/dL   Troponin   Result Value Ref Range    Troponin <0.01 0.00 - 0.03 ng/mL   EKG 12 Lead   Result Value Ref Range    Ventricular Rate 130 BPM    Atrial Rate 326 BPM    QRS Duration 80 ms    Q-T Interval 308 ms    QTc Calculation (Bazett) 453 ms    R Axis 77 degrees    T Axis 54 degrees       RADIOLOGY:  Interpreted by Radiologist.  No orders to display       ------------------------- NURSING NOTES AND VITALS REVIEWED ---------------------------   The nursing notes within the ED encounter and vital signs as below have been reviewed.    /68   Pulse 89   Temp 97.9 °F (36.6 °C)   Resp 16   Ht MD  05/22/21 8069

## 2021-05-22 NOTE — ED NOTES
SBAR report faxed to 4th floor, verified receipt with staff.      Isrrael Dennison RN  05/22/21 6422

## 2021-05-22 NOTE — H&P
[facility] History and Physical      CHIEF COMPLAINT:  palpitations    History of Present Illness:  38 yo male hx of pafib bipolar disorder follows with EP. Last night after coming back from a trip to the bathroom he felt palpitations and lasted for 2 hours therefore presented to the ED for further evaluation. Felt some tightness with this. Recently seen by EP, ablation discussed but patient deferred. He was ordered for flecainide but per patient he was instructed not to start this until further testing is done. Had a stress test and echo 6 months ago that were normal.  Reports eating and drinking okay recently. In the ED he was given a dose of digoxin with improvement in his heart rate, when seen his pulse ox was 70 to 80s but regular. Cardiology consulted, recommended for admission.     Informant(s) for H&P: Patient    REVIEW OF SYSTEMS:  A comprehensive 14 point review of systems was negative except for: what is in the HPI    PMH:  Past Medical History:   Diagnosis Date    Anxiety with depression     Arthritis     Hand pain, right     History of Helicobacter pylori infection     Panic attacks     Restless leg syndrome     Schizophrenia ([address]     mild - controlled (history of )  PCP states it is marilyn bilpolar    Scoliosis     Weight loss     25 # in 6 months / as of 5/17/2018, no further weight loss       Surgical History:  Past Surgical History:   Procedure Laterality Date    ABDOMEN SURGERY N/A 03/15/2018    wound exploration umbilicus, excsion suture granuloma    COLONOSCOPY  2016    HERNIA REPAIR  09/19/2017    [place] LAP,CHOLECYSTECTOMY N/A 5/29/2018    LAPAROSCOPIC  CHOLECYSTECTOMY performed by Myra Loomis MD at [facility] WND EXTEN/COMPLIC N/A 5/95/5355    ABDOMINAL WOUND EXPLORATION , REMOVAL OF SUTURE GRANULOMA performed by Myra Loomis MD at [address] [place] [address]  4/9/2018    EGD BIOPSY performed by Myra Loomis MD at [facility] 96%   BMI 22.81 kg/m²   General Appearance: alert and oriented to person, place and time and in no acute distress  Skin: warm and dry, turgor not diminished  Head: normocephalic and atraumatic  Eyes: pupils equal, round, and reactive to light, extraocular eye movements intact, conjunctivae normal  Neck: neck supple and non tender without mass   Pulmonary/Chest: clear to auscultation bilaterally- no wheezes, rales or rhonchi, normal air movement, no respiratory distress  Cardiovascular: normal rate, normal S1 and S2 and no M/R/R  Abdomen: soft, non-tender, non-distended, normal bowel sounds, no masses or organomegaly  Extremities: no cyanosis, no clubbing and no edema  Neurologic: no cranial nerve deficit and speech normal        LABS:  Recent Labs     05/22/21  0439      K 4.2      CO2 24   BUN 13   CREATININE 1.0   GLUCOSE 192*   CALCIUM 8.9       Recent Labs     05/22/21  0439   WBC 11.3   RBC 5.32   HGB 15.4   HCT 47.9   MCV 90.0   MCH 28.9   MCHC 32.2   RDW 14.2      MPV 10.3       No results for input(s): POCGLU in the last 72 hours. Radiology:   No orders to display       EKG: Atrial fibrillation with rapid ventricular response  Abnormal ECG  When compared with ECG of 07-MAY-2021 04:31,  Atrial fibrillation present  Confirmed by Soni Dumont ([id number]) on 5/22/2021 5:34:38 AM    ASSESSMENT:    Atrial fibrillation with RVR (HCC)  Bipolar disorder  Hyperglycemia    PLAN:  A. fib: Resume home metoprolol. Consult cardiology. Not on anticoagulation, defer to cardiology. Episodes occur at night and wife reports some snoring, may need sleep study, defer to cardiology. Bipolar disorder: Resume home regimen    Elevated blood glucose: No history of diabetes. Check hemoglobin A1c. DVT prophylaxis: lovenox      NOTE: This report was transcribed using voice recognition software.  Every effort was made to ensure accuracy; however, inadvertent computerized transcription errors may be

## 2021-05-22 NOTE — PROGRESS NOTES
Patient says that he takes valium 10mg at night and not 5mg twice a day.  Dr Leeanne Masterson notified

## 2021-05-22 NOTE — CONSULTS
Inpatient Cardiology Consultation      Reason for Consult:  AF RVR    Consulting Physician: Dr Aisha Silvestre    Requesting Physician:  Dr Aspen Sánchez    Date of Consultation: 5/22/2021    HISTORY OF PRESENT ILLNESS: 38 yo  male known to Dr Gama Torres last seen in office 1/4/2021. Seen by EP 5/17/2021 as outpatient as was placed on Flecainide 50 mg BID. Has returned to ED 1/30/2021 for non cardiac CP 1/30/021, Eye pain 2/16/201, and palpitations (EKG SR) 5/3/2021, palpitations 5/7/2021 AF RVR with spontaneous conversion to SR (BB increased and outpatient consult to EP) each time discharged to home. PMH: Anxiety, schizophrenia (patient denies), negative sleep study in 2020, 27 pack year smoker, marijuana use, hx H pylori, COPD, unremarkable TTE 11/2020 and non ischemic stress 11/2020, and Cardizem/ASA cause tinnitus. Awoke around 0130 to use bathroom returned to be and noticed chest pressure, felt like he had to burp but couldn't and heart felt fast and irregular waited 2 hours no improvement so he came to ED. [facility] 5/22/2021 for palpitations. BP upon arrival 109/68 HR 89. AF RVR 1 liter NSS and 500 mcgs IV digoxin remained in AF--> admission  Troponin <0.01 BMP and CBC unremarkable  Current /71 HR 80's AF CVR  Currently sitting up in bed eating breakfast. Continues to feel like he needs to burp and chest pressure has decreased but is still there maybe a 2 out of 10. No SOB, or dizziness. Please note: past medical records were reviewed per electronic medical record (EMR) - see detailed reports under Past Medical/ Surgical History. Past Medical History:    1. Father PCI x 3 age 67  4. ASA/Cardizem cause tinnitus  3. 27 pack year smoker  4. Marijuana use  5. COPD patient denies  6. Anxiety/Panic attacks/Depression  7. Denies Schizophrenia/bipolar  8. Hx H pylori  9. 2020 Negative Sleep study  10. 11/2020 Diagnosed with AF on BB (increased 5/2021 to 50 mg BID)  11. 11/6/2020 TTE EF 55%.  NO VHD  12. 11/10/2020 Lexiscan MPS: EF 60%. A mild defect was present in the inferior wall(s) that was small size by quantification - likely attenuation  13. Recurrent palpitations  14. 5/17/2021 Outpatient evaluation by EP--> Flecainide 50 mg BID prescribed. 13. Has not gotten COVID Vaccine      Past Surgical History:    Hernia repair, cholecystectomy, EGD, and colonoscopy      Medications Prior to admit:  Prior to Admission medications    Medication Sig Start Date End Date Taking? Authorizing Provider   metoprolol succinate (TOPROL XL) 50 MG extended release tablet Take 1 tablet by mouth 2 times daily 5/17/21   Sarbjit Murphy MD   flecainide (TAMBOCOR) 50 MG tablet Take 1 tablet by mouth 2 times daily 5/17/21   Yoly Brennan MD   diazePAM (VALIUM) 5 MG tablet Take 5 mg by mouth 2 times daily.     Historical Provider, MD   cholestyramine Zuleyma Lilly) 4 g packet Take 1 packet by mouth daily  Patient taking differently: Take 1 packet by mouth daily as needed  12/3/20 5/7/21  Jose Maria Mcneil MD   traZODone (DESYREL) 50 MG tablet Take 100 mg by mouth nightly     Historical Provider, MD   metoclopramide (REGLAN) 10 MG tablet Take 1 tablet by mouth 4 times daily as needed (nausea) 12/5/19   Jose Maria Mcneil MD   busPIRone (BUSPAR) 15 MG tablet Take 15 mg by mouth daily  10/30/19   Historical Provider, MD   omeprazole (PRILOSEC) 40 MG delayed release capsule TK ONE C PO  QD 10/25/19   Historical Provider, MD   acetaminophen (TYLENOL) 325 MG tablet Take 650 mg by mouth every 6 hours as needed for Pain    Historical Provider, MD       Current Medications:    Current Facility-Administered Medications: busPIRone (BUSPAR) tablet 15 mg, 15 mg, Oral, Daily  diazePAM (VALIUM) tablet 5 mg, 5 mg, Oral, BID  metoprolol succinate (TOPROL XL) extended release tablet 50 mg, 50 mg, Oral, BID  pantoprazole (PROTONIX) tablet 40 mg, 40 mg, Oral, QAM AC  traZODone (DESYREL) tablet 100 mg, 100 mg, Oral, Nightly  0.9 % sodium chloride infusion, , Intravenous, Continuous  sodium chloride flush 0.9 % injection 10 mL, 10 mL, Intravenous, 2 times per day  sodium chloride flush 0.9 % injection 10 mL, 10 mL, Intravenous, PRN  0.9 % sodium chloride infusion, 25 mL, Intravenous, PRN  enoxaparin (LOVENOX) injection 40 mg, 40 mg, Subcutaneous, Daily  polyethylene glycol (GLYCOLAX) packet 17 g, 17 g, Oral, Daily PRN  acetaminophen (TYLENOL) tablet 650 mg, 650 mg, Oral, Q6H PRN **OR** acetaminophen (TYLENOL) suppository 650 mg, 650 mg, Rectal, Q6H PRN    Allergies:  Effexor [venlafaxine hydrochloride], Aspirin, Cardizem [diltiazem hcl], and Biaxin [clarithromycin]   ASA/Cardizem: tinnitus    Social History:    27 pack years  Marijuana use last time 5/21/2021  ETOH: quit in 11/2020  Activity: Lives with girlfriend in 2 story home. Employed as a landlord, does his own mainentence with no CP or SOB. Code Status: Full Code      Family History:   Father alive age 79 with HTN, HLD, tobacco abuse, AF, and PCI x 3 at age 79  Mother alive no reported CAD or DM  3 Sisters and 1 brother alive no CAD or DM      REVIEW OF SYSTEMS:     · Constitutional: Denies fatigue, fevers, chills or night sweats  · Eyes: Denies visual changes or drainage  · ENT: Denies headaches or hearing loss. No mouth sores or sore throat. No epistaxis   · Cardiovascular: + heart fast and irregular with chest pressure. No lower extremity swelling. · Respiratory: Denies WEST, cough, orthopnea or PND. No hemoptysis   · Gastrointestinal: Denies hematemesis or anorexia. No hematochezia or melena    · Genitourinary: Denies urgency, dysuria or hematuria. · Musculoskeletal: Denies gait disturbance, weakness or joint complaints  · Integumentary: Denies rash, hives or pruritis   · Neurological: Denies dizziness, headaches or seizures. No numbness or tingling  · Psychiatric: Denies anxiety or depression. · Endocrine: Denies temperature intolerance. No recent weight change. with flecainide. Following his evaluation, this was unavailable at his pharmacy initially and became available on the night of the development of his most recent recurrent symptomatology but presently had not been initiated. On the night of admission, he noted symptomatology typical for that of his atrial fibrillation recurrences inclusive of palpitations and somewhat vaguely described chest discomfort as well as exercise intolerance with the episode occurring during the early morning hours following returning from a trip to the bathroom for urination. Symptoms persisted with emergency room presentation completed at which time atrial fibrillation with a mean ventricular response was documented electrocardiographically upon review with the subsequent achievement of rate control. In spite of this, he is continued to note somewhat vague symptoms of ill-defined discomfort within his left precordium. At the time of hospitalization, cardiac biomarkers were unremarkable with no evidence of metabolic derangements. At the time of evaluation, the patient's medications and allergies were reviewed as well as that of his past medical history and review of systems as documented. On examination, he appears in no present discomfort nor distress and is hemodynamically stable with vital signs as documented. Jugular venous pressure appears normal with no identified carotid bruits. Lung fields are clear to auscultation. Cardiac examination is notable for an irregular rhythm consistent with that of his atrial fibrillation with no gallop rhythm or cardiac murmur. A benign abdominal examination is present no peripheral edema identified. Diagnostic Assessment and Plan: On a clinical basis, the patient presents with recurrent paroxysmal atrial fibrillation present with suboptimal rate control and recent recommended consideration of antiarrhythmic therapy which is yet to be initiated.   On this basis, flecainide will be initiated with observation overnight to assess his potential spontaneous conversion with needs of a repeat electrocardiogram the following day and if conversion has not occurred the initiation of oral anticoagulation with consideration of a transesophageal echocardiographic guided cardioversion. In spite of a [id number] risk score of 0 persistent arrhythmias are noting short-term anticoagulation would be advisable to facilitate cardioversion and additionally would be a requirement prior to consideration of ablation. Thank you for allowing me to participate in your patient's care. Please feel free to contact me if you have any questions or concerns. Sara Daley.  Genet Zuniga, [facility]6 [facility]

## 2021-05-23 VITALS
WEIGHT: 151.13 LBS | BODY MASS INDEX: 22.9 KG/M2 | SYSTOLIC BLOOD PRESSURE: 106 MMHG | HEART RATE: 65 BPM | OXYGEN SATURATION: 98 % | HEIGHT: 68 IN | RESPIRATION RATE: 18 BRPM | TEMPERATURE: 98.1 F | DIASTOLIC BLOOD PRESSURE: 54 MMHG

## 2021-05-23 LAB
ANION GAP SERPL CALCULATED.3IONS-SCNC: 5 MMOL/L (ref 7–16)
BASOPHILS ABSOLUTE: 0.1 E9/L (ref 0–0.2)
BASOPHILS RELATIVE PERCENT: 1.1 % (ref 0–2)
BUN BLDV-MCNC: 9 MG/DL (ref 6–20)
CALCIUM SERPL-MCNC: 9.2 MG/DL (ref 8.6–10.2)
CHLORIDE BLD-SCNC: 107 MMOL/L (ref 98–107)
CO2: 26 MMOL/L (ref 22–29)
CREAT SERPL-MCNC: 1 MG/DL (ref 0.7–1.2)
EOSINOPHILS ABSOLUTE: 0.26 E9/L (ref 0.05–0.5)
EOSINOPHILS RELATIVE PERCENT: 2.8 % (ref 0–6)
GFR AFRICAN AMERICAN: >60
GFR NON-AFRICAN AMERICAN: >60 ML/MIN/1.73
GLUCOSE BLD-MCNC: 105 MG/DL (ref 74–99)
HCT VFR BLD CALC: 45.2 % (ref 37–54)
HEMOGLOBIN: 14.8 G/DL (ref 12.5–16.5)
IMMATURE GRANULOCYTES #: 0.04 E9/L
IMMATURE GRANULOCYTES %: 0.4 % (ref 0–5)
LYMPHOCYTES ABSOLUTE: 3.73 E9/L (ref 1.5–4)
LYMPHOCYTES RELATIVE PERCENT: 39.7 % (ref 20–42)
MCH RBC QN AUTO: 29.8 PG (ref 26–35)
MCHC RBC AUTO-ENTMCNC: 32.7 % (ref 32–34.5)
MCV RBC AUTO: 91.1 FL (ref 80–99.9)
MONOCYTES ABSOLUTE: 0.68 E9/L (ref 0.1–0.95)
MONOCYTES RELATIVE PERCENT: 7.2 % (ref 2–12)
NEUTROPHILS ABSOLUTE: 4.58 E9/L (ref 1.8–7.3)
NEUTROPHILS RELATIVE PERCENT: 48.8 % (ref 43–80)
PDW BLD-RTO: 14.1 FL (ref 11.5–15)
PLATELET # BLD: 262 E9/L (ref 130–450)
PMV BLD AUTO: 10.1 FL (ref 7–12)
POTASSIUM REFLEX MAGNESIUM: 4.7 MMOL/L (ref 3.5–5)
RBC # BLD: 4.96 E12/L (ref 3.8–5.8)
SODIUM BLD-SCNC: 138 MMOL/L (ref 132–146)
WBC # BLD: 9.4 E9/L (ref 4.5–11.5)

## 2021-05-23 PROCEDURE — 6370000000 HC RX 637 (ALT 250 FOR IP): Performed by: INTERNAL MEDICINE

## 2021-05-23 PROCEDURE — 2580000003 HC RX 258: Performed by: INTERNAL MEDICINE

## 2021-05-23 PROCEDURE — 6360000002 HC RX W HCPCS: Performed by: INTERNAL MEDICINE

## 2021-05-23 PROCEDURE — 93005 ELECTROCARDIOGRAM TRACING: CPT | Performed by: NURSE PRACTITIONER

## 2021-05-23 PROCEDURE — 36415 COLL VENOUS BLD VENIPUNCTURE: CPT

## 2021-05-23 PROCEDURE — 85025 COMPLETE CBC W/AUTO DIFF WBC: CPT

## 2021-05-23 PROCEDURE — 99232 SBSQ HOSP IP/OBS MODERATE 35: CPT | Performed by: INTERNAL MEDICINE

## 2021-05-23 PROCEDURE — 80048 BASIC METABOLIC PNL TOTAL CA: CPT

## 2021-05-23 PROCEDURE — 99238 HOSP IP/OBS DSCHRG MGMT 30/<: CPT | Performed by: INTERNAL MEDICINE

## 2021-05-23 RX ADMIN — FLECAINIDE ACETATE 50 MG: 50 TABLET ORAL at 11:06

## 2021-05-23 RX ADMIN — SODIUM CHLORIDE, PRESERVATIVE FREE 10 ML: 5 INJECTION INTRAVENOUS at 09:50

## 2021-05-23 RX ADMIN — METOPROLOL SUCCINATE 50 MG: 25 TABLET, EXTENDED RELEASE ORAL at 11:06

## 2021-05-23 RX ADMIN — ENOXAPARIN SODIUM 40 MG: 40 INJECTION SUBCUTANEOUS at 09:50

## 2021-05-23 ASSESSMENT — PAIN SCALES - GENERAL: PAINLEVEL_OUTOF10: 0

## 2021-05-23 NOTE — PROGRESS NOTES
INPATIENT CARDIOLOGY FOLLOW-UP    Name: Kasandra Clay III    Age: 39 y.o. Date of Admission: 5/22/2021  4:10 AM    Date of Service: 5/23/2021    Chief Complaint: Follow-up for paroxysmal atrial fibrillation    Interim History: The patient presently appears compensated from a cardiovascular standpoint with no additional active symptoms and resolution of chest discomfort in conjunction with spontaneous conversion of his atrial fibrillation. He is presently tolerated the initiation of flecainide without the development of abnormalities of his conduction system. Review of Systems: The remainder of a complete multisystem review including consitutional, central nervous, respiratory, circulatory, gastrointestinal, genitourinary, endocrinologic, hematologic, musculoskeletal and psychiatric are negative. Problem List:  Patient Active Problem List   Diagnosis    Hand pain, right    Depression    Helicobacter pylori gastritis    Symptomatic cholelithiasis    A-fib ([address]    Atrial fibrillation (HCC)    Atypical chest pain    Chronic obstructive pulmonary disease (HCC)    Schizophrenia (HCC)    Chest pain    Leukocytosis    Atrial fibrillation with RVR (HCC)       Allergies:   Allergies   Allergen Reactions    Effexor [Venlafaxine Hydrochloride] Palpitations     Passed out    Aspirin Other (See Comments)     Ringing in ears    Cardizem [Diltiazem Hcl] Other (See Comments)     Ringing in the ears    Biaxin [Clarithromycin] Palpitations       Current Medications:  Current Facility-Administered Medications   Medication Dose Route Frequency Provider Last Rate Last Admin    busPIRone (BUSPAR) tablet 15 mg  15 mg Oral Daily Luis Noyola MD   15 mg at 05/22/21 2156    metoprolol succinate (TOPROL XL) extended release tablet 50 mg  50 mg Oral BID Luis Noyola MD   50 mg at 05/23/21 1106    pantoprazole (PROTONIX) tablet 40 mg  40 mg Oral QAM AC Luis Noyola MD   40 mg at 05/22/21 0900    traZODone (DESYREL) tablet 100 mg  100 mg Oral Nightly Luis Noyola MD   100 mg at 05/22/21 2156    sodium chloride flush 0.9 % injection 10 mL  10 mL Intravenous 2 times per day Ofelia Hua MD   10 mL at 05/23/21 0950    sodium chloride flush 0.9 % injection 10 mL  10 mL Intravenous PRN Luis Noyola MD        0.9 % sodium chloride infusion  25 mL Intravenous PRN Luis Noyola MD        enoxaparin (LOVENOX) injection 40 mg  40 mg Subcutaneous Daily Luis Noyola MD   40 mg at 05/23/21 0950    polyethylene glycol (GLYCOLAX) packet 17 g  17 g Oral Daily PRN Ofelia Hua MD        acetaminophen (TYLENOL) tablet 650 mg  650 mg Oral Q6H PRN Luis Noyola MD        Or    acetaminophen (TYLENOL) suppository 650 mg  650 mg Rectal Q6H PRN Luis Noyola MD        flecainide (TAMBOCOR) tablet 50 mg  50 mg Oral 2 times per day Yanira Larry MD   50 mg at 05/23/21 1106    diazePAM (VALIUM) tablet 10 mg  10 mg Oral Nightly Zeeshan Khanna MD   10 mg at 05/22/21 2156      sodium chloride         Physical Exam:  BP (!) 106/54   Pulse 65   Temp 98.1 °F (36.7 °C) (Oral)   Resp 18   Ht 5' 8\" (1.727 m)   Wt 151 lb 2 oz (68.5 kg)   SpO2 98%   BMI 22.98 kg/m²   Weight change: 1 lb 2 oz (0.51 kg)  Wt Readings from Last 3 Encounters:   05/23/21 151 lb 2 oz (68.5 kg)   05/17/21 150 lb (68 kg)   05/07/21 154 lb (69.9 kg)     The patient is awake, alert and in no discomfort or distress. No gross musculoskeletal deformity is present. No significant skin or nail changes are present. Gross examination of head, eyes, nose and throat are negative. Jugular venous pressure is normal and no carotid bruits are present. Normal respiratory effort is noted with no accessory muscle usage present. Lung fields are clear to ascultation. Cardiac examination is notable for a regular rate and rhythm with no palpable thrill. No gallop rhythm or cardiac murmur are identified.  A benign abdominal examination is present with no masses or organomegaly. Intact pulses are present throughout all extremities and no peripheral edema is present. No focal neurologic deficits are present. Intake/Output:    Intake/Output Summary (Last 24 hours) at 5/23/2021 1134  Last data filed at 5/23/2021 0753  Gross per 24 hour   Intake 840 ml   Output --   Net 840 ml     I/O this shift:  In: 240 [P.O.:240]  Out: -     Laboratory Tests:  Lab Results   Component Value Date    CREATININE 1.0 05/23/2021    BUN 9 05/23/2021     05/23/2021    K 4.7 05/23/2021     05/23/2021    CO2 26 05/23/2021     No results for input(s): CKTOTAL, CKMB in the last 72 hours. Invalid input(s): TROPONONI  No results found for: BNP  Lab Results   Component Value Date    WBC 9.4 05/23/2021    RBC 4.96 05/23/2021    HGB 14.8 05/23/2021    HCT 45.2 05/23/2021    MCV 91.1 05/23/2021    MCH 29.8 05/23/2021    MCHC 32.7 05/23/2021    RDW 14.1 05/23/2021     05/23/2021    MPV 10.1 05/23/2021     No results for input(s): ALKPHOS, ALT, AST, PROT, BILITOT, BILIDIR, LABALBU in the last 72 hours.   Lab Results   Component Value Date    MG 2.0 05/22/2021     Lab Results   Component Value Date    PROTIME 11.7 05/03/2021    INR 1.1 05/03/2021     Lab Results   Component Value Date    TSH 1.020 05/22/2021     No components found for: CHLPL  Lab Results   Component Value Date    TRIG 136 11/11/2011     Lab Results   Component Value Date    HDL 36 05/22/2021    HDL 40.0 (A) 11/11/2011     Lab Results   Component Value Date    LDLCALC 71 05/22/2021    LDLCALC 105 (H) 11/11/2011       Cardiac Tests:  ECG: A resting electrocardiogram reviewed at the time of evaluation demonstrates evidence of sinus bradycardia with normal MT and QRS duration  Telemetry findings reviewed: sinus bradycardia no new tachy/bradyarrhythmias overnight      ASSESSMENT / PLAN: On a clinical basis, the patient presently remains compensated from a cardiovascular standpoint with spontaneous conversion of his atrial fibrillation to sinus rhythm and resolution of symptoms. He has tolerated the initiation of flecainide without abnormalities of conduction intervals with plans to maintain his existing medical regimen and based on his low inherent risk of embolic events and the relatively temporary duration of his atrial arrhythmias present withholding of anticoagulation. He is presently compensated for discharge with arrangements to be made for follow-up with his primary cardiologist, Ricco Abreu and electrophysiologist, Madelaine Brennan within the next 2 weeks. Note: This report was completed utilizing computer voice recognition software. Every effort has been made to ensure accuracy, however; inadvertent computerized transcription errors may be present. Issac Heath.  [facility], [facility]6 [facility]

## 2021-05-23 NOTE — DISCHARGE SUMMARY
well-developed and well-nourished, in no acute distress  Skin: warm and dry, no rash or erythema  Head: normocephalic and atraumatic  Eyes: pupils equal, round, and reactive to light, extraocular eye movements intact, conjunctivae normal  ENT: tympanic membrane, external ear and ear canal normal bilaterally, oropharynx clear and moist with normal mucous membranes  Neck: neck supple and non tender without mass, no thyromegaly or thyroid nodules, no cervical lymphadenopathy   Pulmonary/Chest: clear to auscultation bilaterally- no wheezes, rales or rhonchi, normal air movement, no respiratory distress  Cardiovascular: normal rate, normal S1 and S2, no gallops, intact distal pulses and no carotid bruits  Abdomen: soft, non-tender, non-distended, normal bowel sounds, no masses or organomegaly  I/O last 3 completed shifts:   In: 840 [P.O.:840]  Out: -   I/O this shift:  In: 240 [P.O.:240]  Out: -       LABS:  Recent Labs     05/22/21  0439 05/23/21  0336    138   K 4.2 4.7    107   CO2 24 26   BUN 13 9   CREATININE 1.0 1.0   GLUCOSE 192* 105*   CALCIUM 8.9 9.2       Recent Labs     05/22/21 0439 05/23/21  0336   WBC 11.3 9.4   RBC 5.32 4.96   HGB 15.4 14.8   HCT 47.9 45.2   MCV 90.0 91.1   MCH 28.9 29.8   MCHC 32.2 32.7   RDW 14.2 14.1    262   MPV 10.3 10.1       Imaging:   No orders to display       Patient Instructions:      Medication List      CHANGE how you take these medications    cholestyramine 4 g packet  Commonly known as: Questran  Take 1 packet by mouth daily  What changed:   · when to take this  · reasons to take this        CONTINUE taking these medications    acetaminophen 325 MG tablet  Commonly known as: TYLENOL     busPIRone 15 MG tablet  Commonly known as: BUSPAR     diazePAM 5 MG tablet  Commonly known as: VALIUM     flecainide 50 MG tablet  Commonly known as: TAMBOCOR  Take 1 tablet by mouth 2 times daily     metoclopramide 10 MG tablet  Commonly known as: REGLAN  Take 1 tablet by mouth 4 times daily as needed (nausea)     metoprolol succinate 50 MG extended release tablet  Commonly known as: TOPROL XL  Take 1 tablet by mouth 2 times daily     omeprazole 40 MG delayed release capsule  Commonly known as: PRILOSEC     traZODone 50 MG tablet  Commonly known as: DESYREL              Note that less than 30 minutes was spent in preparing discharge papers, discussing discharge with patient, medication review, etc.    Signed:  Electronically signed by Adali Dsouza MD on 5/23/2021 at 11:56 AM    NOTE: This report was transcribed using voice recognition software. Every effort was made to ensure accuracy; however, inadvertent computerized transcription errors may be present.

## 2021-05-23 NOTE — PLAN OF CARE
Problem:  Activity:  Goal: Ability to tolerate increased activity will improve  Description: Ability to tolerate increased activity will improve  Outcome: Met This Shift     Problem: Coping:  Goal: Level of anxiety will decrease  Description: Level of anxiety will decrease  Outcome: Met This Shift     Problem: Coping:  Goal: General experience of comfort will improve  Description: General experience of comfort will improve  Outcome: Met This Shift     Problem: Safety:  Goal: Ability to remain free from injury will improve  Description: Ability to remain free from injury will improve  Outcome: Met This Shift

## 2021-05-24 LAB
EKG ATRIAL RATE: 220 BPM
EKG ATRIAL RATE: 53 BPM
EKG P AXIS: 57 DEGREES
EKG P-R INTERVAL: 162 MS
EKG Q-T INTERVAL: 284 MS
EKG Q-T INTERVAL: 386 MS
EKG QRS DURATION: 80 MS
EKG QRS DURATION: 86 MS
EKG QTC CALCULATION (BAZETT): 358 MS
EKG QTC CALCULATION (BAZETT): 362 MS
EKG R AXIS: 45 DEGREES
EKG R AXIS: 55 DEGREES
EKG T AXIS: 20 DEGREES
EKG T AXIS: 33 DEGREES
EKG VENTRICULAR RATE: 53 BPM
EKG VENTRICULAR RATE: 96 BPM

## 2021-05-26 ENCOUNTER — TELEPHONE (OUTPATIENT)
Dept: CARDIOLOGY CLINIC | Age: 46
End: 2021-05-26

## 2021-06-01 ENCOUNTER — TELEPHONE (OUTPATIENT)
Dept: CARDIOLOGY CLINIC | Age: 46
End: 2021-06-01

## 2021-06-01 NOTE — TELEPHONE ENCOUNTER
----- Message from Roberth Calvo MD sent at 5/23/2021 11:32 AM EDT -----  Patient hospitalized with recurrent paroxysmal atrial fibrillation with flecainide initiated.   Needs reevaluation with Lora Lacy and Julia Wise within the next 2 weeks

## 2021-06-28 ENCOUNTER — TELEPHONE (OUTPATIENT)
Dept: CARDIOLOGY CLINIC | Age: 46
End: 2021-06-28

## 2021-06-28 NOTE — TELEPHONE ENCOUNTER
Patient scheduled for tooth extraction and root canal, was advised to check with Dr. Wm Juarez prior to proceeding. Please advise.

## 2021-07-13 ENCOUNTER — TELEPHONE (OUTPATIENT)
Dept: ADMINISTRATIVE | Age: 46
End: 2021-07-13

## 2021-07-15 ENCOUNTER — OFFICE VISIT (OUTPATIENT)
Dept: CARDIOLOGY CLINIC | Age: 46
End: 2021-07-15
Payer: COMMERCIAL

## 2021-07-15 VITALS
WEIGHT: 148 LBS | HEART RATE: 68 BPM | RESPIRATION RATE: 16 BRPM | HEIGHT: 68 IN | BODY MASS INDEX: 22.43 KG/M2 | DIASTOLIC BLOOD PRESSURE: 76 MMHG | SYSTOLIC BLOOD PRESSURE: 124 MMHG

## 2021-07-15 DIAGNOSIS — R07.2 PRECORDIAL PAIN: Primary | ICD-10-CM

## 2021-07-15 DIAGNOSIS — R00.2 PALPITATIONS: ICD-10-CM

## 2021-07-15 DIAGNOSIS — Z72.0 TOBACCO ABUSE: ICD-10-CM

## 2021-07-15 DIAGNOSIS — I48.0 PAROXYSMAL ATRIAL FIBRILLATION (HCC): ICD-10-CM

## 2021-07-15 PROCEDURE — G8427 DOCREV CUR MEDS BY ELIG CLIN: HCPCS | Performed by: INTERNAL MEDICINE

## 2021-07-15 PROCEDURE — 4004F PT TOBACCO SCREEN RCVD TLK: CPT | Performed by: INTERNAL MEDICINE

## 2021-07-15 PROCEDURE — 93000 ELECTROCARDIOGRAM COMPLETE: CPT | Performed by: INTERNAL MEDICINE

## 2021-07-15 PROCEDURE — 99214 OFFICE O/P EST MOD 30 MIN: CPT | Performed by: INTERNAL MEDICINE

## 2021-07-15 PROCEDURE — G8420 CALC BMI NORM PARAMETERS: HCPCS | Performed by: INTERNAL MEDICINE

## 2021-07-17 NOTE — PROGRESS NOTES
OUTPATIENT CARDIOLOGY FOLLOW-UP    Name: Jeronimo Wallace III    Age: 39 y.o. Primary Care Physician: Dolores Toth DO    Date of Service: 7/15/21    Chief Complaint: Follow-up for paroxysmal atrial fibrillation, chest pain    Interim History:  +recent increase in anxiety per patient. No exertional chest pain or SOB. He still reports episodes of chest pain at rest (\"pressure\", mid-sternal, episodes last > 10 minutes, no radiation of the pain, no relationship to exertion). +occasional palpitations. He denies recent syncope, PND, or orthopnea. SR on EKG. He feels better on Toprol XL (+sided effects on cardizem CD).     Review of Systems:   Cardiac: As per HPI  General: No fever, chills  Pulmonary: As per HPI  HEENT: No visual disturbances, difficult swallowing  GI: No nausea, vomiting  : No dysuria, hematuria  Endocrine: No thyroid disease, +pre-DM per patient (he takes metformin)  Musculoskeletal: HADDAD x 4, no focal motor deficits  Skin: Intact, no rashes  Neuro: No headache, seizures  Psych: Currently with no depression, +anxiety    Past Medical History:  Past Medical History:   Diagnosis Date    Anxiety with depression     Arthritis     Hand pain, right     History of Helicobacter pylori infection     Panic attacks     Restless leg syndrome     Schizophrenia (HCC)     mild - controlled (history of )  PCP states it is marilyn bilpolar    Scoliosis     Weight loss     25 # in 6 months / as of 5/17/2018, no further weight loss       Past Surgical History:  Past Surgical History:   Procedure Laterality Date    ABDOMEN SURGERY N/A 03/15/2018    wound exploration umbilicus, excsion suture granuloma    COLONOSCOPY  2016    HERNIA REPAIR  09/19/2017    [place] LAP,CHOLECYSTECTOMY N/A 5/29/2018    LAPAROSCOPIC  CHOLECYSTECTOMY performed by Red Loja MD at [facility] WND EXTEN/COMPLIC N/A 1/99/3183    ABDOMINAL WOUND EXPLORATION , REMOVAL OF SUTURE GRANULOMA performed by Red Loja MD at [address]  4/9/2018    EGD BIOPSY performed by Ivanna Daniels MD at [facility] N/A 5/21/2018    EGD BIOPSY performed by Ivanna Daniels MD at [facility] ENDOSCOPY       Family History:  Family History   Problem Relation Age of Onset    Mental Illness Father         unspecified    High Blood Pressure Father     Crohn's Disease Sister        Social History:  Social History     Socioeconomic History    Marital status: Single     Spouse name: Not on file    Number of children: Not on file    Years of education: Not on file    Highest education level: Not on file   Occupational History    Not on file   Tobacco Use    Smoking status: Current Every Day Smoker     Packs/day: 1.00     Years: 18.00     Pack years: 18.00     Types: Cigarettes    Smokeless tobacco: Never Used    Tobacco comment: Before up to 2 ppd   Vaping Use    Vaping Use: Former    Substances: Never   Substance and Sexual Activity    Alcohol use: Not Currently    Drug use: Yes     Types: Marijuana     Comment: at [address] Sexual activity: Yes     Partners: Female   Other Topics Concern    Not on file   Social History Narrative    Not on file     Social Determinants of Health     Financial Resource Strain:     Difficulty of Paying Living Expenses:    Food Insecurity:     Worried About [address] in the Last Year:     [address] in the Last Year:    Transportation Needs:     Lack of Transportation (Medical):      Lack of Transportation (Non-Medical):    Physical Activity:     Days of Exercise per Week:     Minutes of Exercise per Session:    Stress:     Feeling of Stress :    Social Connections:     Frequency of Communication with Friends and Family:     Frequency of Social Gatherings with Friends and Family:     Attends [employer] Services:     Active Member of Clubs or Organizations:     Attends Club or Organization Meetings:     Marital Status: Intimate Partner Violence:     Fear of Current or Ex-Partner:     Emotionally Abused:     Physically Abused:     Sexually Abused: Allergies: Allergies   Allergen Reactions    Effexor [Venlafaxine Hydrochloride] Palpitations     Passed out    Aspirin Other (See Comments)     Ringing in ears    Cardizem [Diltiazem Hcl] Other (See Comments)     Ringing in the ears    Biaxin [Clarithromycin] Palpitations       Current Medications:  Current Outpatient Medications   Medication Sig Dispense Refill    metoprolol succinate (TOPROL XL) 50 MG extended release tablet Take 1 tablet by mouth 2 times daily 180 tablet 3    flecainide (TAMBOCOR) 50 MG tablet Take 1 tablet by mouth 2 times daily 60 tablet 6    diazePAM (VALIUM) 5 MG tablet Take 5 mg by mouth 2 times daily. Pt takes 10mg at night only, pt does not take twice a day      traZODone (DESYREL) 50 MG tablet Take 100 mg by mouth nightly       busPIRone (BUSPAR) 15 MG tablet Take 15 mg by mouth nightly       omeprazole (PRILOSEC) 40 MG delayed release capsule TK ONE C PO  QD  0    acetaminophen (TYLENOL) 325 MG tablet Take 650 mg by mouth every 6 hours as needed for Pain      cholestyramine (QUESTRAN) 4 g packet Take 1 packet by mouth daily (Patient taking differently: Take 1 packet by mouth daily as needed ) 90 packet 3    metoclopramide (REGLAN) 10 MG tablet Take 1 tablet by mouth 4 times daily as needed (nausea) (Patient not taking: Reported on 7/15/2021) 120 tablet 3     No current facility-administered medications for this visit.        Physical Exam:  /76   Pulse 68   Resp 16   Ht 5' 8\" (1.727 m)   Wt 148 lb (67.1 kg)   BMI 22.50 kg/m²   Wt Readings from Last 3 Encounters:   07/15/21 148 lb (67.1 kg)   05/23/21 151 lb 2 oz (68.5 kg)   05/17/21 150 lb (68 kg)     Appearance: Awake, alert and oriented x 3, no acute respiratory distress  Skin: Intact, no rash  Head: Normocephalic, atraumatic  Eyes: EOMI, no conjunctival erythema  ENMT: No pharyngeal erythema, MMM, no rhinorrhea  Neck: Supple, no elevated JVP, no carotid bruits  Lungs: Clear to auscultation bilaterally. No wheezes, rales, or rhonchi.   Cardiac: Regular rate and rhythm, +S1S2, no murmurs apparent  Abdomen: Soft, nontender, +bowel sounds  Extremities: Moves all extremities x 4, no lower extremity edema  Neurologic: No focal motor deficits apparent, normal mood and affect, alert and oriented x 3  Peripheral Pulses: Intact posterior tibial pulses bilaterally    Laboratory Tests:  Lab Results   Component Value Date    CREATININE 1.0 06/29/2021    BUN 16 06/29/2021     06/29/2021    K 4.2 06/29/2021     (H) 06/29/2021    CO2 23 06/29/2021     Lab Results   Component Value Date    MG 2.0 05/22/2021     Lab Results   Component Value Date    WBC 16.6 (H) 06/29/2021    HGB 14.9 06/29/2021    HCT 44.4 06/29/2021    MCV 89.9 06/29/2021     06/29/2021     Lab Results   Component Value Date    ALT 28 06/29/2021    AST 17 06/29/2021    ALKPHOS 67 06/29/2021    BILITOT 0.7 06/29/2021     Lab Results   Component Value Date    TROPONINI < 0.03 06/29/2021    TROPONINI <0.01 05/22/2021    TROPONINI <0.01 05/22/2021     Lab Results   Component Value Date    INR 1.1 05/03/2021    INR 1.0 11/10/2020    INR 1.0 11/06/2020    PROTIME 11.7 05/03/2021    PROTIME 11.6 11/10/2020    PROTIME 11.5 11/06/2020     Lab Results   Component Value Date    TSH 1.020 05/22/2021     Lab Results   Component Value Date    LABA1C 6.1 (H) 05/22/2021     No results found for: EAG  Lab Results   Component Value Date    CHOL 172 11/11/2011     Lab Results   Component Value Date    TRIG 136 11/11/2011     Lab Results   Component Value Date    HDL 36 05/22/2021    HDL 40.0 (A) 11/11/2011     Lab Results   Component Value Date    LDLCALC 71 05/22/2021    LDLCALC 105 (H) 11/11/2011     Lab Results   Component Value Date    LABVLDL 38 05/22/2021     No results found for: CHOLHDLRATIO  No results for input(s): PROBNP in the last 72 hours. Cardiac Tests:  EKG reviewed (EKG date: 7/15/21): SR, rate 68, no acute STT changes    EKG reviewed (EKG date: 1/4/2021): SR, rate 72, no acute STT changes    Telemetry reviewed (11/6/2020): atrial fibrillation --> SR     Telemetry reviewed (12/5/2020): SR, rate 60's     Echocardiogram: 11/6/2020   Normal left ventricular systolic function.   Ejection fraction is visually estimated at 55%.   Normal right ventricular size and function (TAPSE 1.8 cm).   Normal left ventricular diastolic filling pattern for age.  Trudy Ambriz evidence of hemodynamically significant valve disease.     Exercise nuclear stress test: 11/11/2020  The patient exercised for 6:30 minutes on an accelerated Sukhjinder protocol treadmill achieving 11.7 METS of activity and 87% MPHR. Nonanginal chest pain with no cardiac arrhythmias were noted. A normal blood pressure response to exercise was recorded. No electrocardiographic changes were noted. The myocardial perfusion imaging was normal         Overall left ventricular systolic function was normal, EF 60%        ASSESSMENT / PLAN:  1. Chest pain  2. Paroxysmal atrial fibrillation (diagnosed in 11/2020; spontaneously converted to SR at that time) -- flecainide started in 5/2021, he follows with Dr. Isaac Herrera, maintaining SR  3. Hgb A1c 6.1  4. Anxiety, depression, schizophrenia  5. Tobacco (1 PPD since age 14) and marijuana use  6. Gastroesophageal reflux disease  7.  Restless leg syndrome    - Coronary CTA ordered today  - Continue Toprol XL 50 mg BID  - Continue flecainide / follow-up with EP as scheduled  - R/B/A/I for anticoagulation discussed again today -- he opts to continue ASA  - Negative KAYLEN study at [facility] last year per patient -- obtain results  - Counseled today re: tobacco and marijuana cessation    Greater than 30 minutes was spent counseling the patient, reviewing the rationale for the above recommendations and reviewing the patient's current medication list, problem list and results of all previously ordered testing.     Ileana Rodriguez MD  [facility] ([facility]) Cardiology

## 2021-07-24 ENCOUNTER — APPOINTMENT (OUTPATIENT)
Dept: GENERAL RADIOLOGY | Age: 46
End: 2021-07-24
Payer: COMMERCIAL

## 2021-07-24 ENCOUNTER — HOSPITAL ENCOUNTER (OUTPATIENT)
Age: 46
Setting detail: OBSERVATION
Discharge: HOME | End: 2021-07-24
Attending: EMERGENCY MEDICINE | Admitting: INTERNAL MEDICINE
Payer: COMMERCIAL

## 2021-07-24 VITALS
OXYGEN SATURATION: 97 % | HEART RATE: 75 BPM | RESPIRATION RATE: 16 BRPM | BODY MASS INDEX: 22.5 KG/M2 | WEIGHT: 148 LBS | TEMPERATURE: 97.7 F | SYSTOLIC BLOOD PRESSURE: 109 MMHG | DIASTOLIC BLOOD PRESSURE: 74 MMHG

## 2021-07-24 DIAGNOSIS — I48.91 ATRIAL FIBRILLATION WITH RVR (HCC): Primary | ICD-10-CM

## 2021-07-24 PROBLEM — R19.7 DIARRHEA: Status: ACTIVE | Noted: 2021-07-24

## 2021-07-24 LAB
ANION GAP SERPL CALCULATED.3IONS-SCNC: 5 MMOL/L (ref 7–16)
BASOPHILS ABSOLUTE: 0.09 E9/L (ref 0–0.2)
BASOPHILS RELATIVE PERCENT: 0.6 % (ref 0–2)
BUN BLDV-MCNC: 11 MG/DL (ref 6–20)
CALCIUM SERPL-MCNC: 9 MG/DL (ref 8.6–10.2)
CHLORIDE BLD-SCNC: 108 MMOL/L (ref 98–107)
CK MB: 1.2 NG/ML (ref 0–7.7)
CO2: 25 MMOL/L (ref 22–29)
CREAT SERPL-MCNC: 0.9 MG/DL (ref 0.7–1.2)
EOSINOPHILS ABSOLUTE: 0.36 E9/L (ref 0.05–0.5)
EOSINOPHILS RELATIVE PERCENT: 2.5 % (ref 0–6)
GFR AFRICAN AMERICAN: >60
GFR NON-AFRICAN AMERICAN: >60 ML/MIN/1.73
GLUCOSE BLD-MCNC: 132 MG/DL (ref 74–99)
HCT VFR BLD CALC: 46.7 % (ref 37–54)
HEMOGLOBIN: 15.3 G/DL (ref 12.5–16.5)
IMMATURE GRANULOCYTES #: 0.05 E9/L
IMMATURE GRANULOCYTES %: 0.4 % (ref 0–5)
LYMPHOCYTES ABSOLUTE: 3.14 E9/L (ref 1.5–4)
LYMPHOCYTES RELATIVE PERCENT: 22.1 % (ref 20–42)
MAGNESIUM: 1.8 MG/DL (ref 1.6–2.6)
MCH RBC QN AUTO: 29.8 PG (ref 26–35)
MCHC RBC AUTO-ENTMCNC: 32.8 % (ref 32–34.5)
MCV RBC AUTO: 91 FL (ref 80–99.9)
MONOCYTES ABSOLUTE: 1.16 E9/L (ref 0.1–0.95)
MONOCYTES RELATIVE PERCENT: 8.2 % (ref 2–12)
NEUTROPHILS ABSOLUTE: 9.4 E9/L (ref 1.8–7.3)
NEUTROPHILS RELATIVE PERCENT: 66.2 % (ref 43–80)
PDW BLD-RTO: 14.1 FL (ref 11.5–15)
PLATELET # BLD: 289 E9/L (ref 130–450)
PMV BLD AUTO: 9.7 FL (ref 7–12)
POTASSIUM REFLEX MAGNESIUM: 3.9 MMOL/L (ref 3.5–5)
PROCALCITONIN: 0.06 NG/ML (ref 0–0.08)
RBC # BLD: 5.13 E12/L (ref 3.8–5.8)
SODIUM BLD-SCNC: 138 MMOL/L (ref 132–146)
T4 FREE: 1.01 NG/DL (ref 0.93–1.7)
TOTAL CK: 60 U/L (ref 20–200)
TROPONIN, HIGH SENSITIVITY: <6 NG/L (ref 0–11)
TSH SERPL DL<=0.05 MIU/L-ACNC: 1.02 UIU/ML (ref 0.27–4.2)
WBC # BLD: 14.2 E9/L (ref 4.5–11.5)

## 2021-07-24 PROCEDURE — 87449 NOS EACH ORGANISM AG IA: CPT

## 2021-07-24 PROCEDURE — 82553 CREATINE MB FRACTION: CPT

## 2021-07-24 PROCEDURE — 84484 ASSAY OF TROPONIN QUANT: CPT

## 2021-07-24 PROCEDURE — G0378 HOSPITAL OBSERVATION PER HR: HCPCS

## 2021-07-24 PROCEDURE — 96360 HYDRATION IV INFUSION INIT: CPT

## 2021-07-24 PROCEDURE — 2580000003 HC RX 258: Performed by: EMERGENCY MEDICINE

## 2021-07-24 PROCEDURE — 93005 ELECTROCARDIOGRAM TRACING: CPT | Performed by: EMERGENCY MEDICINE

## 2021-07-24 PROCEDURE — 80048 BASIC METABOLIC PNL TOTAL CA: CPT

## 2021-07-24 PROCEDURE — 99236 HOSP IP/OBS SAME DATE HI 85: CPT | Performed by: STUDENT IN AN ORGANIZED HEALTH CARE EDUCATION/TRAINING PROGRAM

## 2021-07-24 PROCEDURE — APPSS45 APP SPLIT SHARED TIME 31-45 MINUTES: Performed by: NURSE PRACTITIONER

## 2021-07-24 PROCEDURE — 83497 ASSAY OF 5-HIAA: CPT

## 2021-07-24 PROCEDURE — 87329 GIARDIA AG IA: CPT

## 2021-07-24 PROCEDURE — 84439 ASSAY OF FREE THYROXINE: CPT

## 2021-07-24 PROCEDURE — 6370000000 HC RX 637 (ALT 250 FOR IP): Performed by: EMERGENCY MEDICINE

## 2021-07-24 PROCEDURE — 99244 OFF/OP CNSLTJ NEW/EST MOD 40: CPT | Performed by: INTERNAL MEDICINE

## 2021-07-24 PROCEDURE — 99283 EMERGENCY DEPT VISIT LOW MDM: CPT

## 2021-07-24 PROCEDURE — 84443 ASSAY THYROID STIM HORMONE: CPT

## 2021-07-24 PROCEDURE — 71045 X-RAY EXAM CHEST 1 VIEW: CPT

## 2021-07-24 PROCEDURE — 96361 HYDRATE IV INFUSION ADD-ON: CPT

## 2021-07-24 PROCEDURE — 82550 ASSAY OF CK (CPK): CPT

## 2021-07-24 PROCEDURE — 87324 CLOSTRIDIUM AG IA: CPT

## 2021-07-24 PROCEDURE — 84145 PROCALCITONIN (PCT): CPT

## 2021-07-24 PROCEDURE — 87045 FECES CULTURE AEROBIC BACT: CPT

## 2021-07-24 PROCEDURE — 85025 COMPLETE CBC W/AUTO DIFF WBC: CPT

## 2021-07-24 PROCEDURE — 83735 ASSAY OF MAGNESIUM: CPT

## 2021-07-24 RX ORDER — PANTOPRAZOLE SODIUM 40 MG/1
40 TABLET, DELAYED RELEASE ORAL
Status: DISCONTINUED | OUTPATIENT
Start: 2021-07-24 | End: 2021-07-24 | Stop reason: HOSPADM

## 2021-07-24 RX ORDER — METOPROLOL SUCCINATE 25 MG/1
50 TABLET, EXTENDED RELEASE ORAL 2 TIMES DAILY
Status: DISCONTINUED | OUTPATIENT
Start: 2021-07-24 | End: 2021-07-24 | Stop reason: HOSPADM

## 2021-07-24 RX ORDER — METOPROLOL TARTRATE 50 MG/1
50 TABLET, FILM COATED ORAL ONCE
Status: COMPLETED | OUTPATIENT
Start: 2021-07-24 | End: 2021-07-24

## 2021-07-24 RX ORDER — FLECAINIDE ACETATE 50 MG/1
50 TABLET ORAL ONCE
Status: COMPLETED | OUTPATIENT
Start: 2021-07-24 | End: 2021-07-24

## 2021-07-24 RX ORDER — SODIUM CHLORIDE 9 MG/ML
INJECTION, SOLUTION INTRAVENOUS ONCE
Status: COMPLETED | OUTPATIENT
Start: 2021-07-24 | End: 2021-07-24

## 2021-07-24 RX ORDER — METOCLOPRAMIDE 10 MG/1
10 TABLET ORAL 4 TIMES DAILY PRN
Status: DISCONTINUED | OUTPATIENT
Start: 2021-07-24 | End: 2021-07-24 | Stop reason: HOSPADM

## 2021-07-24 RX ORDER — 0.9 % SODIUM CHLORIDE 0.9 %
1000 INTRAVENOUS SOLUTION INTRAVENOUS ONCE
Status: COMPLETED | OUTPATIENT
Start: 2021-07-24 | End: 2021-07-24

## 2021-07-24 RX ORDER — TRAZODONE HYDROCHLORIDE 50 MG/1
100 TABLET ORAL NIGHTLY
Status: DISCONTINUED | OUTPATIENT
Start: 2021-07-24 | End: 2021-07-24 | Stop reason: HOSPADM

## 2021-07-24 RX ORDER — FLECAINIDE ACETATE 50 MG/1
50 TABLET ORAL 2 TIMES DAILY
Status: DISCONTINUED | OUTPATIENT
Start: 2021-07-24 | End: 2021-07-24 | Stop reason: HOSPADM

## 2021-07-24 RX ORDER — BUSPIRONE HYDROCHLORIDE 5 MG/1
15 TABLET ORAL NIGHTLY
Status: DISCONTINUED | OUTPATIENT
Start: 2021-07-24 | End: 2021-07-24 | Stop reason: HOSPADM

## 2021-07-24 RX ORDER — DIAZEPAM 5 MG/1
5 TABLET ORAL 2 TIMES DAILY
Status: DISCONTINUED | OUTPATIENT
Start: 2021-07-24 | End: 2021-07-24 | Stop reason: HOSPADM

## 2021-07-24 RX ORDER — CHOLESTYRAMINE 4 G/9G
1 POWDER, FOR SUSPENSION ORAL DAILY
Status: DISCONTINUED | OUTPATIENT
Start: 2021-07-24 | End: 2021-07-24 | Stop reason: HOSPADM

## 2021-07-24 RX ADMIN — FLECAINIDE ACETATE 50 MG: 50 TABLET ORAL at 07:47

## 2021-07-24 RX ADMIN — METOPROLOL TARTRATE 50 MG: 50 TABLET, FILM COATED ORAL at 07:41

## 2021-07-24 RX ADMIN — SODIUM CHLORIDE 1000 ML: 9 INJECTION, SOLUTION INTRAVENOUS at 05:31

## 2021-07-24 RX ADMIN — SODIUM CHLORIDE: 9 INJECTION, SOLUTION INTRAVENOUS at 07:05

## 2021-07-24 ASSESSMENT — PAIN DESCRIPTION - PROGRESSION: CLINICAL_PROGRESSION: NOT CHANGED

## 2021-07-24 ASSESSMENT — PAIN DESCRIPTION - PAIN TYPE: TYPE: ACUTE PAIN

## 2021-07-24 ASSESSMENT — PAIN DESCRIPTION - DESCRIPTORS: DESCRIPTORS: ACHING;CONSTANT;DISCOMFORT

## 2021-07-24 ASSESSMENT — PAIN SCALES - GENERAL: PAINLEVEL_OUTOF10: 6

## 2021-07-24 ASSESSMENT — PAIN DESCRIPTION - ONSET: ONSET: ON-GOING

## 2021-07-24 ASSESSMENT — PAIN DESCRIPTION - FREQUENCY: FREQUENCY: CONTINUOUS

## 2021-07-24 ASSESSMENT — PAIN DESCRIPTION - LOCATION: LOCATION: CHEST

## 2021-07-24 NOTE — H&P
[facility] History and Physical      CHIEF COMPLAINT:  Atrial fib    History of Present Illness:    Mr Africa Riley is a 39year old with hx of depression, anxiety, panic attacks, schizophrenia, restless leg syndrome and active tobacco use. Diagnosed with atrial fib in November 2020, converted to sinus spontaneously at that time. Had several episodes of atrial fib with RVR earlier this year, has been started on Toprol XL as well as Flecainide. He states over past several days increased diarrhea and \"gas\". He has chronic loose stools following cholecystectomy, but states more frequent over past several days. Otherwise appetite and energy have been ok, no N/V. Went to bed last night feeling well, awoken at 04:00 this morning with feeling of palpitations and felt an irregular/fast pulse. He had been advised to go to ED if noted atrial fib, and did so this morning. In ED here, atrial fib rate 120s. Hs troponin was < 6, CK, CK-MB both normal. No significant lyte abnormalities noted. CXR with some interstitial changes    He was given a dose of Flecainide and Toprol XL orally in ED, and admission requested. On arrival to inpatient unit he has already converted back to sinus rhythm with rates in 70s. He has no further complaints at this time.       Informant(s) for H&P patient    REVIEW OF SYSTEMS:  A comprehensive review of systems was negative except for: what is in the HPI      PMH:  Past Medical History:   Diagnosis Date    Anxiety with depression     Arthritis     Hand pain, right     History of Helicobacter pylori infection     Panic attacks     Restless leg syndrome     Schizophrenia (HCC)     mild - controlled (history of )  PCP states it is marilyn bilpolar    Scoliosis     Weight loss     25 # in 6 months / as of 5/17/2018, no further weight loss       Surgical History:  Past Surgical History:   Procedure Laterality Date    ABDOMEN SURGERY N/A 03/15/2018    wound exploration umbilicus, excsion suture granuloma    COLONOSCOPY  2016    HERNIA REPAIR  09/19/2017    [place] LAP,CHOLECYSTECTOMY N/A 5/29/2018    LAPAROSCOPIC  CHOLECYSTECTOMY performed by Elli Burns MD at [address] [facility] WND EXTEN/COMPLIC N/A 6/15/4054    ABDOMINAL WOUND EXPLORATION , REMOVAL OF SUTURE GRANULOMA performed by Elli Burns MD at [facility]  4/9/2018    EGD BIOPSY [facility] at [facility] N/A 5/21/2018    EGD BIOPSY performed by Elli Burns MD at [facility] ENDOSCOPY       Medications Prior to Admission:    Prior to Admission medications    Medication Sig Start Date End Date Taking? Authorizing Provider   metoprolol succinate (TOPROL XL) 50 MG extended release tablet Take 1 tablet by mouth 2 times daily 5/17/21  Yes Shefali Hargrove MD   flecainide (TAMBOCOR) 50 MG tablet Take 1 tablet by mouth 2 times daily 5/17/21  Yes Verna Brennan MD   diazePAM (VALIUM) 5 MG tablet Take 5 mg by mouth 2 times daily.  Pt takes 10mg at night only, pt does not take twice a day   Yes Historical Provider, MD   traZODone (DESYREL) 50 MG tablet Take 100 mg by mouth nightly    Yes Historical Provider, MD   busPIRone (BUSPAR) 15 MG tablet Take 15 mg by mouth nightly  10/30/19  Yes Historical Provider, MD   omeprazole (PRILOSEC) 40 MG delayed release capsule TK ONE C PO  QD 10/25/19  Yes Historical Provider, MD   cholestyramine (QUESTRAN) 4 g packet Take 1 packet by mouth daily  Patient taking differently: Take 1 packet by mouth daily as needed  12/3/20 5/7/21  Kathrin Castro MD   metoclopramide (REGLAN) 10 MG tablet Take 1 tablet by mouth 4 times daily as needed (nausea)  Patient not taking: Reported on 7/15/2021 12/5/19   Kathrin Castro MD   acetaminophen (TYLENOL) 325 MG tablet Take 650 mg by mouth every 6 hours as needed for Pain    Historical Provider, MD       Allergies:    Effexor [venlafaxine hydrochloride], Aspirin, Cardizem [diltiazem hcl], and Biaxin [clarithromycin]    Social History:    reports that he has been smoking cigarettes. He has a 18.00 pack-year smoking history. He has never used smokeless tobacco. He reports previous alcohol use. He reports current drug use. Drug: Marijuana. Family History:   family history includes Crohn's Disease in his sister; High Blood Pressure in his father; Mental Illness in his father. Father had MI, 3 stents. PHYSICAL EXAM:  Vitals:  /74   Pulse 75   Temp 97.7 °F (36.5 °C) (Oral)   Resp 16   Wt 148 lb (67.1 kg)   SpO2 97%   BMI 22.50 kg/m²     General Appearance: alert and oriented to person, place and time and in no acute distress  Skin: warm and dry  Head: normocephalic and atraumatic  Eyes: pupils equal, round, and reactive to light, extraocular eye movements intact, conjunctivae normal  Neck: neck supple and non tender without mass   Pulmonary/Chest: clear to auscultation bilaterally- no wheezes, rales or rhonchi, normal air movement, no respiratory distress  Cardiovascular: normal rate, normal S1 and S2 and no carotid bruits  Abdomen: soft, non-tender, non-distended, normal bowel sounds, no masses or organomegaly  Extremities: no cyanosis, no clubbing and no edema  Neurologic: no cranial nerve deficit and speech normal        LABS:  Recent Labs     07/24/21  0525      K 3.9   *   CO2 25   BUN 11   CREATININE 0.9   GLUCOSE 132*   CALCIUM 9.0       Recent Labs     07/24/21  0525   WBC 14.2*   RBC 5.13   HGB 15.3   HCT 46.7   MCV 91.0   MCH 29.8   MCHC 32.8   RDW 14.1      MPV 9.7       No results for input(s): POCGLU in the last 72 hours. Radiology:   XR CHEST PORTABLE   Final Result   Mild central interstitial prominence. Mild fluid overload versus atypical or   viral infection not excluded. No acute process otherwise identified.   PA and   lateral views would be useful for further assessment, if symptoms persist.             EKG:     ASSESSMENT:      Active Problems:    Diarrhea  Resolved Problems:    * No resolved hospital problems. *      PLAN:    1. Atrial fib with RVR  - hx of paroxysmal atrial fib  - On Flecainide and Toprol XL at home, denies missing any doses  - Recent increase - acute on chronic diarrhea. No significant lyte abnormalities noted on chemistry, will follow up Mg as could be trigger for atrial fib  - repeat TSH, last checked in May  - cardiology consulted, he has already converted back to sinus rhythm  - CHADS-2VASC is 0 and he has declined anticoagulation in past, takes Aspirin daily    2. Anxiety  - continue Buspar, Valium, Trazodone    3. Chronic diarrhea  - with acute increase over past 4 days  - appetite unchanged, no N/V. Denies abdominal pain  - he is s/p cholecystectomy, on Questran    4. Tobacco abuse  - [place] cessation    5. Marijuana use  [place] cessation    6. Leukocytosis  - denies fever/chills, malaise or any infectious type symptoms  - will send stool cx, c-diff studies if produces a bowel movement  - chest imaging read as interstitial prominence suggestive of \"viral or atypical pneumonia\"  Denies respiratory complaints, he is not hypoxic. Will send off procalcitonin, added on to prior labs      Code Status: full  DVT prophylaxis: lovenox      NOTE: This report was transcribed using voice recognition software. Every effort was made to ensure accuracy; however, inadvertent computerized transcription errors may be present.   Electronically signed by BECK Pepper CNP on 7/24/2021 at 10:18 AM

## 2021-07-24 NOTE — DISCHARGE SUMMARY
[facility] Physician Discharge Summary       No follow-up provider specified. Activity level: As tolerated     Dispo: home    Condition on discharge: Stable    Patient ID:  Basim Peoples III  [id number]  11 y.o.  1975    Admit date: 7/24/2021    Discharge date and time:  7/24/2021  2:20 PM    Admission Diagnoses: Active Problems:    Diarrhea  Resolved Problems:    * No resolved hospital problems. *      Discharge Diagnoses: Active Problems:    Diarrhea  Resolved Problems:    * No resolved hospital problems. *      Consults:  IP CONSULT TO CARDIOLOGY    Procedures: none    Hospital Course:   Patient Robert Linda is a 39 y.o. presented with Diarrhea [R19.7]    Mr Zane Bell is a 39year old with hx of depression, anxiety, panic attacks, schizophrenia, restless leg syndrome and active tobacco use. Diagnosed with atrial fib in November 2020, converted to sinus spontaneously at that time. Had several episodes of atrial fib with RVR earlier this year, has been started on Toprol XL as well as Flecainide. He states over past several days increased diarrhea and \"gas\". He has chronic loose stools following cholecystectomy, but states more frequent over past several days. Otherwise appetite and energy have been ok, no N/V. Went to bed last night feeling well, awoken at 04:00 this morning with feeling of palpitations and felt an irregular/fast pulse. He had been advised to go to ED if noted atrial fib, and did so this morning.     In ED here, atrial fib rate 120s. Hs troponin was < 6, CK, CK-MB both normal. No significant lyte abnormalities noted. CXR with some interstitial changes     He was given a dose of Flecainide and Toprol XL orally in ED, and admission requested. On arrival to inpatient unit he has already converted back to sinus rhythm with rates in 70s. He has no further complaints at this time. He will follow up with his cardiology team and EP planned upcoming.  He is pending CTA coronaries as well. No changes to medications at this time    Stool studies were collected prior to discharge - c-diff, crypto/giardia, culture. Will notify patient of any abnormalities. Urine for legionella strep PNA, and 5-HIAA sent as well. Note these requested  in regards to chest-xray, he denied any respiratory complaints whatsoever. Discharge Exam:    General Appearance: alert and oriented to person, place and time and in no acute distress  Skin: warm and dry  Head: normocephalic and atraumatic  Eyes: pupils equal, round, and reactive to light, extraocular eye movements intact, conjunctivae normal  Neck: neck supple and non tender without mass   Pulmonary/Chest: clear to auscultation bilaterally- no wheezes, rales or rhonchi, normal air movement, no respiratory distress  Cardiovascular: normal rate, normal S1 and S2 and no carotid bruits  Abdomen: soft, non-tender, non-distended, normal bowel sounds, no masses or organomegaly  Extremities: no cyanosis, no clubbing and no edema  Neurologic: no cranial nerve deficit and speech normal    No intake/output data recorded. I/O this shift:  In: 240 [P.O.:240]  Out: -       LABS:  Recent Labs     07/24/21  0525      K 3.9   *   CO2 25   BUN 11   CREATININE 0.9   GLUCOSE 132*   CALCIUM 9.0       Recent Labs     07/24/21  0525   WBC 14.2*   RBC 5.13   HGB 15.3   HCT 46.7   MCV 91.0   MCH 29.8   MCHC 32.8   RDW 14.1      MPV 9.7       No results for input(s): POCGLU in the last 72 hours. Imaging:  XR CHEST PORTABLE    Result Date: 7/24/2021  EXAMINATION: ONE XRAY VIEW OF THE CHEST 7/24/2021 4:50 am COMPARISON: 05/03/2021 HISTORY: ORDERING SYSTEM PROVIDED HISTORY: cp TECHNOLOGIST PROVIDED HISTORY: Reason for exam:->cp FINDINGS: EKG leads overlie the chest.  Heart size is normal.  No pneumothorax. No effusion. No focal consolidation. Mild central interstitial prominence. Mild central interstitial prominence.   Mild fluid overload versus atypical or viral infection not excluded. No acute process otherwise identified.   PA and lateral views would be useful for further assessment, if symptoms persist.       Patient Instructions:      Medication List      CHANGE how you take these medications    cholestyramine 4 g packet  Commonly known as: Questran  Take 1 packet by mouth daily  What changed:   · when to take this  · reasons to take this        CONTINUE taking these medications    busPIRone 15 MG tablet  Commonly known as: BUSPAR     diazePAM 5 MG tablet  Commonly known as: VALIUM     flecainide 50 MG tablet  Commonly known as: TAMBOCOR  Take 1 tablet by mouth 2 times daily     metoclopramide 10 MG tablet  Commonly known as: REGLAN  Take 1 tablet by mouth 4 times daily as needed (nausea)     metoprolol succinate 50 MG extended release tablet  Commonly known as: TOPROL XL  Take 1 tablet by mouth 2 times daily     omeprazole 40 MG delayed release capsule  Commonly known as: PRILOSEC     traZODone 50 MG tablet  Commonly known as: DESYREL        STOP taking these medications    acetaminophen 325 MG tablet  Commonly known as: TYLENOL              Note that more than 30 minutes was spent in preparing discharge papers, discussing discharge with patient, medication review, etc.    Signed:  Electronically signed by BECK Patten CNP on 7/24/2021 at 2:20 PM

## 2021-07-24 NOTE — ED PROVIDER NOTES
HPI:  7/24/21,   Time: 5:01 AM EDT         Fabiola Hunter III is a 39 y.o. male presenting to the ED for fast irregular heartbeat and history of A. fib, beginning about 40 minutes ago. The complaint has been constant, moderate in severity, and worsened by nothing. No alleviating factors. Complains of mild chest pressure as well and no shortness of breath no dizziness. No fever chills or night sweats    ROS:   Pertinent positives and negatives are stated within HPI, all other systems reviewed and are negative.  --------------------------------------------- PAST HISTORY ---------------------------------------------  Past Medical History:  has a past medical history of Anxiety with depression, Arthritis, Hand pain, right, History of Helicobacter pylori infection, Panic attacks, Restless leg syndrome, Schizophrenia ([facility] [address] Scoliosis, and Weight loss. Past Surgical History:  has a past surgical history that includes Colonoscopy (2016); hernia repair (09/19/2017); pr secd clos surg wnd exten/complic (N/A, 8/43/9818); Upper gastrointestinal endoscopy (4/9/2018); Abdomen surgery (N/A, 03/15/2018); Upper gastrointestinal endoscopy (N/A, 5/21/2018); and pr lap,cholecystectomy (N/A, 5/29/2018). Social History:  reports that he has been smoking cigarettes. He has a 18.00 pack-year smoking history. He has never used smokeless tobacco. He reports previous alcohol use. He reports current drug use. Drug: Marijuana. Family History: family history includes Crohn's Disease in his sister; High Blood Pressure in his father; Mental Illness in his father. The patients home medications have been reviewed.     Allergies: Effexor [venlafaxine hydrochloride], Aspirin, Cardizem [diltiazem hcl], and Biaxin [clarithromycin]    -------------------------------------------------- RESULTS -------------------------------------------------  All laboratory and radiology results have been personally reviewed by myself   LABS:  Results for orders placed or performed during the hospital encounter of 07/24/21   Culture, Stool    Specimen: Stool   Result Value Ref Range    Culture, Stool       Salmonella, Shigella, Campylobacter, Yersinia,  Aeromonas or E. Coli 0157:H7 not isolated     Giardia antigen    Specimen: Stool   Result Value Ref Range    Giardia Ag, Stl       Giardia antigen test is negative  *  *  Reference range: negative  Giardia antigen testing is performed routinely in place of  Ova and Parasite Examination on stool specimens. Additional  testing requires consultation with the laboratory. All stool  specimen preservative containers are held 10 days after the  performance of Giardia Antigen to accommodate any additional  testing. Legionella antigen, urine    Specimen: Urine voided   Result Value Ref Range    L. pneumophila Serogp 1 Ur Ag       Presumptive Negative -suggesting no recent or current infections  with Legionella pneumophila serogroup 1. Infection to Legionella cannot be ruled out since other serogroups  and species may cause infection, antigen may not be present in  early infection, or level of antigen may be below the  detection limit. Normal Range: Presumptive Negative     Strep Pneumoniae Antigen    Specimen: Urine voided   Result Value Ref Range    STREP PNEUMONIAE ANTIGEN, URINE       Presumptive negative- suggests no current or recent  pneumococcal infection. Infection due to Strep pneumoniae cannot be  ruled out since the antigen present in the sample  may be below the detection limit of the test.  Normal Range:Presumptive Negative     Clostridium difficile EIA    Specimen: Stool   Result Value Ref Range    C.diff Toxin/Antigen       C.  Difficile Toxin A and/or B NOT detected  Normal Range: Not detected     CBC Auto Differential   Result Value Ref Range    WBC 14.2 (H) 4.5 - 11.5 E9/L    RBC 5.13 3.80 - 5.80 E12/L    Hemoglobin 15.3 12.5 - 16.5 g/dL    Hematocrit 46.7 37.0 - 54.0 %    MCV 91.0 80.0 - 99.9 fL MCH 29.8 26.0 - 35.0 pg    MCHC 32.8 32.0 - 34.5 %    RDW 14.1 11.5 - 15.0 fL    Platelets 243 647 - 042 E9/L    MPV 9.7 7.0 - 12.0 fL    Neutrophils % 66.2 43.0 - 80.0 %    Immature Granulocytes % 0.4 0.0 - 5.0 %    Lymphocytes % 22.1 20.0 - 42.0 %    Monocytes % 8.2 2.0 - 12.0 %    Eosinophils % 2.5 0.0 - 6.0 %    Basophils % 0.6 0.0 - 2.0 %    Neutrophils Absolute 9.40 (H) 1.80 - 7.30 E9/L    Immature Granulocytes # 0.05 E9/L    Lymphocytes Absolute 3.14 1.50 - 4.00 E9/L    Monocytes Absolute 1.16 (H) 0.10 - 0.95 E9/L    Eosinophils Absolute 0.36 0.05 - 0.50 E9/L    Basophils Absolute 0.09 0.00 - 0.20 J3/I   Basic Metabolic Panel w/ Reflex to MG   Result Value Ref Range    Sodium 138 132 - 146 mmol/L    Potassium reflex Magnesium 3.9 3.5 - 5.0 mmol/L    Chloride 108 (H) 98 - 107 mmol/L    CO2 25 22 - 29 mmol/L    Anion Gap 5 (L) 7 - 16 mmol/L    Glucose 132 (H) 74 - 99 mg/dL    BUN 11 6 - 20 mg/dL    CREATININE 0.9 0.7 - 1.2 mg/dL    GFR Non-African American >60 >=60 mL/min/1.73    GFR African American >60     Calcium 9.0 8.6 - 10.2 mg/dL   Troponin   Result Value Ref Range    Troponin, High Sensitivity <6 0 - 11 ng/L   CK   Result Value Ref Range    Total CK 60 20 - 200 U/L   CK MB   Result Value Ref Range    CK-MB 1.2 0.0 - 7.7 ng/mL   Magnesium   Result Value Ref Range    Magnesium 1.8 1.6 - 2.6 mg/dL   TSH without Reflex   Result Value Ref Range    TSH 1.020 0.270 - 4.200 uIU/mL   T4, Free   Result Value Ref Range    T4 Free 1.01 0.93 - 1.70 ng/dL   Procalcitonin   Result Value Ref Range    Procalcitonin 0.06 0.00 - 0.08 ng/mL   5-HYDROXYINDOLEACETIC ACID (HIAA) URINE   Result Value Ref Range    5-HIAA, Interp See Note     Creatinine, 74E Ur Not Applicable 6507 - 2378 mg/d    Creatinine, Ur 106 mg/dL    5-HIAA, 55A Ur Not Applicable 0 - 15 mg/d    5-HIAA, Ur 3.5 mg/L    5-HIAA,Urine 3 0 - 14 mg/gCR    Urine Total Volume RANDOM     Hours Collected RANDOM    EKG 12 Lead   Result Value Ref Range    Ventricular Rate 103 BPM    Atrial Rate 153 BPM    QRS Duration 94 ms    Q-T Interval 342 ms    QTc Calculation (Bazett) 448 ms    R Axis 58 degrees    T Axis 33 degrees       RADIOLOGY:  Interpreted by Radiologist.  XR CHEST PORTABLE   Final Result   Mild central interstitial prominence. Mild fluid overload versus atypical or   viral infection not excluded. No acute process otherwise identified. PA and   lateral views would be useful for further assessment, if symptoms persist.             ------------------------- NURSING NOTES AND VITALS REVIEWED ---------------------------   The nursing notes within the ED encounter and vital signs as below have been reviewed. /74   Pulse 75   Temp 97.7 °F (36.5 °C) (Temporal)   Resp 16   Wt 148 lb (67.1 kg)   SpO2 97%   BMI 22.50 kg/m²   Oxygen Saturation Interpretation: Normal      ---------------------------------------------------PHYSICAL EXAM--------------------------------------      Constitutional/General: Alert and oriented x3, well appearing, non toxic in NAD  Head: NC/AT  Eyes: PERRL, EOMI  Mouth: Oropharynx clear, handling secretions, no trismus  Neck: Supple, full ROM, no meningeal signs  Pulmonary: Lungs clear to auscultation bilaterally, no wheezes, rales, or rhonchi. Not in respiratory distress  Cardiovascular: Slightly tachycardic with irregular rhythm, no murmurs, gallops, or rubs. 2+ distal pulses  Abdomen: Soft, non tender, non distended,   Extremities: Moves all extremities x 4.  Warm and well perfused  Skin: warm and dry without rash  Neurologic: GCS 15,  Psych: Normal Affect      ------------------------------ ED COURSE/MEDICAL DECISION MAKING----------------------  Medications   0.9 % sodium chloride bolus (0 mLs Intravenous Stopped 7/24/21 0621)   0.9 % sodium chloride infusion (0 mL/hr Intravenous Stopped 7/24/21 0740)   flecainide (TAMBOCOR) tablet 50 mg (50 mg Oral Given 7/24/21 0747)   metoprolol tartrate (LOPRESSOR) tablet 50 mg (50 mg Oral Given 7/24/21 0741)         Medical Decision Making:    Acute on chronic A. fib patient is not on blood thinners but does take flecainide and metoprolol    Counseling: The emergency provider has spoken with the patient and discussed todays results, in addition to providing specific details for the plan of care and counseling regarding the diagnosis and prognosis. Questions are answered at this time and they are agreeable with the plan.      --------------------------------- IMPRESSION AND DISPOSITION ---------------------------------    IMPRESSION  1.  Atrial fibrillation with RVR ([address] Stable       DISPOSITION  Disposition: Admit to telemetry  Patient condition is stable      Critical care time of 33 minutes            Mahogany Robledo MD  08/01/21 4526

## 2021-07-24 NOTE — CONSULTS
 traZODone (DESYREL) tablet 100 mg  100 mg Oral Nightly Miugel Dennis MD        metoclopramide (REGLAN) tablet 10 mg  10 mg Oral 4x Daily PRN Miguel Dennis MD        enoxaparin (LOVENOX) injection 40 mg  40 mg Subcutaneous Daily Miguel Dennis MD           Social History     Socioeconomic History    Marital status: Single     Spouse name: Not on file    Number of children: Not on file    Years of education: Not on file    Highest education level: Not on file   Occupational History    Not on file   Tobacco Use    Smoking status: Current Every Day Smoker     Packs/day: 1.00     Years: 18.00     Pack years: 18.00     Types: Cigarettes    Smokeless tobacco: Never Used    Tobacco comment: Before up to 2 ppd   Vaping Use    Vaping Use: Former    Substances: Never   Substance and Sexual Activity    Alcohol use: Not Currently    Drug use: Yes     Types: Marijuana     Comment: at [address] Sexual activity: Yes     Partners: Female   Other Topics Concern    Not on file   Social History Narrative    Not on file     Social Determinants of Health     Financial Resource Strain:     Difficulty of Paying Living Expenses:    Food Insecurity:     Worried About [address] [employer] in the Last Year:     [address] [employer] in the Last Year:    Transportation Needs:     Lack of Transportation (Medical):      Lack of Transportation (Non-Medical):    Physical Activity:     Days of Exercise per Week:     Minutes of Exercise per Session:    Stress:     Feeling of Stress :    Social Connections:     Frequency of Communication with Friends and Family:     Frequency of Social Gatherings with Friends and Family:     Attends [employer] Services:     Active Member of Clubs or Organizations:     Attends Club or Organization Meetings:     Marital Status:    Intimate Partner Violence:     Fear of Current or Ex-Partner:     Emotionally Abused:     Physically Abused:     Sexually Abused:        Family History   Problem Relation Age of Onset    Mental Illness Father         unspecified    High Blood Pressure Father     Crohn's Disease Sister        Review of Systems:   Heart: as above   Lungs: as above   Eyes: denies changes in vision or discharge. Ears: denies changes in hearing or pain. Nose: denies epistaxis or masses   Throat: denies sore throat or trouble swallowing. Neuro: denies numbness, tingling, tremors. Skin: denies rashes or itching. : denies hematuria, dysuria   GI: denies vomiting, diarrhea   Psych: denies mood changed, anxiety, depression. all others negative. Physical Exam   /74   Pulse 75   Temp 97.7 °F (36.5 °C) (Oral)   Resp 16   Wt 148 lb (67.1 kg)   SpO2 97%   BMI 22.50 kg/m²   Constitutional: Oriented to person, place, and time. Well-developed and well-nourished. No distress. Head: Normocephalic and atraumatic. Eyes: EOM are normal. Pupils are equal, round, and reactive to light. Neck: Normal range of motion. Neck supple. No hepatojugular reflux and no JVD present. Carotid bruit is not present. No tracheal deviation present. No thyromegaly present. Cardiovascular: Normal rate, regular rhythm, normal heart sounds and intact distal pulses. Exam reveals no gallop and no friction rub. No murmur heard. Pulmonary/Chest: Effort normal and breath sounds normal. No respiratory distress. No wheezes. No rales. No tenderness. Abdominal: Soft. Bowel sounds are normal. No distension and no mass. No tenderness. No rebound and no guarding. Musculoskeletal: Normal range of motion. No edema and no tenderness. Lymphadenopathy:   No cervical adenopathy. No groin adenopathy. Neurological: Alert and oriented to person, place, and time. Skin: Skin is warm and dry. No rash noted. Not diaphoretic. No erythema. Psychiatric: Normal mood and affect.  Behavior is normal.     CBC:   Lab Results   Component Value Date    WBC 14.2 07/24/2021    RBC 5.13 07/24/2021    HGB 15.3 07/24/2021 HCT 46.7 07/24/2021    MCV 91.0 07/24/2021    MCH 29.8 07/24/2021    MCHC 32.8 07/24/2021    RDW 14.1 07/24/2021     07/24/2021    MPV 9.7 07/24/2021     BMP:   Lab Results   Component Value Date     07/24/2021    K 3.9 07/24/2021     07/24/2021    CO2 25 07/24/2021    BUN 11 07/24/2021    LABALBU 4.3 06/29/2021    LABALBU 4.6 11/11/2011    CREATININE 0.9 07/24/2021    CALCIUM 9.0 07/24/2021    GFRAA >60 07/24/2021    LABGLOM >60 07/24/2021     Magnesium:    Lab Results   Component Value Date    MG 1.8 07/24/2021     Cardiac Enzymes:   Lab Results   Component Value Date    CKTOTAL 60 07/24/2021    CKMB 1.2 07/24/2021    TROPHS <6 07/24/2021      PT/INR:    Lab Results   Component Value Date    PROTIME 11.7 05/03/2021    INR 1.1 05/03/2021     TSH:    Lab Results   Component Value Date    TSH 1.020 07/24/2021     Rhythm Strip: normal sinus rhythm. EKG:  nonspecific ST and T waves changes, atrial fibrillation. ASSESSMENT AND PLAN:  Patient Active Problem List   Diagnosis    Hand pain, right    Depression    Helicobacter pylori gastritis    Symptomatic cholelithiasis    A-fib (HCC)    Atrial fibrillation (HCC)    Atypical chest pain    Chronic obstructive pulmonary disease (HCC)    Schizophrenia (HCC)    Chest pain    Leukocytosis    Atrial fibrillation with RVR (HCC)    Diarrhea     1. PAfib:    Diagnosed in 11/2020 and spontaneously converted to SR at that time. Started on flecainide 5/2021. Follows with Dr. Braden Maxwell. Intolerant of ASA. CHADS VASc is 0. Anticoagulation if sustained PAF issues until quelled. 2. Diarrhea: Per primary service. 3. Recent Chest pain symptoms: Coronary CTA was discussed and ordered at last OV with Dr. Michell Johnson 7/15/2021. Tobacco use    4. GERD    Luz Major D.O.   Cardiologist  Cardiology, [address] [place] [address]

## 2021-07-25 LAB
C DIFF TOXIN/ANTIGEN: NORMAL
EKG ATRIAL RATE: 153 BPM
EKG Q-T INTERVAL: 342 MS
EKG QRS DURATION: 94 MS
EKG QTC CALCULATION (BAZETT): 448 MS
EKG R AXIS: 58 DEGREES
EKG T AXIS: 33 DEGREES
EKG VENTRICULAR RATE: 103 BPM

## 2021-07-25 PROCEDURE — 93010 ELECTROCARDIOGRAM REPORT: CPT | Performed by: INTERNAL MEDICINE

## 2021-07-26 LAB — GIARDIA ANTIGEN STOOL: NORMAL

## 2021-07-27 LAB
CULTURE, STOOL: NORMAL
L. PNEUMOPHILA SEROGP 1 UR AG: NORMAL
STREP PNEUMONIAE ANTIGEN, URINE: NORMAL

## 2021-07-28 LAB
5 HIAA URINE (PER GM CREAT): 3 MG/GCR (ref 0–14)
5-HIAA 24 HOUR URINE: NORMAL MG/D (ref 0–15)
5-HIAA INTERPRETATION: NORMAL
5-HIAA URINE: 3.5 MG/L
CREATININE 24 HOUR URINE: NORMAL MG/D (ref 1000–2500)
CREATININE URINE: 106 MG/DL
HOURS COLLECTED: NORMAL
URINE TOTAL VOLUME: NORMAL

## 2021-08-03 ENCOUNTER — APPOINTMENT (OUTPATIENT)
Dept: GENERAL RADIOLOGY | Age: 46
End: 2021-08-03
Payer: COMMERCIAL

## 2021-08-03 ENCOUNTER — HOSPITAL ENCOUNTER (EMERGENCY)
Age: 46
Discharge: HOME | End: 2021-08-03
Attending: EMERGENCY MEDICINE
Payer: COMMERCIAL

## 2021-08-03 VITALS
DIASTOLIC BLOOD PRESSURE: 80 MMHG | BODY MASS INDEX: 23.78 KG/M2 | HEART RATE: 78 BPM | RESPIRATION RATE: 16 BRPM | OXYGEN SATURATION: 99 % | HEIGHT: 66 IN | SYSTOLIC BLOOD PRESSURE: 113 MMHG | WEIGHT: 148 LBS | TEMPERATURE: 98 F

## 2021-08-03 DIAGNOSIS — I48.91 ATRIAL FIBRILLATION, UNSPECIFIED TYPE (HCC): Primary | ICD-10-CM

## 2021-08-03 LAB
ANION GAP SERPL CALCULATED.3IONS-SCNC: 11 MMOL/L (ref 7–16)
BASOPHILS ABSOLUTE: 0.06 E9/L (ref 0–0.2)
BASOPHILS RELATIVE PERCENT: 0.6 % (ref 0–2)
BUN BLDV-MCNC: 10 MG/DL (ref 6–20)
CALCIUM SERPL-MCNC: 9.2 MG/DL (ref 8.6–10.2)
CHLORIDE BLD-SCNC: 107 MMOL/L (ref 98–107)
CO2: 23 MMOL/L (ref 22–29)
CREAT SERPL-MCNC: 1 MG/DL (ref 0.7–1.2)
EKG ATRIAL RATE: 416 BPM
EKG Q-T INTERVAL: 366 MS
EKG QRS DURATION: 94 MS
EKG QTC CALCULATION (BAZETT): 427 MS
EKG R AXIS: 57 DEGREES
EKG T AXIS: 45 DEGREES
EKG VENTRICULAR RATE: 82 BPM
EOSINOPHILS ABSOLUTE: 0.21 E9/L (ref 0.05–0.5)
EOSINOPHILS RELATIVE PERCENT: 1.9 % (ref 0–6)
GFR AFRICAN AMERICAN: >60
GFR NON-AFRICAN AMERICAN: >60 ML/MIN/1.73
GLUCOSE BLD-MCNC: 113 MG/DL (ref 74–99)
HCT VFR BLD CALC: 46 % (ref 37–54)
HEMOGLOBIN: 15.2 G/DL (ref 12.5–16.5)
IMMATURE GRANULOCYTES #: 0.04 E9/L
IMMATURE GRANULOCYTES %: 0.4 % (ref 0–5)
LYMPHOCYTES ABSOLUTE: 2.69 E9/L (ref 1.5–4)
LYMPHOCYTES RELATIVE PERCENT: 24.9 % (ref 20–42)
MCH RBC QN AUTO: 29.7 PG (ref 26–35)
MCHC RBC AUTO-ENTMCNC: 33 % (ref 32–34.5)
MCV RBC AUTO: 90 FL (ref 80–99.9)
MONOCYTES ABSOLUTE: 0.7 E9/L (ref 0.1–0.95)
MONOCYTES RELATIVE PERCENT: 6.5 % (ref 2–12)
NEUTROPHILS ABSOLUTE: 7.09 E9/L (ref 1.8–7.3)
NEUTROPHILS RELATIVE PERCENT: 65.7 % (ref 43–80)
PDW BLD-RTO: 14.2 FL (ref 11.5–15)
PLATELET # BLD: 280 E9/L (ref 130–450)
PMV BLD AUTO: 9.7 FL (ref 7–12)
POTASSIUM REFLEX MAGNESIUM: 4 MMOL/L (ref 3.5–5)
RBC # BLD: 5.11 E12/L (ref 3.8–5.8)
SODIUM BLD-SCNC: 141 MMOL/L (ref 132–146)
TROPONIN, HIGH SENSITIVITY: <6 NG/L (ref 0–11)
WBC # BLD: 10.8 E9/L (ref 4.5–11.5)

## 2021-08-03 PROCEDURE — 80048 BASIC METABOLIC PNL TOTAL CA: CPT

## 2021-08-03 PROCEDURE — 71045 X-RAY EXAM CHEST 1 VIEW: CPT

## 2021-08-03 PROCEDURE — 2580000003 HC RX 258: Performed by: STUDENT IN AN ORGANIZED HEALTH CARE EDUCATION/TRAINING PROGRAM

## 2021-08-03 PROCEDURE — 93010 ELECTROCARDIOGRAM REPORT: CPT | Performed by: INTERNAL MEDICINE

## 2021-08-03 PROCEDURE — 99285 EMERGENCY DEPT VISIT HI MDM: CPT

## 2021-08-03 PROCEDURE — 84484 ASSAY OF TROPONIN QUANT: CPT

## 2021-08-03 PROCEDURE — 6370000000 HC RX 637 (ALT 250 FOR IP): Performed by: STUDENT IN AN ORGANIZED HEALTH CARE EDUCATION/TRAINING PROGRAM

## 2021-08-03 PROCEDURE — 85025 COMPLETE CBC W/AUTO DIFF WBC: CPT

## 2021-08-03 PROCEDURE — 93005 ELECTROCARDIOGRAM TRACING: CPT | Performed by: STUDENT IN AN ORGANIZED HEALTH CARE EDUCATION/TRAINING PROGRAM

## 2021-08-03 RX ORDER — FLECAINIDE ACETATE 50 MG/1
50 TABLET ORAL ONCE
Status: COMPLETED | OUTPATIENT
Start: 2021-08-03 | End: 2021-08-03

## 2021-08-03 RX ORDER — 0.9 % SODIUM CHLORIDE 0.9 %
1000 INTRAVENOUS SOLUTION INTRAVENOUS ONCE
Status: COMPLETED | OUTPATIENT
Start: 2021-08-03 | End: 2021-08-03

## 2021-08-03 RX ORDER — METOPROLOL SUCCINATE 25 MG/1
50 TABLET, EXTENDED RELEASE ORAL ONCE
Status: COMPLETED | OUTPATIENT
Start: 2021-08-03 | End: 2021-08-03

## 2021-08-03 RX ADMIN — METOPROLOL SUCCINATE 50 MG: 25 TABLET, EXTENDED RELEASE ORAL at 06:47

## 2021-08-03 RX ADMIN — FLECAINIDE ACETATE 50 MG: 50 TABLET ORAL at 08:05

## 2021-08-03 RX ADMIN — SODIUM CHLORIDE 1000 ML: 9 INJECTION, SOLUTION INTRAVENOUS at 06:48

## 2021-08-03 ASSESSMENT — ENCOUNTER SYMPTOMS
EYE REDNESS: 0
VOMITING: 0
NAUSEA: 0
ABDOMINAL PAIN: 0
EYE PAIN: 0
BACK PAIN: 0
SHORTNESS OF BREATH: 0
EYE DISCHARGE: 0
DIARRHEA: 0
WHEEZING: 0
COUGH: 0
SORE THROAT: 0
SINUS PRESSURE: 0

## 2021-08-03 NOTE — LETTER
[facility] Emergency Department  [address] [place] [address]  Phone: [phone]             August 3, 2021    Patient: Mitali Lakhani III   YOB: 1975   Date of Visit: 8/3/2021       To Whom It May Concern:    Robin Roman was seen and treated in our emergency department on 8/3/2021. He may return to work on 08/03/21. He was accompanied by girlfriend who was his transportation and stayed with him while he was being treated in the Emergency Department.       Sincerely,             Signature:__________________________________

## 2021-08-03 NOTE — ED NOTES
Called lab for delay in blood work results, they will look into it.      Martha Hanna RN  08/03/21 2035

## 2021-08-03 NOTE — ED PROVIDER NOTES
The history is provided by the patient and medical records. 61-year-old male past medical history of atrial fibrillation on Toprol and flecainide presents emergency department complaint of being in atrial fibrillation and having chest pressure. Patient states chest pressure is in the middle of his chest no radiation, states he woke up and read about an hour ago noticed that his heart was racing he started having chest pressure and believed he was in atrial fibrillation when he felt his pulse. He was told by his cardiologist that anytime he feels like he is in atrial fibrillation, to the emergency department. He was diagnosed with A. fib back in November 2020 has had several episodes of going back in A. fib since then. He is scheduled for an outpatient procedure on August 23 for his atrial fibrillation. He otherwise at this time has no other complaints      The patient presents with chest pressure that has been going on for 1 hour. These symptoms are moderate in severity. Symptoms are made better by nothing. Symptoms are made worse by nothing. Associated symptoms include none. Review of Systems   Constitutional: Negative for chills and fever. HENT: Negative for ear pain, sinus pressure and sore throat. Eyes: Negative for pain, discharge and redness. Respiratory: Negative for cough, shortness of breath and wheezing. Cardiovascular: Positive for chest pain. Gastrointestinal: Negative for abdominal pain, diarrhea, nausea and vomiting. Genitourinary: Negative for dysuria and frequency. Musculoskeletal: Negative for arthralgias and back pain. Skin: Negative for rash and wound. Neurological: Negative for weakness and headaches. Hematological: Negative for adenopathy. All other systems reviewed and are negative. Physical Exam  Vitals and nursing note reviewed. Constitutional:       General: He is not in acute distress. Appearance: Normal appearance.  He is well-developed and normal weight. He is not toxic-appearing. HENT:      Head: Normocephalic and atraumatic. Eyes:      General: No scleral icterus. Conjunctiva/sclera: Conjunctivae normal.   Cardiovascular:      Rate and Rhythm: Normal rate. Rhythm irregular. Heart sounds: Normal heart sounds. No murmur heard. Pulmonary:      Effort: Pulmonary effort is normal. No respiratory distress. Breath sounds: Normal breath sounds. No wheezing or rales. Abdominal:      General: Bowel sounds are normal.      Palpations: Abdomen is soft. Tenderness: There is no abdominal tenderness. There is no guarding or rebound. Musculoskeletal:         General: No swelling, tenderness or deformity. Cervical back: Normal range of motion and neck supple. Skin:     General: Skin is warm and dry. Coloration: Skin is not jaundiced. Neurological:      General: No focal deficit present. Mental Status: He is alert and oriented to person, place, and time. Psychiatric:         Mood and Affect: Mood normal.         Thought Content: Thought content normal.          Procedures     MDM   40 yo male history of atrial fibrillation on metoprolol and flecainide presents emerged from complaint of atrial fibrillation and some slight chest pain. Patient was noted to be in atrial fibrillation however heart rate between 80-90. No acute ST elevations on the EKG and troponin was negative. Rest of his laboratory work-up was negative did touch base with his cardiology wise he was safe to be discharged home and to increase his flecainide 200 mg twice daily. Patient safe for discharge home at this time he is agreeable this plan. He is feeling much better at this time as well. I did advise he needs to follow-up with his family doctor as well as cardiologist and electrophysiologist.  And provided return precautions ER.     ED Course as of Aug 03 0057   Tue Aug 03, 2021   2548 Spoke with cardiology they stated patient does not name at this time, he can increase his flecainide dose to 100 mg twice daily. And can follow-up as an outpatient with electrophysiology. [CB]      ED Course User Index  [CB] Nicholas Wu MD        EKG: This EKG is signed by emergency department physician. Rate: 82  Rhythm: A. fib  Interpretation: No acute ST elevation depression, atrial fibrillation, normal axis  Comparison: stable as compared to patient's most recent EKG       ED Course as of Aug 03 0857   Tue Aug 03, 2021   0842 Spoke with cardiology they stated patient does not name at this time, he can increase his flecainide dose to 100 mg twice daily. And can follow-up as an outpatient with electrophysiology. [CB]      ED Course User Index  [CB] Nicholas Wu MD       --------------------------------------------- PAST HISTORY ---------------------------------------------  Past Medical History:  has a past medical history of Anxiety with depression, Arthritis, Hand pain, right, History of Helicobacter pylori infection, Panic attacks, Restless leg syndrome, Schizophrenia ([facility] [address] Scoliosis, and Weight loss. Past Surgical History:  has a past surgical history that includes Colonoscopy (2016); hernia repair (09/19/2017); pr secd clos surg wnd exten/complic (N/A, 5/29/9794); Upper gastrointestinal endoscopy (4/9/2018); Abdomen surgery (N/A, 03/15/2018); Upper gastrointestinal endoscopy (N/A, 5/21/2018); and pr lap,cholecystectomy (N/A, 5/29/2018). Social History:  reports that he has been smoking cigarettes. He has a 18.00 pack-year smoking history. He has never used smokeless tobacco. He reports previous alcohol use. He reports current drug use. Drug: Marijuana. Family History: family history includes Crohn's Disease in his sister; High Blood Pressure in his father; Mental Illness in his father. The patients home medications have been reviewed.     Allergies: Effexor [venlafaxine hydrochloride], Aspirin, Cardizem [diltiazem hcl], and Biaxin [clarithromycin]    -------------------------------------------------- RESULTS -------------------------------------------------  Labs:  Results for orders placed or performed during the hospital encounter of 08/03/21   CBC Auto Differential   Result Value Ref Range    WBC 10.8 4.5 - 11.5 E9/L    RBC 5.11 3.80 - 5.80 E12/L    Hemoglobin 15.2 12.5 - 16.5 g/dL    Hematocrit 46.0 37.0 - 54.0 %    MCV 90.0 80.0 - 99.9 fL    MCH 29.7 26.0 - 35.0 pg    MCHC 33.0 32.0 - 34.5 %    RDW 14.2 11.5 - 15.0 fL    Platelets 357 374 - 839 E9/L    MPV 9.7 7.0 - 12.0 fL    Neutrophils % 65.7 43.0 - 80.0 %    Immature Granulocytes % 0.4 0.0 - 5.0 %    Lymphocytes % 24.9 20.0 - 42.0 %    Monocytes % 6.5 2.0 - 12.0 %    Eosinophils % 1.9 0.0 - 6.0 %    Basophils % 0.6 0.0 - 2.0 %    Neutrophils Absolute 7.09 1.80 - 7.30 E9/L    Immature Granulocytes # 0.04 E9/L    Lymphocytes Absolute 2.69 1.50 - 4.00 E9/L    Monocytes Absolute 0.70 0.10 - 0.95 E9/L    Eosinophils Absolute 0.21 0.05 - 0.50 E9/L    Basophils Absolute 0.06 0.00 - 0.20 I8/L   Basic Metabolic Panel w/ Reflex to MG   Result Value Ref Range    Sodium 141 132 - 146 mmol/L    Potassium reflex Magnesium 4.0 3.5 - 5.0 mmol/L    Chloride 107 98 - 107 mmol/L    CO2 23 22 - 29 mmol/L    Anion Gap 11 7 - 16 mmol/L    Glucose 113 (H) 74 - 99 mg/dL    BUN 10 6 - 20 mg/dL    CREATININE 1.0 0.7 - 1.2 mg/dL    GFR Non-African American >60 >=60 mL/min/1.73    GFR African American >60     Calcium 9.2 8.6 - 10.2 mg/dL   Troponin   Result Value Ref Range    Troponin, High Sensitivity <6 0 - 11 ng/L   EKG 12 Lead   Result Value Ref Range    Ventricular Rate 82 BPM    Atrial Rate 416 BPM    QRS Duration 94 ms    Q-T Interval 366 ms    QTc Calculation (Bazett) 427 ms    R Axis 57 degrees    T Axis 45 degrees       Radiology:  XR CHEST PORTABLE   Final Result   Increased reticular airspace opacities throughout both lungs, a nonspecific   finding that may be on the basis of pulmonary edema. Atypical pneumonia   could have a similar appearance. ------------------------- NURSING NOTES AND VITALS REVIEWED ---------------------------  Date / Time Roomed:  8/3/2021  6:18 AM  ED Bed Assignment:  22/22    The nursing notes within the ED encounter and vital signs as below have been reviewed. /80   Pulse 83   Temp 98 °F (36.7 °C) (Oral)   Resp 12   Ht 5' 6\" (1.676 m)   Wt 148 lb (67.1 kg)   SpO2 97%   BMI 23.89 kg/m²   Oxygen Saturation Interpretation: Normal      ------------------------------------------ PROGRESS NOTES ------------------------------------------  8:48 AM EDT  I have spoken with the patient and discussed todays results, in addition to providing specific details for the plan of care and counseling regarding the diagnosis and prognosis. Their questions are answered at this time and they are agreeable with the plan. I discussed at length with them reasons for immediate return here for re evaluation. They will followup with their Cardiologist and electrophysiologist and primary care physician by calling their office 2-3 days. --------------------------------- ADDITIONAL PROVIDER NOTES ---------------------------------  At this time the patient is without objective evidence of an acute process requiring hospitalization or inpatient management. They have remained hemodynamically stable throughout their entire ED visit and are stable for discharge with outpatient follow-up. The plan has been discussed in detail and they are aware of the specific conditions for emergent return, as well as the importance of follow-up. New Prescriptions    No medications on file       Diagnosis:  1. Atrial fibrillation, unspecified type ([address]        Disposition:  Patient's disposition: Discharge to home  Patient's condition is stable.     The patient was seen and evaluated by myself and Rizwana Garcia MD PGY-2  8/3/2021 8:57 AM       Billy Levin, MD  Resident  08/03/21 0426

## 2021-08-06 ENCOUNTER — TELEPHONE (OUTPATIENT)
Dept: CARDIOLOGY CLINIC | Age: 46
End: 2021-08-06

## 2021-08-09 RX ORDER — FLECAINIDE ACETATE 50 MG/1
50 TABLET ORAL 2 TIMES DAILY
Qty: 180 TABLET | Refills: 3 | Status: SHIPPED
Start: 2021-08-09 | End: 2021-09-24 | Stop reason: SDUPTHER

## 2021-08-17 ENCOUNTER — HOSPITAL ENCOUNTER (OUTPATIENT)
Age: 46
Discharge: HOME | End: 2021-08-19
Payer: COMMERCIAL

## 2021-08-17 DIAGNOSIS — Z01.812 ENCOUNTER FOR PREPROCEDURE SCREENING LABORATORY TESTING FOR COVID-19: ICD-10-CM

## 2021-08-17 DIAGNOSIS — Z20.822 ENCOUNTER FOR PREPROCEDURE SCREENING LABORATORY TESTING FOR COVID-19: ICD-10-CM

## 2021-08-17 PROCEDURE — U0003 INFECTIOUS AGENT DETECTION BY NUCLEIC ACID (DNA OR RNA); SEVERE ACUTE RESPIRATORY SYNDROME CORONAVIRUS 2 (SARS-COV-2) (CORONAVIRUS DISEASE [COVID-19]), AMPLIFIED PROBE TECHNIQUE, MAKING USE OF HIGH THROUGHPUT TECHNOLOGIES AS DESCRIBED BY CMS-2020-01-R: HCPCS

## 2021-08-17 PROCEDURE — U0005 INFEC AGEN DETEC AMPLI PROBE: HCPCS

## 2021-08-18 LAB — SARS-COV-2, PCR: NOT DETECTED

## 2021-08-23 ENCOUNTER — HOSPITAL ENCOUNTER (OUTPATIENT)
Dept: CT IMAGING | Age: 46
Discharge: HOME | End: 2021-08-25
Payer: COMMERCIAL

## 2021-08-23 VITALS
HEIGHT: 68 IN | DIASTOLIC BLOOD PRESSURE: 60 MMHG | HEART RATE: 55 BPM | OXYGEN SATURATION: 96 % | SYSTOLIC BLOOD PRESSURE: 100 MMHG | BODY MASS INDEX: 22.43 KG/M2 | WEIGHT: 148 LBS | RESPIRATION RATE: 18 BRPM | TEMPERATURE: 98 F

## 2021-08-23 DIAGNOSIS — R07.2 PRECORDIAL PAIN: ICD-10-CM

## 2021-08-23 PROCEDURE — 75574 CT ANGIO HRT W/3D IMAGE: CPT | Performed by: INTERNAL MEDICINE

## 2021-08-23 PROCEDURE — 6360000004 HC RX CONTRAST MEDICATION: Performed by: RADIOLOGY

## 2021-08-23 PROCEDURE — 2580000003 HC RX 258: Performed by: INTERNAL MEDICINE

## 2021-08-23 PROCEDURE — 75574 CT ANGIO HRT W/3D IMAGE: CPT

## 2021-08-23 PROCEDURE — 6370000000 HC RX 637 (ALT 250 FOR IP): Performed by: INTERNAL MEDICINE

## 2021-08-23 PROCEDURE — 2580000003 HC RX 258: Performed by: RADIOLOGY

## 2021-08-23 RX ORDER — 0.9 % SODIUM CHLORIDE 0.9 %
1000 INTRAVENOUS SOLUTION INTRAVENOUS ONCE
Status: COMPLETED | OUTPATIENT
Start: 2021-08-23 | End: 2021-08-23

## 2021-08-23 RX ORDER — NITROGLYCERIN 0.4 MG/1
0.8 TABLET SUBLINGUAL ONCE
Status: DISCONTINUED | OUTPATIENT
Start: 2021-08-23 | End: 2021-08-26 | Stop reason: HOSPADM

## 2021-08-23 RX ORDER — METOPROLOL TARTRATE 50 MG/1
50 TABLET, FILM COATED ORAL ONCE
Status: COMPLETED | OUTPATIENT
Start: 2021-08-23 | End: 2021-08-23

## 2021-08-23 RX ORDER — SODIUM CHLORIDE 0.9 % (FLUSH) 0.9 %
10 SYRINGE (ML) INJECTION ONCE
Status: COMPLETED | OUTPATIENT
Start: 2021-08-23 | End: 2021-08-23

## 2021-08-23 RX ADMIN — IOPAMIDOL 110 ML: 755 INJECTION, SOLUTION INTRAVENOUS at 10:47

## 2021-08-23 RX ADMIN — SODIUM CHLORIDE 1000 ML: 9 INJECTION, SOLUTION INTRAVENOUS at 10:06

## 2021-08-23 RX ADMIN — Medication 10 ML: at 10:47

## 2021-08-23 RX ADMIN — METOPROLOL TARTRATE 50 MG: 50 TABLET, FILM COATED ORAL at 10:05

## 2021-08-25 ENCOUNTER — TELEPHONE (OUTPATIENT)
Dept: CARDIOLOGY CLINIC | Age: 46
End: 2021-08-25

## 2021-09-01 ENCOUNTER — OFFICE VISIT (OUTPATIENT)
Dept: NON INVASIVE DIAGNOSTICS | Age: 46
End: 2021-09-01
Payer: COMMERCIAL

## 2021-09-01 VITALS
HEART RATE: 58 BPM | WEIGHT: 151 LBS | HEIGHT: 68 IN | BODY MASS INDEX: 22.88 KG/M2 | RESPIRATION RATE: 16 BRPM | DIASTOLIC BLOOD PRESSURE: 68 MMHG | SYSTOLIC BLOOD PRESSURE: 120 MMHG

## 2021-09-01 DIAGNOSIS — Z51.81 THERAPEUTIC DRUG MONITORING: ICD-10-CM

## 2021-09-01 DIAGNOSIS — R07.89 ATYPICAL CHEST PAIN: ICD-10-CM

## 2021-09-01 DIAGNOSIS — I48.0 PAROXYSMAL ATRIAL FIBRILLATION (HCC): Primary | ICD-10-CM

## 2021-09-01 DIAGNOSIS — R94.31 ABNORMAL EKG: ICD-10-CM

## 2021-09-01 PROCEDURE — G8427 DOCREV CUR MEDS BY ELIG CLIN: HCPCS | Performed by: INTERNAL MEDICINE

## 2021-09-01 PROCEDURE — 4004F PT TOBACCO SCREEN RCVD TLK: CPT | Performed by: INTERNAL MEDICINE

## 2021-09-01 PROCEDURE — 93000 ELECTROCARDIOGRAM COMPLETE: CPT | Performed by: INTERNAL MEDICINE

## 2021-09-01 PROCEDURE — 99214 OFFICE O/P EST MOD 30 MIN: CPT | Performed by: INTERNAL MEDICINE

## 2021-09-01 PROCEDURE — G8420 CALC BMI NORM PARAMETERS: HCPCS | Performed by: INTERNAL MEDICINE

## 2021-09-01 ASSESSMENT — ENCOUNTER SYMPTOMS
SINUS PRESSURE: 0
SHORTNESS OF BREATH: 0
ABDOMINAL PAIN: 0
CHEST TIGHTNESS: 0
NAUSEA: 0
COUGH: 0
COLOR CHANGE: 0
DIARRHEA: 0
ABDOMINAL DISTENTION: 0
EYE REDNESS: 0
WHEEZING: 0

## 2021-09-01 NOTE — PROGRESS NOTES
Cardiac Electrophysiology Outpatient Progress Note    Gabby Moran III  1975  Date of Service: 9/1/2021  Referring Provider/PCP: Dina German DO  Chief Complaint:   Chief Complaint   Patient presents with    Atrial Fibrillation     3 month ov, no device, on flecainide, chest pain, palpitations, fatigue       HISTORY OF PRESENT ILLNESS    Gabby Moran III presents to the office today for the management of these Electrophysiology conditions: Palpitation, PAF    He has a PMHx of prediabetes, depression, anxiety, panic attacks, schizophrenia, restless leg syndrome and active tobacco use. He was diagnosed with new onset atrial fibrillation 11/2020 that self converted. He had new onset chest burning at that time. He was started on oral cardizem 120 mg daily which caused side effects. He had a negative KAYLEN study at [facility] last year   ER visit for palpitation and chest pain 1/30/21, 5/3/21 and 5/7/21. ECGs were wnl by the time he got to ER  He was switched to metoprolol 25 mg daily 1/4/21, currently raised to 50 mg bid last week. He continues to have daily chest pain that feels like burning that radiates the left side. It lasts from seconds to all day. Usually, his heart rhythm is in rhythm with these episodes. 9/1/2021: Mr. Thu Barnes presents today for follow up. He reports no new symptoms. The patient denies any chest pain, dyspnea, palpitations, dizziness, syncope, orthopnea or paroxysmal nocturnal dyspnea.  He tries to stay active   Hospitalized 7/24/21 for afib rate 120's, he was given flecainide and metoprolol and he converted to NSR    Social History:    Tobacco: 1 PPD x 27 years  EtOH: Rare use  Illicit: Smokes marijuana  Drinks 2 cups of coffee daily, 1 cup in evening  Unemployed   with 3 kids    Patient Active Problem List    Diagnosis Date Noted    Diarrhea 07/24/2021    Atrial fibrillation with RVR (HCC) 05/22/2021    Chest pain 12/05/2020    Leukocytosis     [facility] obstructive pulmonary disease ([facility] [address]     Schizophrenia ([facility] [address]     Atypical chest pain 11/10/2020    A-fib ([facility] [address] 11/05/2020    Atrial fibrillation ([facility] [address] 11/05/2020    Symptomatic cholelithiasis     Helicobacter pylori gastritis     Depression     Hand pain, right 08/02/2011       Family History   Problem Relation Age of Onset    Mental Illness Father         unspecified    High Blood Pressure Father     Crohn's Disease Sister        SOCIAL HISTORY     Past Surgical History:   Procedure Laterality Date    ABDOMEN SURGERY N/A 03/15/2018    wound exploration umbilicus, excsion suture granuloma    COLONOSCOPY  2016    HERNIA REPAIR  09/19/2017    AR LAP,CHOLECYSTECTOMY N/A 5/29/2018    LAPAROSCOPIC  CHOLECYSTECTOMY performed by Olga Siddiqui MD at [address] WND EXTEN/COMPLIC N/A 5/62/3147    ABDOMINAL WOUND EXPLORATION , REMOVAL OF SUTURE GRANULOMA performed by Olga Siddiqui MD at [facility]  4/9/2018    EGD BIOPSY performed by Olga Siddiqui MD at [facility] 5/21/2018    EGD BIOPSY performed by Olga Siddiqui MD at [facility] ENDOSCOPY       Current Outpatient Medications   Medication Sig Dispense Refill    flecainide (TAMBOCOR) 50 MG tablet Take 1 tablet by mouth 2 times daily (Patient taking differently: Take 100 mg by mouth 2 times daily ) 180 tablet 3    metoprolol succinate (TOPROL XL) 50 MG extended release tablet Take 1 tablet by mouth 2 times daily 180 tablet 3    diazePAM (VALIUM) 5 MG tablet Take 5 mg by mouth 2 times daily.  Pt takes 10mg at night only, pt does not take twice a day      traZODone (DESYREL) 50 MG tablet Take 100 mg by mouth nightly       busPIRone (BUSPAR) 15 MG tablet Take 15 mg by mouth nightly       omeprazole (PRILOSEC) 40 MG delayed release capsule TK ONE C PO  QD  0    cholestyramine (QUESTRAN) 4 g packet Take 1 packet by mouth daily (Patient taking differently: Take 1 packet by rate and regular rhythm. Pulses: Normal pulses. Heart sounds: Normal heart sounds. Pulmonary:      Effort: Pulmonary effort is normal.      Breath sounds: Normal breath sounds. No rales. Chest:      Chest wall: No tenderness. Abdominal:      General: Bowel sounds are normal.      Palpations: Abdomen is soft. Musculoskeletal:         General: Normal range of motion. Cervical back: Normal range of motion and neck supple. Skin:     General: Skin is warm. Neurological:      General: No focal deficit present. Mental Status: He is alert and oriented to person, place, and time. Psychiatric:         Mood and Affect: Mood normal.         Behavior: Behavior normal.         Thought Content:  Thought content normal.          Pertinent Labs:  CBC:   WBC   Date Value   08/03/2021 10.8 E9/L   07/24/2021 14.2 E9/L (H)   06/29/2021 16.6 K/uL (H)     Hemoglobin (g/dL)   Date Value   08/03/2021 15.2   07/24/2021 15.3   06/29/2021 14.9     Hematocrit (%)   Date Value   08/03/2021 46.0   07/24/2021 46.7   06/29/2021 44.4     Platelets   Date Value   08/03/2021 280 E9/L   07/24/2021 289 E9/L   06/29/2021 283 K/uL      BMP:   Sodium   Date Value   08/03/2021 141 mmol/L   07/24/2021 138 mmol/L   06/29/2021 140 mEq/L     Potassium (mEq/L)   Date Value   06/29/2021 4.2     Potassium reflex Magnesium (mmol/L)   Date Value   08/03/2021 4.0   07/24/2021 3.9   05/23/2021 4.7     Magnesium (mg/dL)   Date Value   07/24/2021 1.8   05/22/2021 2.0   01/30/2021 1.9     Chloride   Date Value   08/03/2021 107 mmol/L   07/24/2021 108 mmol/L (H)   06/29/2021 108 mEq/L (H)     CO2   Date Value   08/03/2021 23 mmol/L   07/24/2021 25 mmol/L   06/29/2021 23 mEq/L     BUN (mg/dL)   Date Value   08/03/2021 10   07/24/2021 11   06/29/2021 16     CREATININE (mg/dL)   Date Value   08/03/2021 1.0   07/24/2021 0.9   06/29/2021 1.0     Glucose (mg/dL)   Date Value   08/03/2021 113 (H)   07/24/2021 132 (H)   06/29/2021 105 (H) 2011 90     Calcium (mg/dL)   Date Value   2021 9.2   2021 9.0   2021 9.2      INR:   INR (no units)   Date Value   2021 1.1   11/10/2020 1.0   2020 1.0      BNP: No results found for: BNP   TSH:   TSH (uIU/mL)   Date Value   2021 1.020   2021 1.020   2020 1.660      Cardiac Injury Profile: Total CK (U/L)   Date Value   2021 60     CK-MB (ng/mL)   Date Value   2021 1.2     Troponin (ng/mL)   Date Value   2021 <0.01     Troponin I (ng/mL)   Date Value   2021 < 0.03     Lipid Profile:   Triglycerides (mg/dL)   Date Value   2011 136     HDL (mg/dL)   Date Value   2021 36     LDL Calculated (mg/dL)   Date Value   2021 71     Cholesterol (mg/dL)   Date Value   2011 172      Hemoglobin A1C:   Hemoglobin A1C (%)   Date Value   2021 6.1 (H)           Pertinent Cardiac Testin/10/20 Stress Test       A mild defect was present in the inferior wall(s) that was small size   by quantification - likely attenuation.              The resting images no significant reversibility       Gated SPECT left ventricular ejection fraction was calculated to be   60%, with normal Myocardial wall motion. TTE 20   Left Ventricle   Left ventricle size is normal.   Normal left ventricular wall thickness. Normal left ventricular systolic function. Ejection fraction is visually estimated at 55%. Normal left ventricular diastolic filling pattern for age. ECG 2021: NSR, Rate 58- see scanned cardiology    I have independently reviewed all of the ECGs and rhythm strips per above    I have personally reviewed the laboratory, cardiac diagnostic and radiographic testing as outlined above: We have requested previous records. 1. Paroxysmal atrial fibrillation ([address]    2. Abnormal EKG    3. Atypical chest pain    4. Therapeutic drug monitoring         ASSESSMENT & PLAN    1.  PAF  - Diagnosed 2020  - On Metoprolol 50 mg bid  - recent ER visits for chest pain  - ER visit 7/2021 fr recurrent Afib  - Discussed Rhythm control with ablation and he is not ready to consider invasive procedure at this time  - Continue Flecainide and metoprolol at this time. He is now on 100 mg bid Flecainide    2. Chest pain  - Atypical  - Follow up with Cardiology    3. Anxiety  - Medications    4. Schizophrenia    Plan  1. No changes in medications. 2.OV in 6 months or sooner PRN. The patient denies any chest pain, dyspnea, palpitations, dizziness, syncope, orthopnea or paroxysmal nocturnal dyspnea. Thank you for allowing me to participate in your patient's care. I have spent a total of 25 minutes with the patient and his/her family reviewing the above stated recommendations. A total of >50% of that time involved face-to-face time providing counseling and or coordination of care with the other providers.     Gracie Garg MD  Cardiac Electrophysiology  [address] [place] [address]

## 2021-09-24 RX ORDER — FLECAINIDE ACETATE 100 MG/1
100 TABLET ORAL 2 TIMES DAILY
Qty: 60 TABLET | Refills: 5 | Status: SHIPPED
Start: 2021-09-24 | End: 2022-03-14 | Stop reason: SDUPTHER

## 2022-02-07 RX ORDER — CHOLESTYRAMINE 4 G/9G
1 POWDER, FOR SUSPENSION ORAL DAILY PRN
Qty: 30 PACKET | Refills: 0 | Status: SHIPPED
Start: 2022-02-07 | End: 2022-02-10 | Stop reason: SDUPTHER

## 2022-02-09 ENCOUNTER — OFFICE VISIT (OUTPATIENT)
Dept: CARDIOLOGY CLINIC | Age: 47
End: 2022-02-09
Payer: COMMERCIAL

## 2022-02-09 VITALS
HEART RATE: 61 BPM | BODY MASS INDEX: 23.49 KG/M2 | WEIGHT: 155 LBS | SYSTOLIC BLOOD PRESSURE: 102 MMHG | HEIGHT: 68 IN | DIASTOLIC BLOOD PRESSURE: 62 MMHG | RESPIRATION RATE: 12 BRPM

## 2022-02-09 DIAGNOSIS — R07.2 PRECORDIAL PAIN: Primary | ICD-10-CM

## 2022-02-09 DIAGNOSIS — I48.0 PAROXYSMAL ATRIAL FIBRILLATION (HCC): ICD-10-CM

## 2022-02-09 DIAGNOSIS — R00.2 PALPITATIONS: ICD-10-CM

## 2022-02-09 DIAGNOSIS — Z72.0 TOBACCO ABUSE: ICD-10-CM

## 2022-02-09 PROCEDURE — G8420 CALC BMI NORM PARAMETERS: HCPCS | Performed by: INTERNAL MEDICINE

## 2022-02-09 PROCEDURE — 99214 OFFICE O/P EST MOD 30 MIN: CPT | Performed by: INTERNAL MEDICINE

## 2022-02-09 PROCEDURE — G8427 DOCREV CUR MEDS BY ELIG CLIN: HCPCS | Performed by: INTERNAL MEDICINE

## 2022-02-09 PROCEDURE — 4004F PT TOBACCO SCREEN RCVD TLK: CPT | Performed by: INTERNAL MEDICINE

## 2022-02-09 PROCEDURE — G8484 FLU IMMUNIZE NO ADMIN: HCPCS | Performed by: INTERNAL MEDICINE

## 2022-02-09 PROCEDURE — 93000 ELECTROCARDIOGRAM COMPLETE: CPT | Performed by: INTERNAL MEDICINE

## 2022-02-09 RX ORDER — SIMVASTATIN 10 MG
10 TABLET ORAL NIGHTLY
Status: ON HOLD | COMMUNITY
End: 2022-03-17

## 2022-02-09 RX ORDER — PENTOXIFYLLINE 400 MG/1
400 TABLET, EXTENDED RELEASE ORAL EVERY EVENING
COMMUNITY
Start: 2022-01-14

## 2022-02-09 RX ORDER — TADALAFIL 5 MG/1
5 TABLET ORAL EVERY EVENING
COMMUNITY
Start: 2021-12-26

## 2022-02-09 RX ORDER — METOPROLOL SUCCINATE 50 MG/1
50 TABLET, EXTENDED RELEASE ORAL 2 TIMES DAILY
Qty: 180 TABLET | Refills: 3 | Status: SHIPPED | OUTPATIENT
Start: 2022-02-09

## 2022-02-09 RX ORDER — BUPROPION HYDROCHLORIDE 150 MG/1
TABLET ORAL
Status: ON HOLD | COMMUNITY
Start: 2022-01-18 | End: 2022-03-18 | Stop reason: HOSPADM

## 2022-02-09 NOTE — PROGRESS NOTES
OUTPATIENT CARDIOLOGY FOLLOW-UP    Name: Laura De Leon III    Age: 42 y.o. Primary Care Physician: Kaylene Hill DO    Date of Service: 2/9/2022    Chief Complaint: Follow-up for paroxysmal atrial fibrillation, chest pain    Interim History:  No exertional chest pain or SOB. He still reports episodes of chest pain at rest (\"pressure\", mid-sternal, episodes last > 10 minutes, no radiation of the pain, no relationship to exertion). +occasional palpitations -- improved on increased dose of flecainide. He denies recent syncope, PND, or orthopnea. SR on EKG. He feels better on Toprol XL (+sided effects on cardizem CD). +history of anxiety. Normal coronary CTA on 8/23/21.     Review of Systems:   Cardiac: As per HPI  General: No fever, chills  Pulmonary: As per HPI  HEENT: No visual disturbances, difficult swallowing  GI: No nausea, vomiting  : No dysuria, hematuria  Endocrine: No thyroid disease, +pre-DM per patient (he takes metformin)  Musculoskeletal: HADDAD x 4, no focal motor deficits  Skin: Intact, no rashes  Neuro: No headache, seizures  Psych: Currently with no depression, +anxiety    Past Medical History:  Past Medical History:   Diagnosis Date    Anxiety with depression     Arthritis     Hand pain, right     History of Helicobacter pylori infection     Panic attacks     Restless leg syndrome     Schizophrenia (HCC)     mild - controlled (history of )  PCP states it is marilyn bilpolar    Scoliosis     Weight loss     25 # in 6 months / as of 5/17/2018, no further weight loss       Past Surgical History:  Past Surgical History:   Procedure Laterality Date    ABDOMEN SURGERY N/A 03/15/2018    wound exploration umbilicus, excsion suture granuloma    COLONOSCOPY  2016    HERNIA REPAIR  09/19/2017    VA LAP,CHOLECYSTECTOMY N/A 5/29/2018    LAPAROSCOPIC  CHOLECYSTECTOMY performed by Maria Victoria Bacon MD at [facility] WND EXTEN/COMPLIC N/A 3/25/5610    ABDOMINAL WOUND EXPLORATION , REMOVAL OF SUTURE GRANULOMA performed by Yifan Mathis MD at [facility] [address]  4/9/2018    EGD BIOPSY performed by Yifan Mathis MD at [facility] N/A 5/21/2018    EGD BIOPSY performed by Yifan Mathis MD at [facility] ENDOSCOPY       Family History:  Family History   Problem Relation Age of Onset    Mental Illness Father         unspecified    High Blood Pressure Father     Crohn's Disease Sister        Social History:  Social History     Socioeconomic History    Marital status: Single     Spouse name: Not on file    Number of children: Not on file    Years of education: Not on file    Highest education level: Not on file   Occupational History    Not on file   Tobacco Use    Smoking status: Current Every Day Smoker     Packs/day: 1.00     Years: 18.00     Pack years: 18.00     Types: Cigarettes    Smokeless tobacco: Never Used    Tobacco comment: Before up to 2 ppd   Vaping Use    Vaping Use: Former    Substances: Never   Substance and Sexual Activity    Alcohol use: Not Currently    Drug use: Yes     Types: Marijuana (Weed)     Comment: at [facility] Sexual activity: Yes     Partners: Female   Other Topics Concern    Not on file   Social History Narrative    Not on file     Social Determinants of Health     Financial Resource Strain:     Difficulty of Paying Living Expenses: Not on file   Food Insecurity:     Worried About [address] in the Last Year: Not on file    [address] in the Last Year: Not on file   Transportation Needs:     Lack of Transportation (Medical): Not on file    Lack of Transportation (Non-Medical):  Not on file   Physical Activity:     Days of Exercise per Week: Not on file    Minutes of Exercise per Session: Not on file   Stress:     Feeling of Stress : Not on file   Social Connections:     Frequency of Communication with Friends and Family: Not on file    Frequency of Social Gatherings with Friends and Family: Not on file    Attends Yarsanism Services: Not on file    Active Member of Clubs or Organizations: Not on file    Attends Club or Organization Meetings: Not on file    Marital Status: Not on file   Intimate Partner Violence:     Fear of Current or Ex-Partner: Not on file    Emotionally Abused: Not on file    Physically Abused: Not on file    Sexually Abused: Not on file   Housing Stability:     Unable to Pay for Housing in the Last Year: Not on file    Number of [place] in the Last Year: Not on file    Unstable Housing in the Last Year: Not on file       Allergies: Allergies   Allergen Reactions    Effexor [Venlafaxine Hydrochloride] Palpitations     Passed out    Aspirin Other (See Comments)     Ringing in ears    Cardizem [Diltiazem Hcl] Other (See Comments)     Ringing in the ears    Biaxin [Clarithromycin] Palpitations       Current Medications:  Current Outpatient Medications   Medication Sig Dispense Refill    cholestyramine (QUESTRAN) 4 g packet Take 1 packet by mouth daily as needed (daily as needed) 30 packet 0    flecainide (TAMBOCOR) 100 MG tablet Take 1 tablet by mouth 2 times daily 60 tablet 5    metoprolol succinate (TOPROL XL) 50 MG extended release tablet Take 1 tablet by mouth 2 times daily 180 tablet 3    diazePAM (VALIUM) 5 MG tablet Take 5 mg by mouth 2 times daily. Pt takes 10mg at night only, pt does not take twice a day      traZODone (DESYREL) 50 MG tablet Take 100 mg by mouth nightly       busPIRone (BUSPAR) 15 MG tablet Take 15 mg by mouth nightly       omeprazole (PRILOSEC) 40 MG delayed release capsule TK ONE C PO  QD  0     No current facility-administered medications for this visit. Physical Exam:  There were no vitals taken for this visit.   Wt Readings from Last 3 Encounters:   09/01/21 151 lb (68.5 kg)   08/23/21 148 lb (67.1 kg)   08/03/21 148 lb (67.1 kg)     Appearance: Awake, alert and oriented x 3, no acute respiratory distress  Skin: Intact, no rash  Head: Normocephalic, atraumatic  Eyes: EOMI, no conjunctival erythema  ENMT: No pharyngeal erythema, MMM, no rhinorrhea  Neck: Supple, no elevated JVP, no carotid bruits  Lungs: Clear to auscultation bilaterally. No wheezes, rales, or rhonchi.   Cardiac: Regular rate and rhythm, +S1S2, no murmurs apparent  Abdomen: Soft, nontender, +bowel sounds  Extremities: Moves all extremities x 4, no lower extremity edema  Neurologic: No focal motor deficits apparent, normal mood and affect, alert and oriented x 3  Peripheral Pulses: Intact posterior tibial pulses bilaterally    Laboratory Tests:  Lab Results   Component Value Date    CREATININE 1.0 08/03/2021    BUN 10 08/03/2021     08/03/2021    K 4.0 08/03/2021     08/03/2021    CO2 23 08/03/2021     Lab Results   Component Value Date    MG 1.8 07/24/2021     Lab Results   Component Value Date    WBC 10.8 08/03/2021    HGB 15.2 08/03/2021    HCT 46.0 08/03/2021    MCV 90.0 08/03/2021     08/03/2021     Lab Results   Component Value Date    ALT 28 06/29/2021    AST 17 06/29/2021    ALKPHOS 67 06/29/2021    BILITOT 0.7 06/29/2021     Lab Results   Component Value Date    CKTOTAL 60 07/24/2021    CKMB 1.2 07/24/2021    TROPONINI < 0.03 06/29/2021    TROPONINI <0.01 05/22/2021    TROPONINI <0.01 05/22/2021     Lab Results   Component Value Date    INR 1.1 05/03/2021    INR 1.0 11/10/2020    INR 1.0 11/06/2020    PROTIME 11.7 05/03/2021    PROTIME 11.6 11/10/2020    PROTIME 11.5 11/06/2020     Lab Results   Component Value Date    TSH 1.020 07/24/2021     Lab Results   Component Value Date    LABA1C 6.1 (H) 05/22/2021     No results found for: EAG  Lab Results   Component Value Date    CHOL 172 11/11/2011     Lab Results   Component Value Date    TRIG 136 11/11/2011     Lab Results   Component Value Date    HDL 36 05/22/2021    HDL 40.0 (A) 11/11/2011     Lab Results   Component Value Date    LDLCALC 71 05/22/2021    LDLCALC 105 (H) 11/11/2011     Lab Results   Component Value Date    LABVLDL 38 05/22/2021     No results found for: CHOLHDLRATIO  No results for input(s): PROBNP in the last 72 hours. Cardiac Tests:  EKG reviewed (EKG date: 2/9/2022): SR, rate 61, no acute STT changes,  msec    EKG reviewed (EKG date: 7/15/21): SR, rate 68, no acute STT changes    EKG reviewed (EKG date: 1/4/2021): SR, rate 72, no acute STT changes    Telemetry reviewed (11/6/2020): atrial fibrillation --> SR     Telemetry reviewed (12/5/2020): SR, rate 60's     Echocardiogram: 11/6/2020   Normal left ventricular systolic function.   Ejection fraction is visually estimated at 55%.   Normal right ventricular size and function (TAPSE 1.8 cm).   Normal left ventricular diastolic filling pattern for age.  María Elena Katy evidence of hemodynamically significant valve disease.     Exercise nuclear stress test: 11/11/2020  The patient exercised for 6:30 minutes on an accelerated Sukhjinder protocol treadmill achieving 11.7 METS of activity and 87% MPHR. Nonanginal chest pain with no cardiac arrhythmias were noted. A normal blood pressure response to exercise was recorded. No electrocardiographic changes were noted.    The myocardial perfusion imaging was normal         Overall left ventricular systolic function was normal, EF 60%      Coronary CTA: 8/23/21 (Dr. Roselia Yousif)  CARDIAC FINDINGS:       Coronary Calcium: 0.       CORONARY ANGIOGRAPHY       No anomalous coronary arteries.       Left Main: The left main coronary artery originates normally from the left   coronary sinus and gives rise to the left anterior descending and left   circumflex arteries.  The left main has short segment without any   atherosclerotic disease.       Left anterior descending artery: The left main coronary artery is a large   sized vessel that wraps around the LV apex and gives rise to 2 small sized   diagonal arteries.  The LAD and its branches are free of atherosclerotic   disease.       Favious intermedius: NA.       Left circumflex artery: Left circumflex artery is a large sized vessel   without any significant atherosclerotic disease.       Right coronary artery: The right coronary artery originates normally from the   right coronary sinus, and is a dominant vessel and gives rise to the   posterior descending and posterolateral ventricular branches.  It is a large   sized vessel.  The RCA and       its branches are free of atherosclerotic disease.       Left atrium: Normal size left atrium without thrombus in the left atrium or   left atrial appendage.       Left ventricle: Normal LV size and function, LVEF 71%.       Pulmonary veins: There are 4 pulmonary veins, 2 on the left and 2 on the   right joining the left atrium.       Cardiac valves: Bileaflet aortic valve without stenosis.  Mitral valve   appears normal without prolapse or flail segments.       Pericardium: The pericardium is normal in appearance without thickening or   calcification.  No pericardial effusion.       Great vessels: The aortic root, ascending thoracic aorta, descending thoracic   artery, main pulmonary artery, left and right pulmonary arteries are of   normal caliber without aneurysms or dissection.       NON-CARDIAC FINDINGS -noncardiac findings will be reported separately.           NON-CARDIAC FINDINGS:       Noncardiac findings section interpreted by radiologist.       Airway is patent.  No endobronchial lesions.  Mild emphysematous changes in   the lungs.  Lungs otherwise clear       No concerning mediastinal nor hilar lymphadenopathy.       Normal appearance of the thoracic aorta.  Normal appearance of the pulmonary   arterial system.       Heart chambers are not enlarged.  No pericardial effusion.       Normal course and appearance of the esophagus.       The imaged upper abdomen is unremarkable.       There are mild degenerative changes in the imaged thoracic spine.           Impression   1.  Normal coronary CT angiogram     2. Zero calcium score       CAD-RADS: 0.  Maximal stenosis 0%.  Interpretation, no CAD. ASSESSMENT / PLAN:  1. Chest pain -- currently CP free  2. Paroxysmal atrial fibrillation (diagnosed in 11/2020; spontaneously converted to SR at that time) -- flecainide started in 5/2021, he follows with Dr. Israel Ruiz, maintaining SR  3. Hgb A1c 6.1  4. Anxiety, depression, schizophrenia  5. Tobacco (1 PPD since age 14, currently < 1 PPD) and marijuana use  6. Gastroesophageal reflux disease  7. Restless leg syndrome    - Results of 8/23/21 coronary CTA reviewed with the patient today (normal coronary CT angiogram, zero calcium score)  - Continue Toprol XL 50 mg BID (reordered today)  - Continue flecainide / follow-up with EP as scheduled  - R/B/A/I for anticoagulation discussed in the past multiple times -- he opts to continue ASA  - Negative KAYLEN study at [facility] last year per patient -- obtain results  - Counseled today re: tobacco and marijuana cessation  - Multiple questions answered today re: possible causes of chest pain    Greater than 30 minutes was spent counseling the patient, reviewing the rationale for the above recommendations and reviewing the patient's current medication list, problem list and results of all previously ordered testing.     Jasper Roman MD  [facility]) Cardiology

## 2022-02-10 ENCOUNTER — OFFICE VISIT (OUTPATIENT)
Dept: SURGERY | Age: 47
End: 2022-02-10
Payer: COMMERCIAL

## 2022-02-10 VITALS
HEIGHT: 68 IN | DIASTOLIC BLOOD PRESSURE: 68 MMHG | RESPIRATION RATE: 16 BRPM | SYSTOLIC BLOOD PRESSURE: 110 MMHG | BODY MASS INDEX: 23.49 KG/M2 | WEIGHT: 155 LBS | TEMPERATURE: 97.5 F | HEART RATE: 63 BPM

## 2022-02-10 DIAGNOSIS — K31.84 GASTROPARESIS: ICD-10-CM

## 2022-02-10 DIAGNOSIS — R19.4 CHANGE IN BOWEL HABITS: Primary | ICD-10-CM

## 2022-02-10 PROCEDURE — 99213 OFFICE O/P EST LOW 20 MIN: CPT | Performed by: SURGERY

## 2022-02-10 PROCEDURE — G8427 DOCREV CUR MEDS BY ELIG CLIN: HCPCS | Performed by: SURGERY

## 2022-02-10 PROCEDURE — G8420 CALC BMI NORM PARAMETERS: HCPCS | Performed by: SURGERY

## 2022-02-10 PROCEDURE — 4004F PT TOBACCO SCREEN RCVD TLK: CPT | Performed by: SURGERY

## 2022-02-10 PROCEDURE — G8484 FLU IMMUNIZE NO ADMIN: HCPCS | Performed by: SURGERY

## 2022-02-10 RX ORDER — SODIUM CHLORIDE 9 MG/ML
INJECTION, SOLUTION INTRAVENOUS CONTINUOUS
Status: CANCELLED | OUTPATIENT
Start: 2022-02-10

## 2022-02-10 RX ORDER — CHOLESTYRAMINE 4 G/9G
1 POWDER, FOR SUSPENSION ORAL DAILY PRN
Qty: 30 PACKET | Refills: 5 | Status: SHIPPED
Start: 2022-02-10 | End: 2022-03-23

## 2022-02-10 NOTE — PROGRESS NOTES
Progress Note - Follow up    Patient's Name/Date of Birth: Kristine Pierre III / 1975    Date: 2/10/2022    PCP: Lavern Salcedo DO    Referring Physician:   Bridgette Waller [address] [place] [address].    Chief Complaint   Patient presents with    GI Problem     yearly fl/u med refills     Rectal Pain       HPI:    The patient has been taking questran once a day and said he is having stool like mud. He said if he doesn't take it, he has  A lot of watery BM daily. He said he has some pain around his rectum as well. He is concerned because his stools are black. He has some intermittent nausea but no vomiting. He is eating ok. He has some intermittent pain over his umbilicus form his hernia. Patient's medications, allergies, past medical, surgical, social and family histories were reviewed and updated as appropriate. Review of Systems  Constitutional: negative  Respiratory: negative  Cardiovascular: negative  Gastrointestinal: as in hpi  Genitourinary:negative  Integument/breast: negative    Physical Exam:  /68   Pulse 63   Temp 97.5 °F (36.4 °C) (Temporal)   Resp 16   Ht 5' 8\" (1.727 m)   Wt 155 lb (70.3 kg)   BMI 23.57 kg/m²   General appearance: alert, cooperative and in no acute distress. Lungs: normal work of breathing  Heart: regular rate  Abdomen: soft, nontender, nondistended  Musculoskeletal: symmetrical without edema. Skin: normal       Impression/Plan:  42y.o. year old male with gastroparesis, change in bowel habits, dark stools    Continue Questran daily    I will set the patient up for a colonoscopy, possible biopsy, possible polypectomy. I explained the risks including but not limited to bowel perforation, postoperative bleeding, post-polypectomy syndrome, as well as the possibility of needing emergency surgery or another colonoscopy.  The benefits, alternatives, and potential complications associated with the above procedure to be performed and transfusions when applicable with the patient/responsible person prior to the procedure. All of the patient's questions were answered. The patient understands and agrees to the procedure.          Electronically by Fe Vizcaino MD, General Surgery  on 2/10/2022 at 9:23 AM      Send copy of H&P to PCP, Prince Arora DO and referring physician, Gus Evans DO      2/10/2022

## 2022-02-10 NOTE — LETTER
[facility] Surgery  [address] [place] [address], [address] [facility]  [address] [id number]  Phone: [phone]  Fax: [phone]    Daniel Miller MD    February 10, 2022     Flaca Kenney DO  [address] [address]    Patient: Bridget Juan III   MR Number: <[id number]>   YOB: 1975   Date of Visit: 2/10/2022       Dear Flaca Kenney: Thank you for referring Chelo Garber to me for evaluation/treatment. Below are the relevant portions of my assessment and plan of care. Progress Note - Follow up    Patient's Name/Date of Birth: Bridget Juan III / 1975    Date: 2/10/2022    PCP: Flaca Kenney DO    Referring Physician:   Naveed Sutherland [address] [place], [place] [address].    Chief Complaint   Patient presents with    GI Problem     yearly fl/u med refills     Rectal Pain       HPI:    The patient has been taking questran once a day and said he is having stool like mud. He said if he doesn't take it, he has  A lot of watery BM daily. He said he has some pain around his rectum as well. He is concerned because his stools are black. He has some intermittent nausea but no vomiting. He is eating ok. He has some intermittent pain over his umbilicus form his hernia. Patient's medications, allergies, past medical, surgical, social and family histories were reviewed and updated as appropriate. Review of Systems  Constitutional: negative  Respiratory: negative  Cardiovascular: negative  Gastrointestinal: as in hpi  Genitourinary:negative  Integument/breast: negative    Physical Exam:  /68   Pulse 63   Temp 97.5 °F (36.4 °C) (Temporal)   Resp 16   Ht 5' 8\" (1.727 m)   Wt 155 lb (70.3 kg)   BMI 23.57 kg/m²   General appearance: alert, cooperative and in no acute distress. Lungs: normal work of breathing  Heart: regular rate  Abdomen: soft, nontender, nondistended  Musculoskeletal: symmetrical without edema.   Skin: normal Impression/Plan:  42y.o. year old male with gastroparesis, change in bowel habits, dark stools    Continue Questran daily    I will set the patient up for a colonoscopy, possible biopsy, possible polypectomy. I explained the risks including but not limited to bowel perforation, postoperative bleeding, post-polypectomy syndrome, as well as the possibility of needing emergency surgery or another colonoscopy. The benefits, alternatives, and potential complications associated with the above procedure to be performed and transfusions when applicable with the patient/responsible person prior to the procedure. All of the patient's questions were answered. The patient understands and agrees to the procedure. Electronically by Candi Bloom MD, General Surgery  on 2/10/2022 at 9:23 AM      Send copy of H&P to PCP, Tariq Lauren DO and referring physician, Kirstin Fried DO      2/10/2022                 If you have questions, please do not hesitate to call me. I look forward to following Mauricio Cano along with you.     Sincerely,      Marty Marx MD

## 2022-02-11 ENCOUNTER — HOSPITAL ENCOUNTER (OUTPATIENT)
Age: 47
Discharge: HOME | End: 2022-02-13
Payer: COMMERCIAL

## 2022-02-11 ENCOUNTER — TELEPHONE (OUTPATIENT)
Dept: SURGERY | Age: 47
End: 2022-02-11

## 2022-02-11 DIAGNOSIS — Z01.818 PREOP TESTING: ICD-10-CM

## 2022-02-11 PROCEDURE — U0005 INFEC AGEN DETEC AMPLI PROBE: HCPCS

## 2022-02-11 PROCEDURE — U0003 INFECTIOUS AGENT DETECTION BY NUCLEIC ACID (DNA OR RNA); SEVERE ACUTE RESPIRATORY SYNDROME CORONAVIRUS 2 (SARS-COV-2) (CORONAVIRUS DISEASE [COVID-19]), AMPLIFIED PROBE TECHNIQUE, MAKING USE OF HIGH THROUGHPUT TECHNOLOGIES AS DESCRIBED BY CMS-2020-01-R: HCPCS

## 2022-02-11 NOTE — PROGRESS NOTES
[facility] PRE-ADMISSION TESTING INSTRUCTIONS    The Preadmission Testing patient is instructed accordingly using the following criteria (check applicable):    ARRIVAL INSTRUCTIONS:  [x] Parking the day of Surgery is located in the Main Entrance lot. Upon entering the door, make an immediate right to the surgery reception desk    [x] Bring photo ID and insurance card    [] Bring in a copy of Living will or Durable Power of  papers. [x] Please be sure to arrange for responsible adult to provide transportation to and from the hospital    [x] Please arrange for responsible adult to be with you for the 24 hour period post procedure due to having anesthesia      GENERAL INSTRUCTIONS:    [x] Nothing by mouth after midnight, including gum, candy, mints or water    [x] You may brush your teeth, but do not swallow any water    [x] Take medications as instructed with 1-2 oz of water    [] Stop herbal supplements and vitamins 5 days prior to procedure    [] Follow preop dosing of blood thinners per physician instructions    [] Take 1/2 dose of evening insulin, but no insulin after midnight    [] No oral diabetic medications after midnight    [] If diabetic and have low blood sugar or feel symptomatic, take 1-2oz apple juice only    [] Bring inhalers day of surgery    [] Bring C-PAP/ Bi-Pap day of surgery    [] Bring urine specimen day of surgery    [x] Shower or bath with soap, lather and rinse well, AM of Surgery, no lotion, powders or creams to surgical site    [x] Follow bowel prep as instructed per surgeon    [x] No tobacco products within 24 hours of surgery     [] No alcohol or illegal drug use within 24 hours of surgery.     [x] Jewelry, body piercing's, eyeglasses, contact lenses and dentures are not permitted into surgery (bring cases)      [x] Please do not wear any nail polish, make up or hair products on the day of surgery    [x] You can expect a call the business day prior to procedure to notify you if your arrival time changes    [x] If you receive a survey after surgery we would greatly appreciate your comments    [] Parent/guardian of a minor must accompany their child and remain on the premises  the entire time they are under our care     [] Pediatric patients may bring favorite toy, blanket or comfort item with them    [] A caregiver or family member must remain with the patient during their stay if they are mentally handicapped, have dementia, disoriented or unable to use a call light or would be a safety concern if left unattended    [x] Please notify surgeon if you develop any illness between now and time of surgery (cold, cough, sore throat, fever, nausea, vomiting) or any signs of infections  including skin, wounds, and dental.    [x]  The Outpatient Pharmacy is available to fill your prescription here on your day of surgery, ask your preop nurse for details    [] Other instructions    EDUCATIONAL MATERIALS PROVIDED:    [] PAT Preoperative Education Packet/Booklet     [] Medication List    [] Transfusion bracelet applied with instructions    [] Shower with soap, lather and rinse well, and use CHG wipes provided the evening before surgery as instructed    [] Incentive spirometer with instructions        Have you been tested for COVID  Yes           Have you been told you were positive for COVID no  Have you had any known exposure to someone that is positive for COVID No  Do you have a cough                   No              Do you have shortness of breath No                 Do you have a sore throat            No                Are you having chills                    Yes                Are you having muscle aches. No                    Please come to the hospital wearing a mask and have your significant other wear a mask as well. Both of you should check your temperature before leaving to come here,  if it is 100 or higher please call [phone] for instruction.

## 2022-02-13 LAB
SARS-COV-2: NOT DETECTED
SOURCE: NORMAL

## 2022-02-16 ENCOUNTER — ANESTHESIA (OUTPATIENT)
Dept: ENDOSCOPY | Age: 47
End: 2022-02-16
Payer: COMMERCIAL

## 2022-02-16 ENCOUNTER — ANESTHESIA EVENT (OUTPATIENT)
Dept: ENDOSCOPY | Age: 47
End: 2022-02-16
Payer: COMMERCIAL

## 2022-02-16 ENCOUNTER — HOSPITAL ENCOUNTER (OUTPATIENT)
Age: 47
Setting detail: OUTPATIENT SURGERY
Discharge: HOME | End: 2022-02-16
Attending: SURGERY | Admitting: SURGERY
Payer: COMMERCIAL

## 2022-02-16 VITALS
BODY MASS INDEX: 23.49 KG/M2 | TEMPERATURE: 98.4 F | HEIGHT: 68 IN | DIASTOLIC BLOOD PRESSURE: 66 MMHG | RESPIRATION RATE: 16 BRPM | OXYGEN SATURATION: 96 % | WEIGHT: 155 LBS | HEART RATE: 54 BPM | SYSTOLIC BLOOD PRESSURE: 110 MMHG

## 2022-02-16 VITALS
SYSTOLIC BLOOD PRESSURE: 91 MMHG | RESPIRATION RATE: 17 BRPM | DIASTOLIC BLOOD PRESSURE: 53 MMHG | OXYGEN SATURATION: 98 %

## 2022-02-16 DIAGNOSIS — Z01.818 PREOP TESTING: Primary | ICD-10-CM

## 2022-02-16 PROCEDURE — 7100000010 HC PHASE II RECOVERY - FIRST 15 MIN: Performed by: SURGERY

## 2022-02-16 PROCEDURE — 88305 TISSUE EXAM BY PATHOLOGIST: CPT

## 2022-02-16 PROCEDURE — 2709999900 HC NON-CHARGEABLE SUPPLY: Performed by: SURGERY

## 2022-02-16 PROCEDURE — 45380 COLONOSCOPY AND BIOPSY: CPT | Performed by: SURGERY

## 2022-02-16 PROCEDURE — 2580000003 HC RX 258: Performed by: NURSE ANESTHETIST, CERTIFIED REGISTERED

## 2022-02-16 PROCEDURE — 3700000001 HC ADD 15 MINUTES (ANESTHESIA): Performed by: SURGERY

## 2022-02-16 PROCEDURE — 7100000011 HC PHASE II RECOVERY - ADDTL 15 MIN: Performed by: SURGERY

## 2022-02-16 PROCEDURE — 88305 TISSUE EXAM BY PATHOLOGIST: CPT | Performed by: NURSE ANESTHETIST, CERTIFIED REGISTERED

## 2022-02-16 PROCEDURE — 2500000003 HC RX 250 WO HCPCS: Performed by: NURSE ANESTHETIST, CERTIFIED REGISTERED

## 2022-02-16 PROCEDURE — 3609010300 HC COLONOSCOPY W/BIOPSY SINGLE/MULTIPLE: Performed by: SURGERY

## 2022-02-16 PROCEDURE — 3700000000 HC ANESTHESIA ATTENDED CARE: Performed by: SURGERY

## 2022-02-16 PROCEDURE — 6360000002 HC RX W HCPCS: Performed by: NURSE ANESTHETIST, CERTIFIED REGISTERED

## 2022-02-16 RX ORDER — PROPOFOL 10 MG/ML
INJECTION, EMULSION INTRAVENOUS PRN
Status: DISCONTINUED | OUTPATIENT
Start: 2022-02-16 | End: 2022-02-16 | Stop reason: SDUPTHER

## 2022-02-16 RX ORDER — GLYCOPYRROLATE 1 MG/5 ML
SYRINGE (ML) INTRAVENOUS PRN
Status: DISCONTINUED | OUTPATIENT
Start: 2022-02-16 | End: 2022-02-16 | Stop reason: SDUPTHER

## 2022-02-16 RX ORDER — SODIUM CHLORIDE 9 MG/ML
INJECTION, SOLUTION INTRAVENOUS CONTINUOUS PRN
Status: DISCONTINUED | OUTPATIENT
Start: 2022-02-16 | End: 2022-02-16 | Stop reason: SDUPTHER

## 2022-02-16 RX ORDER — SODIUM CHLORIDE 9 MG/ML
INJECTION, SOLUTION INTRAVENOUS CONTINUOUS
Status: DISCONTINUED | OUTPATIENT
Start: 2022-02-16 | End: 2022-02-16 | Stop reason: HOSPADM

## 2022-02-16 RX ADMIN — SODIUM CHLORIDE: 9 INJECTION, SOLUTION INTRAVENOUS at 09:16

## 2022-02-16 RX ADMIN — PROPOFOL 240 MG: 10 INJECTION, EMULSION INTRAVENOUS at 09:20

## 2022-02-16 RX ADMIN — Medication 0.1 MG: at 09:20

## 2022-02-16 ASSESSMENT — PAIN SCALES - GENERAL: PAINLEVEL_OUTOF10: 0

## 2022-02-16 ASSESSMENT — PAIN - FUNCTIONAL ASSESSMENT: PAIN_FUNCTIONAL_ASSESSMENT: 0-10

## 2022-02-16 ASSESSMENT — LIFESTYLE VARIABLES: SMOKING_STATUS: 1

## 2022-02-16 NOTE — PROGRESS NOTES
Went over discharge instructions with patient and girlfriend. Both verbalized understanding of instructions. Doctor spoke with patient before leaving.

## 2022-02-16 NOTE — OP NOTE
Operative Note: Colonoscopy    Parviz Jimenez III     DATE OF PROCEDURE: 2/16/2022  SURGEON: Dr. Peyton Lozano MD, M.D. PREOPERATIVE DIAGNOSES:    Diarrhea    POSTOPERATIVE DIAGNOSES:  R/O microscopic colitis    SPECIMENS:  ID Type Source Tests Collected by Time Destination   A : Terminal Ileum Bx Tissue Colon SURGICAL PATHOLOGY Molina Guallpa MD 2/16/2022 3337    B : Random Colon Bxs Tissue Colon SURGICAL PATHOLOGY Molina Guallpa MD 2/16/2022 0685         OPERATION:   Colonoscopy to the terminal ileum with random colon biopsies and TI biopsies     ANESTHESIA: LMAC    COMPLICATIONS: None. BLOOD LOSS: Minimal    Procedure Note:    CONSENT AND INDICATIONS:  This is a 42y.o. year old male who is having the above. I have discussed with the patient and/or the patient representative the indication, alternatives, and the possible risks and/or complications of the planned procedure and the anesthesia methods. The patient and/or patient representative appear to understand and agree to proceed. OPERATIONS: Bowel prep was done yesterday until the bowels were clear. The patient was placed on the table and sedated by anesthesia. A rectal exam was performed and no mass was felt. A lubricated scope was passed into the rectum which looked normal.  The scope was passed all the way around through the sigmoid, descending, transverse and ascending colon to the cecum. The bowel prep was moderately clear. No abnormalities were seen. The cecum was identified by the appendiceal orifice, ileocecal valve, and light reflex in the RLQ. The terminal ileum was intubated and appeared normal. Biopsies were taken. The scope was then slowly withdrawn, each area was examined again on the way out. Random colon biopsies were taken on the way out of the colon. The scope was retroflexed in the rectum and it was normal . The patient tolerated the procedure well. PLAN: follow up biopsies.     Follow up colonoscopy in 10 years. Physician Signature: Electronically signed by Dr. Gerard Garner MD M.D. FACS    Send copy of H&P to PCP, Bennie Bedoya DO and referring physician, No ref.  provider found

## 2022-02-16 NOTE — ANESTHESIA POSTPROCEDURE EVALUATION
Department of Anesthesiology  Postprocedure Note    Patient: Yana Masters  MRN: [id number]  YOB: 1975  Date of evaluation: 2/16/2022  Time:  12:46 PM     Procedure Summary     Date: 02/16/22 Room / Location: [facility] / [facility]    Anesthesia Start: 1185 Anesthesia Stop: 9901    Procedure: COLONOSCOPY WITH BIOPSY (N/A ) Diagnosis: (GASTROPARESIS)    Surgeons: Mundo Hernandez MD Responsible Provider: Clare Tolbert MD    Anesthesia Type: MAC ASA Status: 3          Anesthesia Type: MAC    Aydee Phase I: Aydee Score: 10    Aydee Phase II: Aydee Score: 10    Last vitals: Reviewed and per EMR flowsheets.        Anesthesia Post Evaluation    Patient location during evaluation: PACU  Patient participation: complete - patient participated  Level of consciousness: awake and alert  Airway patency: patent  Nausea & Vomiting: no nausea and no vomiting  Complications: no  Cardiovascular status: hemodynamically stable  Respiratory status: acceptable  Hydration status: euvolemic

## 2022-02-16 NOTE — ANESTHESIA PRE PROCEDURE
Department of Anesthesiology  Preprocedure Note       Name:  Jae Head   Age:  42 y.o.  :  1975                                          MRN:  [id number]         Date:  2022      Surgeon: Ulises Guo):  Jaqui Alfonso MD    Procedure: Procedure(s):  EGD    Medications prior to admission:   Prior to Admission medications    Medication Sig Start Date End Date Taking? Authorizing Provider   cholestyramine (QUESTRAN) 4 g packet Take 1 packet by mouth daily as needed (daily as needed) 2/10/22   Jaqui Alfonso MD   buPROPion (WELLBUTRIN XL) 150 MG extended release tablet take 1 tablet by mouth once daily 22   Historical Provider, MD   pentoxifylline (TRENTAL) 400 MG extended release tablet take 1 tablet by mouth once daily 22   Historical Provider, MD   tadalafil (CIALIS) 5 MG tablet take 1 tablet by mouth once daily at bedtime 21   Historical Provider, MD   simvastatin (ZOCOR) 10 MG tablet Take 10 mg by mouth nightly    Historical Provider, MD   metoprolol succinate (TOPROL XL) 50 MG extended release tablet Take 1 tablet by mouth 2 times daily 22   Cruz Stacy MD   flecainide (TAMBOCOR) 100 MG tablet Take 1 tablet by mouth 2 times daily 21   Meliton Brennan MD   diazePAM (VALIUM) 5 MG tablet Take 5 mg by mouth 2 times daily. Pt takes 10mg at night only, pt does not take twice a day    Historical Provider, MD   traZODone (DESYREL) 50 MG tablet Take 100 mg by mouth nightly     Historical Provider, MD   busPIRone (BUSPAR) 15 MG tablet Take 15 mg by mouth 2 times daily  10/30/19   Historical Provider, MD   omeprazole (PRILOSEC) 40 MG delayed release capsule TK ONE C PO  QD 10/25/19   Historical Provider, MD       Current medications:    No current facility-administered medications for this visit. No current outpatient medications on file.      Facility-Administered Medications Ordered in Other Visits   Medication Dose Route Frequency Provider Last Rate Last Admin    0.9 % sodium chloride infusion   IntraVENous Continuous Fe Vizcaino MD           Allergies: Allergies   Allergen Reactions    Biaxin [Clarithromycin] Palpitations    Effexor [Venlafaxine Hydrochloride] Palpitations     Passed out    Aspirin Other (See Comments)     Ringing in ears    Cardizem [Diltiazem Hcl] Other (See Comments)     Ringing in the ears       Problem List:    Patient Active Problem List   Diagnosis Code    Hand pain, right M79.641    Depression F32. A    Helicobacter pylori gastritis K29.70, B96.81    Symptomatic cholelithiasis K80.20    A-fib ([facility]) I48.91    Atrial fibrillation (HCC) I48.91    Atypical chest pain R07.89    Chronic obstructive pulmonary disease (HCC) J44.9    Schizophrenia (HCC) F20.9    Chest pain R07.9    Leukocytosis D72.829    Atrial fibrillation with RVR ([facility]) I48.91    Diarrhea R19.7       Past Medical History:        Diagnosis Date    A-fib ([address] 2021    Anxiety with depression     Arthritis     Hand pain, right     History of Helicobacter pylori infection     Hyperlipidemia     Restless leg syndrome     Scoliosis     Screening for colon cancer 2022       Past Surgical History:        Procedure Laterality Date    ABDOMEN SURGERY N/A 03/15/2018    wound exploration umbilicus, excsion suture granuloma    CHOLECYSTECTOMY  2017    COLONOSCOPY  2016    HERNIA REPAIR  09/19/2017    DC LAP,CHOLECYSTECTOMY N/A 5/29/2018    LAPAROSCOPIC  CHOLECYSTECTOMY performed by Susan Romo MD at [address] [facility] WND EXTEN/COMPLIC N/A 6/57/5007    ABDOMINAL WOUND EXPLORATION , REMOVAL OF SUTURE GRANULOMA performed by Susan Romo MD at [address] [facility]  4/9/2018    EGD BIOPSY performed by Susan Romo MD at [place] [address] N/A 5/21/2018    EGD BIOPSY performed by Susan Romo MD at [facility] ENDOSCOPY       Social History:    Social History     Tobacco Use    Smoking status: Current Every Day Smoker     Packs/day: 1.00     Years: 18.00     Pack years: 18.00     Types: Cigarettes    Smokeless tobacco: Never Used    Tobacco comment: Before up to 2 ppd   Substance Use Topics    Alcohol use: Not Currently                                Ready to quit: Not Answered  Counseling given: Not Answered  Comment: Before up to 2 ppd      Vital Signs (Current): There were no vitals filed for this visit. BP Readings from Last 3 Encounters:   02/10/22 110/68   02/09/22 102/62   09/01/21 120/68       NPO Status:                                                                                 BMI:   Wt Readings from Last 3 Encounters:   02/11/22 155 lb (70.3 kg)   02/10/22 155 lb (70.3 kg)   02/09/22 155 lb (70.3 kg)     There is no height or weight on file to calculate BMI.    CBC:   Lab Results   Component Value Date    WBC 10.8 08/03/2021    RBC 5.11 08/03/2021    HGB 15.2 08/03/2021    HCT 46.0 08/03/2021    MCV 90.0 08/03/2021    RDW 14.2 08/03/2021     08/03/2021       CMP:   Lab Results   Component Value Date     08/03/2021    K 4.0 08/03/2021     08/03/2021    CO2 23 08/03/2021    BUN 10 08/03/2021    CREATININE 1.0 08/03/2021    GFRAA >60 08/03/2021    AGRATIO 1.4 06/29/2021    LABGLOM >60 08/03/2021    GLUCOSE 113 08/03/2021    GLUCOSE 90 11/11/2011    PROT 7.4 06/29/2021    CALCIUM 9.2 08/03/2021    BILITOT 0.7 06/29/2021    ALKPHOS 67 06/29/2021    AST 17 06/29/2021    ALT 28 06/29/2021       POC Tests: No results for input(s): POCGLU, POCNA, POCK, POCCL, POCBUN, POCHEMO, POCHCT in the last 72 hours.     Coags:   Lab Results   Component Value Date    PROTIME 11.7 05/03/2021    INR 1.1 05/03/2021    APTT 33.9 05/03/2021       HCG (If Applicable): No results found for: PREGTESTUR, PREGSERUM, HCG, HCGQUANT     ABGs:   Lab Results   Component Value Date    W9THYTWJ 99.4 10/31/2010        Type & Screen (If Applicable):  No results found for: LABABO, [address]    Anesthesia Evaluation  Patient summary reviewed and Nursing notes reviewed no history of anesthetic complications:   Airway: Mallampati: III  TM distance: >3 FB   Neck ROM: full  Mouth opening: > = 3 FB Dental:      Comment: Poor dentition    Pulmonary: breath sounds clear to auscultation  (+) COPD:  current smoker          Patient smoked on day of surgery. Cardiovascular:Negative CV ROS  Exercise tolerance: good (>4 METS),   (+) dysrhythmias: atrial fibrillation,         Rhythm: regular  Rate: normal           Beta Blocker:  Not on Beta Blocker         Neuro/Psych:   (+) psychiatric history (Schizophrenia ):depression/anxiety              ROS comment: Restless leg syndrome GI/Hepatic/Renal:   (+) GERD: well controlled, bowel prep,           Endo/Other:    (+) : arthritis: OA., .                  ROS comment: MJ abuse Abdominal:             Vascular:   + PVD, aortic or cerebral, . Other Findings:               Anesthesia Plan      MAC     ASA 3       Induction: intravenous. Anesthetic plan and risks discussed with patient. Plan discussed with CRNA and surgical team.            DOS STAFF ADDENDUM:    Patient seen and examined, physical exam updated as needed, chart reviewed. NPO status confirmed. Anesthetic options and risks discussed with patient/legal guardian. Patient/legal guardian verbalized understanding and agrees to proceed.      Delevan BECK Craig - CRNA  Staff CRNA  February 16, 2022  9:09 AM                      Kaleigh Levin MD   2/16/2022

## 2022-02-16 NOTE — H&P
Patient's office history and physical was reviewed. Patient examined. There has been no change in the patient's history and physical.      Physician Signature: Electronically signed by Dr. Luisa Regan  Patient's Name/Date of Birth: Bridget Juan III / 1975    Date: 2/10/2022    PCP: Flaca Kenney DO    Referring Physician:   No ref. provider found  N/A    No chief complaint on file. HPI:    The patient has been taking Staci once a day and said he is having stool like mud. He said if he doesn't take it, he has  A lot of watery BM daily. He said he has some pain around his rectum as well. He is concerned because his stools are black. He has some intermittent nausea but no vomiting. He is eating ok. He has some intermittent pain over his umbilicus form his hernia. Patient's medications, allergies, past medical, surgical, social and family histories were reviewed and updated as appropriate. Review of Systems  Constitutional: negative  Respiratory: negative  Cardiovascular: negative  Gastrointestinal: as in hpi  Genitourinary:negative  Integument/breast: negative    Physical Exam:  BP (!) 112/59   Pulse 65   Temp 97.6 °F (36.4 °C) (Temporal)   Resp 18   Ht 5' 8\" (1.727 m)   Wt 155 lb (70.3 kg)   SpO2 94%   BMI 23.57 kg/m²   General appearance: alert, cooperative and in no acute distress. Lungs: normal work of breathing  Heart: regular rate  Abdomen: soft, nontender, nondistended  Musculoskeletal: symmetrical without edema. Skin: normal       Impression/Plan:  42y.o. year old male with gastroparesis, change in bowel habits, dark stools    Continue Questran daily    I will set the patient up for a colonoscopy, possible biopsy, possible polypectomy. I explained the risks including but not limited to bowel perforation, postoperative bleeding, post-polypectomy syndrome, as well as the possibility of needing emergency surgery or another colonoscopy.  The benefits, alternatives, and potential complications associated with the above procedure to be performed and transfusions when applicable with the patient/responsible person prior to the procedure. All of the patient's questions were answered. The patient understands and agrees to the procedure. Electronically by Karyn Gutierrez MD, General Surgery  on 2/16/2022 at 9:17 AM      Send copy of H&P to PCP, Pamella Crowe DO and referring physician, No ref.  provider found      2/10/2022

## 2022-02-18 ENCOUNTER — HOSPITAL ENCOUNTER (EMERGENCY)
Age: 47
Discharge: HOME | End: 2022-02-18
Payer: COMMERCIAL

## 2022-02-18 ENCOUNTER — APPOINTMENT (OUTPATIENT)
Dept: CT IMAGING | Age: 47
End: 2022-02-18
Payer: COMMERCIAL

## 2022-02-18 VITALS
DIASTOLIC BLOOD PRESSURE: 65 MMHG | SYSTOLIC BLOOD PRESSURE: 109 MMHG | HEART RATE: 65 BPM | RESPIRATION RATE: 16 BRPM | OXYGEN SATURATION: 96 % | TEMPERATURE: 98 F

## 2022-02-18 DIAGNOSIS — S16.1XXA ACUTE STRAIN OF NECK MUSCLE, INITIAL ENCOUNTER: Primary | ICD-10-CM

## 2022-02-18 PROCEDURE — 96372 THER/PROPH/DIAG INJ SC/IM: CPT

## 2022-02-18 PROCEDURE — 70450 CT HEAD/BRAIN W/O DYE: CPT

## 2022-02-18 PROCEDURE — 72125 CT NECK SPINE W/O DYE: CPT

## 2022-02-18 PROCEDURE — 6360000002 HC RX W HCPCS: Performed by: PHYSICIAN ASSISTANT

## 2022-02-18 PROCEDURE — 99283 EMERGENCY DEPT VISIT LOW MDM: CPT

## 2022-02-18 RX ORDER — METHOCARBAMOL 750 MG/1
750 TABLET, FILM COATED ORAL 3 TIMES DAILY
Qty: 15 TABLET | Refills: 0 | Status: SHIPPED | OUTPATIENT
Start: 2022-02-18 | End: 2022-02-23

## 2022-02-18 RX ORDER — DEXAMETHASONE 4 MG/1
4 TABLET ORAL 2 TIMES DAILY WITH MEALS
Qty: 10 TABLET | Refills: 0 | Status: SHIPPED | OUTPATIENT
Start: 2022-02-18 | End: 2022-02-23

## 2022-02-18 RX ORDER — ORPHENADRINE CITRATE 30 MG/ML
60 INJECTION INTRAMUSCULAR; INTRAVENOUS ONCE
Status: COMPLETED | OUTPATIENT
Start: 2022-02-18 | End: 2022-02-18

## 2022-02-18 RX ADMIN — ORPHENADRINE CITRATE 60 MG: 30 INJECTION, SOLUTION INTRAMUSCULAR; INTRAVENOUS at 18:27

## 2022-02-18 NOTE — ED PROVIDER NOTES
[facility]  Department of Emergency Medicine   ED  Encounter Note  Admit Date/RoomTime: 2022  5:47 PM  ED Room: 3636    NAME: Helen Mott III  : 1975  MRN: [id number]     Chief Complaint:  Back Pain and Neck Pain (started yesterday. woke up today and could barely move.)    History of Present Illness        Helen Mott III is a 42 y.o. old male who presents to the emergency department by private vehicle, for non-traumatic aching, sharp and stabbing right sided neck pain which began 1 day(s) prior to arrival.   The pain was caused by no known cause. He has no prior neck problems. Since onset the symptoms have been persistent. The pain is associated with nothing else except pain on his paraspinal region into his right shoulder up into the base of his head and down into his mid back and denies headaches, syncope, seizures, paralysis, numbness or tingling of hands, numbness or tingling of feet, muscle weakness, involuntary movements or tremor. His symptoms are aggraveated by any movement and relieved by nothing. Patient states that he tried ice, heat, warm baths and nothing is improving it. Patient states he is on flecainide and some other medications due to the fact of his atrial fibrillation. Patient states he is not on blood thinners. Patient states he has a history of scoliosis and restless leg syndrome. Patient states he is compliant with all of his medications. Patient is able to ambulate and has no loss of bowel or bladder function. Patient has no paresthesias noted in his upper extremities. ROS   Pertinent positives and negatives are stated within HPI, all other systems reviewed and are negative.     Past Medical History:  has a past medical history of A-fib ([facility] [address] Anxiety with depression, Arthritis, Hand pain, right, History of Helicobacter pylori infection, Hyperlipidemia, Restless leg syndrome, Scoliosis, and Screening for colon cancer. Surgical History:  has a past surgical history that includes Colonoscopy (2016); hernia repair (09/19/2017); pr secd clos surg wnd exten/complic (N/A, 9/99/1846); Upper gastrointestinal endoscopy (4/9/2018); Abdomen surgery (N/A, 03/15/2018); Upper gastrointestinal endoscopy (N/A, 5/21/2018); pr lap,cholecystectomy (N/A, 5/29/2018); Cholecystectomy (2017); and Colonoscopy (N/A, 2/16/2022). Social History:  reports that he has been smoking cigarettes. He has a 18.00 pack-year smoking history. He has never used smokeless tobacco. He reports previous alcohol use. He reports current drug use. Drug: Marijuana [employer] Family History: family history includes Crohn's Disease in his sister; High Blood Pressure in his father; Mental Illness in his father. Allergies: Biaxin [clarithromycin], Effexor [venlafaxine hydrochloride], Aspirin, and Cardizem [diltiazem hcl]    Physical Exam   Oxygen Saturation Interpretation: Normal.        ED Triage Vitals [02/18/22 1639]   BP Temp Temp src Pulse Resp SpO2 Height Weight   109/65 98 °F (36.7 °C) -- 65 16 96 % -- --         Constitutional:  Alert, development consistent with age. HEENT:  NC/NT. Airway patent. Neck:  In collar: No.          Bony tenderness:  none. Paraspinal tenderness:  level C2, C3, C4 and C5 to the right. Trapezius Tenderness:  moderate to the right. Crepitance: none. Step off: No.        Tracheal deviation: none. JVD: none. ROM: diminished range with pain. Skin:  no wounds, erythema, or swelling. Chest:  Symmetrical without visible rash or tenderness. Respiratory:  Clear to auscultation and breath sounds equal.  CV:  Regular rate and rhythm, normal heart sounds, without pathological murmurs. GI:  Abdomen Soft, nontender. No abrasions, ecchymoses, or lacerations. Back:  No costovertebral, paravertebral, intervertebral, or vertebral tenderness or spasm.   Patient has no midline tenderness noted in cervical, thoracic or lumbar region. Mild paraspinal tenderness noted bilaterally more so on right than left. This is noted in the trapezius region. Patient still will shrug shoulders. No evidence of abscess such as erythema, edema or lymphatic streaking on the entire spine. Pelvis: non tender and stable to palpation. Integument:  No abrasions, ecchymoses, or lacerations unless noted elsewhere. Musculoskeletal:  No extremity tenderness or swelling. Normal, painless range of motion of extremities. No neurovascular deficit. Lymphatics: No lymphangitis or adenopathy noted. Neurological:  Orientation age-appropriate. Motor functions intact. Lab / Imaging Results   (All laboratory and radiology results have been personally reviewed by myself)  Labs:  No results found for this visit on 02/18/22. Imaging: All Radiology results interpreted by Radiologist unless otherwise noted. CT CERVICAL SPINE WO CONTRAST   Final Result   No acute fracture or subluxation. CT HEAD WO CONTRAST   Final Result   1. No acute intracranial abnormality. 2. Mild mucosal disease of the paranasal sinuses. ED Course / Medical Decision Making     Medications   orphenadrine (NORFLEX) injection 60 mg (60 mg IntraMUSCular Given 2/18/22 1827)          Consult(s):   none. Procedure(s):   none. MDM:         NEXUS Criteria For C-Spine Imaging:     []   Focal Neurologic Deficit Present? []   Midline Spinal Tenderness Present? []   Altered Level of Consciousness Present? []   Intoxication Present? []   Distracting Injury Present? Patient is a 82-year-old male that is presenting to the emergency department with a \"popping sensation in his neck that started yesterday. \"  Patient states he has no trauma or falls. Patient denies anything that could have caused this. Patient states that he woke up like that ends progressively gotten worse and 12 pop occurred.   Patient did use Tylenol and ibuprofen as well as a heating pad and ice without relief therefore he came in for evaluation. Patient has no blurry vision, headaches, gait abnormality, loss of bowel or bladder function, chest pain, shortness of breath. Patient has all use of all upper and lower extremities. No neurologic or sensory deficits. On examination patient had paraspinal tenderness with tightness noted in the traps more so on the right side than the left. No obvious step-off deformities of the cervical, thoracic or midline spine. No evidence of abscess. Imaging was obtained based on low suspicion for fracture / bony abnormality, dislocation, retained foreign body, joint effusionas per history/physical findings. No acute fracture or dislocation was noted. Patient was educated this and given some Norflex. Patient cannot be given Toradol due to his aspirin allergy. Patient was told to use ice 20 minutes on 20 minutes off. Muscle relaxers will be given. Patient was told he cannot drive on this medication. Patient was told to follow-up with his family doctor. Patient was advised of worsening signs symptoms of neurologic compromise and when to return back to the emergency department. Patient currently is not experiencing any of these and was told to follow-up accordingly. He voiced understanding and agreed with the plan management. Patient was explicitly instructed on specific signs and symptoms on which to return to the emergency room for. Patient was instructed to return to the ER for any new or worsening symptoms. Additional discharge instructions were given verbally. All questions were answered. Patient is comfortable and agreeable with discharge plan. Patient in no acute distress and non-toxic in appearance.        Plan of Care/Counseling:  Carisa Maldonado PA-C reviewed today's visit with the patient in addition to providing specific details for the plan of care and counseling regarding the diagnosis and prognosis. Questions are answered at this time and are agreeable with the plan. Assessment     1. Acute strain of neck muscle, initial encounter      Plan   Discharged home. Patient condition is stable    New Medications     New Prescriptions    DEXAMETHASONE (DECADRON) 4 MG TABLET    Take 1 tablet by mouth 2 times daily (with meals) for 5 days    METHOCARBAMOL (ROBAXIN-750) 750 MG TABLET    Take 1 tablet by mouth 3 times daily for 5 days     Electronically signed by Reese Beck PA-C   DD: 2/18/22  **This report was transcribed using voice recognition software. Every effort was made to ensure accuracy; however, inadvertent computerized transcription errors may be present.   END OF ED PROVIDER NOTE      Reese Beck PA-C  02/18/22 1005

## 2022-03-14 RX ORDER — FLECAINIDE ACETATE 100 MG/1
100 TABLET ORAL 2 TIMES DAILY
Qty: 180 TABLET | Refills: 3 | Status: SHIPPED | OUTPATIENT
Start: 2022-03-14

## 2022-03-14 NOTE — TELEPHONE ENCOUNTER
He will need a follow-up with Dr. Dewey Gonzalez in 4 weeks if he cannot get a follow-up with Dr. Dewey Gonzalez in 4 weeks he will need an EKG to check his QRS.  Thanks

## 2022-03-16 ENCOUNTER — HOSPITAL ENCOUNTER (EMERGENCY)
Age: 47
Discharge: ANOTHER HOSPITAL | End: 2022-03-17
Attending: EMERGENCY MEDICINE
Payer: COMMERCIAL

## 2022-03-16 DIAGNOSIS — R45.851 SUICIDAL THOUGHTS: Primary | ICD-10-CM

## 2022-03-16 LAB
ACETAMINOPHEN LEVEL: <5 MCG/ML (ref 10–30)
ALBUMIN SERPL-MCNC: 4.2 G/DL (ref 3.5–5.2)
ALP BLD-CCNC: 79 U/L (ref 40–129)
ALT SERPL-CCNC: 21 U/L (ref 0–40)
AMPHETAMINE SCREEN, URINE: NOT DETECTED
ANION GAP SERPL CALCULATED.3IONS-SCNC: 10 MMOL/L (ref 7–16)
AST SERPL-CCNC: 16 U/L (ref 0–39)
BARBITURATE SCREEN URINE: NOT DETECTED
BASOPHILS ABSOLUTE: 0.08 E9/L (ref 0–0.2)
BASOPHILS RELATIVE PERCENT: 0.6 % (ref 0–2)
BENZODIAZEPINE SCREEN, URINE: POSITIVE
BILIRUB SERPL-MCNC: 0.4 MG/DL (ref 0–1.2)
BILIRUBIN URINE: NEGATIVE
BLOOD, URINE: NEGATIVE
BUN BLDV-MCNC: 10 MG/DL (ref 6–20)
CALCIUM SERPL-MCNC: 9 MG/DL (ref 8.6–10.2)
CANNABINOID SCREEN URINE: NOT DETECTED
CHLORIDE BLD-SCNC: 102 MMOL/L (ref 98–107)
CLARITY: CLEAR
CO2: 25 MMOL/L (ref 22–29)
COCAINE METABOLITE SCREEN URINE: NOT DETECTED
COLOR: YELLOW
CREAT SERPL-MCNC: 1 MG/DL (ref 0.7–1.2)
EOSINOPHILS ABSOLUTE: 0.18 E9/L (ref 0.05–0.5)
EOSINOPHILS RELATIVE PERCENT: 1.4 % (ref 0–6)
ETHANOL: <10 MG/DL (ref 0–0.08)
FENTANYL SCREEN, URINE: NOT DETECTED
GFR AFRICAN AMERICAN: >60
GFR NON-AFRICAN AMERICAN: >60 ML/MIN/1.73
GLUCOSE BLD-MCNC: 90 MG/DL (ref 74–99)
GLUCOSE URINE: NEGATIVE MG/DL
HCT VFR BLD CALC: 46.8 % (ref 37–54)
HEMOGLOBIN: 15.5 G/DL (ref 12.5–16.5)
IMMATURE GRANULOCYTES #: 0.06 E9/L
IMMATURE GRANULOCYTES %: 0.5 % (ref 0–5)
KETONES, URINE: NEGATIVE MG/DL
LEUKOCYTE ESTERASE, URINE: NEGATIVE
LYMPHOCYTES ABSOLUTE: 3.26 E9/L (ref 1.5–4)
LYMPHOCYTES RELATIVE PERCENT: 24.5 % (ref 20–42)
Lab: ABNORMAL
MCH RBC QN AUTO: 30.2 PG (ref 26–35)
MCHC RBC AUTO-ENTMCNC: 33.1 % (ref 32–34.5)
MCV RBC AUTO: 91.1 FL (ref 80–99.9)
METHADONE SCREEN, URINE: NOT DETECTED
MONOCYTES ABSOLUTE: 0.76 E9/L (ref 0.1–0.95)
MONOCYTES RELATIVE PERCENT: 5.7 % (ref 2–12)
NEUTROPHILS ABSOLUTE: 8.94 E9/L (ref 1.8–7.3)
NEUTROPHILS RELATIVE PERCENT: 67.3 % (ref 43–80)
NITRITE, URINE: NEGATIVE
OPIATE SCREEN URINE: NOT DETECTED
OXYCODONE URINE: NOT DETECTED
PDW BLD-RTO: 13.3 FL (ref 11.5–15)
PH UA: 7 (ref 5–9)
PHENCYCLIDINE SCREEN URINE: NOT DETECTED
PLATELET # BLD: 245 E9/L (ref 130–450)
PMV BLD AUTO: 9.7 FL (ref 7–12)
POTASSIUM SERPL-SCNC: 4.5 MMOL/L (ref 3.5–5)
PROTEIN UA: NEGATIVE MG/DL
RBC # BLD: 5.14 E12/L (ref 3.8–5.8)
SALICYLATE, SERUM: <0.3 MG/DL (ref 0–30)
SARS-COV-2, NAAT: NOT DETECTED
SODIUM BLD-SCNC: 137 MMOL/L (ref 132–146)
SPECIFIC GRAVITY UA: 1.01 (ref 1–1.03)
T4 TOTAL: 6.2 MCG/DL (ref 4.5–11.7)
TOTAL PROTEIN: 6.9 G/DL (ref 6.4–8.3)
TRICYCLIC ANTIDEPRESSANTS SCREEN SERUM: NEGATIVE NG/ML
TSH SERPL DL<=0.05 MIU/L-ACNC: 0.64 UIU/ML (ref 0.27–4.2)
UROBILINOGEN, URINE: 1 E.U./DL
WBC # BLD: 13.3 E9/L (ref 4.5–11.5)

## 2022-03-16 PROCEDURE — 84443 ASSAY THYROID STIM HORMONE: CPT

## 2022-03-16 PROCEDURE — 6360000002 HC RX W HCPCS: Performed by: EMERGENCY MEDICINE

## 2022-03-16 PROCEDURE — 6370000000 HC RX 637 (ALT 250 FOR IP): Performed by: EMERGENCY MEDICINE

## 2022-03-16 PROCEDURE — 81003 URINALYSIS AUTO W/O SCOPE: CPT

## 2022-03-16 PROCEDURE — 80053 COMPREHEN METABOLIC PANEL: CPT

## 2022-03-16 PROCEDURE — 80179 DRUG ASSAY SALICYLATE: CPT

## 2022-03-16 PROCEDURE — 84436 ASSAY OF TOTAL THYROXINE: CPT

## 2022-03-16 PROCEDURE — 87635 SARS-COV-2 COVID-19 AMP PRB: CPT

## 2022-03-16 PROCEDURE — 93005 ELECTROCARDIOGRAM TRACING: CPT | Performed by: PHYSICIAN ASSISTANT

## 2022-03-16 PROCEDURE — 85025 COMPLETE CBC W/AUTO DIFF WBC: CPT

## 2022-03-16 PROCEDURE — 82077 ASSAY SPEC XCP UR&BREATH IA: CPT

## 2022-03-16 PROCEDURE — 99285 EMERGENCY DEPT VISIT HI MDM: CPT

## 2022-03-16 PROCEDURE — 96372 THER/PROPH/DIAG INJ SC/IM: CPT

## 2022-03-16 PROCEDURE — 80143 DRUG ASSAY ACETAMINOPHEN: CPT

## 2022-03-16 PROCEDURE — 80307 DRUG TEST PRSMV CHEM ANLYZR: CPT

## 2022-03-16 RX ORDER — METOPROLOL TARTRATE 50 MG/1
50 TABLET, FILM COATED ORAL ONCE
Status: COMPLETED | OUTPATIENT
Start: 2022-03-16 | End: 2022-03-16

## 2022-03-16 RX ORDER — BUSPIRONE HYDROCHLORIDE 5 MG/1
15 TABLET ORAL ONCE
Status: COMPLETED | OUTPATIENT
Start: 2022-03-16 | End: 2022-03-16

## 2022-03-16 RX ORDER — ATORVASTATIN CALCIUM 10 MG/1
10 TABLET, FILM COATED ORAL NIGHTLY
Status: DISCONTINUED | OUTPATIENT
Start: 2022-03-16 | End: 2022-03-17 | Stop reason: HOSPADM

## 2022-03-16 RX ORDER — NICOTINE POLACRILEX 4 MG/1
20 GUM, CHEWING ORAL ONCE
Status: DISCONTINUED | OUTPATIENT
Start: 2022-03-16 | End: 2022-03-16 | Stop reason: CLARIF

## 2022-03-16 RX ORDER — DIAZEPAM 5 MG/1
5 TABLET ORAL ONCE
Status: COMPLETED | OUTPATIENT
Start: 2022-03-16 | End: 2022-03-17

## 2022-03-16 RX ORDER — PANTOPRAZOLE SODIUM 40 MG/1
40 TABLET, DELAYED RELEASE ORAL ONCE
Status: COMPLETED | OUTPATIENT
Start: 2022-03-16 | End: 2022-03-16

## 2022-03-16 RX ORDER — SIMVASTATIN 10 MG
10 TABLET ORAL NIGHTLY
Status: DISCONTINUED | OUTPATIENT
Start: 2022-03-16 | End: 2022-03-16 | Stop reason: CLARIF

## 2022-03-16 RX ORDER — HALOPERIDOL 5 MG/ML
10 INJECTION INTRAMUSCULAR ONCE
Status: COMPLETED | OUTPATIENT
Start: 2022-03-16 | End: 2022-03-16

## 2022-03-16 RX ORDER — FLECAINIDE ACETATE 100 MG/1
100 TABLET ORAL ONCE
Status: COMPLETED | OUTPATIENT
Start: 2022-03-16 | End: 2022-03-16

## 2022-03-16 RX ORDER — BUPROPION HYDROCHLORIDE 150 MG/1
150 TABLET ORAL ONCE
Status: DISCONTINUED | OUTPATIENT
Start: 2022-03-16 | End: 2022-03-17 | Stop reason: HOSPADM

## 2022-03-16 RX ADMIN — PANTOPRAZOLE SODIUM 40 MG: 40 TABLET, DELAYED RELEASE ORAL at 21:26

## 2022-03-16 RX ADMIN — FLECAINIDE ACETATE 100 MG: 100 TABLET ORAL at 19:57

## 2022-03-16 RX ADMIN — BUSPIRONE HYDROCHLORIDE 15 MG: 5 TABLET ORAL at 22:47

## 2022-03-16 RX ADMIN — ATORVASTATIN CALCIUM 10 MG: 10 TABLET, FILM COATED ORAL at 22:46

## 2022-03-16 RX ADMIN — HALOPERIDOL LACTATE 10 MG: 5 INJECTION, SOLUTION INTRAMUSCULAR at 18:06

## 2022-03-16 RX ADMIN — METOPROLOL TARTRATE 50 MG: 50 TABLET, FILM COATED ORAL at 19:56

## 2022-03-16 ASSESSMENT — ENCOUNTER SYMPTOMS
COUGH: 0
EYE DISCHARGE: 0
NAUSEA: 0
EYE PAIN: 0
SHORTNESS OF BREATH: 0
SINUS PRESSURE: 0
BACK PAIN: 0
HYPERVENTILATION: 0
ABDOMINAL PAIN: 0
DIARRHEA: 0
WHEEZING: 0
SORE THROAT: 0
EYE REDNESS: 0
VOMITING: 0

## 2022-03-16 NOTE — CARE COORDINATION
Social Work / Discharge Planning:    Return call received from Loni, patient's psychiatrist through [facility]. Per Loni, he has been seeing patient consistently for 1 year. Yesterday patient had expressed SI with no intent to harm himself and no plan. It was suggested that patient go to treatment and patient declined at the time stating he \"doesn't have a plan yet\". Patient agreed to present to ED if he felt suicidal. Loni reports patient may be having legal problems that are \"coming back to haunt him\" but has not admitted to anything specific. Reports patient may be feeling desperate. Aditi provided cell number if physician has any questions, [phone]. Updated ED PCP. Patient pink slipped for SI. Telehealth consent form signed by patient. Social work contacted [facility] AFFILIATE OF [facility], spoke to Yajaira Mealing and requested [employer]. Per Yajaira Mealing, it will be several hours for eval. [place] slip and signed consent faxed to [facility]. Call received from patient's counselor Gaylia Cranker. She was provided update on patient. Reports she is not at her desk and does not have additional information to add but thanked social work for update. Contacted Diana with [facility], reports \"medical clearance including negative COVID and pink slip should be enough to admit patient to facility\". This information can be faxed to [phone]. Cheli Kyra unsure if they can provide transport. Suggested that social work \"call back after information is faxed and she should have an answer by then\". Social work will wait to cancel [employer] until patient is accepted at [facility]. Social work will continue to follow.  Electronically signed by ROYCE Parker on 3/16/22 at 2:39 PM EDT

## 2022-03-16 NOTE — ED NOTES
MILLER called spoke with Teresa MCCARTHY at [facility].   Patient still needing his tele health done     Tamy Hairston RN  03/16/22 1845

## 2022-03-16 NOTE — ED NOTES
Department of Emergency Medicine  FIRST PROVIDER TRIAGE NOTE             Independent MLP           3/16/22  12:13 PM EDT    Date of Encounter: 3/16/22   MRN: [id number]      HPI: Annia Ch is a 42 y.o. male who presents to the ED for Other (medical clearance to go to [facility] for inpatient counseling )   \  ROS: Negative for cp, sob, abd pain, back pain, fever, cough, vomiting, diarrhea, urinary complaints, rash, headache, dizziness, Suicidal ideation or Homicidal Ideation. PE: Gen Appearance/Constitutional: alert     Initial Plan of Care: All treatment areas with department are currently occupied. Plan to order/Initiate the following while awaiting opening in ED: labs and EKG.   Initiate Treatment-Testing, Proceed toTreatment Area When Bed Available for ED Attending/MLP to Continue Care    Electronically signed by Apollo Rodriguez PA-C   DD: 3/16/22         Apollo Rodriguez PA-C  03/16/22 8401

## 2022-03-16 NOTE — ED NOTES
[facility] IS AWARE THAT PT NEEDS ASSESSED    COVID PENDING     MEDICAL CLEARANCE IS NOT NOTED    RECEIVED PINK SLIP AND TELE-HEALTH RELEASE    [facility] WILL CALL OVER TO ASSESS WHEN Karlie Schmitz A Yrn [place]  03/16/22 4522

## 2022-03-16 NOTE — ED PROVIDER NOTES
44-year-old male history of atrial fibrillation COPD schizophrenia presents to the emergency department for voluntary evaluation and clearance to go to a local psychiatric facility in [place]. Per the patient he has been accepted and just requires medical clearance to go to this facility which he understands to be a place where he can go to voluntarily and leave it his own cognizance. Patient states he has had some suicidal thoughts but no plan no real concerns that he is going to actually go forward with suicide but has had some thoughts. He states he currently has no thoughts he is not homicidal he is on no drugs or alcohol. He states no chest pain shortness of breath or other complaints at this time. The history is provided by the patient. Mental Health Problem  Presenting symptoms: depression and suicidal thoughts (without plan)    Presenting symptoms: no suicidal threats and no suicide attempt    Degree of incapacity (severity):  Mild  Onset quality:  Gradual  Timing:  Intermittent  Progression:  Waxing and waning  Chronicity:  New  Relieved by:  Nothing  Worsened by:  Nothing  Ineffective treatments:  None tried  Associated symptoms: no abdominal pain, no anxiety, no appetite change, no chest pain, no decreased need for sleep, no euphoric mood, no fatigue, no headaches, no hypersomnia, no hyperventilation, no insomnia, no poor judgment and no school problems         Review of Systems   Constitutional: Negative for appetite change, chills, fatigue and fever. HENT: Negative for ear pain, sinus pressure and sore throat. Eyes: Negative for pain, discharge and redness. Respiratory: Negative for cough, shortness of breath and wheezing. Cardiovascular: Negative for chest pain. Gastrointestinal: Negative for abdominal pain, diarrhea, nausea and vomiting. Genitourinary: Negative for dysuria and frequency. Musculoskeletal: Negative for arthralgias and back pain.    Skin: Negative for rash and wound. Neurological: Negative for weakness and headaches. Hematological: Negative for adenopathy. Psychiatric/Behavioral: Positive for suicidal ideas (without plan). The patient is not nervous/anxious and does not have insomnia. All other systems reviewed and are negative. Physical Exam  Constitutional:       Appearance: Normal appearance. HENT:      Head: Normocephalic and atraumatic. Nose: Nose normal.   Eyes:      Extraocular Movements: Extraocular movements intact. Pupils: Pupils are equal, round, and reactive to light. Cardiovascular:      Rate and Rhythm: Normal rate and regular rhythm. Pulses: Normal pulses. Heart sounds: Normal heart sounds. Pulmonary:      Effort: Pulmonary effort is normal.      Breath sounds: Normal breath sounds. Abdominal:      General: Abdomen is flat. Bowel sounds are normal. There is no distension. Palpations: Abdomen is soft. Tenderness: There is no abdominal tenderness. There is no guarding. Musculoskeletal:         General: Normal range of motion. Skin:     General: Skin is warm. Capillary Refill: Capillary refill takes less than 2 seconds. Neurological:      General: No focal deficit present. Mental Status: He is alert and oriented to person, place, and time. Psychiatric:         Attention and Perception: Attention normal.         Mood and Affect: Mood and affect normal.         Speech: Speech normal.         Behavior: Behavior is uncooperative. Thought Content: Thought content includes suicidal ideation. Thought content does not include homicidal or suicidal plan. Procedures     MDM  Number of Diagnoses or Management Options  Suicidal thoughts  Diagnosis management comments: Patient seen and examined. We did contact the facility the patient was seeking to go to and they state they require pink slip.   In further discussion with the patient and with his psychiatrist patient expressed suicidal thoughts within the last 24 hours and the patient psychiatrists concerned about his legal issues may resulting in him being more desperate. Given this the patient was pink slipped for suicidal thoughts. Work-up was reviewed he was found to be medically cleared. Social work to disposition patient to appropriate facility.            -------------------------------------------- PAST HISTORY ---------------------------------------------  Past Medical History:  has a past medical history of A-fib ([address] Anxiety with depression, Arthritis, Hand pain, right, History of Helicobacter pylori infection, Hyperlipidemia, Restless leg syndrome, Scoliosis, and Screening for colon cancer. Past Surgical History:  has a past surgical history that includes Colonoscopy (2016); hernia repair (09/19/2017); pr secd clos surg wnd exten/complic (N/A, 8/04/2512); Upper gastrointestinal endoscopy (4/9/2018); Abdomen surgery (N/A, 03/15/2018); Upper gastrointestinal endoscopy (N/A, 5/21/2018); pr lap,cholecystectomy (N/A, 5/29/2018); Cholecystectomy (2017); and Colonoscopy (N/A, 2/16/2022). Social History:  reports that he has been smoking cigarettes. He has a 18.00 pack-year smoking history. He has never used smokeless tobacco. He reports previous alcohol use. He reports current drug use. Drug: Marijuana [place]). Family History: family history includes Crohn's Disease in his sister; High Blood Pressure in his father; Mental Illness in his father. The patients home medications have been reviewed.     Allergies: Biaxin [clarithromycin], Effexor [venlafaxine hydrochloride], Aspirin, and Cardizem [diltiazem hcl]    -------------------------------------------------- RESULTS -------------------------------------------------    Lab  Results for orders placed or performed during the hospital encounter of 03/16/22   COVID-19, Rapid    Specimen: Nasopharyngeal Swab   Result Value Ref Range    SARS-CoV-2, NAAT Not Detected Not Detected   Comprehensive Metabolic Panel   Result Value Ref Range    Sodium 137 132 - 146 mmol/L    Potassium 4.5 3.5 - 5.0 mmol/L    Chloride 102 98 - 107 mmol/L    CO2 25 22 - 29 mmol/L    Anion Gap 10 7 - 16 mmol/L    Glucose 90 74 - 99 mg/dL    BUN 10 6 - 20 mg/dL    CREATININE 1.0 0.7 - 1.2 mg/dL    GFR Non-African American >60 >=60 mL/min/1.73    GFR African American >60     Calcium 9.0 8.6 - 10.2 mg/dL    Total Protein 6.9 6.4 - 8.3 g/dL    Albumin 4.2 3.5 - 5.2 g/dL    Total Bilirubin 0.4 0.0 - 1.2 mg/dL    Alkaline Phosphatase 79 40 - 129 U/L    ALT 21 0 - 40 U/L    AST 16 0 - 39 U/L   CBC with Auto Differential   Result Value Ref Range    WBC 13.3 (H) 4.5 - 11.5 E9/L    RBC 5.14 3.80 - 5.80 E12/L    Hemoglobin 15.5 12.5 - 16.5 g/dL    Hematocrit 46.8 37.0 - 54.0 %    MCV 91.1 80.0 - 99.9 fL    MCH 30.2 26.0 - 35.0 pg    MCHC 33.1 32.0 - 34.5 %    RDW 13.3 11.5 - 15.0 fL    Platelets 022 487 - 319 E9/L    MPV 9.7 7.0 - 12.0 fL    Neutrophils % 67.3 43.0 - 80.0 %    Immature Granulocytes % 0.5 0.0 - 5.0 %    Lymphocytes % 24.5 20.0 - 42.0 %    Monocytes % 5.7 2.0 - 12.0 %    Eosinophils % 1.4 0.0 - 6.0 %    Basophils % 0.6 0.0 - 2.0 %    Neutrophils Absolute 8.94 (H) 1.80 - 7.30 E9/L    Immature Granulocytes # 0.06 E9/L    Lymphocytes Absolute 3.26 1.50 - 4.00 E9/L    Monocytes Absolute 0.76 0.10 - 0.95 E9/L    Eosinophils Absolute 0.18 0.05 - 0.50 E9/L    Basophils Absolute 0.08 0.00 - 0.20 E9/L   Serum Drug Screen   Result Value Ref Range    Ethanol Lvl <10 mg/dL    Acetaminophen Level <5.0 (L) 10.0 - 84.4 mcg/mL    Salicylate, Serum <4.3 0.0 - 30.0 mg/dL   Urinalysis   Result Value Ref Range    Color, UA Yellow Straw/Yellow    Clarity, UA Clear Clear    Glucose, Ur Negative Negative mg/dL    Bilirubin Urine Negative Negative    Ketones, Urine Negative Negative mg/dL    Specific Gravity, UA 1.015 1.005 - 1.030    Blood, Urine Negative Negative    pH, UA 7.0 5.0 - 9.0    Protein, UA Negative Negative mg/dL Urobilinogen, Urine 1.0 <2.0 E.U./dL    Nitrite, Urine Negative Negative    Leukocyte Esterase, Urine Negative Negative   Urine Drug Screen   Result Value Ref Range    Amphetamine Screen, Urine NOT DETECTED Negative <1000 ng/mL    Barbiturate Screen, Ur NOT DETECTED Negative < 200 ng/mL    Benzodiazepine Screen, Urine POSITIVE (A) Negative < 200 ng/mL    Cannabinoid Scrn, Ur NOT DETECTED Negative < 50ng/mL    Cocaine Metabolite Screen, Urine NOT DETECTED Negative < 300 ng/mL    Opiate Scrn, Ur NOT DETECTED Negative < 300ng/mL    PCP Screen, Urine NOT DETECTED Negative < 25 ng/mL    Methadone Screen, Urine NOT DETECTED Negative <300 ng/mL    Oxycodone Urine NOT DETECTED Negative <100 ng/mL    FENTANYL SCREEN, URINE NOT DETECTED Negative <1 ng/mL    Drug Screen Comment: see below    TSH   Result Value Ref Range    TSH 0.644 0.270 - 4.200 uIU/mL   T4   Result Value Ref Range    T4, Total 6.2 4.5 - 11.7 mcg/dL   EKG 12 Lead   Result Value Ref Range    Ventricular Rate 53 BPM    Atrial Rate 53 BPM    P-R Interval 164 ms    QRS Duration 100 ms    Q-T Interval 416 ms    QTc Calculation (Bazett) 390 ms    P Axis 74 degrees    R Axis 65 degrees    T Axis 64 degrees       Radiology  CT HEAD WO CONTRAST    Result Date: 2/18/2022  EXAMINATION: CT OF THE HEAD WITHOUT CONTRAST  2/18/2022 6:40 pm TECHNIQUE: CT of the head was performed without the administration of intravenous contrast. Dose modulation, iterative reconstruction, and/or weight based adjustment of the mA/kV was utilized to reduce the radiation dose to as low as reasonably achievable. COMPARISON: None. HISTORY: ORDERING SYSTEM PROVIDED HISTORY: neck pain TECHNOLOGIST PROVIDED HISTORY: Has a \"code stroke\" or \"stroke alert\" been called? ->No Reason for exam:->neck pain Decision Support Exception - unselect if not a suspected or confirmed emergency medical condition->Emergency Medical Condition (MA) FINDINGS: There are no areas of abnormal attenuation within the brain parenchyma. No evidence of mass, mass effect, or midline shift. The ventricles and sulci are of normal size and configuration. No extra-axial fluid collections or acute hemorrhage. There is prominence of the extra-axial space in the midline posterior fossa suggesting abbie cisterna magna. The gray-white differentiation appears preserved without evidence of acute cortical ischemia. The calvarium is intact. There is polypoid mucosal thickening within the bilateral maxillary sinuses. There is mucosal thickening within bilateral ethmoid air cells and right sphenoid sinus. The mastoid air cells are clear. 1. No acute intracranial abnormality. 2. Mild mucosal disease of the paranasal sinuses. CT CERVICAL SPINE WO CONTRAST    Result Date: 2/18/2022  EXAMINATION: CT OF THE CERVICAL SPINE WITHOUT CONTRAST 2/18/2022 6:40 pm TECHNIQUE: CT of the cervical spine was performed without the administration of intravenous contrast. Multiplanar reformatted images are provided for review. Dose modulation, iterative reconstruction, and/or weight based adjustment of the mA/kV was utilized to reduce the radiation dose to as low as reasonably achievable. COMPARISON: None. HISTORY: ORDERING SYSTEM PROVIDED HISTORY: neck strain TECHNOLOGIST PROVIDED HISTORY: Reason for exam:->neck strain Decision Support Exception - unselect if not a suspected or confirmed emergency medical condition->Emergency Medical Condition (MA) FINDINGS: There is no evidence of acute fracture. Vertebral alignment is anatomic. Vertebral body and intervertebral disc space heights are preserved. The facet joints are intact at all levels. The prevertebral soft tissue structures are unremarkable. There is no evidence of central canal stenosis or neural foraminal narrowing. There are emphysematous changes at the lung apices. No acute fracture or subluxation. EKG: This EKG is signed and interpreted by me.     Rate: 53  Rhythm: Sinus  Interpretation: sinus bradycardia  Comparison: was normal      ------------------------- NURSING NOTES AND VITALS REVIEWED ---------------------------  Date / Time Roomed:  3/16/2022 12:34 PM  ED Bed Assignment:  11/11    The nursing notes within the ED encounter and vital signs as below have been reviewed. Patient Vitals for the past 24 hrs:   BP Temp Pulse Resp SpO2 Height Weight   03/16/22 1213 (!) 112/59 98 °F (36.7 °C) 84 16 98 % 5' 8\" (1.727 m) 155 lb (70.3 kg)       Oxygen Saturation Interpretation: Normal      ------------------------------------------ PROGRESS NOTES ------------------------------------------  I have spoken with the patient and discussed todays results, in addition to providing specific details for the plan of care and counseling regarding the diagnosis and prognosis. Their questions are answered at this time and they are agreeable with the plan. I have discussed the risks and benefits of transfer and they wish to proceed with the transfer. --------------------------------- ADDITIONAL PROVIDER NOTES ---------------------------------  Reason for transfer: [id number] [facility].    This patient's ED course included: a personal history and physicial examination, re-evaluation prior to disposition, multiple bedside re-evaluations, IV medications, cardiac monitoring and continuous pulse oximetry    This patient has remained hemodynamically stable during their ED course. Please note that the withdrawal or failure to initiate urgent interventions for this patient would likely result in a life threatening deterioration or permanent disability. Accordingly this patient received 0 minutes of critical care time, excluding separately billable procedures. Clinical Impression  1. Suicidal thoughts          Disposition  Patient's disposition: Transfer per social work. Transferred by: mobile. Patient's condition is fair.        Radha Segovia,   03/16/22 1520

## 2022-03-16 NOTE — ED NOTES
Patient is refusing the safety gown. Dr. Arsenio Henriquez notified of such.      Janett Sim  03/16/22 1539

## 2022-03-17 ENCOUNTER — HOSPITAL ENCOUNTER (INPATIENT)
Age: 47
LOS: 1 days | Discharge: HOME | DRG: 751 | End: 2022-03-18
Attending: PSYCHIATRY & NEUROLOGY | Admitting: PSYCHIATRY & NEUROLOGY
Payer: COMMERCIAL

## 2022-03-17 VITALS
BODY MASS INDEX: 23.49 KG/M2 | TEMPERATURE: 97.9 F | SYSTOLIC BLOOD PRESSURE: 99 MMHG | DIASTOLIC BLOOD PRESSURE: 62 MMHG | HEIGHT: 68 IN | HEART RATE: 72 BPM | OXYGEN SATURATION: 97 % | RESPIRATION RATE: 16 BRPM | WEIGHT: 155 LBS

## 2022-03-17 PROBLEM — R45.851 SUICIDAL IDEATIONS: Status: RESOLVED | Noted: 2022-03-17 | Resolved: 2022-03-17

## 2022-03-17 PROBLEM — R45.851 SUICIDAL IDEATIONS: Status: ACTIVE | Noted: 2022-03-17

## 2022-03-17 PROBLEM — F33.0 MILD EPISODE OF RECURRENT MAJOR DEPRESSIVE DISORDER (HCC): Status: ACTIVE | Noted: 2022-03-17

## 2022-03-17 LAB
CHOLESTEROL, TOTAL: 149 MG/DL (ref 0–199)
EKG ATRIAL RATE: 53 BPM
EKG P AXIS: 74 DEGREES
EKG P-R INTERVAL: 164 MS
EKG Q-T INTERVAL: 416 MS
EKG QRS DURATION: 100 MS
EKG QTC CALCULATION (BAZETT): 390 MS
EKG R AXIS: 65 DEGREES
EKG T AXIS: 64 DEGREES
EKG VENTRICULAR RATE: 53 BPM
HBA1C MFR BLD: 5.8 % (ref 4–5.6)
HDLC SERPL-MCNC: 32 MG/DL
LDL CHOLESTEROL CALCULATED: 95 MG/DL (ref 0–99)
TRIGL SERPL-MCNC: 109 MG/DL (ref 0–149)
VLDLC SERPL CALC-MCNC: 22 MG/DL

## 2022-03-17 PROCEDURE — 93010 ELECTROCARDIOGRAM REPORT: CPT | Performed by: INTERNAL MEDICINE

## 2022-03-17 PROCEDURE — 83036 HEMOGLOBIN GLYCOSYLATED A1C: CPT

## 2022-03-17 PROCEDURE — 80061 LIPID PANEL: CPT

## 2022-03-17 PROCEDURE — 6370000000 HC RX 637 (ALT 250 FOR IP): Performed by: PSYCHIATRY & NEUROLOGY

## 2022-03-17 PROCEDURE — 6370000000 HC RX 637 (ALT 250 FOR IP): Performed by: EMERGENCY MEDICINE

## 2022-03-17 PROCEDURE — 36415 COLL VENOUS BLD VENIPUNCTURE: CPT

## 2022-03-17 PROCEDURE — 1240000000 HC EMOTIONAL WELLNESS R&B

## 2022-03-17 PROCEDURE — 6370000000 HC RX 637 (ALT 250 FOR IP)

## 2022-03-17 PROCEDURE — 6370000000 HC RX 637 (ALT 250 FOR IP): Performed by: NURSE PRACTITIONER

## 2022-03-17 PROCEDURE — 99218 PR INITIAL OBSERVATION CARE/DAY 30 MINUTES: CPT | Performed by: NURSE PRACTITIONER

## 2022-03-17 PROCEDURE — G0378 HOSPITAL OBSERVATION PER HR: HCPCS

## 2022-03-17 PROCEDURE — G0379 DIRECT REFER HOSPITAL OBSERV: HCPCS

## 2022-03-17 RX ORDER — CHOLESTYRAMINE 4 G/9G
1 POWDER, FOR SUSPENSION ORAL DAILY PRN
Status: DISCONTINUED | OUTPATIENT
Start: 2022-03-17 | End: 2022-03-18 | Stop reason: HOSPADM

## 2022-03-17 RX ORDER — HYDROXYZINE PAMOATE 50 MG/1
50 CAPSULE ORAL 3 TIMES DAILY PRN
Status: DISCONTINUED | OUTPATIENT
Start: 2022-03-17 | End: 2022-03-18 | Stop reason: HOSPADM

## 2022-03-17 RX ORDER — TRAZODONE HYDROCHLORIDE 50 MG/1
100 TABLET ORAL NIGHTLY
Status: DISCONTINUED | OUTPATIENT
Start: 2022-03-17 | End: 2022-03-17 | Stop reason: HOSPADM

## 2022-03-17 RX ORDER — FLECAINIDE ACETATE 100 MG/1
100 TABLET ORAL 2 TIMES DAILY
Status: DISCONTINUED | OUTPATIENT
Start: 2022-03-17 | End: 2022-03-18 | Stop reason: HOSPADM

## 2022-03-17 RX ORDER — HYDROXYZINE HYDROCHLORIDE 10 MG/1
50 TABLET, FILM COATED ORAL 3 TIMES DAILY PRN
Status: CANCELLED | OUTPATIENT
Start: 2022-03-17

## 2022-03-17 RX ORDER — BUPROPION HYDROCHLORIDE 150 MG/1
150 TABLET ORAL DAILY
Status: DISCONTINUED | OUTPATIENT
Start: 2022-03-18 | End: 2022-03-18 | Stop reason: HOSPADM

## 2022-03-17 RX ORDER — ACETAMINOPHEN 325 MG/1
650 TABLET ORAL EVERY 4 HOURS PRN
Status: DISCONTINUED | OUTPATIENT
Start: 2022-03-17 | End: 2022-03-18 | Stop reason: HOSPADM

## 2022-03-17 RX ORDER — MAGNESIUM HYDROXIDE/ALUMINUM HYDROXICE/SIMETHICONE 120; 1200; 1200 MG/30ML; MG/30ML; MG/30ML
30 SUSPENSION ORAL EVERY 6 HOURS PRN
Status: CANCELLED | OUTPATIENT
Start: 2022-03-17

## 2022-03-17 RX ORDER — HALOPERIDOL 5 MG
5 TABLET ORAL EVERY 4 HOURS PRN
Status: CANCELLED | OUTPATIENT
Start: 2022-03-17

## 2022-03-17 RX ORDER — HALOPERIDOL 5 MG/ML
3 INJECTION INTRAMUSCULAR EVERY 6 HOURS PRN
Status: DISCONTINUED | OUTPATIENT
Start: 2022-03-17 | End: 2022-03-18 | Stop reason: HOSPADM

## 2022-03-17 RX ORDER — HALOPERIDOL 5 MG/ML
5 INJECTION INTRAMUSCULAR EVERY 4 HOURS PRN
Status: CANCELLED | OUTPATIENT
Start: 2022-03-17

## 2022-03-17 RX ORDER — TRAZODONE HYDROCHLORIDE 50 MG/1
50 TABLET ORAL NIGHTLY PRN
Status: DISCONTINUED | OUTPATIENT
Start: 2022-03-17 | End: 2022-03-18 | Stop reason: HOSPADM

## 2022-03-17 RX ORDER — DIAZEPAM 5 MG/1
5 TABLET ORAL NIGHTLY
Status: DISCONTINUED | OUTPATIENT
Start: 2022-03-17 | End: 2022-03-18 | Stop reason: HOSPADM

## 2022-03-17 RX ORDER — PANTOPRAZOLE SODIUM 40 MG/1
40 TABLET, DELAYED RELEASE ORAL
Status: DISCONTINUED | OUTPATIENT
Start: 2022-03-17 | End: 2022-03-18 | Stop reason: HOSPADM

## 2022-03-17 RX ORDER — HALOPERIDOL 2 MG/1
3 TABLET ORAL EVERY 6 HOURS PRN
Status: DISCONTINUED | OUTPATIENT
Start: 2022-03-17 | End: 2022-03-18 | Stop reason: HOSPADM

## 2022-03-17 RX ORDER — TRAZODONE HYDROCHLORIDE 50 MG/1
50 TABLET ORAL NIGHTLY PRN
Status: CANCELLED | OUTPATIENT
Start: 2022-03-17

## 2022-03-17 RX ORDER — ACETAMINOPHEN 325 MG/1
650 TABLET ORAL EVERY 4 HOURS PRN
Status: CANCELLED | OUTPATIENT
Start: 2022-03-17

## 2022-03-17 RX ORDER — METOPROLOL SUCCINATE 25 MG/1
50 TABLET, EXTENDED RELEASE ORAL 2 TIMES DAILY
Status: DISCONTINUED | OUTPATIENT
Start: 2022-03-17 | End: 2022-03-18 | Stop reason: HOSPADM

## 2022-03-17 RX ORDER — NICOTINE 21 MG/24HR
1 PATCH, TRANSDERMAL 24 HOURS TRANSDERMAL DAILY
Status: DISCONTINUED | OUTPATIENT
Start: 2022-03-17 | End: 2022-03-18 | Stop reason: HOSPADM

## 2022-03-17 RX ORDER — MAGNESIUM HYDROXIDE/ALUMINUM HYDROXICE/SIMETHICONE 120; 1200; 1200 MG/30ML; MG/30ML; MG/30ML
30 SUSPENSION ORAL PRN
Status: DISCONTINUED | OUTPATIENT
Start: 2022-03-17 | End: 2022-03-18 | Stop reason: HOSPADM

## 2022-03-17 RX ORDER — BUPROPION HYDROCHLORIDE 300 MG/1
300 TABLET ORAL DAILY
COMMUNITY

## 2022-03-17 RX ADMIN — TRAZODONE HYDROCHLORIDE 50 MG: 50 TABLET ORAL at 22:09

## 2022-03-17 RX ADMIN — FLECAINIDE ACETATE 100 MG: 100 TABLET ORAL at 21:14

## 2022-03-17 RX ADMIN — PANTOPRAZOLE SODIUM 40 MG: 40 TABLET, DELAYED RELEASE ORAL at 11:30

## 2022-03-17 RX ADMIN — ACETAMINOPHEN 650 MG: 325 TABLET ORAL at 21:14

## 2022-03-17 RX ADMIN — METOPROLOL SUCCINATE 50 MG: 25 TABLET, EXTENDED RELEASE ORAL at 11:30

## 2022-03-17 RX ADMIN — FLECAINIDE ACETATE 100 MG: 100 TABLET ORAL at 11:59

## 2022-03-17 RX ADMIN — DIAZEPAM 5 MG: 5 TABLET ORAL at 21:13

## 2022-03-17 RX ADMIN — TRAZODONE HYDROCHLORIDE 100 MG: 50 TABLET ORAL at 00:54

## 2022-03-17 RX ADMIN — DIAZEPAM 5 MG: 5 TABLET ORAL at 00:54

## 2022-03-17 RX ADMIN — METOPROLOL SUCCINATE 50 MG: 25 TABLET, EXTENDED RELEASE ORAL at 22:09

## 2022-03-17 ASSESSMENT — LIFESTYLE VARIABLES
HISTORY_ALCOHOL_USE: NO
HISTORY_ALCOHOL_USE: NO

## 2022-03-17 ASSESSMENT — SLEEP AND FATIGUE QUESTIONNAIRES
SLEEP PATTERN: NORMAL
DO YOU HAVE DIFFICULTY SLEEPING: NO
DO YOU HAVE DIFFICULTY SLEEPING: NO
DO YOU USE A SLEEP AID: YES
DIFFICULTY ARISING: NO
DIFFICULTY STAYING ASLEEP: NO
AVERAGE NUMBER OF SLEEP HOURS: 7
DIFFICULTY FALLING ASLEEP: NO
DO YOU USE A SLEEP AID: YES
DIFFICULTY ARISING: NO
AVERAGE NUMBER OF SLEEP HOURS: 7
DIFFICULTY STAYING ASLEEP: NO
DIFFICULTY FALLING ASLEEP: NO
RESTFUL SLEEP: YES
RESTFUL SLEEP: YES
SLEEP PATTERN: EARLY AWAKENING

## 2022-03-17 ASSESSMENT — PAIN SCALES - GENERAL
PAINLEVEL_OUTOF10: 0
PAINLEVEL_OUTOF10: 8
PAINLEVEL_OUTOF10: 2
PAINLEVEL_OUTOF10: 0
PAINLEVEL_OUTOF10: 0

## 2022-03-17 NOTE — ED NOTES
Spoke with Dr. Skippy Leventhal, patient was reviewed and accepted.       Ignacia Guidry RN  03/17/22 4471

## 2022-03-17 NOTE — PLAN OF CARE
Problem: Altered Mood, Depressive Behavior:  Goal: Ability to disclose and discuss suicidal ideas will improve  Description: Ability to disclose and discuss suicidal ideas will improve  3/17/2022 0949 by Mohit Dover RN  Outcome: Met This Shift  3/17/2022 0615 by Jez Cunningham RN  Outcome: Ongoing     Problem: Altered Mood, Depressive Behavior:  Goal: Absence of self-harm  Description: Absence of self-harm  3/17/2022 0949 by Mohit Dover RN  Outcome: Met This Shift  3/17/2022 0615 by Jez Cunningham RN  Outcome: Ongoing     Problem: Altered Mood, Depressive Behavior:  Goal: Able to verbalize acceptance of life and situations over which he or she has no control  Description: Able to verbalize acceptance of life and situations over which he or she has no control  3/17/2022 0949 by Mohit Dover RN  Outcome: Ongoing  3/17/2022 0615 by Jez Cunningham RN  Outcome: Ongoing     Problem: Altered Mood, Depressive Behavior:  Goal: Able to verbalize and/or display a decrease in depressive symptoms  Description: Able to verbalize and/or display a decrease in depressive symptoms  3/17/2022 0949 by Mohit Dover RN  Outcome: Ongoing  3/17/2022 0615 by Jez Cunningham RN  Outcome: Ongoing     Patient adamantly denies SI/HI/Hallucinations. Patient told this RN that his admission was a big misunderstanding and he had no intention of harming himself. Patient evasive and irritable during conversation. Patient observed out on the unit watching TV, keeps to himself. Patient appears flat, sad, and anxious. Patient denies any symptoms of withdrawal, none observed. Patient does not have any medications ordered at this time. Patient waiting to see Dr. Susan Jonas.

## 2022-03-17 NOTE — ED NOTES
N2N report given to [facility] @[phone]. Patient made aware of transferring to [facility].  Needs to call nurse at [facility] when find out  time       Micah Rowan RN  03/17/22 9966 [facility], RN  03/17/22 7548
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
## 2022-03-17 NOTE — ED NOTES
Patient has been accepted for admission to [facility] by Dr. Ada Morrell. Patient will go to room 7301A. Called admitting and notified Kai Regalado. RN is aware to call nurse to nurse and to arrange for transport. Once set up to call SW with and ETA.       SUNDEEP Peterson, [place]  03/17/22 9375

## 2022-03-17 NOTE — PROGRESS NOTES
[facility]  Initial Interdisciplinary Treatment Plan NOTE    Review Date & Time: 3/17/22 0900    Patient was in treatment team    Admission Type:   Admission Type: Involuntary    Reason for admission:  Reason for Admission: \"I'm not sure, it's complicated with that because I told them that I wasn't going to hurt myself and they pink slipped me. But I guess that I was having suicidal thoughts but I didn't have any plans of acting them out. They still pink slipped me. \"      Estimated Length of Stay Update:  1-3 days  Estimated Discharge Date Update: 3/20/22    EDUCATION:   Learner Progress Toward Treatment Goals: Reviewed results and recommendations of this team and Reviewed goals and plan of care    Method: Small group    Outcome: Verbalized understanding    PATIENT GOALS: None at this time    PLAN/TREATMENT RECOMMENDATIONS UPDATE: Take prescribed medications, attend/particiapte in groups. Continue to provide emotional support to patient.     GOALS UPDATE:   Time frame for Short-Term Goals: Prior to discharge    Maikol Hernandez RN

## 2022-03-17 NOTE — PROGRESS NOTES
42 yr old male presents to the psych unit with an admitting Dx of suicidal ideations. Patient is A+Ox4. Patient denies any current thoughts of SI, HI, or internal stimulation. It's reported that patient has been expressing suicidal ideations for the past several days. Patient's psychiatrist states some previous legal issue is the motivation as to why patient is having suicidal ideations. Patient presents with a calm, flat but pleasant demeanor. Maintains minimal eye contact when communicating. Patient denies any pain or discomfort. Past psych Hx includes schizophrenia. Past medical Hx includes A-fib and COPD. QTC-390  CIWA-0. Patient was positive for benzos upon admission. Patient refused refreshments but was oriented to the unit. All forms have been explained, agreed upon, and signed. Staff will offer support and interventions as requested or required.

## 2022-03-17 NOTE — PROGRESS NOTES
[facility]  Admission Note     Admission Type:   Admission Type: Involuntary    Reason for admission:  Reason for Admission: \"I'm not sure, it's complicated with that because I told them that I wasn't going to hurt myself and they pink slipped me. But I guess that I was having suicidal thoughts but I didn't have any plans of acting them out. They still pink slipped me. \"    PATIENT STRENGTHS:  Strengths: Communication,Motivated,No significant Physical Illness,Employment,Positive Support,Medication Compliance,Social Skills    Patient Strengths and Limitations:  Limitations:  (Denies)    Addictive Behavior:   Addictive Behavior  In the past 3 months, have you felt or has someone told you that you have a problem with:  : None  Do you have a history of Chemical Use?: No  Do you have a history of Alcohol Use?: No  Do you have a history of Street Drug Abuse?: No  Histroy of Prescripton Drug Abuse?: No    Medical Problems:   Past Medical History:   Diagnosis Date    A-fib ([facility] [address] 2021    Anxiety with depression     Arthritis     Hand pain, right     History of Helicobacter pylori infection     Hyperlipidemia     Restless leg syndrome     Scoliosis     Screening for colon cancer 2022       Status EXAM:  Status and Exam  Normal: No  Facial Expression:  (Normal)  Affect: Congruent,Appropriate  Level of Consciousness: Alert  Mood:Normal: No  Mood:  (\"Tired\")  Motor Activity:Normal: Yes  Interview Behavior: Cooperative  Preception: [place] to Person,[place] to Time,[place] to Place,[place] to Situation  Attention:Normal: No  Attention: Distractible  Thought Processes: Circumstantial  Thought Content:Normal: No  Thought Content: Preoccupations  Hallucinations: None  Delusions: No  Memory:Normal: No  Memory: Poor Recent  Insight and Judgment: No  Insight and Judgment: Poor Judgment,Poor Insight  Present Suicidal Ideation: No  Present Homicidal Ideation: No    Tobacco Screening:  Practical Counseling, on admission, pravin X, if applicable and completed (first 3 are required if patient doesn't refuse): (x )  Recognizing danger situations (included triggers and roadblocks)                    ( x)  Coping skills (new ways to manage stress, exercise, relaxation techniques, changing routine, distraction)                                                           ( x)  Basic information about quitting (benefits of quitting, techniques in how to quit, available resources  ( ) Referral for counseling faxed to [facility]                                           ( ) Patient refused counseling  ( ) Patient has not smoked in the last 30 days    Metabolic Screening:    Lab Results   Component Value Date    LABA1C 6.1 (H) 05/22/2021       Lab Results   Component Value Date    CHOL 172 11/11/2011     Lab Results   Component Value Date    TRIG 136 11/11/2011     Lab Results   Component Value Date    HDL 36 05/22/2021    HDL 40.0 (A) 11/11/2011     No components found for: [facility]  Lab Results   Component Value Date    LABVLDL 38 05/22/2021         Body mass index is 23.57 kg/m². BP Readings from Last 2 Encounters:   03/17/22 105/65   03/17/22 99/62           Pt admitted with followings belongings:  Dental Appliances: None  Vision - Corrective Lenses: None  Hearing Aid: None  Jewelry: None  Body Piercings Removed: N/A  Clothing: Dorathy Pacini / coat,Shirt,Socks  Were All Patient Medications Collected?: Not Applicable  Other Valuables: Keys,Cell phone,Wallet,Other (Comment) (, cigarettes)     Patient's home medications were N/A. Patient oriented to surroundings and program expectations and copy of patient rights given. Received admission packet:  YES. Consents reviewed, signed YES. Patient verbalize understanding:  YES.   Patient education on precautions: Georgette Wang RN

## 2022-03-17 NOTE — PROGRESS NOTES
Excused from community meeting. Meeting with  for assessment at same time. Declined to identify daily goal when approached individually after. Observed to be evasive and avoidant of answering questions, walking away multiple times. Eventually went into his bathroom to end interaction attempt.

## 2022-03-17 NOTE — ED NOTES
Patient talking with SW via tele health at this time. Will monitor.      Yoselyn Barry RN  03/17/22 [id number]

## 2022-03-17 NOTE — CARE COORDINATION
FABIO spoke with pt's girlfriend, Kasey Leonard. Rosalba states that she has no concerns for this pt, and doesn't necessarily agree with his admission. Pt  Is able to return to the home at discharge. Rosalba states that there are guns in the home. The guns are locked up and only this pt has access to them. FABIO asked Rosalba if the guns could be removed, but Rosalba states that she cannot do this. Rosalba stated \"he doesn't even think about the guns. I'm not worried about it. \"    FABIO will discuss this with her supervisor.

## 2022-03-17 NOTE — ED NOTES
Patient is alert x oriented,and cooperative at this time. VS stable.  Continues sitter monitor       [employer], RN  03/17/22 [id number]

## 2022-03-17 NOTE — ED NOTES
FABIO notified EN[facility]LL[facility] ED staffing and is awaiting for tele health tablet to be set up in room in order to complete assessment.       SUNDEEP Pereira, [place]  03/17/22 0006

## 2022-03-17 NOTE — GROUP NOTE
Group Therapy Note    Date: 3/17/2022    Group Start Time: 1000  Group End Time: 9095  Group Topic: Psychoeducation    [facility] 7W ACUTE [facility]        Group Therapy Note    Attendees: 10           Notes: Active and engaged during discussion on managing our thoughts. Able to share experiences and relate with peers. Status After Intervention:  Improved    Participation Level:  Active Listener and Interactive    Participation Quality: Appropriate, Attentive and Sharing      Speech:  normal      Thought Process/Content: Logical      Affective Functioning: Congruent      Mood: euthymic      Level of consciousness:  Alert and Attentive      Response to Learning: Progressing to goal      Endings: None Reported    Modes of Intervention: Education, Support, Socialization and Exploration      Discipline Responsible: Psychoeducational Specialist      Signature:  Chris Somers, [address]

## 2022-03-17 NOTE — PLAN OF CARE
Problem: Altered Mood, Depressive Behavior:  Goal: Able to verbalize acceptance of life and situations over which he or she has no control  Description: Able to verbalize acceptance of life and situations over which he or she has no control  Outcome: Ongoing     Problem: Altered Mood, Depressive Behavior:  Goal: Able to verbalize and/or display a decrease in depressive symptoms  Description: Able to verbalize and/or display a decrease in depressive symptoms  Outcome: Ongoing     Problem: Altered Mood, Depressive Behavior:  Goal: Able to verbalize support systems  Description: Able to verbalize support systems  Outcome: Ongoing

## 2022-03-17 NOTE — ED NOTES
At 1750 patient refused medications. Patient wanting to sign out and leave.  Dr Odalys Mcdermott, RN  03/16/22 [id number]

## 2022-03-17 NOTE — ED NOTES
Patient placed belongings in 4 bags.  Bags labeled and placed at charge desk     Laura Zhang RN  03/16/22 [id number]

## 2022-03-17 NOTE — H&P
Department of Psychiatry  History and Physical - Adult     CHIEF COMPLAINT:  \"I really don't need to be here\"    Patient was seen after discussing with the treatment team and reviewing the chart    CIRCUMSTANCES OF ADMISSION:   He states that he never made suicidal statements and came to the ED in [facility] on advice from his counselor at [email or web address] He was not pink slipped to the ED by [facility] [[email or web address]], he drove himself, rather he was pink slipped in the ED after he apparently wanted to leave before he was fully assessed by the behavioral health . He agreeable to [facility] on a voluntary basis and planned to go there. Instead, he was pink slipped and referred here. HISTORY OF PRESENT ILLNESS:      The patient is a 42 y.o. male with significant past history of depression and anxiety, presented to [employer] for medical clearance to go to [facility] on voluntary basis, however he attempted to leave before the  could evaluate him and he was pink slipped due to him making statements about suicide with no plan. Which he now denies. He was medically cleared in the ED, and admitted to [facility] for psychiatric evaluation and stabilization. UDS in ED positive for benzodiazepine, QTc 390. Upon assessment today the patient is demonstrating psychomotor agitation, he cannot sit still, and is bouncing his leg, he is reporting anxiety and if perseverating on wanting to be discharged. He states that he never made suicidal statements and came to the ED in [facility] on advice from his counselor at [facility] [[email or web address] He was not pink slipped to the ED by [facility] [[email or web address]], he drove himself, rather he was pink slipped in the ED after he apparently wanted to leave before he was fully assessed by the behavioral health . He agreeable to [facility] on a voluntary basis and planned to go there. Instead, he was pink slipped and referred here. The patient very anxious.  He has increased energy, he states that he did not sleep good last night, and has not eaten well because he is focused on wanting to leave. He states that he has been treating with [facility] for medication management and counseling. He previously was treating at [facility]. He denies any history of suicide attempt, denies self injurious behaviors, denies any auditory or visual hallucinations, he does not appear to be internally stimulated or paranoid. He is linear, goal directed and future focused. He states his stressor is that his daughter is moving out. He appeared sad when talking about this. He endorses positive answers to cluster B personalilty questions with respect to denying hx of self injury or cutting. Likely secondary to his mother leaving him when he was small. The patient is anxious, but relates this to being upset over the situation. He states that he does not need to be here. He is demonstrating fair insight, seems to be a good historian. Describes his depression as mostly situational. Vehemently denies suicidal ideation intent or plan. Denies he has ever been suicidal.         Psychiatric history:  Treats with [facility] for both medication management and counseling. He has no reported history suicide attempts and no reported past inpatient psychiatric hospitalizations. Has dx of anxiety and depression, takes Wellbutrin  mg daily which was just increased to 300 mg daily, however he has not started this medication yet. He is also prescribed trazodone, buspar and valium. Family psychiatric history:  No history of suicides in family, no psychiatric history    Substance abuse history:  Reports that he used to smoke marijuana at night to help him sleep. He states he has not done this in a long time. UDS in ED positive for Benzodiazepine and he is prescribed on a regular basis valium at .       Legal history:  Denies    Personal, family and social hx:  Grew up in [address] and raised by dad, mom left when he was too young to remember how old he was when she left. He has 4 siblings, 1 brother and 3 sisters, he never graduated High school or obtain a GED, he is employed off and on as a . Patient states he has 3kids, 1 son and 2 daughters. The daughters live with him and he has been  and  twice. He is currently living with his girlfriend. Past Medical History:        Diagnosis Date    A-fib [facility]) 2021    Anxiety with depression     Arthritis     Hand pain, right     History of Helicobacter pylori infection     Hyperlipidemia     Restless leg syndrome     Scoliosis     Screening for colon cancer 2022       Medications Prior to Admission:   Medications Prior to Admission: buPROPion (WELLBUTRIN XL) 300 MG extended release tablet, Take 300 mg by mouth daily  flecainide (TAMBOCOR) 100 MG tablet, Take 1 tablet by mouth 2 times daily  cholestyramine (QUESTRAN) 4 g packet, Take 1 packet by mouth daily as needed (daily as needed)  buPROPion (WELLBUTRIN XL) 150 MG extended release tablet, take 1 tablet by mouth once daily  pentoxifylline (TRENTAL) 400 MG extended release tablet, take 1 tablet by mouth once daily  tadalafil (CIALIS) 5 MG tablet, take 1 tablet by mouth once daily at bedtime  metoprolol succinate (TOPROL XL) 50 MG extended release tablet, Take 1 tablet by mouth 2 times daily  [DISCONTINUED] simvastatin (ZOCOR) 10 MG tablet, Take 10 mg by mouth nightly  diazePAM (VALIUM) 5 MG tablet, Take 5 mg by mouth 2 times daily.  Pt takes 10mg at night only, pt does not take twice a day  traZODone (DESYREL) 50 MG tablet, Take 100 mg by mouth nightly   busPIRone (BUSPAR) 15 MG tablet, Take 15 mg by mouth 2 times daily   omeprazole (PRILOSEC) 40 MG delayed release capsule, TK ONE C PO  QD    Past Surgical History:        Procedure Laterality Date    ABDOMEN SURGERY N/A 03/15/2018    wound exploration umbilicus, excsion suture granuloma    CHOLECYSTECTOMY  2017    COLONOSCOPY  2016    COLONOSCOPY N/A 2/16/2022    COLONOSCOPY WITH BIOPSY [facility]  09/19/2017    DC LAP,CHOLECYSTECTOMY N/A 5/29/2018    LAPAROSCOPIC  CHOLECYSTECTOMY performed by Rosalee Evans MD at [address] [place] [address] WND EXTEN/COMPLIC N/A 7/48/1098    ABDOMINAL WOUND EXPLORATION , REMOVAL OF SUTURE GRANULOMA performed by Rosalee Evans MD at [address]  4/9/2018    EGD BIOPSY performed by Rosalee Evans MD at [facility] N/A 5/21/2018    EGD BIOPSY performed by Rosalee Evans MD at [facility] ENDOSCOPY       Allergies:   Biaxin [clarithromycin], Effexor [venlafaxine hydrochloride], Aspirin, and Cardizem [diltiazem hcl]    Family History  Family History   Problem Relation Age of Onset    Mental Illness Father         unspecified    High Blood Pressure Father     Crohn's Disease Sister              EXAMINATION:    REVIEW OF SYSTEMS:    ROS:  [x] All negative/unchanged except if checked.  Explain positive(checked items) below:  [] Constitutional  [] Eyes  [] Ear/Nose/Mouth/Throat  [] Respiratory  [] CV  [] GI  []   [] Musculoskeletal  [] Skin/Breast  [] Neurological  [] Endocrine  [] Heme/Lymph  [] Allergic/Immunologic    Explanation:     Vitals:  /73   Pulse 64   Temp 97 °F (36.1 °C) (Temporal)   Resp 17   Ht 5' 8\" (1.727 m)   Wt 155 lb (70.3 kg)   SpO2 97%   BMI 23.57 kg/m²      Physical Examination:   Head: x  Atraumatic: x normocephalic  Skin and Mucosa        Moist x  Dry   Pale  x Normal   Neck:  Thyroid  Palpable   x  Not palpable   venus distention   adenopathy   Chest: x Clear   Rhonchi     Wheezing   CV:  xS1   xS2    xNo murmer   Abdomen:  x  Soft    Tender    Viceromegaly   Extremities:  x No Edema     Edema     Cranial Nerves Examination:   CN II:   xPupils are reactive to light  Pupils are non reactive to light  CN III, IV, VI:  xNo eye deviation    No diplopia or ptosis   CN V: xFacial Sensation is intact     Facial Sensation is not intact   CN IIIV:   x Hearing is normal to rubbing fingers   CN IX, X:     xNormal gag reflex and phonation   CN XI:   xShoulder shrug and neck rotation is normal  CNXII:    xTongue is midline no deviation or atrophy    Mental Status Examination:    Level of consciousness:  within normal limits   Appearance:  well-appearing  Behavior/Motor:  no abnormalities noted  Attitude toward examiner:  cooperative  Speech:  normal rate, normal volume and well articulated   Mood: irritable  Affect:  mood congruent  Thought processes:  linear and goal directed   Thought content: The patient is devoid of suicidal or homicidal ideation intent or plan. Devoid of auditory or visual hallucinations or other perceptual disturbances, there are no overt or covert signs of psychosis or paranoia. There are no neurovegetative signs of depression. Cognition:  oriented to person, place, and time   Concentration intact  Memory intact  Insight fair   Judgement fair the patient's diagnosis, treatment plan, medication management were formulated after patient was seen directly by the attending physician and myself and all relevant documentation was reviewed.   Fund of Knowledge good      DIAGNOSIS:  Major depressive disorder           LABS: REVIEWED TODAY:  Recent Labs     03/16/22  1409   WBC 13.3*   HGB 15.5        Recent Labs     03/16/22  1409      K 4.5      CO2 25   BUN 10   CREATININE 1.0   GLUCOSE 90     Recent Labs     03/16/22  1409   BILITOT 0.4   ALKPHOS 79   AST 16   ALT 21     Lab Results   Component Value Date    LABAMPH NOT DETECTED 03/16/2022    LABAMPH NOT DETECTED 11/01/2010    BARBSCNU NOT DETECTED 03/16/2022    LABBENZ POSITIVE 03/16/2022    LABBENZ NOT DETECTED 11/01/2010    CANNAB NOT DETECTED  11/01/2010    LABMETH NOT DETECTED 03/16/2022    OPIATESCREENURINE NOT DETECTED 03/16/2022    PHENCYCLIDINESCREENURINE NOT DETECTED 03/16/2022    ETOH <10 03/16/2022     Lab Results   Component Value Date    TSH 0.644 03/16/2022     No results found for: LITHIUM  No results found for: VALPROATE, CBMZ  No results found for: LITHIUM, VALPROATE      Radiology CT HEAD WO CONTRAST    Result Date: 2/18/2022  EXAMINATION: CT OF THE HEAD WITHOUT CONTRAST  2/18/2022 6:40 pm TECHNIQUE: CT of the head was performed without the administration of intravenous contrast. Dose modulation, iterative reconstruction, and/or weight based adjustment of the mA/kV was utilized to reduce the radiation dose to as low as reasonably achievable. COMPARISON: None. HISTORY: ORDERING SYSTEM PROVIDED HISTORY: neck pain TECHNOLOGIST PROVIDED HISTORY: Has a \"code stroke\" or \"stroke alert\" been called? ->No Reason for exam:->neck pain Decision Support Exception - unselect if not a suspected or confirmed emergency medical condition->Emergency Medical Condition (MA) FINDINGS: There are no areas of abnormal attenuation within the brain parenchyma. No evidence of mass, mass effect, or midline shift. The ventricles and sulci are of normal size and configuration. No extra-axial fluid collections or acute hemorrhage. There is prominence of the extra-axial space in the midline posterior fossa suggesting abbie cisterna magna. The gray-white differentiation appears preserved without evidence of acute cortical ischemia. The calvarium is intact. There is polypoid mucosal thickening within the bilateral maxillary sinuses. There is mucosal thickening within bilateral ethmoid air cells and right sphenoid sinus. The mastoid air cells are clear. 1. No acute intracranial abnormality. 2. Mild mucosal disease of the paranasal sinuses.      CT CERVICAL SPINE WO CONTRAST    Result Date: 2/18/2022  EXAMINATION: CT OF THE CERVICAL SPINE WITHOUT CONTRAST 2/18/2022 6:40 pm TECHNIQUE: CT of the cervical spine was performed without the administration of intravenous contrast. Multiplanar reformatted images are provided for review. Dose modulation, iterative reconstruction, and/or weight based adjustment of the mA/kV was utilized to reduce the radiation dose to as low as reasonably achievable. COMPARISON: None. HISTORY: ORDERING SYSTEM PROVIDED HISTORY: neck strain TECHNOLOGIST PROVIDED HISTORY: Reason for exam:->neck strain Decision Support Exception - unselect if not a suspected or confirmed emergency medical condition->Emergency Medical Condition (MA) FINDINGS: There is no evidence of acute fracture. Vertebral alignment is anatomic. Vertebral body and intervertebral disc space heights are preserved. The facet joints are intact at all levels. The prevertebral soft tissue structures are unremarkable. There is no evidence of central canal stenosis or neural foraminal narrowing. There are emphysematous changes at the lung apices. No acute fracture or subluxation. TREATMENT PLAN:  The patient's diagnosis, treatment plan, medication management were formulated after patient was seen directly by the attending physician and myself and all relevant documentation was reviewed. Risk, benefit, side effects, possible outcomes of the medication and alternatives discussed with the patient and the patient demonstrated understanding. The patient was also educated that the outcome of treatment will depend on the medication compliance as directed by the prescribers along with regular follow-up, compliance with the labs and other work-up, as clinically indicated. Risk Management: Based on the diagnosis and assessment biopsychosocial treatment model was presented to the patient and was given the opportunity to ask any question. The patient was agreeable to the plan and all the patient's questions were answered to the patient's satisfaction. I discussed with the patient the risk, benefit, alternative and common side effects for the proposed medication treatment. The patient is consenting to this treatment.      Collateral Information:  Will obtain collateral information from the family or friends. Will obtain medical records as appropriate from out patient providers  Will consult the hospitalist for a physical exam to rule out any co-morbid physical condition. Home medication Reconciled       New Medications started during this admission :    No new medications started due to no acute psychiatric presentation patient's home medications are adequate we will continue with those medications    Prn Haldol 5mg and Vistaril 50mg q6hr for extreme agitation. Trazodone as ordered for insomnia  Vistaril as ordered for anxiety      Psychotherapy:   Encourage participation in milieu and group therapy  Individual therapy as needed      NOTE: This report was transcribed using voice recognition software. Every effort was made to ensure accuracy; however, inadvertent computerized transcription errors may be present. Behavioral Services  Medicare Certification Upon Admission    I certify that this patient's inpatient psychiatric hospital admission is medically necessary for:    [x] (1) Treatment which could reasonably be expected to improve this patient's condition,       [x] (2) Or for diagnostic study;     AND     [x](2) The inpatient psychiatric services are provided while the individual is under the care of a physician and are included in the individualized plan of care.     Estimated length of stay/service 1 - 3 days based on stability    Plan for post-hospital care follow with OP provider    Electronically signed by BECK Vick CNP on 3/17/2022 at 10:43 AM

## 2022-03-17 NOTE — PROGRESS NOTES
Spoke to [facility] . This RN was told [facility] has no available bed at this time. SW will reach out the [phone] [facility] doctor and try to get patient admitted to [facility]. FABIO will call back ASAP.
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
## 2022-03-18 VITALS
DIASTOLIC BLOOD PRESSURE: 65 MMHG | HEIGHT: 68 IN | OXYGEN SATURATION: 97 % | RESPIRATION RATE: 15 BRPM | TEMPERATURE: 97.2 F | WEIGHT: 155 LBS | SYSTOLIC BLOOD PRESSURE: 107 MMHG | BODY MASS INDEX: 23.49 KG/M2 | HEART RATE: 76 BPM

## 2022-03-18 PROCEDURE — 6370000000 HC RX 637 (ALT 250 FOR IP): Performed by: NURSE PRACTITIONER

## 2022-03-18 PROCEDURE — G0378 HOSPITAL OBSERVATION PER HR: HCPCS

## 2022-03-18 PROCEDURE — 99217 PR OBSERVATION CARE DISCHARGE MANAGEMENT: CPT | Performed by: NURSE PRACTITIONER

## 2022-03-18 RX ADMIN — METOPROLOL SUCCINATE 50 MG: 25 TABLET, EXTENDED RELEASE ORAL at 08:28

## 2022-03-18 RX ADMIN — BUPROPION HYDROCHLORIDE 150 MG: 150 TABLET, EXTENDED RELEASE ORAL at 08:28

## 2022-03-18 RX ADMIN — PANTOPRAZOLE SODIUM 40 MG: 40 TABLET, DELAYED RELEASE ORAL at 06:27

## 2022-03-18 RX ADMIN — FLECAINIDE ACETATE 100 MG: 100 TABLET ORAL at 08:28

## 2022-03-18 ASSESSMENT — PAIN SCALES - GENERAL: PAINLEVEL_OUTOF10: 0

## 2022-03-18 NOTE — GROUP NOTE
Group Therapy Note    Date: 3/18/2022    Group Start Time: 1100  Group End Time: 4165  Group Topic: Psychotherapy    [facility], MSW, LSW        Group Therapy Note    Attendees: 4         Patient's Goal:  To increase social interaction and improve relationships with others. Notes:  Pt was attentive in group and was able to identify an agenda. Pt was able to verbalize relating to other group members. Status After Intervention:  Improved    Participation Level:  Active Listener and Interactive    Participation Quality: Appropriate, Attentive, Sharing and Supportive      Speech:  normal      Thought Process/Content: Logical  Linear      Affective Functioning: Congruent      Mood: anxious      Level of consciousness:  Alert, Oriented x4 and Attentive      Response to Learning: Able to verbalize current knowledge/experience, Able to verbalize/acknowledge new learning, Able to retain information and Capable of insight      Endings: None Reported    Modes of Intervention: Support, Socialization and Exploration      Discipline Responsible: /Counselor      Signature:  SUNDEEP Diaz, [facility]

## 2022-03-18 NOTE — PROGRESS NOTES
[facility]  Discharge Note    Pt discharged with followings belongings:   Dental Appliances: None  Vision - Corrective Lenses: None  Hearing Aid: None  Jewelry: None  Body Piercings Removed: N/A  Clothing: Jonne Cowboy / coat,Shirt,Socks  Were All Patient Medications Collected?: Not Applicable  Other Valuables: Keys,Cell phone,Wallet,Other (Comment) (, cigarettes)   Valuables sent home with patient or returned to patient. Patient education on aftercare instructions: Yes  at 1:38 PM .Patient verbalize understanding of AVS:  Yes.     Status EXAM upon discharge:  Status and Exam  Normal: No  Facial Expression: Flat  Affect: Congruent  Level of Consciousness: Alert  Mood:Normal: No  Mood: Anxious,Sad  Motor Activity:Normal: Yes  Interview Behavior: Cooperative  Preception: [place] to Person,[place] to Time,[place] to Place,[place] to Situation  Attention:Normal: No  Attention: Distractible  Thought Processes: Circumstantial  Thought Content:Normal: No  Thought Content: Preoccupations  Hallucinations: None  Delusions: No  Memory:Normal: Yes  Memory: Poor Recent  Insight and Judgment: Yes (Improving)  Insight and Judgment: Poor Judgment,Poor Insight  Present Suicidal Ideation: No  Present Homicidal Ideation: No      Metabolic Screening:    Lab Results   Component Value Date    LABA1C 5.8 (H) 03/17/2022       Lab Results   Component Value Date    CHOL 149 03/17/2022    CHOL 172 11/11/2011     Lab Results   Component Value Date    TRIG 109 03/17/2022    TRIG 136 11/11/2011     Lab Results   Component Value Date    HDL 32 03/17/2022    HDL 36 05/22/2021    HDL 40.0 (A) 11/11/2011     No components found for: [facility]  Lab Results   Component Value Date    LABVLDL 22 03/17/2022    LABVLDL 38 05/22/2021       Giovanna Srinivasan RN

## 2022-03-18 NOTE — GROUP NOTE
Group Therapy Note    Date: 3/18/2022    Group Start Time: 1000  Group End Time: 1050  Group Topic: Psychoeducation    [facility] 1    Lenore Dumont, CTRS ; [employer]         Group Therapy Note      Number of participants: 8  Type of group: Psychoeducation  Mode of intervention: Education, Support, Socialization, Exploration, Clarifying, Problem-solving, and Activity  Topic: Self Esteem  Objective: Pt will identify 1 way to improve self esteem during recovery. Notes:  Offered minimal interaction during group. Appeared annoyed with group and dismissive when prompted to share. Status After Intervention:  Unchanged    Participation Level:  Active Listener and Minimal    Participation Quality: Resistant      Speech:  normal      Thought Process/Content: Perseverating      Affective Functioning: Congruent      Mood: irritable      Level of consciousness:  Preoccupied      Response to Learning: Resistant      Endings: None Reported    Modes of Intervention: Education, Support, Socialization, Exploration, Clarifying, Problem-solving and Activity

## 2022-03-18 NOTE — PROGRESS NOTES
Patient resting quietly in bed with eyes closed at this time. No s/s pain or distress noted. No PRN medications given or requested thus far, no management issues on unit. Safety rounds completed per unit policy. Will continue to monitor.

## 2022-03-18 NOTE — PLAN OF CARE
Problem: Altered Mood, Depressive Behavior:  Goal: Able to verbalize and/or display a decrease in depressive symptoms  Description: Able to verbalize and/or display a decrease in depressive symptoms  3/18/2022 1002 by Carson Vasquez RN  Outcome: Met This Shift  3/17/2022 2111 by Hiram Fothergill, RN  Outcome: Ongoing     Problem: Altered Mood, Depressive Behavior:  Goal: Ability to disclose and discuss suicidal ideas will improve  Description: Ability to disclose and discuss suicidal ideas will improve  Outcome: Met This Shift     Problem: Altered Mood, Depressive Behavior:  Goal: Able to verbalize support systems  Description: Able to verbalize support systems  Outcome: Met This Shift     Problem: Altered Mood, Depressive Behavior:  Goal: Absence of self-harm  Description: Absence of self-harm  3/18/2022 1002 by Carson Vasquez RN  Outcome: Met This Shift  3/17/2022 2111 by Hiram Fothergill, RN  Outcome: Ongoing     Problem: Altered Mood, Depressive Behavior:  Goal: Participates in care planning  Description: Participates in care planning  Outcome: Met This Shift     Patient denies SI/HI/Hallucinations, anxiety and depression. Patient less irritable today. Patient observed out on the unit, socializing with select peers. Patient focused on discharge. Patient taking prescribed medications, eating provided meals, and attending groups.

## 2022-03-18 NOTE — PLAN OF CARE
Patient is up and about on the unit watching TV. Patient keeps to himself mostly is social with select peers, cooperative but flat and irritable at times. Patient is compliant with medications denies SI/HI AVH depression and anxiety stated he is ready to go home.        Problem: Altered Mood, Depressive Behavior:  Goal: Able to verbalize acceptance of life and situations over which he or she has no control  Description: Able to verbalize acceptance of life and situations over which he or she has no control  3/17/2022 2111 by Nilda Mclaughlin RN  Outcome: Ongoing     Problem: Altered Mood, Depressive Behavior:  Goal: Able to verbalize and/or display a decrease in depressive symptoms  Description: Able to verbalize and/or display a decrease in depressive symptoms  3/17/2022 2111 by Nilda Mclaughlin RN  Outcome: Ongoing     Problem: Altered Mood, Depressive Behavior:  Goal: Absence of self-harm  Description: Absence of self-harm  3/17/2022 2111 by Nilda Mclaughlin RN  Outcome: Ongoing     Problem: Depressive Behavior With or Without Suicide Precautions:  Goal: Ability to disclose and discuss suicidal ideas will improve  Description: Ability to disclose and discuss suicidal ideas will improve  Outcome: Ongoing

## 2022-03-18 NOTE — DISCHARGE SUMMARY
DISCHARGE SUMMARY      Patient ID:  Betty Tomlinson  [id number]  70 y.o.  1975    Admit date: 3/17/2022    Discharge date and time: 3/18/2022    Admitting Physician: Gene Fernández MD     Discharge Physician: Dr Mallorie Huar MD    Discharge Diagnoses:   Patient Active Problem List   Diagnosis    Hand pain, right    Depression    Helicobacter pylori gastritis    Symptomatic cholelithiasis    [facility]    Atrial fibrillation (HCC)    Atypical chest pain    Chronic obstructive pulmonary disease (HCC)    Schizophrenia ([facility]    Chest pain    Leukocytosis    Atrial fibrillation with RVR ([facility]    Diarrhea    Mild episode of recurrent major depressive disorder ([facility]       Admission Condition: poor    Discharged Condition: stable    Admission Circumstance:   He states that he never made suicidal statements and came to the ED in [place] [address] on advice from his counselor at [facility]. He was not pink slipped to the ED by [facility], he drove himself, rather he was pink slipped in the ED after he apparently wanted to leave before he was fully assessed by the behavioral health . He agreeable to [facility] on a voluntary basis and planned to go there. Instead, he was pink slipped and referred here.       PAST MEDICAL/PSYCHIATRIC HISTORY:   Past Medical History:   Diagnosis Date    A-fib [facility]) 2021    Anxiety with depression     Arthritis     Hand pain, right     History of Helicobacter pylori infection     Hyperlipidemia     Restless leg syndrome     Scoliosis     Screening for colon cancer 2022       FAMILY/SOCIAL HISTORY:  Family History   Problem Relation Age of Onset    Mental Illness Father         unspecified    High Blood Pressure Father     Crohn's Disease Sister      Social History     Socioeconomic History    Marital status: Single     Spouse name: Not on file    Number of children: Not on file    Years of education: Not on file    Highest education level: Not on file   Occupational History    Not on file   Tobacco Use    Smoking status: Current Every Day Smoker     Packs/day: 1.00     Years: 18.00     Pack years: 18.00     Types: Cigarettes    Smokeless tobacco: Never Used    Tobacco comment: Before up to 2 ppd   Vaping Use    Vaping Use: Former    Substances: Never   Substance and Sexual Activity    Alcohol use: Not Currently    Drug use: Yes     Types: Marijuana (Weed)     Comment: 3 x week    Sexual activity: Yes     Partners: Female   Other Topics Concern    Not on file   Social History Narrative    Not on file     Social Determinants of Health     Financial Resource Strain:     Difficulty of Paying Living Expenses: Not on file   Food Insecurity:     Worried About Running Out of Food in the Last Year: Not on file    Tyrone of Food in the Last Year: Not on file   Transportation Needs:     Lack of Transportation (Medical): Not on file    Lack of Transportation (Non-Medical):  Not on file   Physical Activity:     Days of Exercise per Week: Not on file    Minutes of Exercise per Session: Not on file   Stress:     Feeling of Stress : Not on file   Social Connections:     Frequency of Communication with Friends and Family: Not on file    Frequency of Social Gatherings with Friends and Family: Not on file    Attends Cheondoism Services: Not on file    Active Member of [address] [place] [address] [employer] or Organizations: Not on file    Attends Club or Organization Meetings: Not on file    Marital Status: Not on file   Intimate Partner Violence:     Fear of Current or Ex-Partner: Not on file    Emotionally Abused: Not on file    Physically Abused: Not on file    Sexually Abused: Not on file   Housing Stability:     Unable to Pay for Housing in the Last Year: Not on file    Number of [place] in the Last Year: Not on file    Unstable Housing in the Last Year: Not on file       MEDICATIONS:    Current Facility-Administered Medications:     acetaminophen (TYLENOL) tablet 650 mg, 650 mg, Oral, Q4H PRN, Arti Salinas MD, 650 mg at 03/17/22 2114    magnesium hydroxide (MILK OF MAGNESIA) 400 MG/5ML suspension 30 mL, 30 mL, Oral, Daily PRN, Arti Salinas MD    nicotine (NICODERM CQ) 21 MG/24HR 1 patch, 1 patch, TransDERmal, Daily, Arti Salinas MD    aluminum & magnesium hydroxide-simethicone (MAALOX) 200-200-20 MG/5ML suspension 30 mL, 30 mL, Oral, PRN, Arti Salinas MD    hydrOXYzine (VISTARIL) capsule 50 mg, 50 mg, Oral, TID PRN, Arti Salinas MD    haloperidol (HALDOL) tablet 3 mg, 3 mg, Oral, Q6H PRN **OR** haloperidol lactate (HALDOL) injection 3 mg, 3 mg, IntraMUSCular, Q6H PRN, Arti Salinas MD    traZODone (DESYREL) tablet 50 mg, 50 mg, Oral, Nightly PRN, Arti Salinas MD, 50 mg at 03/17/22 2209    metoprolol succinate (TOPROL XL) extended release tablet 50 mg, 50 mg, Oral, BID, BECK Barboza - CNP, 50 mg at 03/18/22 4178    flecainide (TAMBOCOR) tablet 100 mg, 100 mg, Oral, BID, Michelle Quevedo APRN - CNP, 100 mg at 03/18/22 2327    cholestyramine (QUESTRAN) packet 4 g, 1 packet, Oral, Daily PRN, Michelle Quevedo APRN - CNP    pantoprazole (PROTONIX) tablet 40 mg, 40 mg, Oral, QAM AC, Michelle Quevedo, APRN - CNP, 40 mg at 03/18/22 5664    buPROPion (WELLBUTRIN XL) extended release tablet 150 mg, 150 mg, Oral, Daily, Michelle Quevedo, APRN - CNP, 150 mg at 03/18/22 9870    diazePAM (VALIUM) tablet 5 mg, 5 mg, Oral, Nightly, Michelle Quevedo, APRN - CNP, 5 mg at 03/17/22 2113    Examination:  /65   Pulse 76   Temp 97.2 °F (36.2 °C) (Temporal)   Resp 15   Ht 5' 8\" (1.727 m)   Wt 155 lb (70.3 kg)   SpO2 97%   BMI 23.57 kg/m²   Gait - steady    HOSPITAL COURSE[de-identified]  Following admission to the hospital, patient had a complete physical exam and blood work up, which he was medically cleared and admitted to [facility] for psychiatric evaluation and stabilization. The patient was monitored closely with suicide and appropriate precautions.   He was continued on his home medications as there was no acute reason to make any changes to the patients home medications regime. He was encouraged to participate in group and other milieu activity and started to feel better with this combination of treatment. There has been significant progress in the improvement of symptoms since admission. The patient has been an active participant in his treatment, and discharge planning. Patient was no longer suicidal, homicidal, manic or psychotic. He received the required treatment with medication, participated in group milieu, remained engaged in unit activities, learned appropriate coping skills. He was seen to be watching television socializing with peers using the phone. There were no mention or gestures of self-harm or harm to others. His mental status has returned to baseline. The treatment team believes the patient obtain maximum benefit out of this hospitalization and does not meet the criteria for inpatient hospitalization anymore. However he will continue to benefit from outpatient follow-up and treatment to maintain stability. Collateral information has been obtained and reconciled and there are no concerns about his safety. He has no access to guns or weapons. He appreciates the help that he received here. This patient no longer meets criteria for inpatient hospitalization. He was discharged home to his family in psychiatrically stable condition. Appetite:  [x] Normal  [] Increased  [] Decreased    Sleep:       [x] Normal  [] Fair       [] Poor            Energy:    [x] Normal  [] Increased  [] Decreased     SI [] Present  [x] Absent  HI  []Present  [x] Absent   Aggression:  [] yes  [] no  Patient is [x] able  [] unable to CONTRACT FOR SAFETY   Medication side effects(SE):  [x] None(Psych.  Meds.) [] Other      Mental Status Examination on discharge:    Level of consciousness:  within normal limits   Appearance:  well-appearing  Behavior/Motor:  no abnormalities noted  Attitude toward examiner:  attentive and good eye contact  Speech:  spontaneous, normal rate and normal volume   Mood: euthymic  Affect:  mood congruent  Thought processes:  goal directed. Linear   Thought content:  The patient is devoid of suicidal or homicidal ideation intent or plan. Devoid of auditory or visual hallucinations or other perceptual disturbances, there are no overt or covert signs of psychosis or paranoia. There are no neurovegetative signs of depression.   Cognition:  oriented to person, place, and time   Concentration intact  Memory intact  Insight good   Judgement fair   Fund of Knowledge adequate      ASSESSMENT:  Patient symptoms are:  [x] Well controlled  [x] Improving  [] Worsening  [] No change    Reason for more than one antipsychotic:  [x] N/A  [] 3 Failed Monotherapy attempts (Drugs tried:)  [] Crossover to a new antipsychotic  [] Taper to Monotherapy from Polypharmacy  [] Augmentation of clozapine therapy due to treatment resistance to single therapy    Diagnosis:  Principal Problem:    Mild episode of recurrent major depressive disorder (HCC)  Resolved Problems:    Suicidal ideations      LABS:    Recent Labs     03/16/22  1409   WBC 13.3*   HGB 15.5        Recent Labs     03/16/22  1409      K 4.5      CO2 25   BUN 10   CREATININE 1.0   GLUCOSE 90     Recent Labs     03/16/22  1409   BILITOT 0.4   ALKPHOS 79   AST 16   ALT 21     Lab Results   Component Value Date    LABAMPH NOT DETECTED 03/16/2022    [address] NOT DETECTED 11/01/2010    BARBSCNU NOT DETECTED 03/16/2022    LABBENZ POSITIVE 03/16/2022    LABBENZ NOT DETECTED 11/01/2010    CANNAB NOT DETECTED  11/01/2010    LABMETH NOT DETECTED 03/16/2022    OPIATESCREENURINE NOT DETECTED 03/16/2022    PHENCYCLIDINESCREENURINE NOT DETECTED 03/16/2022    ETOH <10 03/16/2022     Lab Results   Component Value Date    TSH 0.644 03/16/2022     No results found for: LITHIUM  No results found for: VALPROATE, CBMZ    RISK ASSESSMENT AT DISCHARGE: Low risk for suicide and homicide. Treatment Plan:  The patient's diagnosis, treatment plan, medication management were formulated after patient was seen directly by the attending physician and myself and all relevant documentation was reviewed. Risk, benefit, side effects, possible outcomes of the medication and alternatives discussed with the patient and the patient demonstrated understanding. The patient was also educated that the outcome of treatment will depend on the medication compliance as directed by the prescribers along with regular follow-up, compliance with the labs and other work-up, as clinically indicated.     Risk Management: Based on the diagnosis and assessment biopsychosocial treatment model was presented to the patient and was given the opportunity to ask any question. The patient was agreeable to the plan and all the patient's questions were answered to the patient's satisfaction. I discussed with the patient the risk, benefit, alternative and common side effects for the proposed medication treatment. The patient is consenting to this treatment. Encourage patient to attend outpatient follow up appointment and therapy, out patient follow up appointments have been scheduled prior to discharge. Patient was advised to call the outpatient provider, visit the nearest ED or call 911 if symptoms are not manageable. Patient's family member was contacted prior to the discharge, the patient has been discharged home in psychiatrically stable condition.           Medication List      ASK your doctor about these medications    * buPROPion 300 MG extended release tablet  Commonly known as: WELLBUTRIN XL     * buPROPion 150 MG extended release tablet  Commonly known as: WELLBUTRIN XL     busPIRone 15 MG tablet  Commonly known as: BUSPAR     cholestyramine 4 g packet  Commonly known as: QUESTRAN  Take 1 packet by mouth daily as needed (daily as needed)     diazePAM 5 MG tablet  Commonly known as: VALIUM     flecainide 100 MG tablet  Commonly known as: TAMBOCOR  Take 1 tablet by mouth 2 times daily     metoprolol succinate 50 MG extended release tablet  Commonly known as: TOPROL XL  Take 1 tablet by mouth 2 times daily     omeprazole 40 MG delayed release capsule  Commonly known as: PRILOSEC     pentoxifylline 400 MG extended release tablet  Commonly known as: TRENTAL     tadalafil 5 MG tablet  Commonly known as: CIALIS     traZODone 50 MG tablet  Commonly known as: DESYREL         * This list has 2 medication(s) that are the same as other medications prescribed for you. Read the directions carefully, and ask your doctor or other care provider to review them with you. NOTE: This report was transcribed using voice recognition software. Every effort was made to ensure accuracy; however, inadvertent computerized transcription errors may be present.       TIME SPEND - 35 MINUTES TO COMPLETE THE EVALUATION, DISCHARGE SUMMARY, MEDICATION RECONCILIATION AND FOLLOW UP CARE     Signed:  BECK Panda CNP  3/18/2022  8:31 AM
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
## 2022-03-18 NOTE — CARE COORDINATION
SW called pt's domestic partner Devi GoodClic [phone] to discuss removing weapons from the home, so pt does not have access to them. SW left a voicemail.

## 2022-03-23 ENCOUNTER — APPOINTMENT (OUTPATIENT)
Dept: GENERAL RADIOLOGY | Age: 47
End: 2022-03-23
Payer: COMMERCIAL

## 2022-03-23 ENCOUNTER — HOSPITAL ENCOUNTER (EMERGENCY)
Age: 47
Discharge: HOME | End: 2022-03-23
Attending: EMERGENCY MEDICINE
Payer: COMMERCIAL

## 2022-03-23 VITALS
OXYGEN SATURATION: 97 % | RESPIRATION RATE: 16 BRPM | SYSTOLIC BLOOD PRESSURE: 94 MMHG | HEART RATE: 64 BPM | WEIGHT: 155 LBS | BODY MASS INDEX: 23.49 KG/M2 | DIASTOLIC BLOOD PRESSURE: 66 MMHG | TEMPERATURE: 98.6 F | HEIGHT: 68 IN

## 2022-03-23 DIAGNOSIS — Z86.79 HISTORY OF ATRIAL FIBRILLATION: ICD-10-CM

## 2022-03-23 DIAGNOSIS — R00.2 PALPITATIONS: Primary | ICD-10-CM

## 2022-03-23 LAB
ANION GAP SERPL CALCULATED.3IONS-SCNC: 8 MMOL/L (ref 7–16)
BASOPHILS ABSOLUTE: 0.09 E9/L (ref 0–0.2)
BASOPHILS RELATIVE PERCENT: 0.9 % (ref 0–2)
BUN BLDV-MCNC: 14 MG/DL (ref 6–20)
CALCIUM SERPL-MCNC: 8.8 MG/DL (ref 8.6–10.2)
CHLORIDE BLD-SCNC: 105 MMOL/L (ref 98–107)
CO2: 23 MMOL/L (ref 22–29)
CREAT SERPL-MCNC: 0.9 MG/DL (ref 0.7–1.2)
EKG ATRIAL RATE: 64 BPM
EKG P AXIS: 65 DEGREES
EKG P-R INTERVAL: 162 MS
EKG Q-T INTERVAL: 396 MS
EKG QRS DURATION: 102 MS
EKG QTC CALCULATION (BAZETT): 408 MS
EKG R AXIS: 38 DEGREES
EKG T AXIS: 58 DEGREES
EKG VENTRICULAR RATE: 64 BPM
EOSINOPHILS ABSOLUTE: 0.32 E9/L (ref 0.05–0.5)
EOSINOPHILS RELATIVE PERCENT: 3 % (ref 0–6)
GFR AFRICAN AMERICAN: >60
GFR NON-AFRICAN AMERICAN: >60 ML/MIN/1.73
GLUCOSE BLD-MCNC: 92 MG/DL (ref 74–99)
HCT VFR BLD CALC: 46.2 % (ref 37–54)
HEMOGLOBIN: 15.4 G/DL (ref 12.5–16.5)
IMMATURE GRANULOCYTES #: 0.04 E9/L
IMMATURE GRANULOCYTES %: 0.4 % (ref 0–5)
LIPASE: 30 U/L (ref 13–60)
LYMPHOCYTES ABSOLUTE: 3.08 E9/L (ref 1.5–4)
LYMPHOCYTES RELATIVE PERCENT: 29.3 % (ref 20–42)
MCH RBC QN AUTO: 30.1 PG (ref 26–35)
MCHC RBC AUTO-ENTMCNC: 33.3 % (ref 32–34.5)
MCV RBC AUTO: 90.2 FL (ref 80–99.9)
MONOCYTES ABSOLUTE: 0.91 E9/L (ref 0.1–0.95)
MONOCYTES RELATIVE PERCENT: 8.7 % (ref 2–12)
NEUTROPHILS ABSOLUTE: 6.06 E9/L (ref 1.8–7.3)
NEUTROPHILS RELATIVE PERCENT: 57.7 % (ref 43–80)
PDW BLD-RTO: 13.4 FL (ref 11.5–15)
PLATELET # BLD: 269 E9/L (ref 130–450)
PMV BLD AUTO: 10.2 FL (ref 7–12)
POTASSIUM REFLEX MAGNESIUM: 4.3 MMOL/L (ref 3.5–5)
PRO-BNP: 241 PG/ML (ref 0–125)
RBC # BLD: 5.12 E12/L (ref 3.8–5.8)
SODIUM BLD-SCNC: 136 MMOL/L (ref 132–146)
TROPONIN, HIGH SENSITIVITY: <6 NG/L (ref 0–11)
TSH SERPL DL<=0.05 MIU/L-ACNC: 0.68 UIU/ML (ref 0.27–4.2)
WBC # BLD: 10.5 E9/L (ref 4.5–11.5)

## 2022-03-23 PROCEDURE — 85025 COMPLETE CBC W/AUTO DIFF WBC: CPT

## 2022-03-23 PROCEDURE — 80048 BASIC METABOLIC PNL TOTAL CA: CPT

## 2022-03-23 PROCEDURE — 71045 X-RAY EXAM CHEST 1 VIEW: CPT

## 2022-03-23 PROCEDURE — 83880 ASSAY OF NATRIURETIC PEPTIDE: CPT

## 2022-03-23 PROCEDURE — 84443 ASSAY THYROID STIM HORMONE: CPT

## 2022-03-23 PROCEDURE — 83690 ASSAY OF LIPASE: CPT

## 2022-03-23 PROCEDURE — 6360000002 HC RX W HCPCS: Performed by: STUDENT IN AN ORGANIZED HEALTH CARE EDUCATION/TRAINING PROGRAM

## 2022-03-23 PROCEDURE — 93010 ELECTROCARDIOGRAM REPORT: CPT | Performed by: INTERNAL MEDICINE

## 2022-03-23 PROCEDURE — 6370000000 HC RX 637 (ALT 250 FOR IP): Performed by: STUDENT IN AN ORGANIZED HEALTH CARE EDUCATION/TRAINING PROGRAM

## 2022-03-23 PROCEDURE — 96374 THER/PROPH/DIAG INJ IV PUSH: CPT

## 2022-03-23 PROCEDURE — 84484 ASSAY OF TROPONIN QUANT: CPT

## 2022-03-23 PROCEDURE — 36415 COLL VENOUS BLD VENIPUNCTURE: CPT

## 2022-03-23 PROCEDURE — 99284 EMERGENCY DEPT VISIT MOD MDM: CPT

## 2022-03-23 PROCEDURE — 93005 ELECTROCARDIOGRAM TRACING: CPT | Performed by: STUDENT IN AN ORGANIZED HEALTH CARE EDUCATION/TRAINING PROGRAM

## 2022-03-23 RX ORDER — CHOLESTYRAMINE 4 G/9G
1 POWDER, FOR SUSPENSION ORAL EVERY EVENING
COMMUNITY
End: 2022-09-13 | Stop reason: SDUPTHER

## 2022-03-23 RX ORDER — ACETAMINOPHEN 500 MG
500 TABLET ORAL EVERY 12 HOURS PRN
COMMUNITY

## 2022-03-23 RX ORDER — SIMVASTATIN 10 MG
10 TABLET ORAL
COMMUNITY

## 2022-03-23 RX ORDER — FENTANYL CITRATE 50 UG/ML
25 INJECTION, SOLUTION INTRAMUSCULAR; INTRAVENOUS ONCE
Status: COMPLETED | OUTPATIENT
Start: 2022-03-23 | End: 2022-03-23

## 2022-03-23 RX ADMIN — ALUMINUM HYDROXIDE, MAGNESIUM HYDROXIDE, AND SIMETHICONE: 200; 200; 20 SUSPENSION ORAL at 09:03

## 2022-03-23 RX ADMIN — FENTANYL CITRATE 25 MCG: 50 INJECTION, SOLUTION INTRAMUSCULAR; INTRAVENOUS at 09:02

## 2022-03-23 ASSESSMENT — ENCOUNTER SYMPTOMS
ABDOMINAL PAIN: 0
RHINORRHEA: 0
NAUSEA: 0
VOMITING: 0
CONSTIPATION: 0
SHORTNESS OF BREATH: 1
COUGH: 0
DIARRHEA: 0
TROUBLE SWALLOWING: 0
VOICE CHANGE: 0

## 2022-03-23 ASSESSMENT — PAIN - FUNCTIONAL ASSESSMENT: PAIN_FUNCTIONAL_ASSESSMENT: 0-10

## 2022-03-23 ASSESSMENT — PAIN DESCRIPTION - ONSET: ONSET: GRADUAL

## 2022-03-23 ASSESSMENT — PAIN DESCRIPTION - PAIN TYPE: TYPE: ACUTE PAIN

## 2022-03-23 ASSESSMENT — PAIN DESCRIPTION - LOCATION
LOCATION: CHEST
LOCATION: CHEST

## 2022-03-23 ASSESSMENT — PAIN SCALES - GENERAL
PAINLEVEL_OUTOF10: 4
PAINLEVEL_OUTOF10: 1
PAINLEVEL_OUTOF10: 4

## 2022-03-23 ASSESSMENT — PAIN DESCRIPTION - ORIENTATION: ORIENTATION: MID

## 2022-03-23 ASSESSMENT — PAIN DESCRIPTION - FREQUENCY: FREQUENCY: CONTINUOUS

## 2022-03-23 ASSESSMENT — PAIN DESCRIPTION - DESCRIPTORS: DESCRIPTORS: DISCOMFORT

## 2022-03-23 ASSESSMENT — PAIN DESCRIPTION - PROGRESSION: CLINICAL_PROGRESSION: NOT CHANGED

## 2022-03-23 NOTE — ED PROVIDER NOTES
Patient is a 57-year-old male with a history of paroxysmal A. fib not on anticoagulation, hyperlipidemia, daily smoker who presents to the emergency department with a complaint of palpitations and A. fib overnight. Patient states that he went into A. fib and was persistent through the night with palpitations, he knows when he goes into A. fib. Patient says he broke this morning and feels that he is in normal sinus rhythm however continues to have chest pain/pressure as well as some shortness of breath. Patient is on metoprolol and flecainide which he took this morning. Patient describes left-sided chest pain that starts midsternal and then radiates over to his lateral chest wall, moderate intensity, nothing makes it better, nothing makes it worse, pressure/sharp/intermittent. Patient also endorses mild shortness of breath with the chest pain. Patient denies nausea or vomiting. Patient is not on anticoagulation. Patient does follow with cardiology, Dr. Luz Pearson.  Patient denies any urinary or GI symptoms, denies any trauma. Patient states the last time he was in A. fib was a couple months ago. Review of Systems   Constitutional: Negative for chills, fatigue and fever. HENT: Negative for congestion, rhinorrhea, trouble swallowing and voice change. Eyes: Negative for visual disturbance. Respiratory: Positive for shortness of breath. Negative for cough. Cardiovascular: Positive for chest pain and palpitations. Negative for leg swelling. Gastrointestinal: Negative for abdominal pain, constipation, diarrhea, nausea and vomiting. Endocrine: Negative for polyuria. Genitourinary: Negative for dysuria, frequency, hematuria and urgency. Musculoskeletal: Negative for arthralgias and myalgias. Skin: Negative for rash and wound. Allergic/Immunologic: Negative for immunocompromised state. Neurological: Negative for dizziness, syncope, weakness, light-headedness, numbness and headaches. Psychiatric/Behavioral: Negative for confusion. The patient is not nervous/anxious. Physical Exam  Vitals and nursing note reviewed. Constitutional:       General: He is awake. He is not in acute distress. Appearance: He is normal weight. He is not ill-appearing or toxic-appearing. HENT:      Head: Normocephalic and atraumatic. Mouth/Throat:      Mouth: Mucous membranes are moist.   Eyes:      Pupils: Pupils are equal, round, and reactive to light. Cardiovascular:      Rate and Rhythm: Normal rate and regular rhythm. Pulses: Normal pulses. Radial pulses are 2+ on the right side and 2+ on the left side. Heart sounds: Normal heart sounds. Pulmonary:      Effort: Pulmonary effort is normal.      Breath sounds: Normal breath sounds. Abdominal:      General: There is no distension. Palpations: Abdomen is soft. Tenderness: There is no abdominal tenderness. Musculoskeletal:         General: Normal range of motion. Cervical back: Normal range of motion. Skin:     General: Skin is warm and dry. Capillary Refill: Capillary refill takes less than 2 seconds. Neurological:      General: No focal deficit present. Mental Status: He is alert and oriented to person, place, and time. GCS: GCS eye subscore is 4. GCS verbal subscore is 5. GCS motor subscore is 6. Motor: Motor function is intact. Psychiatric:         Behavior: Behavior is cooperative. MDM  Number of Diagnoses or Management Options  History of atrial fibrillation  Palpitations  Diagnosis management comments: Patient is a 68-year-old male presents to the emergency department with complaint of palpitations and chest pain as well as mild shortness of breath. Patient has a history of paroxysmal A. fib states he was in A. fib overnight and broke this morning, has residual chest pain and shortness of breath.   Patient presents awake, alert, following all commands, no apparent distress, nontoxic. Vital signs were noted, normotensive, eucardic. EKG shows normal sinus rhythm without ectopy. Patient is not in A. fib. Patient additionally with symptoms of heartburn including burning chest radiating up his sternum. Patient was treated with GI cocktail as well as fentanyl for residual chest pain. Work-up is reassuring, troponin is negative, BNP is negative. Work-up was discussed with patient, he has significantly improved in the emergency department, pain-free. Symptoms were discussed, work-up was discussed, plan for patient to follow-up outpatient with his cardiologist.  Patient to maintain his medications including his new increased dose of flecainide that was changed by cardiology recently. Patient is understanding, all questions answered. Patient will return to the emergency department if needed otherwise patient will follow-up outpatient       Amount and/or Complexity of Data Reviewed  Clinical lab tests: ordered and reviewed  Tests in the radiology section of CPT®: ordered and reviewed       ED Course as of 03/23/22 2157   Wed Mar 23, 2022   1008 Patient reevaluation, patient chest burning pain has resolved with GI cocktail. Patient states his left-sided chest pain is still present however 1/10 after fentanyl. [QC]      ED Course User Index  [QC] Mae Alonso MD        EKG: This EKG is signed and interpreted by me. Rate: 64  Rhythm: Sinus  Interpretation: Normal sinus rhythm, normal WA interval, normal QRS, normal QT interval, no acute ST or T wave changes  Comparison: stable as compared to patient's most recent EKG     ED Course as of 03/23/22 2157   Wed Mar 23, 2022   1008 Patient reevaluation, patient chest burning pain has resolved with GI cocktail.   Patient states his left-sided chest pain is still present however 1/10 after fentanyl. [QC]      ED Course User Index  [QC] Mae Alonso MD     --------------------------------------------- PAST HISTORY ---------------------------------------------  Past Medical History:  has a past medical history of A-fib ([address] Anxiety with depression, Arthritis, Hand pain, right, History of Helicobacter pylori infection, Hyperlipidemia, Restless leg syndrome, Scoliosis, and Screening for colon cancer. Past Surgical History:  has a past surgical history that includes Colonoscopy (2016); hernia repair (09/19/2017); pr secd clos surg wnd exten/complic (N/A, 1/67/8717); Upper gastrointestinal endoscopy (4/9/2018); Abdomen surgery (N/A, 03/15/2018); Upper gastrointestinal endoscopy (N/A, 5/21/2018); pr lap,cholecystectomy (N/A, 5/29/2018); Cholecystectomy (2017); and Colonoscopy (N/A, 2/16/2022). Social History:  reports that he has been smoking cigarettes. He has a 18.00 pack-year smoking history. He has never used smokeless tobacco. He reports previous alcohol use. He reports current drug use. Drug: Marijuana [place]). Family History: family history includes Crohn's Disease in his sister; High Blood Pressure in his father; Mental Illness in his father. The patients home medications have been reviewed.     Allergies: Biaxin [clarithromycin], Effexor [venlafaxine hydrochloride], Cardizem [diltiazem hcl], and Aspirin    -------------------------------------------------- RESULTS -------------------------------------------------  Labs:  Results for orders placed or performed during the hospital encounter of 03/23/22   CBC with Auto Differential   Result Value Ref Range    WBC 10.5 4.5 - 11.5 E9/L    RBC 5.12 3.80 - 5.80 E12/L    Hemoglobin 15.4 12.5 - 16.5 g/dL    Hematocrit 46.2 37.0 - 54.0 %    MCV 90.2 80.0 - 99.9 fL    MCH 30.1 26.0 - 35.0 pg    MCHC 33.3 32.0 - 34.5 %    RDW 13.4 11.5 - 15.0 fL    Platelets 920 497 - 604 E9/L    MPV 10.2 7.0 - 12.0 fL    Neutrophils % 57.7 43.0 - 80.0 %    Immature Granulocytes % 0.4 0.0 - 5.0 %    Lymphocytes % 29.3 20.0 - 42.0 %    Monocytes % 8.7 2.0 - 12.0 %    Eosinophils % 3.0 0.0 - 6.0 %    Basophils % 0.9 0.0 - 2.0 %    Neutrophils Absolute 6.06 1.80 - 7.30 E9/L    Immature Granulocytes # 0.04 E9/L    Lymphocytes Absolute 3.08 1.50 - 4.00 E9/L    Monocytes Absolute 0.91 0.10 - 0.95 E9/L    Eosinophils Absolute 0.32 0.05 - 0.50 E9/L    Basophils Absolute 0.09 0.00 - 0.20 X8/B   Basic Metabolic Panel w/ Reflex to MG   Result Value Ref Range    Sodium 136 132 - 146 mmol/L    Potassium reflex Magnesium 4.3 3.5 - 5.0 mmol/L    Chloride 105 98 - 107 mmol/L    CO2 23 22 - 29 mmol/L    Anion Gap 8 7 - 16 mmol/L    Glucose 92 74 - 99 mg/dL    BUN 14 6 - 20 mg/dL    CREATININE 0.9 0.7 - 1.2 mg/dL    GFR Non-African American >60 >=60 mL/min/1.73    GFR African American >60     Calcium 8.8 8.6 - 10.2 mg/dL   Troponin   Result Value Ref Range    Troponin, High Sensitivity <6 0 - 11 ng/L   Brain Natriuretic Peptide   Result Value Ref Range    Pro- (H) 0 - 125 pg/mL   TSH   Result Value Ref Range    TSH 0.675 0.270 - 4.200 uIU/mL   Lipase   Result Value Ref Range    Lipase 30 13 - 60 U/L   EKG 12 Lead   Result Value Ref Range    Ventricular Rate 64 BPM    Atrial Rate 64 BPM    P-R Interval 162 ms    QRS Duration 102 ms    Q-T Interval 396 ms    QTc Calculation (Bazett) 408 ms    P Axis 65 degrees    R Axis 38 degrees    T Axis 58 degrees       Radiology:  XR CHEST PORTABLE   Final Result   No acute pulmonary process           ------------------------- NURSING NOTES AND VITALS REVIEWED ---------------------------  Date / Time Roomed:  3/23/2022  8:09 AM  ED Bed Assignment:  04/04    The nursing notes within the ED encounter and vital signs as below have been reviewed.    BP 94/66   Pulse 64   Temp 98.6 °F (37 °C) (Oral)   Resp 16   Ht 5' 8\" (1.727 m)   Wt 155 lb (70.3 kg)   SpO2 97%   BMI 23.57 kg/m²   Oxygen Saturation Interpretation: Normal      ------------------------------------------ PROGRESS NOTES ------------------------------------------  9:55 PM EDT  I have spoken with the patient and discussed todays results, in addition to providing specific details for the plan of care and counseling regarding the diagnosis and prognosis. Their questions are answered at this time and they are agreeable with the plan. I discussed at length with them reasons for immediate return here for re evaluation. They will followup with their Cardiologist by calling their office tomorrow. --------------------------------- ADDITIONAL PROVIDER NOTES ---------------------------------  At this time the patient is without objective evidence of an acute process requiring hospitalization or inpatient management. They have remained hemodynamically stable throughout their entire ED visit and are stable for discharge with outpatient follow-up. The plan has been discussed in detail and they are aware of the specific conditions for emergent return, as well as the importance of follow-up. Discharge Medication List as of 3/23/2022 12:53 PM          Diagnosis:  1. Palpitations    2. History of atrial fibrillation      Disposition:  Patient's disposition: Discharge to home  Patient's condition is stable. 3/23/22, 9:55 PM EDT.     This note is prepared by Rosa Aceves MD -PGY- 3                 Rosa Aceves MD  Resident  03/23/22 [id number]

## 2022-04-24 ENCOUNTER — HOSPITAL ENCOUNTER (EMERGENCY)
Age: 47
Discharge: HOME | End: 2022-04-24
Attending: EMERGENCY MEDICINE
Payer: COMMERCIAL

## 2022-04-24 ENCOUNTER — APPOINTMENT (OUTPATIENT)
Dept: GENERAL RADIOLOGY | Age: 47
End: 2022-04-24
Payer: COMMERCIAL

## 2022-04-24 VITALS
RESPIRATION RATE: 16 BRPM | BODY MASS INDEX: 23.42 KG/M2 | SYSTOLIC BLOOD PRESSURE: 108 MMHG | OXYGEN SATURATION: 98 % | HEART RATE: 77 BPM | TEMPERATURE: 97.6 F | WEIGHT: 154 LBS | DIASTOLIC BLOOD PRESSURE: 85 MMHG

## 2022-04-24 DIAGNOSIS — F17.200 TOBACCO USE DISORDER: ICD-10-CM

## 2022-04-24 DIAGNOSIS — R07.9 CHEST PAIN, UNSPECIFIED TYPE: Primary | ICD-10-CM

## 2022-04-24 LAB
ANION GAP SERPL CALCULATED.3IONS-SCNC: 12 MMOL/L (ref 7–16)
BASOPHILS ABSOLUTE: 0.09 E9/L (ref 0–0.2)
BASOPHILS RELATIVE PERCENT: 0.9 % (ref 0–2)
BUN BLDV-MCNC: 13 MG/DL (ref 6–20)
CALCIUM SERPL-MCNC: 9.2 MG/DL (ref 8.6–10.2)
CHLORIDE BLD-SCNC: 102 MMOL/L (ref 98–107)
CO2: 21 MMOL/L (ref 22–29)
CREAT SERPL-MCNC: 0.9 MG/DL (ref 0.7–1.2)
EKG ATRIAL RATE: 94 BPM
EKG Q-T INTERVAL: 360 MS
EKG QRS DURATION: 102 MS
EKG QTC CALCULATION (BAZETT): 450 MS
EKG R AXIS: 106 DEGREES
EKG T AXIS: 131 DEGREES
EKG VENTRICULAR RATE: 94 BPM
EOSINOPHILS ABSOLUTE: 0.28 E9/L (ref 0.05–0.5)
EOSINOPHILS RELATIVE PERCENT: 2.8 % (ref 0–6)
GFR AFRICAN AMERICAN: >60
GFR NON-AFRICAN AMERICAN: >60 ML/MIN/1.73
GLUCOSE BLD-MCNC: 126 MG/DL (ref 74–99)
HCT VFR BLD CALC: 48.5 % (ref 37–54)
HEMOGLOBIN: 16.2 G/DL (ref 12.5–16.5)
IMMATURE GRANULOCYTES #: 0.05 E9/L
IMMATURE GRANULOCYTES %: 0.5 % (ref 0–5)
LYMPHOCYTES ABSOLUTE: 3.07 E9/L (ref 1.5–4)
LYMPHOCYTES RELATIVE PERCENT: 30.7 % (ref 20–42)
MCH RBC QN AUTO: 30 PG (ref 26–35)
MCHC RBC AUTO-ENTMCNC: 33.4 % (ref 32–34.5)
MCV RBC AUTO: 89.8 FL (ref 80–99.9)
MONOCYTES ABSOLUTE: 0.86 E9/L (ref 0.1–0.95)
MONOCYTES RELATIVE PERCENT: 8.6 % (ref 2–12)
NEUTROPHILS ABSOLUTE: 5.65 E9/L (ref 1.8–7.3)
NEUTROPHILS RELATIVE PERCENT: 56.5 % (ref 43–80)
PDW BLD-RTO: 13.7 FL (ref 11.5–15)
PLATELET # BLD: 256 E9/L (ref 130–450)
PMV BLD AUTO: 9.9 FL (ref 7–12)
POTASSIUM REFLEX MAGNESIUM: 4.4 MMOL/L (ref 3.5–5)
PRO-BNP: 47 PG/ML (ref 0–125)
RBC # BLD: 5.4 E12/L (ref 3.8–5.8)
SODIUM BLD-SCNC: 135 MMOL/L (ref 132–146)
TROPONIN, HIGH SENSITIVITY: 8 NG/L (ref 0–11)
TROPONIN, HIGH SENSITIVITY: <6 NG/L (ref 0–11)
WBC # BLD: 10 E9/L (ref 4.5–11.5)

## 2022-04-24 PROCEDURE — 85025 COMPLETE CBC W/AUTO DIFF WBC: CPT

## 2022-04-24 PROCEDURE — 93005 ELECTROCARDIOGRAM TRACING: CPT | Performed by: STUDENT IN AN ORGANIZED HEALTH CARE EDUCATION/TRAINING PROGRAM

## 2022-04-24 PROCEDURE — 71045 X-RAY EXAM CHEST 1 VIEW: CPT

## 2022-04-24 PROCEDURE — 80048 BASIC METABOLIC PNL TOTAL CA: CPT

## 2022-04-24 PROCEDURE — 99285 EMERGENCY DEPT VISIT HI MDM: CPT

## 2022-04-24 PROCEDURE — 84484 ASSAY OF TROPONIN QUANT: CPT

## 2022-04-24 PROCEDURE — 93010 ELECTROCARDIOGRAM REPORT: CPT | Performed by: INTERNAL MEDICINE

## 2022-04-24 PROCEDURE — 83880 ASSAY OF NATRIURETIC PEPTIDE: CPT

## 2022-04-24 ASSESSMENT — PAIN SCALES - GENERAL: PAINLEVEL_OUTOF10: 4

## 2022-04-24 ASSESSMENT — PAIN DESCRIPTION - ORIENTATION: ORIENTATION: MID

## 2022-04-24 ASSESSMENT — PAIN DESCRIPTION - LOCATION: LOCATION: CHEST

## 2022-04-24 ASSESSMENT — PAIN DESCRIPTION - DESCRIPTORS: DESCRIPTORS: SHARP

## 2022-04-24 NOTE — ED PROVIDER NOTES
Department of Emergency Medicine   ED Provider Note  Admit Date/RoomTime: 4/24/2022  7:45 AM  ED Room: 26/26          History of Present Illness:  4/24/22, Time: 7:46 AM EDT         Jeronimo Wallace III is a 42 y.o. male with history of atrial fibrillation, COPD, tobacco use disorder, schizophrenia, presenting to the ED for chest pain, beginning about 1 hour ago. The complaint has been intermittent, moderate in severity, and worsened by moderate exertion. Patient states he went to the [employer] station to  cigarettes, suddenly got sharp chest pain in the middle of his chest with a \"hollow feeling\" in his head and his back. He has no pain in his back, the pain from his chest does not radiate into his back. The pain in his chest is sharp, intermittent, worse with exertion. He has some associated lightheadedness but no syncope. He has some associated nausea but no vomiting. He has some associated shortness of breath. He is not on any anticoagulation, not on aspirin, no history of DVT or PE, denies any leg pain or swelling, denies any hemoptysis or recent surgery. He is followed by cardiology Dr. Cerda Dear.  He does take Cialis daily. He denies any fevers or chills or cough, denies any abdominal pain or headache. Review of Systems:      Pertinent positives and negatives are stated within HPI. 10 point ROS otherwise negative.    --------------------------------------------- PAST HISTORY ---------------------------------------------  Past Medical History:  has a past medical history of A-fib ([address] Anxiety with depression, Arthritis, Hand pain, right, History of Helicobacter pylori infection, Hyperlipidemia, Restless leg syndrome, Scoliosis, and Screening for colon cancer. Past Surgical History:  has a past surgical history that includes Colonoscopy (2016); hernia repair (09/19/2017); pr secd clos surg wnd exten/complic (N/A, 4/82/7686); Upper gastrointestinal endoscopy (4/9/2018);  Abdomen surgery (N/A, 03/15/2018); Upper gastrointestinal endoscopy (N/A, 5/21/2018); pr lap,cholecystectomy (N/A, 5/29/2018); Cholecystectomy (2017); and Colonoscopy (N/A, 2/16/2022). Social History:  reports that he has been smoking cigarettes. He has a 18.00 pack-year smoking history. He has never used smokeless tobacco. He reports previous alcohol use. He reports current drug use. Drug: Marijuana [place]). Family History: family history includes Crohn's Disease in his sister; High Blood Pressure in his father; Mental Illness in his father. The patients home medications have been reviewed. Allergies: Biaxin [clarithromycin], Effexor [venlafaxine hydrochloride], Cardizem [diltiazem hcl], and Aspirin        ---------------------------------------------------PHYSICAL EXAM--------------------------------------    Constitutional/General: AAO to person/place/time/purpose, NAD, no labored breathing, appears older than stated age, anxious appearing  Head: Normocephalic and atraumatic  Eyes: EOMI, conjunctiva normal, sclera non icteric  Mouth: Moist mucous membranes, uvula midline  Neck: Supple, no stridor, no meningeal signs  Respiratory: Lungs clear to auscultation bilaterally, no wheezes, rales, or rhonchi. Not in respiratory distress  Cardiovascular: Mildly tachycardic. Irregularly irregular rhythm. no murmurs, no gallops, or rubs. 2+ distal pulses. Equal extremity pulses. Chest: No chest wall tenderness or deformity  GI:  Abdomen Soft, Non tender, Non distended. No rebound, guarding, or rigidity. No pulsatile masses. Musculoskeletal: Moves all extremities x 4. Warm and well perfused, no clubbing, cyanosis, or edema. Capillary refill <3 seconds  Integument: skin warm and dry. No rashes.    Neurologic: GCS 15, no focal deficits, symmetric strength 5/5 in the major muscle groups of upper and lower extremities bilaterally, mild tremor of left hand  Psychiatric: Anxious, linear thought processes, no signs of internal preoccupation    -------------------------------------------------- RESULTS -------------------------------------------------  I have personally reviewed all laboratory and imaging results for this patient. Results are listed below.      LABS:  Results for orders placed or performed during the hospital encounter of 04/24/22   CBC with Auto Differential   Result Value Ref Range    WBC 10.0 4.5 - 11.5 E9/L    RBC 5.40 3.80 - 5.80 E12/L    Hemoglobin 16.2 12.5 - 16.5 g/dL    Hematocrit 48.5 37.0 - 54.0 %    MCV 89.8 80.0 - 99.9 fL    MCH 30.0 26.0 - 35.0 pg    MCHC 33.4 32.0 - 34.5 %    RDW 13.7 11.5 - 15.0 fL    Platelets 127 640 - 517 E9/L    MPV 9.9 7.0 - 12.0 fL    Neutrophils % 56.5 43.0 - 80.0 %    Immature Granulocytes % 0.5 0.0 - 5.0 %    Lymphocytes % 30.7 20.0 - 42.0 %    Monocytes % 8.6 2.0 - 12.0 %    Eosinophils % 2.8 0.0 - 6.0 %    Basophils % 0.9 0.0 - 2.0 %    Neutrophils Absolute 5.65 1.80 - 7.30 E9/L    Immature Granulocytes # 0.05 E9/L    Lymphocytes Absolute 3.07 1.50 - 4.00 E9/L    Monocytes Absolute 0.86 0.10 - 0.95 E9/L    Eosinophils Absolute 0.28 0.05 - 0.50 E9/L    Basophils Absolute 0.09 0.00 - 0.20 A6/D   Basic Metabolic Panel w/ Reflex to MG   Result Value Ref Range    Sodium 135 132 - 146 mmol/L    Potassium reflex Magnesium 4.4 3.5 - 5.0 mmol/L    Chloride 102 98 - 107 mmol/L    CO2 21 (L) 22 - 29 mmol/L    Anion Gap 12 7 - 16 mmol/L    Glucose 126 (H) 74 - 99 mg/dL    BUN 13 6 - 20 mg/dL    CREATININE 0.9 0.7 - 1.2 mg/dL    GFR Non-African American >60 >=60 mL/min/1.73    GFR African American >60     Calcium 9.2 8.6 - 10.2 mg/dL   Troponin   Result Value Ref Range    Troponin, High Sensitivity 8 0 - 11 ng/L   Brain Natriuretic Peptide   Result Value Ref Range    Pro-BNP 47 0 - 125 pg/mL   Troponin   Result Value Ref Range    Troponin, High Sensitivity <6 0 - 11 ng/L   EKG 12 Lead   Result Value Ref Range    Ventricular Rate 94 BPM    Atrial Rate 94 BPM    QRS Duration 102 ms    Q-T Interval 360 ms    QTc Calculation (Bazett) 450 ms    R Axis 106 degrees    T Axis 131 degrees       RADIOLOGY:  Interpreted by Radiologist unless otherwise specified  XR CHEST PORTABLE   Final Result   No acute pulmonary process               EKG:      See ED Course for EKG documentation      ------------------------- NURSING NOTES AND VITALS REVIEWED ---------------------------   The nursing notes within the ED encounter and vital signs as below have been reviewed by myself. /85   Pulse 77   Temp 97.6 °F (36.4 °C)   Resp 16   Wt 154 lb (69.9 kg)   SpO2 98%   BMI 23.42 kg/m²   Oxygen Saturation Interpretation: Normal    The patients available past medical records and past encounters were reviewed. ------------------------------ ED COURSE/MEDICAL DECISION MAKING----------------------  Medications - No data to display         Medical Decision Making: This is a 68-year-old male presenting to the emerge department for chest pain. Patient here in atrial fibrillation with controlled rate. He is PERC negative, no concern for PE. Patient's symptoms not typical of ACS. No acute ischemic changes on EKG, troponin negative x2. BNP within normal limits, no significant anemia or leukocytosis, kidney function and electrolytes normal.  No evidence of acute process on chest x-ray. Patient's symptoms spontaneously resolved while here in the emergency department. Patient with reassuring work-up, overall low risk, will follow up outpatient with strict return precautions. Patient was counseled on tobacco cessation, risk reduction by reducing or eliminating smoking. See ED COURSE for additional MDM. Re-Evaluations:             ED Course as of 04/24/22 1504   Sun Apr 24, 2022   0801 EKG: This EKG is signed and interpreted by me.     Rate: 94  Rhythm: Atrial fibrillation  Interpretation: atrial fibrillation (chronic)  Comparison: changes compared to previous EKG, now in atrial fibrillation compared to previous EKG   [RH]   0812 PERC negative [RH]      ED Course User Index  [RH] 600 JATINDER Huston DO         This patient's ED course included: a personal history and physicial examination, re-evaluation prior to disposition, multiple bedside re-evaluations, IV medications, cardiac monitoring and continuous pulse oximetry    This patient has remained hemodynamically stable during their ED course. Consultations:  See ED Course    Counseling: The emergency provider has spoken with the patient and discussed todays results, in addition to providing specific details for the plan of care and counseling regarding the diagnosis and prognosis. Questions are answered at this time and they are agreeable with the plan.       --------------------------------- IMPRESSION AND DISPOSITION ---------------------------------    IMPRESSION  1. Chest pain, unspecified type    2. Tobacco use disorder        DISPOSITION  Disposition: Discharge to home  Patient condition is stable    NOTE: This report was transcribed using voice recognition software. Every effort was made to ensure accuracy; however, inadvertent computerized transcription errors may be present.  ]  Also please note that patient was seen and examined by attending physician. Plan of care and disposition discussed with attending physician and they were immediately available or present for all procedures performed.        -- Johnna Cuevas D.O. PGY-2     Resident Physician     Emergency Medicine      4/24/2022 3:04 PM        600 JATINDER Huston DO  Resident  04/24/22 [id number]

## 2022-04-28 ENCOUNTER — TELEPHONE (OUTPATIENT)
Dept: SURGERY | Age: 47
End: 2022-04-28

## 2022-04-28 ENCOUNTER — OFFICE VISIT (OUTPATIENT)
Dept: SURGERY | Age: 47
End: 2022-04-28
Payer: COMMERCIAL

## 2022-04-28 VITALS
SYSTOLIC BLOOD PRESSURE: 114 MMHG | BODY MASS INDEX: 22.43 KG/M2 | WEIGHT: 148 LBS | HEART RATE: 75 BPM | DIASTOLIC BLOOD PRESSURE: 78 MMHG | HEIGHT: 68 IN

## 2022-04-28 DIAGNOSIS — K90.9 DIARRHEA DUE TO MALABSORPTION: ICD-10-CM

## 2022-04-28 DIAGNOSIS — R19.7 DIARRHEA DUE TO MALABSORPTION: ICD-10-CM

## 2022-04-28 DIAGNOSIS — K31.84 GASTROPARESIS: Primary | ICD-10-CM

## 2022-04-28 DIAGNOSIS — R14.0 BLOATING: ICD-10-CM

## 2022-04-28 DIAGNOSIS — R63.4 WEIGHT LOSS: ICD-10-CM

## 2022-04-28 DIAGNOSIS — R11.0 NAUSEA: ICD-10-CM

## 2022-04-28 PROCEDURE — 99214 OFFICE O/P EST MOD 30 MIN: CPT | Performed by: SURGERY

## 2022-04-28 PROCEDURE — G8420 CALC BMI NORM PARAMETERS: HCPCS | Performed by: SURGERY

## 2022-04-28 PROCEDURE — 4004F PT TOBACCO SCREEN RCVD TLK: CPT | Performed by: SURGERY

## 2022-04-28 PROCEDURE — G8427 DOCREV CUR MEDS BY ELIG CLIN: HCPCS | Performed by: SURGERY

## 2022-04-28 NOTE — TELEPHONE ENCOUNTER
Prior Authorization Form:      DEMOGRAPHICS:                     Patient Name:  Scott Sanchez III  Patient :  1975            Insurance:  Payor: Bennett Nuñez / Plan: Amy Pisano / Product Type: *No Product type* /   Insurance ID Number:    Payor/Plan Subscr  Sex Relation Sub. Ins. ID Effective Group Num   [facility] NIMISHA COLLINS* 1975 Male Self [id number] [birth year] [facility] [address]         DIAGNOSIS & PROCEDURE:                       Procedure/Operation: EGD           CPT Code: 13538    Diagnosis:  GERD, NAUSEA    ICD10 Code: K21.9, R11.0    Location:  [facility]    Surgeon:  DR. PANDA    SCHEDULING INFORMATION:                          Date: MAY 6, 2022        Time: 9:30AM              Anesthesia:  MAC/TIVA                                                       Status:  Outpatient        Special Comments:         Electronically signed by Amalia Nunes MA on 2022 at 2:46 PM

## 2022-04-28 NOTE — PROGRESS NOTES
Progress Note - Follow up    Patient's Name/Date of Birth: Jennifer Sánchez III / 1975    Date: 4/28/2022    PCP: Sandra Luu DO    Referring Physician:   Kiley Wade [facility]    Chief Complaint   Patient presents with   Sable Abler     always feeling full     Gastroesophageal Reflux    Nausea       HPI:    The patient said he wakes up feeling full. He said he is getting weak. He said even coffee bloats him. He has nausea without vomiting. The patient said the Questran daily helps with his stool. Without it, he has constant diarrhea. He sometimes has upper abdominal pain. The patient said he is losing weight as well. He said he can barely eat once a day. He said he has no desire to eat, either. Patient's medications, allergies, past medical, surgical, social and family histories were reviewed and updated as appropriate. Review of Systems  Constitutional: negative  Respiratory: negative  Cardiovascular: negative  Gastrointestinal: as in hpi  Genitourinary:negative  Integument/breast: negative    Physical Exam:  /78   Pulse 75   Ht 5' 8\" (1.727 m)   Wt 148 lb (67.1 kg)   BMI 22.50 kg/m²   General appearance: alert, cooperative and in no acute distress. Lungs: normal work of breathing  Heart: regular rate  Abdomen: soft, mildly tender at site of umbilical hernia repair, nondistended  Musculoskeletal: symmetrical without edema. Skin: normal     Data Reviewed:   Pathology: Diagnosis:      A. Terminal ileum: No significant histopathologic abnormality.      B. Random colon: No significant histopathologic abnormality. Comment:    The colon biopsies show no evidence of chronic or active   colitis including lymphocytic and collagenous colitis.      Impression/Plan:  42y.o. year old male with gastroparesis, malabsorptive diarrhea, weight loss, nausea, bloating    Questran daily    CT abd and pelvis with PO and IV contrast    EGD possible biopsy possible polypectomy  I explained the risks, benefits, alternatives, and potential complications associated with the above procedure to be performed and transfusions when applicable with the patient/responsible person prior to the procedure. All of the patient's questions were answered. The patient understands and agrees to surgery. On this date 4/28/2022 I have spent 30 minutes reviewing previous notes, test results and face to face with the patient discussing the diagnosis and importance of compliance with the treatment plan as well as documenting on the day of the visit. Greater than 50% of the time was spent face-to-face with the patient. This time is exclusive of procedures performed. I answered all of  the patient's questions to his/her satisfaction.      Electronically by Manuel Celeste MD, General Surgery  on 4/28/2022 at 8:29 AM      Send copy of H&P to PCP, Zaira Mijares DO and referring physician, Debbie Gutierrez DO      4/28/2022

## 2022-04-28 NOTE — TELEPHONE ENCOUNTER
CT scan is scheduled for 5/9 at [facility] arrive at 12:30pm. NPO 4 hrs prior. Left message for patient to call office.

## 2022-05-03 NOTE — PROGRESS NOTES
[facility] PRE-ADMISSION TESTING INSTRUCTIONS    The Preadmission Testing patient is instructed accordingly using the following criteria (check applicable):    ARRIVAL INSTRUCTIONS:  [x] Parking the day of Surgery is located in the Main Entrance lot. Upon entering the door, make an immediate right to the surgery reception desk    [x] Bring photo ID and insurance card    [] Bring in a copy of Living will or Durable Power of  papers. [x] Please be sure to arrange for responsible adult to provide transportation to and from the hospital    [x] Please arrange for responsible adult to be with you for the 24 hour period post procedure due to having anesthesia      GENERAL INSTRUCTIONS:    [x] Nothing by mouth after midnight, including gum, candy, mints or water    [x] You may brush your teeth, but do not swallow any water    [x] Take medications as instructed with 1-2 oz of water    [] Stop herbal supplements and vitamins 5 days prior to procedure    [] Follow preop dosing of blood thinners per physician instructions    [] Take 1/2 dose of evening insulin, but no insulin after midnight    [] No oral diabetic medications after midnight    [] If diabetic and have low blood sugar or feel symptomatic, take 1-2oz apple juice only    [] Bring inhalers day of surgery    [] Bring C-PAP/ Bi-Pap day of surgery    [] Bring urine specimen day of surgery    [x] Shower or bath with soap, lather and rinse well, AM of Surgery, no lotion, powders or creams to surgical site    [] Follow bowel prep as instructed per surgeon    [x] No tobacco products within 24 hours of surgery     [x] No alcohol or illegal drug use within 24 hours of surgery.     [x] Jewelry, body piercing's, eyeglasses, contact lenses and dentures are not permitted into surgery (bring cases)      [] Please do not wear any nail polish, make up or hair products on the day of surgery    [] You can expect a call the business day prior to procedure to notify you if your arrival time changes    [] If you receive a survey after surgery we would greatly appreciate your comments    [] Parent/guardian of a minor must accompany their child and remain on the premises  the entire time they are under our care     [] Pediatric patients may bring favorite toy, blanket or comfort item with them    [] A caregiver or family member must remain with the patient during their stay if they are mentally handicapped, have dementia, disoriented or unable to use a call light or would be a safety concern if left unattended    [x] Please notify surgeon if you develop any illness between now and time of surgery (cold, cough, sore throat, fever, nausea, vomiting) or any signs of infections  including skin, wounds, and dental.    [x]  The Outpatient Pharmacy is available to fill your prescription here on your day of surgery, ask your preop nurse for details    [] Other instructions    EDUCATIONAL MATERIALS PROVIDED:    [] PAT Preoperative Education Packet/Booklet     [] Medication List    [] Transfusion bracelet applied with instructions    [] Shower with soap, lather and rinse well, and use CHG wipes provided the evening before surgery as instructed    [] Incentive spirometer with instructions        Have you been tested for COVID  No           Have you been told you were positive for COVID No  Have you had any known exposure to someone that is positive for COVID No  Do you have a cough                   No              Do you have shortness of breath No                 Do you have a sore throat            No                Are you having chills                    No                Are you having muscle aches. No                    Please come to the hospital wearing a mask and have your significant other wear a mask as well. Both of you should check your temperature before leaving to come here,  if it is 100 or higher please call [phone] for instruction.

## 2022-05-03 NOTE — PROGRESS NOTES
Dr. Sloane Schultz informed of pt frequent occurences of afib in last few months. Controlled rhythm at this time.

## 2022-05-06 ENCOUNTER — HOSPITAL ENCOUNTER (OUTPATIENT)
Age: 47
Setting detail: OUTPATIENT SURGERY
Discharge: HOME | End: 2022-05-06
Attending: SURGERY | Admitting: SURGERY
Payer: COMMERCIAL

## 2022-05-06 ENCOUNTER — ANESTHESIA (OUTPATIENT)
Dept: ENDOSCOPY | Age: 47
End: 2022-05-06
Payer: COMMERCIAL

## 2022-05-06 ENCOUNTER — ANESTHESIA EVENT (OUTPATIENT)
Dept: ENDOSCOPY | Age: 47
End: 2022-05-06
Payer: COMMERCIAL

## 2022-05-06 VITALS
SYSTOLIC BLOOD PRESSURE: 106 MMHG | OXYGEN SATURATION: 96 % | BODY MASS INDEX: 22.43 KG/M2 | DIASTOLIC BLOOD PRESSURE: 62 MMHG | HEIGHT: 68 IN | RESPIRATION RATE: 16 BRPM | HEART RATE: 58 BPM | TEMPERATURE: 97 F | WEIGHT: 148 LBS

## 2022-05-06 VITALS — SYSTOLIC BLOOD PRESSURE: 110 MMHG | DIASTOLIC BLOOD PRESSURE: 66 MMHG | OXYGEN SATURATION: 97 %

## 2022-05-06 PROCEDURE — 2709999900 HC NON-CHARGEABLE SUPPLY: Performed by: SURGERY

## 2022-05-06 PROCEDURE — 6360000002 HC RX W HCPCS: Performed by: NURSE ANESTHETIST, CERTIFIED REGISTERED

## 2022-05-06 PROCEDURE — 88342 IMHCHEM/IMCYTCHM 1ST ANTB: CPT

## 2022-05-06 PROCEDURE — 2580000003 HC RX 258: Performed by: NURSE ANESTHETIST, CERTIFIED REGISTERED

## 2022-05-06 PROCEDURE — 3700000001 HC ADD 15 MINUTES (ANESTHESIA): Performed by: SURGERY

## 2022-05-06 PROCEDURE — 3609012400 HC EGD TRANSORAL BIOPSY SINGLE/MULTIPLE: Performed by: SURGERY

## 2022-05-06 PROCEDURE — 7100000010 HC PHASE II RECOVERY - FIRST 15 MIN: Performed by: SURGERY

## 2022-05-06 PROCEDURE — 88305 TISSUE EXAM BY PATHOLOGIST: CPT

## 2022-05-06 PROCEDURE — 88305 TISSUE EXAM BY PATHOLOGIST: CPT | Performed by: NURSE ANESTHETIST, CERTIFIED REGISTERED

## 2022-05-06 PROCEDURE — 3700000000 HC ANESTHESIA ATTENDED CARE: Performed by: SURGERY

## 2022-05-06 PROCEDURE — 2500000003 HC RX 250 WO HCPCS: Performed by: NURSE ANESTHETIST, CERTIFIED REGISTERED

## 2022-05-06 PROCEDURE — 43239 EGD BIOPSY SINGLE/MULTIPLE: CPT | Performed by: SURGERY

## 2022-05-06 PROCEDURE — 7100000011 HC PHASE II RECOVERY - ADDTL 15 MIN: Performed by: SURGERY

## 2022-05-06 RX ORDER — SODIUM CHLORIDE 9 MG/ML
INJECTION, SOLUTION INTRAVENOUS CONTINUOUS PRN
Status: DISCONTINUED | OUTPATIENT
Start: 2022-05-06 | End: 2022-05-06 | Stop reason: SDUPTHER

## 2022-05-06 RX ORDER — PROPOFOL 10 MG/ML
INJECTION, EMULSION INTRAVENOUS PRN
Status: DISCONTINUED | OUTPATIENT
Start: 2022-05-06 | End: 2022-05-06 | Stop reason: SDUPTHER

## 2022-05-06 RX ORDER — LIDOCAINE HYDROCHLORIDE 20 MG/ML
INJECTION, SOLUTION INTRAVENOUS PRN
Status: DISCONTINUED | OUTPATIENT
Start: 2022-05-06 | End: 2022-05-06 | Stop reason: SDUPTHER

## 2022-05-06 RX ORDER — LIDOCAINE HYDROCHLORIDE 10 MG/ML
INJECTION, SOLUTION INFILTRATION; PERINEURAL PRN
Status: DISCONTINUED | OUTPATIENT
Start: 2022-05-06 | End: 2022-05-06 | Stop reason: SDUPTHER

## 2022-05-06 RX ORDER — GLYCOPYRROLATE 1 MG/5 ML
SYRINGE (ML) INTRAVENOUS PRN
Status: DISCONTINUED | OUTPATIENT
Start: 2022-05-06 | End: 2022-05-06 | Stop reason: SDUPTHER

## 2022-05-06 RX ORDER — ONDANSETRON 2 MG/ML
INJECTION INTRAMUSCULAR; INTRAVENOUS PRN
Status: DISCONTINUED | OUTPATIENT
Start: 2022-05-06 | End: 2022-05-06 | Stop reason: SDUPTHER

## 2022-05-06 RX ADMIN — LIDOCAINE HYDROCHLORIDE 100 MG: 20 INJECTION, SOLUTION INTRAVENOUS at 09:05

## 2022-05-06 RX ADMIN — LIDOCAINE HYDROCHLORIDE 20 MG: 10 INJECTION, SOLUTION INFILTRATION; PERINEURAL at 09:05

## 2022-05-06 RX ADMIN — ONDANSETRON 4 MG: 2 INJECTION INTRAMUSCULAR; INTRAVENOUS at 09:05

## 2022-05-06 RX ADMIN — SODIUM CHLORIDE: 9 INJECTION, SOLUTION INTRAVENOUS at 09:03

## 2022-05-06 RX ADMIN — PROPOFOL 200 MG: 10 INJECTION, EMULSION INTRAVENOUS at 09:05

## 2022-05-06 RX ADMIN — Medication 0.1 MG: at 09:05

## 2022-05-06 ASSESSMENT — PAIN DESCRIPTION - ORIENTATION
ORIENTATION: ANTERIOR
ORIENTATION: ANTERIOR

## 2022-05-06 ASSESSMENT — PAIN DESCRIPTION - LOCATION
LOCATION: ABDOMEN

## 2022-05-06 ASSESSMENT — COPD QUESTIONNAIRES: CAT_SEVERITY: MODERATE

## 2022-05-06 ASSESSMENT — PAIN - FUNCTIONAL ASSESSMENT
PAIN_FUNCTIONAL_ASSESSMENT: 0-10
PAIN_FUNCTIONAL_ASSESSMENT: ACTIVITIES ARE NOT PREVENTED

## 2022-05-06 ASSESSMENT — PAIN DESCRIPTION - DESCRIPTORS
DESCRIPTORS: DISCOMFORT
DESCRIPTORS: DISCOMFORT

## 2022-05-06 ASSESSMENT — PAIN DESCRIPTION - FREQUENCY: FREQUENCY: CONTINUOUS

## 2022-05-06 ASSESSMENT — PAIN SCALES - GENERAL
PAINLEVEL_OUTOF10: 0
PAINLEVEL_OUTOF10: 8
PAINLEVEL_OUTOF10: 0
PAINLEVEL_OUTOF10: 5

## 2022-05-06 ASSESSMENT — LIFESTYLE VARIABLES: SMOKING_STATUS: 1

## 2022-05-06 NOTE — H&P
Patient's office history and physical was reviewed. Patient examined. There has been no change in the patient's history and physical.      Physician Signature: Electronically signed by Dr. Jesse Salazar    Patient's Name/Date of Birth: Robert Linda / 1975    Date: 4/28/2022    PCP: Estefany Juárez DO    Referring Physician:   No ref. provider found  N/A    No chief complaint on file. HPI:    The patient said he wakes up feeling full. He said he is getting weak. He said even coffee bloats him. He has nausea without vomiting. The patient said the Questran daily helps with his stool. Without it, he has constant diarrhea. He sometimes has upper abdominal pain. The patient said he is losing weight as well. He said he can barely eat once a day. He said he has no desire to eat, either. Patient's medications, allergies, past medical, surgical, social and family histories were reviewed and updated as appropriate. Review of Systems  Constitutional: negative  Respiratory: negative  Cardiovascular: negative  Gastrointestinal: as in hpi  Genitourinary:negative  Integument/breast: negative    Physical Exam:  BP (!) 107/55   Pulse 64   Temp 97 °F (36.1 °C) (Temporal)   Resp 18   Ht 5' 8\" (1.727 m)   Wt 148 lb (67.1 kg)   SpO2 97%   BMI 22.50 kg/m²   General appearance: alert, cooperative and in no acute distress. Lungs: normal work of breathing  Heart: regular rate  Abdomen: soft, mildly tender at site of umbilical hernia repair, nondistended  Musculoskeletal: symmetrical without edema. Skin: normal     Data Reviewed:   Pathology: Diagnosis:      A. Terminal ileum: No significant histopathologic abnormality.      B. Random colon: No significant histopathologic abnormality. Comment:    The colon biopsies show no evidence of chronic or active   colitis including lymphocytic and collagenous colitis.      Impression/Plan:  42y.o. year old male with gastroparesis, malabsorptive diarrhea, weight loss, nausea, bloating    Questran daily    CT abd and pelvis with PO and IV contrast    EGD possible biopsy possible polypectomy  I explained the risks, benefits, alternatives, and potential complications associated with the above procedure to be performed and transfusions when applicable with the patient/responsible person prior to the procedure. All of the patient's questions were answered. The patient understands and agrees to surgery. On this date 4/28/2022 I have spent 30 minutes reviewing previous notes, test results and face to face with the patient discussing the diagnosis and importance of compliance with the treatment plan as well as documenting on the day of the visit. Greater than 50% of the time was spent face-to-face with the patient. This time is exclusive of procedures performed. I answered all of  the patient's questions to his/her satisfaction. Electronically by Kevin Martínez MD, General Surgery  on 5/6/2022 at 9:03 AM      Send copy of H&P to PCP, Zonia Bertrand DO and referring physician, No ref.  provider found      4/28/2022

## 2022-05-06 NOTE — OP NOTE
Operative Note: EGD    Jacqui Wilkin III     DATE OF PROCEDURE: 5/6/2022  SURGEON: Dr. Joao Bosch MD, MPAVAN. PREOPERATIVE DIAGNOSES:   Nausea  bloating    POSTOPERATIVE DIAGNOSES:  Moderate gastritis  Bile reflux  GERD      OPERATION:    EGD esophagogastroduodenoscopy with distal esophageal, antral and duodenal    SPECIMENS:  ID Type Source Tests Collected by Time Destination   A : duodenum bx Tissue Duodenum SURGICAL PATHOLOGY Kathrin Castro MD 5/6/2022 6784    B : antrum bx Tissue Tissue SURGICAL PATHOLOGY Kathrin Castro MD 5/6/2022 0910        ANESTHESIA: LMAC    BLOOD LOSS: Minimal    CONSENT AND INDICATIONS:  This is a 42y.o. year old male who is having the above. I have discussed with the patient and/or the patient representative the indication, alternatives, and the possible risks and/or complications of the planned procedure and the anesthesia methods. The patient and/or patient representative appear to understand and agree to proceed. OPERATIONS: The patient was placed on the table and sedated by anesthesia. Bite block was placed. A lubricated scope was easily passed into the upper esophagus which looked normal. The distal esophagus looked abnormal: GERD and biopsies were taken. The gastroesophageal junction was at 40 cm. The scope was passed into the stomach and retroflexed. There was no hiatal hernia. The scope was passed down toward the pylorus. The antral mucosa all looked abnormal: moderate gastritis. Biopsy was taken to check for H. pylori. The scope was then passed through the pylorus into the duodenal bulb which looked normal, then around to the distal duodenum which looked normal and biopsies were taken, and the scope was then withdrawn. The patient tolerated the procedure well. PLAN: Follow up biopsies. Continue PPI    Physician Signature: Electronically signed by Dr. Joao Bosch MD M.D.  FACS    Send copy of H&P to PCP, Soren Connolly DO and referring physician, No ref.  provider found

## 2022-05-06 NOTE — ANESTHESIA POSTPROCEDURE EVALUATION
Department of Anesthesiology  Postprocedure Note    Patient: Jitendra Sultana  MRN: [id number]  YOB: 1975  Date of evaluation: 5/6/2022  Time:  3:04 PM     Procedure Summary     Date: 05/06/22 Room / Location: [facility] 03 / [facility]    Anesthesia Start: 7253 Anesthesia Stop: 4827    Procedure: EGD BIOPSY (N/A ) Diagnosis: (GERD NAUSEA)    Surgeons: Ryan Hull MD Responsible Provider: Spencer Chavez MD    Anesthesia Type: MAC ASA Status: 3          Anesthesia Type: No value filed. Aydee Phase I: Aydee Score: 10    Aydee Phase II:      Last vitals: Reviewed and per EMR flowsheets.        Anesthesia Post Evaluation    Patient location during evaluation: PACU  Patient participation: complete - patient participated  Level of consciousness: awake and alert  Airway patency: patent  Nausea & Vomiting: no nausea and no vomiting  Complications: no  Cardiovascular status: hemodynamically stable  Respiratory status: acceptable  Hydration status: stable

## 2022-05-06 NOTE — PROGRESS NOTES
4233 admitted to stage 2 pacu c/o abdominal discomfort   0935 Dr Akbar Garcia- [place] Sosa updated on abdominal discomfort   Dr Murry Latin her and spoke to pt and his family member   46 abdominal discomfort is gone and pt feeling better and passing some gas    1015 no further discomfort  So discharge instructions reviewed with pt and his family member and they verbalized understanding of instructions   [phone] pt discharged into the care of  his family member

## 2022-05-06 NOTE — ANESTHESIA PRE PROCEDURE
Department of Anesthesiology  Preprocedure Note       Name:  Carly Solorzano   Age:  42 y.o.  :  1975                                          MRN:  [id number]         Date:  2022      Surgeon: Roxana Jules):  Shabana Tinoco MD    Procedure: Procedure(s):  EGD    Medications prior to admission:   Prior to Admission medications    Medication Sig Start Date End Date Taking? Authorizing Provider   cholestyramine (QUESTRAN) 4 g packet Take 1 packet by mouth every evening    Historical Provider, MD   simvastatin (ZOCOR) 10 MG tablet Take 10 mg by mouth Daily with supper     Historical Provider, MD   acetaminophen (TYLENOL) 500 MG tablet Take 500 mg by mouth every 12 hours as needed for Pain    Historical Provider, MD   buPROPion (WELLBUTRIN XL) 300 MG extended release tablet Take 300 mg by mouth daily    Historical Provider, MD   flecainide (TAMBOCOR) 100 MG tablet Take 1 tablet by mouth 2 times daily 3/14/22   Naye Dopp, APRN - CNP   pentoxifylline (TRENTAL) 400 MG extended release tablet Take 400 mg by mouth every evening  22   Historical Provider, MD   tadalafil (CIALIS) 5 MG tablet Take 5 mg by mouth every evening  21   Historical Provider, MD   metoprolol succinate (TOPROL XL) 50 MG extended release tablet Take 1 tablet by mouth 2 times daily 22   Nany Ramires MD   diazePAM (VALIUM) 5 MG tablet Take 10 mg by mouth nightly. Historical Provider, MD   traZODone (DESYREL) 100 MG tablet Take 100 mg by mouth nightly     Historical Provider, MD   busPIRone (BUSPAR) 15 MG tablet Take 15 mg by mouth nightly  10/30/19   Historical Provider, MD   omeprazole (PRILOSEC) 40 MG delayed release capsule Take 40 mg by mouth Daily with supper  10/25/19   Historical Provider, MD       Current medications:    No current outpatient medications on file. No current facility-administered medications for this visit. Allergies:     Allergies   Allergen Reactions    Biaxin [Clarithromycin] Palpitations    Effexor [Venlafaxine Hydrochloride] Palpitations and Other (See Comments)     Patient began intensely sweating and \"passed out\"    Cardizem [Diltiazem Hcl] Other (See Comments)     TINNITUS    Aspirin Other (See Comments)     TINNITUS       Problem List:    Patient Active Problem List   Diagnosis Code    Hand pain, right M79.641    Depression F32. A    Helicobacter pylori gastritis K29.70, B96.81    Symptomatic cholelithiasis K80.20    A-fib (HCC) I48.91    Atrial fibrillation (HCC) I48.91    Atypical chest pain R07.89    Chronic obstructive pulmonary disease (HCC) J44.9    Schizophrenia ([facility]) F20.9    Chest pain R07.9    Leukocytosis D72.829    Atrial fibrillation with RVR ([facility]) I48.91    Diarrhea R19.7    Mild episode of recurrent major depressive disorder ([facility]) F33.0       Past Medical History:        Diagnosis Date    A-fib [facility]) 2021    follows with Dr. Isadora Mroris    Abdominal fullness 2022    Anxiety with depression     Arthritis     Hand pain, right     History of Helicobacter pylori infection     Hyperlipidemia     Restless leg syndrome     Scoliosis     Screening for colon cancer 2022       Past Surgical History:        Procedure Laterality Date    ABDOMEN SURGERY N/A 03/15/2018    wound exploration umbilicus, excsion suture granuloma    CHOLECYSTECTOMY  2017    COLONOSCOPY  2016    COLONOSCOPY N/A 2/16/2022    COLONOSCOPY WITH BIOPSY performed by Carlos Walker MD at [place] [address]  09/19/2017    SD LAP,CHOLECYSTECTOMY N/A 5/29/2018    LAPAROSCOPIC  CHOLECYSTECTOMY performed by Phi Clement MD at [address] [facility] WND EXTEN/COMPLIC N/A 7/58/8560    ABDOMINAL WOUND EXPLORATION , REMOVAL OF SUTURE GRANULOMA performed by Phi Clement MD at [address] [place] [address] 190  4/9/2018    EGD BIOPSY performed by Phi Clement MD at [facility] N/A 5/21/2018    EGD BIOPSY performed by Ivanna Daniels MD at [address] History:    Social History     Tobacco Use    Smoking status: Current Every Day Smoker     Packs/day: 1.00     Years: 18.00     Pack years: 18.00     Types: Cigarettes    Smokeless tobacco: Never Used    Tobacco comment: Before up to 2 ppd   Substance Use Topics    Alcohol use: Not Currently                                Ready to quit: Not Answered  Counseling given: Not Answered  Comment: Before up to 2 ppd      Vital Signs (Current): There were no vitals filed for this visit. BP Readings from Last 3 Encounters:   05/06/22 (!) 107/55   04/28/22 114/78   04/24/22 108/85       NPO Status:                                                                                 BMI:   Wt Readings from Last 3 Encounters:   05/06/22 148 lb (67.1 kg)   04/28/22 148 lb (67.1 kg)   04/24/22 154 lb (69.9 kg)     There is no height or weight on file to calculate BMI.    CBC:   Lab Results   Component Value Date    WBC 10.0 04/24/2022    RBC 5.40 04/24/2022    HGB 16.2 04/24/2022    HCT 48.5 04/24/2022    MCV 89.8 04/24/2022    RDW 13.7 04/24/2022     04/24/2022       CMP:   Lab Results   Component Value Date     04/24/2022    K 4.4 04/24/2022     04/24/2022    CO2 21 04/24/2022    BUN 13 04/24/2022    CREATININE 0.9 04/24/2022    GFRAA >60 04/24/2022    AGRATIO 1.4 06/29/2021    LABGLOM >60 04/24/2022    GLUCOSE 126 04/24/2022    GLUCOSE 90 11/11/2011    PROT 6.9 03/16/2022    CALCIUM 9.2 04/24/2022    BILITOT 0.4 03/16/2022    ALKPHOS 79 03/16/2022    AST 16 03/16/2022    ALT 21 03/16/2022       POC Tests: No results for input(s): POCGLU, POCNA, POCK, POCCL, POCBUN, POCHEMO, POCHCT in the last 72 hours.     Coags:   Lab Results   Component Value Date    PROTIME 11.7 05/03/2021    INR 1.1 05/03/2021    APTT 33.9 05/03/2021       HCG (If Applicable): No results found for: PREGTESTUR, PREGSERUM, HCG, HCGQUANT     ABGs:   Lab Results   Component Value Date    P0CTEZVP 99.4 10/31/2010        Type & Screen (If Applicable):  No results found for: LABABO, [address]    Anesthesia Evaluation  Patient summary reviewed and Nursing notes reviewed no history of anesthetic complications:   Airway: Mallampati: II  TM distance: >3 FB   Neck ROM: full  Mouth opening: > = 3 FB Dental:      Comment: Poor dentition    Pulmonary: breath sounds clear to auscultation  (+) COPD: moderate,  current smoker          Patient smoked on day of surgery. Cardiovascular:  Exercise tolerance: good (>4 METS),   (+) dysrhythmias: atrial fibrillation,       ECG reviewed  Rhythm: regular  Rate: normal  Echocardiogram reviewed         Beta Blocker:  Not on Beta Blocker         Neuro/Psych:   (+) psychiatric history (Schizophrenia ):depression/anxiety              ROS comment: Restless leg syndrome GI/Hepatic/Renal:   (+) GERD: well controlled, bowel prep,           Endo/Other:    (+) : arthritis: OA., .                  ROS comment: MJ abuse Abdominal:             Vascular:   + PVD, aortic or cerebral, . Other Findings:               Anesthesia Plan      MAC     ASA 3       Induction: intravenous. Anesthetic plan and risks discussed with patient and mother.       Plan discussed with CRNA and surgical team.              Casey Myers MD  Staff CRNA  May 6, 2022  8:28 AM                      Casey Myesr MD   5/6/2022

## 2022-07-14 NOTE — ED NOTES
Patient needs an appt   Patient sleeping, no acute distress noted.      Reed Myers RN  05/22/21 9141

## 2022-09-13 RX ORDER — CHOLESTYRAMINE 4 G/9G
1 POWDER, FOR SUSPENSION ORAL EVERY EVENING
Qty: 90 PACKET | Refills: 1 | OUTPATIENT
Start: 2022-09-13

## 2022-11-05 ENCOUNTER — HOSPITAL ENCOUNTER (EMERGENCY)
Age: 47
Discharge: HOME | End: 2022-11-05
Attending: EMERGENCY MEDICINE
Payer: COMMERCIAL

## 2022-11-05 ENCOUNTER — APPOINTMENT (OUTPATIENT)
Dept: GENERAL RADIOLOGY | Age: 47
End: 2022-11-05
Payer: COMMERCIAL

## 2022-11-05 VITALS
BODY MASS INDEX: 23.49 KG/M2 | HEIGHT: 68 IN | WEIGHT: 155 LBS | RESPIRATION RATE: 16 BRPM | TEMPERATURE: 96.7 F | OXYGEN SATURATION: 99 % | SYSTOLIC BLOOD PRESSURE: 104 MMHG | HEART RATE: 81 BPM | DIASTOLIC BLOOD PRESSURE: 80 MMHG

## 2022-11-05 DIAGNOSIS — I48.91 ATRIAL FIBRILLATION, UNSPECIFIED TYPE (HCC): ICD-10-CM

## 2022-11-05 DIAGNOSIS — R00.2 PALPITATIONS: Primary | ICD-10-CM

## 2022-11-05 LAB
ALBUMIN SERPL-MCNC: 4.3 G/DL (ref 3.5–5.2)
ALP BLD-CCNC: 79 U/L (ref 40–129)
ALT SERPL-CCNC: 21 U/L (ref 0–40)
ANION GAP SERPL CALCULATED.3IONS-SCNC: 11 MMOL/L (ref 7–16)
AST SERPL-CCNC: 18 U/L (ref 0–39)
BILIRUB SERPL-MCNC: 0.4 MG/DL (ref 0–1.2)
BUN BLDV-MCNC: 12 MG/DL (ref 6–20)
CALCIUM SERPL-MCNC: 9.4 MG/DL (ref 8.6–10.2)
CHLORIDE BLD-SCNC: 105 MMOL/L (ref 98–107)
CO2: 23 MMOL/L (ref 22–29)
CREAT SERPL-MCNC: 1 MG/DL (ref 0.7–1.2)
GFR SERPL CREATININE-BSD FRML MDRD: >60 ML/MIN/1.73
GLUCOSE BLD-MCNC: 165 MG/DL (ref 74–99)
HCT VFR BLD CALC: 51.9 % (ref 37–54)
HEMOGLOBIN: 16.7 G/DL (ref 12.5–16.5)
INFLUENZA A BY PCR: NOT DETECTED
INFLUENZA B BY PCR: NOT DETECTED
MAGNESIUM: 2 MG/DL (ref 1.6–2.6)
MCH RBC QN AUTO: 29.7 PG (ref 26–35)
MCHC RBC AUTO-ENTMCNC: 32.2 % (ref 32–34.5)
MCV RBC AUTO: 92.3 FL (ref 80–99.9)
PDW BLD-RTO: 13.2 FL (ref 11.5–15)
PLATELET # BLD: 289 E9/L (ref 130–450)
PMV BLD AUTO: 9.9 FL (ref 7–12)
POTASSIUM SERPL-SCNC: 4.3 MMOL/L (ref 3.5–5)
RBC # BLD: 5.62 E12/L (ref 3.8–5.8)
SARS-COV-2, NAAT: NOT DETECTED
SODIUM BLD-SCNC: 139 MMOL/L (ref 132–146)
TOTAL PROTEIN: 6.8 G/DL (ref 6.4–8.3)
TROPONIN, HIGH SENSITIVITY: 7 NG/L (ref 0–11)
WBC # BLD: 11.6 E9/L (ref 4.5–11.5)

## 2022-11-05 PROCEDURE — 71045 X-RAY EXAM CHEST 1 VIEW: CPT

## 2022-11-05 PROCEDURE — 93005 ELECTROCARDIOGRAM TRACING: CPT

## 2022-11-05 PROCEDURE — 80053 COMPREHEN METABOLIC PANEL: CPT

## 2022-11-05 PROCEDURE — 87502 INFLUENZA DNA AMP PROBE: CPT

## 2022-11-05 PROCEDURE — 84484 ASSAY OF TROPONIN QUANT: CPT

## 2022-11-05 PROCEDURE — 85027 COMPLETE CBC AUTOMATED: CPT

## 2022-11-05 PROCEDURE — 99285 EMERGENCY DEPT VISIT HI MDM: CPT

## 2022-11-05 PROCEDURE — 83735 ASSAY OF MAGNESIUM: CPT

## 2022-11-05 PROCEDURE — 87635 SARS-COV-2 COVID-19 AMP PRB: CPT

## 2022-11-05 ASSESSMENT — ENCOUNTER SYMPTOMS
SHORTNESS OF BREATH: 1
EYES NEGATIVE: 1
DIARRHEA: 0
ABDOMINAL PAIN: 0
COUGH: 1
VOMITING: 0
NAUSEA: 0

## 2022-11-05 ASSESSMENT — PAIN DESCRIPTION - LOCATION: LOCATION: CHEST

## 2022-11-05 ASSESSMENT — PAIN DESCRIPTION - DESCRIPTORS: DESCRIPTORS: PRESSURE

## 2022-11-05 ASSESSMENT — PAIN SCALES - GENERAL: PAINLEVEL_OUTOF10: 5

## 2022-11-05 NOTE — ED PROVIDER NOTES
Aaliyah Anna is a 41 old male who presents the emergency department for palpitations that started 7 hours prior to arrival.  Complaint is persistent, moderate severity, and nothing makes it better or worse. Patient has a history of paroxysmal atrial fibrillation and reports that he started experiencing palpitations consistent with his history of A. fib bring him to the emergency department. He also reports some substernal chest pressure, mild shortness of breath, and some lightheadedness. He he admits to smoking cigarettes. Denies fever, chills, headache, abdominal pain, nausea, vomiting, diarrhea. The history is provided by the patient. Review of Systems   Constitutional:  Negative for chills and fever. HENT: Negative. Eyes: Negative. Respiratory:  Positive for cough and shortness of breath. Cardiovascular:  Positive for chest pain and palpitations. Gastrointestinal:  Negative for abdominal pain, diarrhea, nausea and vomiting. Genitourinary: Negative. Musculoskeletal:  Negative for neck pain and neck stiffness. Skin: Negative. Neurological:  Positive for light-headedness. Negative for dizziness and headaches. Psychiatric/Behavioral: Negative. Physical Exam  Vitals and nursing note reviewed. Constitutional:       Appearance: Normal appearance. HENT:      Head: Normocephalic and atraumatic. Eyes:      Pupils: Pupils are equal, round, and reactive to light. Cardiovascular:      Rate and Rhythm: Normal rate. Rhythm irregular. Pulmonary:      Effort: Pulmonary effort is normal.      Breath sounds: No wheezing, rhonchi or rales. Abdominal:      Palpations: Abdomen is soft. Tenderness: There is no abdominal tenderness. There is no guarding or rebound. Musculoskeletal:      Cervical back: Normal range of motion. No rigidity. Right lower leg: No edema. Left lower leg: No edema. Skin:     General: Skin is warm and dry.       Capillary Refill: Capillary refill takes less than 2 seconds. Neurological:      General: No focal deficit present. Mental Status: He is alert and oriented to person, place, and time. Procedures     MDM  Number of Diagnoses or Management Options  Atrial fibrillation, unspecified type [facility])  Palpitations  Diagnosis management comments: Patient presented emergency department for palpitations consistent with his history of A. fib. Patient was rate controlled and other vitals are stable. Chest x-ray unremarkable and EKG shows no evidence of ischemic changes. Labs are unremarkable including negative COVID and flu. Troponin was less than 10. On reevaluation discussed results with patient and he is agreeable to plan for discharge to follow-up with his electrophysiologist outpatient. Instructed him to take his metoprolol as prescribed.     --------------------------------------------- PAST HISTORY ---------------------------------------------  Past Medical History:  has a past medical history of A-fib ([address] Abdominal fullness, Anxiety with depression, Arthritis, Atrial fibrillation ([address] Hand pain, right, History of Helicobacter pylori infection, Hyperlipidemia, Restless leg syndrome, Scoliosis, and Screening for colon cancer. Past Surgical History:  has a past surgical history that includes Colonoscopy (2016); hernia repair (09/19/2017); pr secd clos surg wnd exten/complic (N/A, 3/81/0101); Upper gastrointestinal endoscopy (4/9/2018); Abdomen surgery (N/A, 03/15/2018); Upper gastrointestinal endoscopy (N/A, 5/21/2018); pr lap,cholecystectomy (N/A, 5/29/2018); Cholecystectomy (2017); Colonoscopy (N/A, 2/16/2022); and Upper gastrointestinal endoscopy (N/A, 5/6/2022). Social History:  reports that he has been smoking cigarettes. He has a 18.00 pack-year smoking history. He has never used smokeless tobacco. He reports that he does not currently use alcohol. He reports current drug use.  Drug: Marijuana Latonia Quevedo. Family History: family history includes Crohn's Disease in his sister; High Blood Pressure in his father; Mental Illness in his father. The patients home medications have been reviewed.     Allergies: Biaxin [clarithromycin], Effexor [venlafaxine hydrochloride], Cardizem [diltiazem hcl], and Aspirin    -------------------------------------------------- RESULTS -------------------------------------------------    LABS:  Results for orders placed or performed during the hospital encounter of 11/05/22   COVID-19, Rapid    Specimen: Nasopharyngeal Swab   Result Value Ref Range    SARS-CoV-2, NAAT Not Detected Not Detected   Rapid influenza A/B antigens    Specimen: Nasopharyngeal   Result Value Ref Range    Influenza A by PCR Not Detected Not Detected    Influenza B by PCR Not Detected Not Detected   CBC   Result Value Ref Range    WBC 11.6 (H) 4.5 - 11.5 E9/L    RBC 5.62 3.80 - 5.80 E12/L    Hemoglobin 16.7 (H) 12.5 - 16.5 g/dL    Hematocrit 51.9 37.0 - 54.0 %    MCV 92.3 80.0 - 99.9 fL    MCH 29.7 26.0 - 35.0 pg    MCHC 32.2 32.0 - 34.5 %    RDW 13.2 11.5 - 15.0 fL    Platelets 813 466 - 489 E9/L    MPV 9.9 7.0 - 12.0 fL   Comprehensive Metabolic Panel   Result Value Ref Range    Sodium 139 132 - 146 mmol/L    Potassium 4.3 3.5 - 5.0 mmol/L    Chloride 105 98 - 107 mmol/L    CO2 23 22 - 29 mmol/L    Anion Gap 11 7 - 16 mmol/L    Glucose 165 (H) 74 - 99 mg/dL    BUN 12 6 - 20 mg/dL    Creatinine 1.0 0.7 - 1.2 mg/dL    Est, Glom Filt Rate >60 >=60 mL/min/1.73    Calcium 9.4 8.6 - 10.2 mg/dL    Total Protein 6.8 6.4 - 8.3 g/dL    Albumin 4.3 3.5 - 5.2 g/dL    Total Bilirubin 0.4 0.0 - 1.2 mg/dL    Alkaline Phosphatase 79 40 - 129 U/L    ALT 21 0 - 40 U/L    AST 18 0 - 39 U/L   Troponin   Result Value Ref Range    Troponin, High Sensitivity 7 0 - 11 ng/L   Magnesium   Result Value Ref Range    Magnesium 2.0 1.6 - 2.6 mg/dL   EKG 12 Lead   Result Value Ref Range    Ventricular Rate 84 BPM    Atrial Rate 288 BPM    QRS Duration 92 ms    Q-T Interval 366 ms    QTc Calculation (Bazett) 432 ms    R Axis 71 degrees    T Axis 53 degrees       RADIOLOGY:  Interpreted by Radiologist.  XR CHEST PORTABLE   Final Result   No acute process. ------------------------- NURSING NOTES AND VITALS REVIEWED ---------------------------   The nursing notes within the ED encounter and vital signs as below have been reviewed. /80   Pulse 81   Temp (!) 96.7 °F (35.9 °C) (Temporal)   Resp 16   Ht 5' 8\" (1.727 m)   Wt 155 lb (70.3 kg)   SpO2 99%   BMI 23.57 kg/m²   Oxygen Saturation Interpretation: Normal      ------------------------------------------ PROGRESS NOTES ------------------------------------------   The emergency provider has spoken with the patient and discussed todays results, in addition to providing specific details for the plan of care and counseling regarding the diagnosis and prognosis. Questions are answered at this time and they are agreeable with the plan.    --------------------------------- ADDITIONAL PROVIDER NOTES ---------------------------------        This patient is stable for discharge. The emergency provider has shared the specific conditions for return, as well as the importance of follow-up.        --------------------------------- IMPRESSION AND DISPOSITION ---------------------------------    IMPRESSION  1. Palpitations    2. Atrial fibrillation, unspecified type [facility])        DISPOSITION  Disposition: Discharge to home  Patient condition is stable            ED Course as of 11/05/22 0828   Sat Nov 05, 2022   7084 EKG: This EKG is signed and interpreted by me.     Rate: 84  Rhythm: Atrial fibrillation  Interpretation: atrial fibrillation (chronic)  Comparison: changes compared to previous EKG'   [TG]      ED Course User Index  [TG] Laurinda Lanes, [place],   Resident  11/05/22 515 NIlda Shah,   Resident  11/05/22 0804

## 2022-11-06 LAB
EKG ATRIAL RATE: 288 BPM
EKG Q-T INTERVAL: 366 MS
EKG QRS DURATION: 92 MS
EKG QTC CALCULATION (BAZETT): 432 MS
EKG R AXIS: 71 DEGREES
EKG T AXIS: 53 DEGREES
EKG VENTRICULAR RATE: 84 BPM

## 2022-11-07 ENCOUNTER — TELEPHONE (OUTPATIENT)
Dept: NON INVASIVE DIAGNOSTICS | Age: 47
End: 2022-11-07

## 2022-11-07 NOTE — TELEPHONE ENCOUNTER
----- Message from Tamy Perez MD sent at 11/7/2022  2:10 PM EST -----  F/u with in a few weeks when I have availability  ----- Message -----  From: Cesario Cobian DO  Sent: 11/5/2022   8:39 AM EST  To: Tamy Perez MD

## 2022-11-17 ENCOUNTER — TELEPHONE (OUTPATIENT)
Dept: NON INVASIVE DIAGNOSTICS | Age: 47
End: 2022-11-17

## 2022-11-17 NOTE — TELEPHONE ENCOUNTER
Left message to call the office back    Overdue 6 mo OV (03/2022) no device on flecainide / [facility]

## 2022-12-01 ENCOUNTER — OFFICE VISIT (OUTPATIENT)
Dept: NON INVASIVE DIAGNOSTICS | Age: 47
End: 2022-12-01
Payer: COMMERCIAL

## 2022-12-01 VITALS
SYSTOLIC BLOOD PRESSURE: 86 MMHG | WEIGHT: 162 LBS | HEIGHT: 68 IN | BODY MASS INDEX: 24.55 KG/M2 | RESPIRATION RATE: 16 BRPM | DIASTOLIC BLOOD PRESSURE: 64 MMHG | HEART RATE: 54 BPM

## 2022-12-01 DIAGNOSIS — Z51.81 THERAPEUTIC DRUG MONITORING: ICD-10-CM

## 2022-12-01 DIAGNOSIS — I48.0 PAROXYSMAL ATRIAL FIBRILLATION (HCC): Primary | ICD-10-CM

## 2022-12-01 DIAGNOSIS — R94.31 ABNORMAL EKG: ICD-10-CM

## 2022-12-01 PROCEDURE — G8484 FLU IMMUNIZE NO ADMIN: HCPCS | Performed by: INTERNAL MEDICINE

## 2022-12-01 PROCEDURE — 93000 ELECTROCARDIOGRAM COMPLETE: CPT | Performed by: INTERNAL MEDICINE

## 2022-12-01 PROCEDURE — G8427 DOCREV CUR MEDS BY ELIG CLIN: HCPCS | Performed by: INTERNAL MEDICINE

## 2022-12-01 PROCEDURE — 99214 OFFICE O/P EST MOD 30 MIN: CPT | Performed by: INTERNAL MEDICINE

## 2022-12-01 PROCEDURE — G8420 CALC BMI NORM PARAMETERS: HCPCS | Performed by: INTERNAL MEDICINE

## 2022-12-01 PROCEDURE — 4004F PT TOBACCO SCREEN RCVD TLK: CPT | Performed by: INTERNAL MEDICINE

## 2022-12-01 RX ORDER — LAMOTRIGINE 25 MG/1
TABLET ORAL
COMMUNITY
Start: 2022-09-14

## 2022-12-01 ASSESSMENT — ENCOUNTER SYMPTOMS
COUGH: 0
ABDOMINAL PAIN: 0
EYE REDNESS: 0
CHEST TIGHTNESS: 0
DIARRHEA: 0
WHEEZING: 0
ABDOMINAL DISTENTION: 0
SHORTNESS OF BREATH: 0
NAUSEA: 0
SINUS PRESSURE: 0
COLOR CHANGE: 0

## 2022-12-01 NOTE — PROGRESS NOTES
Cardiac Electrophysiology Outpatient Progress Note    Garima Nieves III  1975  Date of Service: 12/1/2022  Referring Provider/PCP: Angelic Munoz DO  Chief Complaint:   Chief Complaint   Patient presents with    Atrial Fibrillation     Overdue 6 month ov, Pt states he keeps going in and out of afib. HISTORY OF PRESENT ILLNESS    Garima Nieves III presents to the office today for the management of these Electrophysiology conditions: Palpitation, PAF    He has a PMHx of prediabetes, depression, anxiety, panic attacks, schizophrenia, restless leg syndrome and active tobacco use. He was diagnosed with new onset atrial fibrillation 11/2020 that self converted. He had new onset chest burning at that time. He was started on oral cardizem 120 mg daily which caused side effects. He had a negative KAYLEN study at [facility] last year   ER visit for palpitation and chest pain 1/30/21, 5/3/21 and 5/7/21. ECGs were wnl by the time he got to ER  He was switched to metoprolol 25 mg daily 1/4/21, currently raised to 50 mg bid last week. He continues to have daily chest pain that feels like burning that radiates the left side. It lasts from seconds to all day. Usually, his heart rhythm is in rhythm with these episodes. 9/1/2021: Mr. Aditya Mukherjee presents today for follow up. He reports no new symptoms. The patient denies any chest pain, dyspnea, palpitations, dizziness, syncope, orthopnea or paroxysmal nocturnal dyspnea. He tries to stay active   Hospitalized 7/24/21 for afib rate 120's, he was given flecainide and metoprolol and he converted to NSR    12/1/22 : He has daily chest pain worsened by his afib episodes. He feels palpitations multiple times a day.  BP runs low so cannot adjust metoprolol and lowering it escalates his symptoms of afib    Social History:    Tobacco: 1 PPD x 27 years  EtOH: Rare use  Illicit: Smokes marijuana  Drinks 2 cups of coffee daily, 1 cup in evening  Unemployed   with 3 kids    Patient Active Problem List    Diagnosis Date Noted    Mild episode of recurrent major depressive disorder ([facility] 03/17/2022    Diarrhea 07/24/2021    Atrial fibrillation with RVR ([address] 05/22/2021    Chest pain 12/05/2020    Leukocytosis     Chronic obstructive pulmonary disease (HCC)     Schizophrenia (HCC)     Atypical chest pain 11/10/2020    A-fib ([facility] 11/05/2020    Atrial fibrillation ([facility] 11/05/2020    Symptomatic cholelithiasis     Helicobacter pylori gastritis     Depression     Hand pain, right 08/02/2011       Family History   Problem Relation Age of Onset    Mental Illness Father         unspecified    High Blood Pressure Father     Crohn's Disease Sister        SOCIAL HISTORY     Past Surgical History:   Procedure Laterality Date    ABDOMEN SURGERY N/A 03/15/2018    wound exploration umbilicus, excsion suture granuloma    CHOLECYSTECTOMY  2017    COLONOSCOPY  2016    COLONOSCOPY N/A 2/16/2022    COLONOSCOPY WITH BIOPSY performed by Christin Hemphill MD at [address] [place] [address]  09/19/2017    MO LAP,CHOLECYSTECTOMY N/A 5/29/2018    LAPAROSCOPIC  CHOLECYSTECTOMY performed by Ara Paez MD at [address] [place] [address] EXTEN/COMPLIC N/A 4/05/3582    ABDOMINAL WOUND EXPLORATION , REMOVAL OF SUTURE GRANULOMA performed by Ara Paez MD at [place] [address]  4/9/2018    EGD BIOPSY performed by Ara Paez MD at [place] [address] N/A 5/21/2018    EGD BIOPSY performed by Ara Paez MD at [place] [address] N/A 5/6/2022    EGD BIOPSY performed by Christin Hemphill MD at [facility] ENDOSCOPY       Current Outpatient Medications   Medication Sig Dispense Refill    lamoTRIgine (LAMICTAL) 25 MG tablet take 2 tablets by mouth at bedtime      cholestyramine (QUESTRAN) 4 g packet Take 1 packet by mouth every evening 90 packet 1    acetaminophen (TYLENOL) 500 MG tablet Take 500 mg by mouth every 12 hours as needed for Pain      flecainide (TAMBOCOR) 100 MG tablet Take 1 tablet by mouth 2 times daily 180 tablet 3    pentoxifylline (TRENTAL) 400 MG extended release tablet Take 400 mg by mouth every evening       tadalafil (CIALIS) 5 MG tablet Take 5 mg by mouth every evening       metoprolol succinate (TOPROL XL) 50 MG extended release tablet Take 1 tablet by mouth 2 times daily 180 tablet 3    diazePAM (VALIUM) 5 MG tablet Take 10 mg by mouth nightly. traZODone (DESYREL) 100 MG tablet Take 100 mg by mouth nightly       busPIRone (BUSPAR) 15 MG tablet Take 15 mg by mouth nightly       omeprazole (PRILOSEC) 40 MG delayed release capsule Take 40 mg by mouth Daily with supper   0    simvastatin (ZOCOR) 10 MG tablet Take 10 mg by mouth Daily with supper  (Patient not taking: Reported on 12/1/2022)      buPROPion (WELLBUTRIN XL) 300 MG extended release tablet Take 300 mg by mouth daily (Patient not taking: Reported on 12/1/2022)       No current facility-administered medications for this visit. Allergies   Allergen Reactions    Biaxin [Clarithromycin] Palpitations    Effexor [Venlafaxine Hydrochloride] Palpitations and Other (See Comments)     Patient began intensely sweating and \"passed out\"    Cardizem [Diltiazem Hcl] Other (See Comments)     TINNITUS    Aspirin Other (See Comments)     TINNITUS           ROS:   Review of Systems   Constitutional:  Negative for fatigue and fever. HENT:  Negative for congestion, nosebleeds and sinus pressure. Eyes:  Negative for redness and visual disturbance. Respiratory:  Negative for cough, chest tightness, shortness of breath and wheezing. Cardiovascular:  Positive for palpitations. Negative for chest pain and leg swelling. Gastrointestinal:  Negative for abdominal distention, abdominal pain, diarrhea and nausea. Endocrine: Negative for cold intolerance, heat intolerance, polydipsia and polyphagia.    Genitourinary:  Negative for difficulty urinating, frequency and urgency. Musculoskeletal:  Negative for arthralgias, joint swelling and myalgias. Skin:  Negative for color change and wound. Neurological:  Negative for dizziness, syncope, weakness and numbness. Psychiatric/Behavioral:  Negative for agitation, behavioral problems, confusion, decreased concentration, hallucinations and suicidal ideas. The patient is not nervous/anxious. PHYSICAL EXAM:  Vitals:    12/01/22 1004   BP: 86/64   Site: Left Upper Arm   Position: Sitting   Cuff Size: Medium Adult   Pulse: 54   Resp: 16   Weight: 162 lb (73.5 kg)   Height: 5' 8\" (1.727 m)     Physical Exam  Vitals reviewed. Constitutional:       Appearance: Normal appearance. HENT:      Head: Normocephalic. Mouth/Throat:      Mouth: Mucous membranes are moist.      Pharynx: Oropharynx is clear. Eyes:      Conjunctiva/sclera: Conjunctivae normal.   Neck:      Vascular: No carotid bruit. Cardiovascular:      Rate and Rhythm: Normal rate and regular rhythm. Pulses: Normal pulses. Heart sounds: Normal heart sounds. Pulmonary:      Effort: Pulmonary effort is normal.      Breath sounds: Normal breath sounds. No rales. Chest:      Chest wall: No tenderness. Abdominal:      General: Bowel sounds are normal.      Palpations: Abdomen is soft. Musculoskeletal:         General: Normal range of motion. Cervical back: Normal range of motion and neck supple. Skin:     General: Skin is warm. Neurological:      General: No focal deficit present. Mental Status: He is alert and oriented to person, place, and time. Psychiatric:         Mood and Affect: Mood normal.         Behavior: Behavior normal.         Thought Content:  Thought content normal.        Pertinent Labs:  CBC:   WBC   Date Value   11/05/2022 11.6 E9/L (H)   08/01/2022 10.9 K/uL   07/31/2022 10.4 K/uL     Hemoglobin (g/dL)   Date Value   11/05/2022 16.7 (H)   08/01/2022 14.5   07/31/2022 14.5 Hematocrit (%)   Date Value   11/05/2022 51.9   08/01/2022 42.3   07/31/2022 42.7     Platelets   Date Value   11/05/2022 289 E9/L   08/01/2022 247 K/uL   07/31/2022 270 K/uL      BMP:   Sodium   Date Value   11/05/2022 139 mmol/L   08/01/2022 139 mEq/L   07/31/2022 138 mEq/L     Potassium   Date Value   11/05/2022 4.3 mmol/L   08/01/2022 3.9 mEq/L   07/31/2022 4.0 mEq/L     Potassium reflex Magnesium (mmol/L)   Date Value   04/24/2022 4.4   03/23/2022 4.3   08/03/2021 4.0     Magnesium   Date Value   11/05/2022 2.0 mg/dL   08/01/2022 1.6 mEq/L   07/24/2021 1.8 mg/dL     Chloride   Date Value   11/05/2022 105 mmol/L   08/01/2022 105 mEq/L   07/31/2022 104 mEq/L     CO2   Date Value   11/05/2022 23 mmol/L   08/01/2022 27 mEq/L   07/31/2022 28 mEq/L     BUN (mg/dL)   Date Value   11/05/2022 12   08/01/2022 13   07/31/2022 12     Creatinine (mg/dL)   Date Value   11/05/2022 1.0   08/01/2022 1.0   07/31/2022 1.0     Glucose (mg/dL)   Date Value   11/05/2022 165 (H)   08/01/2022 91   07/31/2022 108 (H)   11/11/2011 90     Calcium (mg/dL)   Date Value   11/05/2022 9.4   08/01/2022 9.0   07/31/2022 9.3      INR:   INR (no units)   Date Value   05/03/2021 1.1   11/10/2020 1.0   11/06/2020 1.0      BNP: No results found for: BNP   TSH:   TSH (uIU/mL)   Date Value   03/23/2022 0.675   03/16/2022 0.644   07/24/2021 1.020      Cardiac Injury Profile:    Total CK (U/L)   Date Value   07/24/2021 60     CK-MB (ng/mL)   Date Value   07/24/2021 1.2     Troponin (ng/mL)   Date Value   05/22/2021 <0.01     Troponin I (ng/mL)   Date Value   08/01/2022 < 0.03     Lipid Profile:   Triglycerides (mg/dL)   Date Value   08/01/2022 118     HDL (mg/dL)   Date Value   08/01/2022 38     LDL Calculated (mg/dL)   Date Value   03/17/2022 95     Cholesterol, Total (mg/dL)   Date Value   03/17/2022 149     Cholesterol (mg/dL)   Date Value   08/01/2022 156      Hemoglobin A1C:   Hemoglobin A1C (%)   Date Value   03/17/2022 5.8 (H) Pertinent Cardiac Testin/10/20 Stress Test       A mild defect was present in the inferior wall(s) that was small size   by quantification - likely attenuation. The resting images no significant reversibility       Gated SPECT left ventricular ejection fraction was calculated to be   60%, with normal Myocardial wall motion. TTE 20   Left Ventricle   Left ventricle size is normal.   Normal left ventricular wall thickness. Normal left ventricular systolic function. Ejection fraction is visually estimated at 55%. Normal left ventricular diastolic filling pattern for age. ECG 2022: NSR, Rate 58- see scanned cardiology    I have independently reviewed all of the ECGs and rhythm strips per above    I have personally reviewed the laboratory, cardiac diagnostic and radiographic testing as outlined above: We have requested previous records. 1. Paroxysmal atrial fibrillation ([address]    2. Abnormal EKG    3. Therapeutic drug monitoring         ASSESSMENT & PLAN    PAF  - Diagnosed 2020  - On Metoprolol 50 mg bid  - recent ER visits for chest pain  - ER visit 2021 for recurrent Afib  - - Continue Flecainide and metoprolol at this time. He is on 100 mg bid Flecainide and still having breakthrough palpitation and afib. - Discussed AAD drug load vs ablation and he is now willing to consider ablation due to his significant daily symptoms of afib affecting his QOL  - Plan for 2 week monitor to quantify burden  - Plan for Afib ablation  - Recheck stress test due to chest pain complaints radiating to his left arm as he is on flecainide therapy    2. Chest pain  - Atypical  - Follow up with Cardiology    3. Anxiety  - Medications    4. Schizophrenia    Plan  1. Pls for Zio monitor, afib ablation and lexiscan stress test     Thank you for allowing me to participate in your patient's care.     I have spent a total of 30 minutes with the patient and his/her family reviewing the above stated recommendations. A total of >50% of that time involved face-to-face time providing counseling and or coordination of care with the other providers.     Hyacinth Sosa MD  Cardiac Electrophysiology  [address] [place] [address]

## 2022-12-01 NOTE — PROGRESS NOTES
Patient was seen in Office today to have 14 day zio AT monitor put on per Dr. Lida Dan. Pt tolerated placement and understood use and return of device number [id number].         Electronically signed by Rex Obregon MA on 12/1/2022 at 10:39 AM

## 2022-12-08 ENCOUNTER — APPOINTMENT (OUTPATIENT)
Dept: GENERAL RADIOLOGY | Age: 47
End: 2022-12-08
Payer: COMMERCIAL

## 2022-12-08 ENCOUNTER — APPOINTMENT (OUTPATIENT)
Dept: CT IMAGING | Age: 47
End: 2022-12-08
Payer: COMMERCIAL

## 2022-12-08 ENCOUNTER — TELEPHONE (OUTPATIENT)
Dept: NON INVASIVE DIAGNOSTICS | Age: 47
End: 2022-12-08

## 2022-12-08 ENCOUNTER — HOSPITAL ENCOUNTER (EMERGENCY)
Age: 47
Discharge: LEFT BEFORE DISCHARGE | End: 2022-12-08
Attending: EMERGENCY MEDICINE
Payer: COMMERCIAL

## 2022-12-08 ENCOUNTER — APPOINTMENT (OUTPATIENT)
Dept: MRI IMAGING | Age: 47
End: 2022-12-08
Payer: COMMERCIAL

## 2022-12-08 VITALS
SYSTOLIC BLOOD PRESSURE: 112 MMHG | HEART RATE: 67 BPM | DIASTOLIC BLOOD PRESSURE: 76 MMHG | OXYGEN SATURATION: 96 % | RESPIRATION RATE: 16 BRPM | TEMPERATURE: 97.9 F | BODY MASS INDEX: 23.85 KG/M2 | WEIGHT: 161 LBS | HEIGHT: 69 IN

## 2022-12-08 DIAGNOSIS — R56.9 SEIZURE-LIKE ACTIVITY (HCC): ICD-10-CM

## 2022-12-08 DIAGNOSIS — R41.82 ALTERED MENTAL STATUS, UNSPECIFIED ALTERED MENTAL STATUS TYPE: Primary | ICD-10-CM

## 2022-12-08 LAB
ALBUMIN SERPL-MCNC: 4.1 G/DL (ref 3.5–5.2)
ALP BLD-CCNC: 81 U/L (ref 40–129)
ALT SERPL-CCNC: 15 U/L (ref 0–40)
ANION GAP SERPL CALCULATED.3IONS-SCNC: 9 MMOL/L (ref 7–16)
AST SERPL-CCNC: 14 U/L (ref 0–39)
BASOPHILS ABSOLUTE: 0.11 E9/L (ref 0–0.2)
BASOPHILS RELATIVE PERCENT: 1.1 % (ref 0–2)
BILIRUB SERPL-MCNC: 0.2 MG/DL (ref 0–1.2)
BUN BLDV-MCNC: 14 MG/DL (ref 6–20)
CALCIUM SERPL-MCNC: 9.5 MG/DL (ref 8.6–10.2)
CHLORIDE BLD-SCNC: 103 MMOL/L (ref 98–107)
CO2: 26 MMOL/L (ref 22–29)
CREAT SERPL-MCNC: 1 MG/DL (ref 0.7–1.2)
EOSINOPHILS ABSOLUTE: 0.26 E9/L (ref 0.05–0.5)
EOSINOPHILS RELATIVE PERCENT: 2.5 % (ref 0–6)
GFR SERPL CREATININE-BSD FRML MDRD: >60 ML/MIN/1.73
GLUCOSE BLD-MCNC: 94 MG/DL (ref 74–99)
HCT VFR BLD CALC: 48.8 % (ref 37–54)
HEMOGLOBIN: 16.3 G/DL (ref 12.5–16.5)
IMMATURE GRANULOCYTES #: 0.05 E9/L
IMMATURE GRANULOCYTES %: 0.5 % (ref 0–5)
INR BLD: 1
LIPASE: 48 U/L (ref 13–60)
LYMPHOCYTES ABSOLUTE: 3.35 E9/L (ref 1.5–4)
LYMPHOCYTES RELATIVE PERCENT: 32.8 % (ref 20–42)
MCH RBC QN AUTO: 30 PG (ref 26–35)
MCHC RBC AUTO-ENTMCNC: 33.4 % (ref 32–34.5)
MCV RBC AUTO: 89.7 FL (ref 80–99.9)
MONOCYTES ABSOLUTE: 0.85 E9/L (ref 0.1–0.95)
MONOCYTES RELATIVE PERCENT: 8.3 % (ref 2–12)
NEUTROPHILS ABSOLUTE: 5.58 E9/L (ref 1.8–7.3)
NEUTROPHILS RELATIVE PERCENT: 54.8 % (ref 43–80)
PDW BLD-RTO: 13.2 FL (ref 11.5–15)
PLATELET # BLD: 297 E9/L (ref 130–450)
PMV BLD AUTO: 10 FL (ref 7–12)
POTASSIUM REFLEX MAGNESIUM: 4.2 MMOL/L (ref 3.5–5)
PROTHROMBIN TIME: 10.8 SEC (ref 9.3–12.4)
RBC # BLD: 5.44 E12/L (ref 3.8–5.8)
SODIUM BLD-SCNC: 138 MMOL/L (ref 132–146)
TOTAL PROTEIN: 6.8 G/DL (ref 6.4–8.3)
TROPONIN, HIGH SENSITIVITY: <6 NG/L (ref 0–11)
WBC # BLD: 10.2 E9/L (ref 4.5–11.5)

## 2022-12-08 PROCEDURE — 84484 ASSAY OF TROPONIN QUANT: CPT

## 2022-12-08 PROCEDURE — 2580000003 HC RX 258: Performed by: RADIOLOGY

## 2022-12-08 PROCEDURE — 85025 COMPLETE CBC W/AUTO DIFF WBC: CPT

## 2022-12-08 PROCEDURE — 6360000002 HC RX W HCPCS: Performed by: EMERGENCY MEDICINE

## 2022-12-08 PROCEDURE — 99285 EMERGENCY DEPT VISIT HI MDM: CPT

## 2022-12-08 PROCEDURE — 70450 CT HEAD/BRAIN W/O DYE: CPT

## 2022-12-08 PROCEDURE — 70496 CT ANGIOGRAPHY HEAD: CPT

## 2022-12-08 PROCEDURE — 6360000004 HC RX CONTRAST MEDICATION: Performed by: RADIOLOGY

## 2022-12-08 PROCEDURE — 70551 MRI BRAIN STEM W/O DYE: CPT

## 2022-12-08 PROCEDURE — 80053 COMPREHEN METABOLIC PANEL: CPT

## 2022-12-08 PROCEDURE — 83690 ASSAY OF LIPASE: CPT

## 2022-12-08 PROCEDURE — 36415 COLL VENOUS BLD VENIPUNCTURE: CPT

## 2022-12-08 PROCEDURE — 96372 THER/PROPH/DIAG INJ SC/IM: CPT

## 2022-12-08 PROCEDURE — 85610 PROTHROMBIN TIME: CPT

## 2022-12-08 PROCEDURE — 96374 THER/PROPH/DIAG INJ IV PUSH: CPT

## 2022-12-08 PROCEDURE — 99222 1ST HOSP IP/OBS MODERATE 55: CPT | Performed by: PSYCHIATRY & NEUROLOGY

## 2022-12-08 PROCEDURE — 0042T CT BRAIN PERFUSION: CPT

## 2022-12-08 PROCEDURE — 70498 CT ANGIOGRAPHY NECK: CPT

## 2022-12-08 PROCEDURE — 71045 X-RAY EXAM CHEST 1 VIEW: CPT

## 2022-12-08 RX ORDER — FENTANYL CITRATE 50 UG/ML
50 INJECTION, SOLUTION INTRAMUSCULAR; INTRAVENOUS ONCE
Status: COMPLETED | OUTPATIENT
Start: 2022-12-08 | End: 2022-12-08

## 2022-12-08 RX ORDER — SODIUM CHLORIDE 0.9 % (FLUSH) 0.9 %
10 SYRINGE (ML) INJECTION PRN
Status: DISCONTINUED | OUTPATIENT
Start: 2022-12-08 | End: 2022-12-08 | Stop reason: HOSPADM

## 2022-12-08 RX ORDER — HYDROXYZINE HYDROCHLORIDE 50 MG/ML
50 INJECTION, SOLUTION INTRAMUSCULAR ONCE
Status: COMPLETED | OUTPATIENT
Start: 2022-12-08 | End: 2022-12-08

## 2022-12-08 RX ADMIN — SODIUM CHLORIDE, PRESERVATIVE FREE 10 ML: 5 INJECTION INTRAVENOUS at 09:26

## 2022-12-08 RX ADMIN — FENTANYL CITRATE 50 MCG: 50 INJECTION INTRAMUSCULAR; INTRAVENOUS at 10:00

## 2022-12-08 RX ADMIN — HYDROXYZINE HYDROCHLORIDE 50 MG: 50 INJECTION, SOLUTION INTRAMUSCULAR at 10:05

## 2022-12-08 RX ADMIN — IOPAMIDOL 100 ML: 755 INJECTION, SOLUTION INTRAVENOUS at 09:26

## 2022-12-08 ASSESSMENT — ENCOUNTER SYMPTOMS
CONSTIPATION: 0
SHORTNESS OF BREATH: 0
DIARRHEA: 0
NAUSEA: 0
ABDOMINAL PAIN: 0
VOMITING: 0

## 2022-12-08 ASSESSMENT — PAIN SCALES - GENERAL
PAINLEVEL_OUTOF10: 7
PAINLEVEL_OUTOF10: 2

## 2022-12-08 ASSESSMENT — PAIN DESCRIPTION - LOCATION
LOCATION: CHEST
LOCATION: CHEST

## 2022-12-08 NOTE — TELEPHONE ENCOUNTER
----- Message from Christopher Alcantara MD sent at 12/8/2022 12:50 PM EST -----  He is in hospital being worked up for TIA  Continue to monitor afib burden on Zio  Thanks

## 2022-12-08 NOTE — TELEPHONE ENCOUNTER
The patient called into the office stating that he feels that he is in [place] D". He described this as he is having chest pain and left arm pain. I told him that I could send doctor a message but all we can evaluate here in the office is the afib and we are not able to evaluate chest pain. I advised him to go to the Emergency Department. He verbalized understanding and told me that he was going to head there right now for evaluation.

## 2022-12-08 NOTE — ED NOTES
Pt transported to CT on monitor, pt continues to be repetitive with words.       Normie Seip, RN  12/08/22 101

## 2022-12-08 NOTE — VIRTUAL HEALTH
[address] Stroke and Vascular Neurology Consult for  [address] Stroke Alert through [address] @ 9:28  12/8/2022 11:18 AM  Pt Name: Dov Chadwick  MRN: [id number]  YOB: 1975  Date of evaluation: 12/8/2022  Primary Care Physician: Adeline Holcomb, DO  Reason for Evaluation: Stroke Evaluation with Discussion with Ed or primary team with Telemedicine and stroke evaluation with Review of imaging and labs    Eddie Echevarria III is a 41 y.o. male who presents with history of [id number] workup for seizures, afib not on anticoagulation as it is paroxysmal. Patient this am felt possible palpitations and not feeling well with left arm pain and was in car with girl friend when he had loc. In ed intial eval for encephaloapthy, aphasia and left hemiparesis. No reported witness seizure but girlfriend was driving at the time. Sbp normotensive. Ct /cta/ctp was unremarkable. No lvo. Given higher suspicion syncope vs seizure with post ictal presentation. Recommend mri brain stat to confirm no stroke. MRI brain reviewed with no stroke - no iv tnk at this time. Patient exam continuing to improve, able to talk and improving left sided strength    LKW: 8:00  NIH:  11    Allergies  is allergic to biaxin [clarithromycin], effexor [venlafaxine hydrochloride], cardizem [diltiazem hcl], and aspirin. Medications  Prior to Admission medications    Medication Sig Start Date End Date Taking?  Authorizing Provider   lamoTRIgine (LAMICTAL) 25 MG tablet take 2 tablets by mouth at bedtime 9/14/22   Historical Provider, MD   cholestyramine (QUESTRAN) 4 g packet Take 1 packet by mouth every evening 9/13/22   Augustin Aviles MD   simvastatin (ZOCOR) 10 MG tablet Take 10 mg by mouth Daily with supper   Patient not taking: Reported on 12/1/2022    Historical Provider, MD   acetaminophen (TYLENOL) 500 MG tablet Take 500 mg by mouth every 12 hours as needed for Pain    Historical Provider, MD   buPROPion (WELLBUTRIN XL) 300 MG extended release tablet Take 300 mg by mouth daily  Patient not taking: Reported on 12/1/2022    Historical Provider, MD   flecainide (TAMBOCOR) 100 MG tablet Take 1 tablet by mouth 2 times daily 3/14/22   He Poe APRN - CNP   pentoxifylline (TRENTAL) 400 MG extended release tablet Take 400 mg by mouth every evening  1/14/22   Historical Provider, MD   tadalafil (CIALIS) 5 MG tablet Take 5 mg by mouth every evening  12/26/21   Historical Provider, MD   metoprolol succinate (TOPROL XL) 50 MG extended release tablet Take 1 tablet by mouth 2 times daily 2/9/22   Iveth Valentine MD   diazePAM (VALIUM) 5 MG tablet Take 10 mg by mouth nightly. Historical Provider, MD   traZODone (DESYREL) 100 MG tablet Take 100 mg by mouth nightly     Historical Provider, MD   busPIRone (BUSPAR) 15 MG tablet Take 15 mg by mouth nightly  10/30/19   Historical Provider, MD   omeprazole (PRILOSEC) 40 MG delayed release capsule Take 40 mg by mouth Daily with supper  10/25/19   Historical Provider, MD    Scheduled Meds:  Continuous Infusions:  PRN Meds:.sodium chloride flush  Past Medical History   has a past medical history of A-fib ([address] Abdominal fullness, Anxiety with depression, Arthritis, Atrial fibrillation ([address] Hand pain, right, History of Helicobacter pylori infection, Hyperlipidemia, Restless leg syndrome, Scoliosis, and Screening for colon cancer.   Social History  Social History     Socioeconomic History    Marital status: Single     Spouse name: Not on file    Number of children: Not on file    Years of education: Not on file    Highest education level: Not on file   Occupational History    Not on file   Tobacco Use    Smoking status: Every Day     Packs/day: 1.00     Years: 18.00     Pack years: 18.00     Types: Cigarettes    Smokeless tobacco: Never    Tobacco comments:     Before up to 2 ppd   Vaping Use    Vaping Use: Former    Substances: Never   Substance and Sexual Activity    Alcohol use: Not Currently    Drug use: Not Currently     Types: Marijuana Zane Willis)     Comment: stopped recently    Sexual activity: Yes     Partners: Female   Other Topics Concern    Not on file   Social History Narrative    Not on file     Social Determinants of Health     Financial Resource Strain: Not on file   Food Insecurity: Not on file   Transportation Needs: Not on file   Physical Activity: Not on file   Stress: Not on file   Social Connections: Not on file   Intimate Partner Violence: Not on file   Housing Stability: Not on file     Family History      Problem Relation Age of Onset    Mental Illness Father         unspecified    High Blood Pressure Father     Crohn's Disease Sister        OBJECTIVE  BP (!) 134/98   Pulse 56   Temp 98 °F (36.7 °C) (Oral)   Resp 16   Ht 5' 9\" (1.753 m)   Wt 161 lb (73 kg)   SpO2 96%   BMI 23.78 kg/m²        Pre-Morbid mRS: 0    Imaging:  Images were personally reviewed with VIZ. AI and PACS used to review images including:  CT brain without contrast: no hemorrhage  CTA imaging: no lvo  MRI brain no acute stroke    Assessment     41 y.o. male who presents with history of [id number] workup for seizures, afib not on anticoagulation as it is paroxysmal. Patient this am felt possible palpitations and not feeling well with left arm pain and was in car with girl friend when he had loc. In ed intial eval for encephaloapthy, aphasia and left hemiparesis. Differential DDx:  Syncope vs seizure, post ictal  Paroxysmal afib not on anticoagulation       Recommendations:  NIH 11  Recommend Inpatient Neurology Consult for further assessment and evaluation   Further seizure evaluation and management per neurology as patient does not take lamictal as recommended  Followup on documented anticoagulation in setting of afib with ongoing palpitations.   Lipid panel=        Discussed with ED Physician    At least 50 min of Telemedicine and time in conversation directly with ED staff and physician for the patient who is in imminent and life threatening deterioration without further treatment and evaluation. This Virtual Visit was conducted with patient's (and/or legal guardian's) consent, to provide telestroke consultation and necessary medical care. Time spent examining patient, reviewing the images personally, reviewing the chart, perform high complexity decision making and speaking with the nursing staff regarding recommendations      Payal Robledo MD, MD   Stroke, Neurocritical Care And/or [address] [place] [address] Stroke [address]   Electronically signed 12/8/2022 at [address] [place] [address], was evaluated through a synchronous (real-time) audio-video encounter. The patient (or guardian if applicable) is aware that this is a billable service. Verbal consent to proceed has been obtained. Patient identification was verified, and a caregiver was present when appropriate. The patient was located at [facility] ([address] [place] [address]): [address] [place] [address] EMERGENCY DEPARTMENT  [address] [place] [address] [place] [address]  Loc: [phone]  The provider was located at [facility]): [address] [place] [address]  [phone]     Total time spent on this encounter:  48    --Payal Robledo MD on 12/8/2022 at 11:18 AM    An electronic signature was used to authenticate this note.

## 2022-12-08 NOTE — ED PROVIDER NOTES
Department of Emergency Medicine   ED  Provider Note    Pt Name: Herminio Weldon         MRN: [id number]         [place] 1975    Admit Date/RoomTime: 12/8/2022  9:00 AM  ED Room: 23/23      Chief Complaint   Patient presents with    Altered Mental Status     Pt arrives by vehicle. C/o unresponsiveness/ ams. Pt reports going into afib then unresponsive. Stroke alert called. Herminio Weldon is a 41 y.o. male with PMHx of paroxsymal atrial fibrillation (2020) not on anticoagulation, schizophrenia, HLD presenting to the ED for loss of consciousness. Patient was well just prior to 8:00 am this morning when he felt his pulse and realized he was in atrial fibrillation. He also began complaining of left arm pain at this time. The patient and girlfriend (provided history) called Dr. Rene Aguila and the patient's EP who instructed him to come to ED. In the car on way to ED around 8:30 am the patient lost consciousness. Arrived at ED and in room around 9:00 am. NIH score 17. Current everyday smoker. Girlfriend (Rosalba) reports patient recently had remote heart monitor placed by Dr. Flaca Moore for atrial fibrillation. Lamictal on med list, reports he doesn't take anymore, had seizures decades ago as kids. On presentation to ED: +aphasia, facial droop, left extremity weakness  Given Narcan 9:00 am without relief, stroke alert called. The history is provided by a friend. Review of Systems   Reason unable to perform ROS: Patient unable to answer questions upon initial evaluation. Reassessment two hours later able to answer questions. Constitutional:  Negative for chills and fever. HENT:  Negative for congestion. Eyes:  Positive for visual disturbance. Left eye blurry   Respiratory:  Negative for shortness of breath. Cardiovascular:  Negative for chest pain and leg swelling. Gastrointestinal:  Negative for abdominal pain, constipation, diarrhea, nausea and vomiting. Genitourinary:  Positive for difficulty urinating. Neurological:  Positive for weakness and light-headedness. Negative for dizziness. Physical Exam  Constitutional:       General: He is not in acute distress. HENT:      Head: Normocephalic and atraumatic. Eyes:      Extraocular Movements: Extraocular movements intact. Pupils: Pupils are equal, round, and reactive to light. Neck:      Vascular: No carotid bruit. Cardiovascular:      Rate and Rhythm: Normal rate and regular rhythm. Heart sounds: No murmur heard. No friction rub. No gallop. Pulmonary:      Breath sounds: Normal breath sounds. No wheezing, rhonchi or rales. Abdominal:      General: Bowel sounds are normal.      Tenderness: There is no abdominal tenderness. There is no guarding. Musculoskeletal:      Right lower leg: No edema. Left lower leg: No edema. Neurological:      Mental Status: He is alert and oriented to person, place, and time. Cranial Nerves: No cranial nerve deficit. Sensory: Sensory deficit present. Motor: Weakness present.        --------------------------------------------- PAST HISTORY ---------------------------------------------  Past Medical History:  has a past medical history of A-fib ([address] Abdominal fullness, Anxiety with depression, Arthritis, Atrial fibrillation (HCC), Hand pain, right, History of Helicobacter pylori infection, Hyperlipidemia, Restless leg syndrome, Scoliosis, and Screening for colon cancer. Past Surgical History:  has a past surgical history that includes Colonoscopy (2016); hernia repair (09/19/2017); pr secd clos surg wnd exten/complic (N/A, 9/23/6724); Upper gastrointestinal endoscopy (4/9/2018); Abdomen surgery (N/A, 03/15/2018); Upper gastrointestinal endoscopy (N/A, 5/21/2018); pr lap,cholecystectomy (N/A, 5/29/2018); Cholecystectomy (2017); Colonoscopy (N/A, 2/16/2022); and Upper gastrointestinal endoscopy (N/A, 5/6/2022).     Social History: Neutrophils Absolute 5.58 1.80 - 7.30 E9/L    Immature Granulocytes # 0.05 E9/L    Lymphocytes Absolute 3.35 1.50 - 4.00 E9/L    Monocytes Absolute 0.85 0.10 - 0.95 E9/L    Eosinophils Absolute 0.26 0.05 - 0.50 E9/L    Basophils Absolute 0.11 0.00 - 0.20 E9/L   Troponin   Result Value Ref Range    Troponin, High Sensitivity <6 0 - 11 ng/L   Lipase   Result Value Ref Range    Lipase 48 13 - 60 U/L   Protime-INR   Result Value Ref Range    Protime 10.8 9.3 - 12.4 sec    INR 1.0        Radiology  MRI BRAIN WO CONTRAST   Final Result   No acute intracranial abnormality. XR CHEST PORTABLE   Final Result   No evidence of acute cardiopulmonary disease is seen. CT HEAD WO CONTRAST   Final Result   No evidence of acute intracranial pathology on the CT scan of the head. Negative CT perfusion of the brain. Unremarkable intracranial vessels. Unremarkable neck vessels. CTA HEAD W CONTRAST   Final Result   No evidence of acute intracranial pathology on the CT scan of the head. Negative CT perfusion of the brain. Unremarkable intracranial vessels. Unremarkable neck vessels. CT BRAIN PERFUSION   Final Result   No evidence of acute intracranial pathology on the CT scan of the head. Negative CT perfusion of the brain. Unremarkable intracranial vessels. Unremarkable neck vessels. CTA NECK W CONTRAST   Final Result   No evidence of acute intracranial pathology on the CT scan of the head. Negative CT perfusion of the brain. Unremarkable intracranial vessels. Unremarkable neck vessels.              EKG:  This EKG is signed and interpreted by the emergency department physician  Rate: 66  Rhythm: Sinus  Interpretation: NSR, stable  Comparison: no previous EKG available    ------------------------- NURSING NOTES AND VITALS REVIEWED ---------------------------  Date / Time Roomed:  12/8/2022  9:00 AM  ED Bed Assignment:  23/23    The nursing notes within the ED encounter and vital signs as below have been reviewed. Patient Vitals for the past 24 hrs:   BP Temp Temp src Pulse Resp SpO2 Height Weight   12/08/22 1145 113/80 -- -- 51 16 97 % -- --   12/08/22 1000 -- -- -- -- 16 -- -- --   12/08/22 0930 (!) 134/98 -- -- 56 14 96 % -- --   12/08/22 0902 132/85 98 °F (36.7 °C) Oral 60 18 96 % 5' 9\" (1.753 m) 161 lb (73 kg)       Oxygen Saturation Interpretation: Normal      --------------------------------------- ED Clinical Course/MDM --------------------------------------    Patient's 14 day Zio monitor was removed for a stat MRI to rule out stroke per recommendations by Dr. Marco Antonio Bosch (via stroke alert communication). MRI was negative for acute intracranial abnormality. Head CT negative for intracranial process. Per second discussion with Dr. Marco Antonio Bosch, stroke has been ruled out and he recommends inpatient neurology workup to determine syncope from hx of atrial fibrillation vs new seizure. ED Course as of 12/11/22 1218   Thu Dec 08, 2022   1059 Rechecked patient, speech improved as did droop, discussed Lyses, patient does not want [BP]   1605 This patient has chosen to leave against medical advice. I have personally explained to them that choosing to do so may result in permanent bodily harm or death. I discussed at length that without further evaluation and monitoring there may be unforeseen circumstances and deterioration resulting in permanent bodily harm or death as a result of their choice. They are alert, oriented, and competent at this time. They state that they are aware of the serious risks as explained, but they continue to wish to leave against medical   advice. In light of their decision to leave against medical advice, follow-up has been arranged and they are aware of the importance of following up as instructed.   They have been advised that they should return to the ED immediately if they change their mind at any time, or if their condition begins to change or worsen. [BP]      ED Course User Index  [BP] Merlinda Gunther, DO       ------------------------------------------ PROGRESS NOTES ------------------------------------------  Re-evaluation(s):  Time: 12:00. Patients symptoms are improving  Repeat physical examination is improved    Time: 1:17. Patients symptoms are improving  Repeat physical examination is improved    I have spoken with the patient and discussed todays results, in addition to providing specific details for the plan of care and counseling regarding the diagnosis and prognosis. Their questions are answered at this time and they are agreeable with the plan. I have discussed the risks and benefits of transfer and they wish to proceed with the transfer. --------------------------------- ADDITIONAL PROVIDER NOTES ---------------------------------  Consultations:  Spoke with Dr. Gisselle Montes (Medicine). Discussed case. They will admit this patient. Reason for transfer: Patient requires inpatient neurology evaluation. This patient's ED course included: a personal history and physicial examination, re-evaluation prior to disposition, and multiple bedside re-evaluations    This patient has remained hemodynamically stable and improved during their ED course. Please note that the withdrawal or failure to initiate urgent interventions for this patient would likely result in a life threatening deterioration or permanent disability. Clinical Impression  1. Altered mental status, unspecified altered mental status type Stable   2. Confusion Stable   3. Seizure-like activity ([facility] [address] Stable         Disposition  Patient's disposition: Transfer to [place] [address].  Transferred by: Juli Barraza. Patient's condition is stable.      ED Course as of 12/11/22 1218   Thu Dec 08, 2022   1059 Rechecked patient, speech improved as did marilyn carvajal, patient does not want [BP]   1605 This patient has chosen to leave against medical advice. I have personally explained to them that choosing to do so may result in permanent bodily harm or death. I discussed at length that without further evaluation and monitoring there may be unforeseen circumstances and deterioration resulting in permanent bodily harm or death as a result of their choice. They are alert, oriented, and competent at this time. They state that they are aware of the serious risks as explained, but they continue to wish to leave against medical   advice. In light of their decision to leave against medical advice, follow-up has been arranged and they are aware of the importance of following up as instructed. They have been advised that they should return to the ED immediately if they change their mind at any time, or if their condition begins to change or worsen. [BP]      ED Course User Index  [BP] Noemí Patten, [address] DO  Resident  12/08/22 1328      ATTENDING PROVIDER ATTESTATION:     I have personally performed and/or participated in the history, exam, medical decision making, and procedures and agree with all pertinent clinical information unless otherwise noted. I have also reviewed and agree with the past medical, family and social history unless otherwise noted. I have discussed this patient in detail with the resident and provided the instruction and education regarding the evidence-based evaluation and treatment of Felicia Elizabeth    Any EKG that may have been performed has been personally reviewed by me and I agree with the documentation as noted by the resident. Please note that the withdrawal or failure to initiate urgent interventions for this patient would likely result in a life threatening deterioration or permanent disability. Systems at risk for deterioration include: Cv/pulm    Accordingly this patient received 32 minutes of critical care time, excluding separately billable procedures.      Patient presented to the ER the unresponsive by private vehicle he had an NIH greater than 6 patient was immediately placed in a room plan monitor we did trial Narcan improvement a code stroke was placed and neurology was involved fortunately the patient returned back to baseline while here in the department did not qualify for any type of tPA or lytic therapy.   Etiology is unknown at this point in time however was brought given his need for likely neurology assessment he was admitted to our sister facility however while waiting in the patient room patient stated he would follow-up with his PCP and ultimately signed [address] patient was made aware of the high risk of doing so including greater disability and death still patient wished to go home  Mild point time I did recommend he return to the ER if she changes mind    Electronically signed by Vianney Vasquez DO on 12/11/22 at 12:19 PM PENELOPE Vasquez DO  12/11/22 5324

## 2022-12-08 NOTE — ED NOTES
Name: Brittany Screws III  : 1975  MRN: [id number]    Date: 2022    Benefits of immediately proceeding with Radiology exam outweigh the risks and therefore the following is being waived:      [] Pregnancy test    [] Protocol for Iodine allergy    [] MRI questionnaire    [x] BUN/Creatinine        Carola Eisenmenger, DO Carola Eisenmenger, DO  22 6162

## 2022-12-08 NOTE — ED NOTES
Pt speaking in full sentences after being medicated for pain. Mri transports pt down for testing.      Hugo Reeves RN  12/08/22 1017

## 2022-12-13 ENCOUNTER — TELEPHONE (OUTPATIENT)
Dept: CARDIOLOGY | Age: 47
End: 2022-12-13

## 2022-12-13 NOTE — TELEPHONE ENCOUNTER
Lexiscan stress test scheduled.   Electronically signed by Sandi Alexander on 12/13/2022 at 11:59 AM

## 2022-12-19 ENCOUNTER — TELEPHONE (OUTPATIENT)
Dept: NON INVASIVE DIAGNOSTICS | Age: 47
End: 2022-12-19

## 2022-12-19 NOTE — TELEPHONE ENCOUNTER
MA submitted prior auth request via [address] and obtained prior auth for CPT code 74104, Dc Notice number [id number]. Valid from 12/19/2022 - 02/17/2023.     Electronically signed by Chucky Loomis MA on 12/19/2022 at 2:32 PM

## 2022-12-20 ENCOUNTER — TELEPHONE (OUTPATIENT)
Dept: CARDIOLOGY | Age: 47
End: 2022-12-20

## 2022-12-20 NOTE — TELEPHONE ENCOUNTER
Left message on voice mail to remind patient of [facility] stress test appointment on December 22, 2022 at 0800. Instructions for test, and COVID-19 preprocedure information left on voice mail. Left instructions on voice mail for patient to stay on all medications day of test and to call office if there have been any changes to medication list.Asked patient to call with any questions or if unable to keep appointment.

## 2022-12-22 ENCOUNTER — HOSPITAL ENCOUNTER (OUTPATIENT)
Dept: CARDIOLOGY | Age: 47
Discharge: HOME | End: 2022-12-22
Payer: COMMERCIAL

## 2022-12-22 VITALS
HEIGHT: 69 IN | WEIGHT: 161 LBS | DIASTOLIC BLOOD PRESSURE: 64 MMHG | RESPIRATION RATE: 16 BRPM | HEART RATE: 57 BPM | BODY MASS INDEX: 23.85 KG/M2 | SYSTOLIC BLOOD PRESSURE: 94 MMHG

## 2022-12-22 DIAGNOSIS — Z51.81 THERAPEUTIC DRUG MONITORING: ICD-10-CM

## 2022-12-22 DIAGNOSIS — I48.0 PAROXYSMAL ATRIAL FIBRILLATION (HCC): ICD-10-CM

## 2022-12-22 DIAGNOSIS — R94.31 ABNORMAL EKG: ICD-10-CM

## 2022-12-22 PROCEDURE — A9500 TC99M SESTAMIBI: HCPCS | Performed by: INTERNAL MEDICINE

## 2022-12-22 PROCEDURE — 6360000002 HC RX W HCPCS: Performed by: INTERNAL MEDICINE

## 2022-12-22 PROCEDURE — 78452 HT MUSCLE IMAGE SPECT MULT: CPT

## 2022-12-22 PROCEDURE — 3430000000 HC RX DIAGNOSTIC RADIOPHARMACEUTICAL: Performed by: INTERNAL MEDICINE

## 2022-12-22 PROCEDURE — 2580000003 HC RX 258: Performed by: INTERNAL MEDICINE

## 2022-12-22 PROCEDURE — 93017 CV STRESS TEST TRACING ONLY: CPT

## 2022-12-22 RX ORDER — SODIUM CHLORIDE 0.9 % (FLUSH) 0.9 %
10 SYRINGE (ML) INJECTION PRN
Status: DISCONTINUED | OUTPATIENT
Start: 2022-12-22 | End: 2022-12-23 | Stop reason: HOSPADM

## 2022-12-22 RX ORDER — TECHNETIUM TC-99M SESTAMIBI 1 MG/10ML
9.1 INJECTION INTRAVENOUS
Status: COMPLETED | OUTPATIENT
Start: 2022-12-22 | End: 2022-12-22

## 2022-12-22 RX ORDER — TECHNETIUM TC-99M SESTAMIBI 1 MG/10ML
31.7 INJECTION INTRAVENOUS
Status: COMPLETED | OUTPATIENT
Start: 2022-12-22 | End: 2022-12-22

## 2022-12-22 RX ORDER — PRAVASTATIN SODIUM 20 MG
20 TABLET ORAL DAILY
COMMUNITY
Start: 2022-12-19

## 2022-12-22 RX ADMIN — SODIUM CHLORIDE, PRESERVATIVE FREE 10 ML: 5 INJECTION INTRAVENOUS at 09:48

## 2022-12-22 RX ADMIN — SODIUM CHLORIDE, PRESERVATIVE FREE 10 ML: 5 INJECTION INTRAVENOUS at 09:49

## 2022-12-22 RX ADMIN — Medication 31.7 MILLICURIE: at 09:49

## 2022-12-22 RX ADMIN — Medication 9.1 MILLICURIE: at 08:23

## 2022-12-22 RX ADMIN — REGADENOSON 0.4 MG: 0.08 INJECTION, SOLUTION INTRAVENOUS at 09:48

## 2022-12-22 RX ADMIN — SODIUM CHLORIDE, PRESERVATIVE FREE 10 ML: 5 INJECTION INTRAVENOUS at 08:23

## 2022-12-22 NOTE — PROCEDURES
[address] and Vascular [address] [place] [phone]                Pharmacologic Stress Nuclear Gated SPECT Study    [facility] [address] [facility] [de-identified]    :  1975          Sex: male         Date of Study:  2022    Height: 5' 9\" (175.3 cm)         Weight: 161 lb (73 kg)     Ordering Provider: Adrian Hardin MD          PCP: Ignacia Harley      Cardiologist: Gerardo Kc MD             Interpreting Physician: Uli Ring MD  _________________________________________________________________________________    Indication:   Detecting the presence and location of coronary artery disease    Clinical History:   Patient has no known history of coronary artery disease. Resting ECG:    TX int .20 sec, QRS int .08 sec, QT int . 40 sec; HR 57 bpm  Normal sinus rhythm    Procedure:   Pharmacologic stress testing was performed with regadenoson 0.4 mg for 15 seconds. Additionally, low-level exercise was performed along with the infusion. The heart rate was 57 at baseline and ric to 113 beats during the infusion. This corresponds to 65% of maximum predicted heart rate. The blood pressure at baseline was 100/70 and blood pressure at the end of infusion was 112/80. Blood pressure response was normal during the stress procedure. The patient experienced shortness of breathe, dizziness, and chest pressure during the infusion. ECG during the infusion did not change. IMAGING: Myocardial perfusion imaging was performed at rest 30-35 minutes following the intravenous injection of 9.1 mCi of (Tc-Sestamibi) followed by 10 ml of Normal Saline. As per infusion protocol, the patient was injected intravenously with 31.7 mCi of (Tc-Sestamibi) followed by 10 ml of Normal Saline. Gated post-stress tomographic imaging was performed 20-25 minutes after stress.      FINDINGS: The overall quality of the study was fair. Left ventricular cavity size was noted to be normal.    Rotational analog analysis demonstrated no patient motion or abnormal extracardiac radioactivity. The gated SPECT stress imaging in the short, vertical long, and horizontal long axis demonstrated normal homogeneous tracer distribution throughout the myocardium. The resting images show no change. Gated SPECT left ventricular ejection fraction was calculated to be 72%, with normal myocardial thickening and wall motion. Impression:    ECG during the infusion did not change. The myocardial perfusion imaging was normal with attenuation artifact. Overall left ventricular systolic function was normal without regional wall motion abnormalities. Low risk general pharmacologic stress test.    Thank you for sending your patient to this [employer].      Electronically signed by Marcia Connor MD on 12/22/22 at 12:43 PM EST

## 2022-12-22 NOTE — DISCHARGE INSTRUCTIONS
[address],[facility]      Instructions to Patients    The following are the instructions for patients who have had a procedure in our office today. Patient name: Sonia Choudhary III    Radionuclide Activity: 40mCi of 99mTc-Sestamibi    Date Administered: 12/22/2022    Expires: 48 hours after scheduled appointment time      Patient may resume normal activity unless otherwise instructed. Patient may resume medications as normal.  If the need should arise, patient may call [phone] between the hours of 8:00am-5:00pm.  After hours there is at least one physician on-call at all times for those patients needing assistance. Patients may call [phone] and the answering service will direct the patient to one of our physicians for assistance. After the patient's test if they are going to be leaving from an airport in the near future they should take this letter with them to verify the test and radionuclide used for their test.      This letter verifies that the above named bearer received an injection of a radionuclide for medical purpose/usage only.         Electronically signed by Soha Ferraro on 12/22/2022 at 9:35 AM

## 2022-12-23 ENCOUNTER — TELEPHONE (OUTPATIENT)
Dept: NON INVASIVE DIAGNOSTICS | Age: 47
End: 2022-12-23

## 2022-12-30 ENCOUNTER — TELEPHONE (OUTPATIENT)
Dept: CARDIOLOGY CLINIC | Age: 47
End: 2022-12-30

## 2022-12-30 NOTE — TELEPHONE ENCOUNTER
Patient requires dental procedures: extractions, endodontic root canal treatment and dental restorations. He is scheduled for 1/3/23 at [address]. 1)  Does patient need antibiotic prophylaxis? 2)  Contraindications to local anesthetic w/epinephrine? 3)  Any need to withhold or alter any medications prior to treatment?     Email form to: [email or web address], Attn: Hernan Johnson

## 2022-12-30 NOTE — TELEPHONE ENCOUNTER
Okay to proceed. Does not need antibiotic prophylaxis. Okay to use local anesthetic w/epinephrine as needed. Take same medications as prescribed. Okay to hold ASA if needed for procedure. Marcell Pavon D.O.   Cardiologist  Cardiology, [facility]

## 2023-02-15 NOTE — PROGRESS NOTES
Cardiac Electrophysiology Outpatient Progress Note    Jaye Tian III  1975  Date of Service: 2/16/2023  Referring Provider/PCP: Joao Mustafa  Chief Complaint:   Chief Complaint   Patient presents with    Atrial Fibrillation     Former [facility] pt,discuss possible AF ablation, pt says that when he does activity he sometimes gets dizzy and chest pain. HISTORY OF PRESENT ILLNESS    Jaye Tian III presents to the office today for the management of these Electrophysiology conditions: PAF    He has a PMHx of prediabetes, depression, anxiety, panic attacks, schizophrenia, restless leg syndrome and active tobacco use. He was diagnosed with new onset atrial fibrillation 11/2020 that self converted. He had new onset chest burning at that time. He was started on oral cardizem 120 mg daily which caused side effects. He had a negative KAYLEN study at [facility] last year. ER visit for palpitation and chest pain 1/30/21, 5/3/21 and 5/7/21. ECGs were wnl by the time he got to ER. He was switched to metoprolol 25 mg daily 1/4/21, currently raised to 50 mg bid last week. He continues to have daily chest pain that feels like burning that radiates the left side. It lasts from seconds to all day. Usually, his heart rhythm is in rhythm with these episodes. 9/1/2021: Mr. Madhu Mahmood presents today for follow up. He reports no new symptoms. The patient denies any chest pain, dyspnea, palpitations, dizziness, syncope, orthopnea or paroxysmal nocturnal dyspnea. He tries to stay active. Hospitalized 7/24/21 for afib rate 120's, he was given flecainide and metoprolol and he converted to NSR    12/1/22 : He has daily chest pain worsened by his afib episodes. He feels palpitations multiple times a day. BP runs low so cannot adjust metoprolol and lowering it escalates his symptoms of afib    2/16/23: Mr. Madhu Mahmood previously followed with Dr. Brandie Pierce.  At his last office visit, Mr. Madhu Mahmood complained of chest pain, in which a stress was obtained. Nuclear stress test on 12/22/22 showed low risk result. Also he wore a 14 day MCOT that showed a <1% AF burden. He is maintaining rhythm with Flecainide. He reports feeling palpitations, dyspnea on exertion, chest burning and fatigue when going up a incline. He presents today in SR. The patient denies any dizziness, syncope, orthopnea or paroxysmal nocturnal dyspnea. Discussed with him at great length that his chest burning symptoms are likely from GI issues with recent normal stress test. In term of treatment of AF, we discussed regarding continue medical therapy versus AF ablation. Risks, benefits and alternatives of AF ablation were discussed.     Patient Active Problem List    Diagnosis Date Noted    Mild episode of recurrent major depressive disorder ([facility] 03/17/2022    Diarrhea 07/24/2021    Atrial fibrillation with RVR ([address] 05/22/2021    Chest pain 12/05/2020    Leukocytosis     Chronic obstructive pulmonary disease (HCC)     Schizophrenia (HCC)     Atypical chest pain 11/10/2020    A-fib ([facility] 11/05/2020    Atrial fibrillation ([address] 11/05/2020    Symptomatic cholelithiasis     Helicobacter pylori gastritis     Depression     Hand pain, right 08/02/2011       Family History   Problem Relation Age of Onset    Heart Attack Father     Heart Disease Father         3 stents at 71    Mental Illness Father         unspecified    High Blood Pressure Father     Crohn's Disease Sister        SOCIAL HISTORY     Past Surgical History:   Procedure Laterality Date    ABDOMEN SURGERY N/A 03/15/2018    wound exploration umbilicus, excsion suture granuloma    CHOLECYSTECTOMY  2017    COLONOSCOPY  2016    COLONOSCOPY N/A 2/16/2022    COLONOSCOPY WITH BIOPSY performed by Stephanie Oviedo MD at [address] [place] [address]  09/19/2017    OR LAPAROSCOPY SURG CHOLECYSTECTOMY N/A 5/29/2018    LAPAROSCOPIC  CHOLECYSTECTOMY performed by Siria Valencia MD at [place] [address] WOUND/DEHSN EXTSV/COMPLIC N/A 2/18/6767    ABDOMINAL WOUND EXPLORATION , REMOVAL OF SUTURE GRANULOMA performed by Irving Montague MD at [address]  4/9/2018    EGD BIOPSY performed by Irving Montague MD at [facility] N/A 5/21/2018    EGD BIOPSY performed by Irving Montague MD at [facility] 13 N/A 5/6/2022    EGD BIOPSY performed by Mary Grullon MD at [facility] ENDOSCOPY       Current Outpatient Medications   Medication Sig Dispense Refill    lamoTRIgine (LAMICTAL) 25 MG tablet take 2 tablets by mouth at bedtime      cholestyramine (QUESTRAN) 4 g packet Take 1 packet by mouth every evening 90 packet 1    acetaminophen (TYLENOL) 500 MG tablet Take 500 mg by mouth every 12 hours as needed for Pain      flecainide (TAMBOCOR) 100 MG tablet Take 1 tablet by mouth 2 times daily 180 tablet 3    metoprolol succinate (TOPROL XL) 50 MG extended release tablet Take 1 tablet by mouth 2 times daily 180 tablet 3    diazePAM (VALIUM) 5 MG tablet Take 10 mg by mouth nightly. traZODone (DESYREL) 100 MG tablet Take 100 mg by mouth nightly       busPIRone (BUSPAR) 15 MG tablet Take 15 mg by mouth in the morning and at bedtime      omeprazole (PRILOSEC) 40 MG delayed release capsule Take 40 mg by mouth Daily with supper   0     No current facility-administered medications for this visit. Allergies   Allergen Reactions    Biaxin [Clarithromycin] Palpitations    Effexor [Venlafaxine Hydrochloride] Palpitations and Other (See Comments)     Patient began intensely sweating and \"passed out\"    Cardizem [Diltiazem Hcl] Other (See Comments)     TINNITUS    Aspirin Other (See Comments)     TINNITUS     ROS:   Constitutional: Negative for fever, activity change and appetite change. HENT: Negative for epistaxis. Eyes: Negative for diploplia, blurred vision. Respiratory: Negative for cough.  Positive for chest tightness or shortness of breath. Negative for wheezing. Cardiovascular: pertinent positives in HPI  Gastrointestinal: Negative for abdominal pain and blood in stool. All other review of systems are negative     PHYSICAL EXAM:   Vitals:    02/16/23 0821   BP: 128/64   Pulse: 62   Resp: 18   Weight: 160 lb (72.6 kg)   Height: 5' 8\" (1.727 m)      Constitutional: Well-developed, no acute distress  Eyes: conjunctivae normal, no xanthelasma   Ears, Nose, Throat: oral mucosa moist, no cyanosis   CV: no JVD. Regular rate and rhythm. Normal S1S2 and no S3. No murmurs, rubs, or gallops.  PMI is nondisplaced  Lungs: clear to auscultation bilaterally, normal respiratory effort without used of accessory muscles  Abdomen: soft, non-tender, bowel sounds present, no masses or hepatomegaly   Musculoskeletal: no digital clubbing, no edema   Skin: warm, no rashes     Pertinent Labs:  CBC:   WBC   Date Value   12/08/2022 10.2 E9/L   11/05/2022 11.6 E9/L (H)   08/01/2022 10.9 K/uL     Hemoglobin (g/dL)   Date Value   12/08/2022 16.3   11/05/2022 16.7 (H)   08/01/2022 14.5     Hematocrit (%)   Date Value   12/08/2022 48.8   11/05/2022 51.9   08/01/2022 42.3     Platelets   Date Value   12/08/2022 297 E9/L   11/05/2022 289 E9/L   08/01/2022 247 K/uL      BMP:   Sodium   Date Value   12/08/2022 138 mmol/L   11/05/2022 139 mmol/L   08/01/2022 139 mEq/L     Potassium   Date Value   11/05/2022 4.3 mmol/L   08/01/2022 3.9 mEq/L   07/31/2022 4.0 mEq/L     Potassium reflex Magnesium (mmol/L)   Date Value   12/08/2022 4.2   04/24/2022 4.4   03/23/2022 4.3     Magnesium   Date Value   11/05/2022 2.0 mg/dL   08/01/2022 1.6 mEq/L   07/24/2021 1.8 mg/dL     Chloride   Date Value   12/08/2022 103 mmol/L   11/05/2022 105 mmol/L   08/01/2022 105 mEq/L     CO2   Date Value   12/08/2022 26 mmol/L   11/05/2022 23 mmol/L   08/01/2022 27 mEq/L     BUN (mg/dL)   Date Value   12/08/2022 14   11/05/2022 12   08/01/2022 13     Creatinine (mg/dL)   Date Value 12/08/2022 1.0   11/05/2022 1.0   08/01/2022 1.0     Glucose (mg/dL)   Date Value   12/08/2022 94   11/05/2022 165 (H)   08/01/2022 91   11/11/2011 90     Calcium (mg/dL)   Date Value   12/08/2022 9.5   11/05/2022 9.4   08/01/2022 9.0      INR:   INR (no units)   Date Value   12/08/2022 1.0   05/03/2021 1.1   11/10/2020 1.0      BNP: No results found for: BNP   TSH:   TSH (uIU/mL)   Date Value   03/23/2022 0.675   03/16/2022 0.644   07/24/2021 1.020      Cardiac Injury Profile: Total CK (U/L)   Date Value   07/24/2021 60     CK-MB (ng/mL)   Date Value   07/24/2021 1.2     Troponin (ng/mL)   Date Value   05/22/2021 <0.01     Troponin I (ng/mL)   Date Value   08/01/2022 < 0.03     Lipid Profile:   Triglycerides (mg/dL)   Date Value   08/01/2022 118     HDL (mg/dL)   Date Value   08/01/2022 38     LDL Calculated (mg/dL)   Date Value   03/17/2022 95     Cholesterol, Total (mg/dL)   Date Value   03/17/2022 149     Cholesterol (mg/dL)   Date Value   08/01/2022 156      Hemoglobin A1C:   Hemoglobin A1C (%)   Date Value   03/17/2022 5.8 (H)       Pertinent Cardiac Testing:      TTE 11/6/20:   Left Ventricle   Left ventricle size is normal.   Normal left ventricular wall thickness. Normal left ventricular systolic function. Ejection fraction is visually estimated at 55%. Normal left ventricular diastolic filling pattern for age. Stress test 12/22/2022:  ECG during the infusion did not change. The myocardial perfusion imaging was normal with attenuation artifact. Overall left ventricular systolic function was normal without regional wall motion abnormalities. Low risk general pharmacologic stress test.    Stress Test 11/10/20:       A mild defect was present in the inferior wall(s) that was small size   by quantification - likely attenuation.               The resting images no significant reversibility       Gated SPECT left ventricular ejection fraction was calculated to be   60%, with normal Myocardial wall motion. 14 day MCOT 2022 :   Predominant underlying rhythm was Sinus Rhythm. Atrial Fibrillation  occurred (<1% burden), ranging from  bpm (avg of 91 bpm), the  longest lasting 49 mins 59 secs with an avg rate of 91 bpm. Isolated  SVEs were rare (<1.0%), SVE Couplets were rare (<1.0%), and no SVE  Triplets were present. Isolated VEs were rare (<1.0%), and no VE  Couplets or VE Triplets were present. Triggered events and  reported symptoms were correlated with AF with HR  bpm and  sinus rhythm. ECG 2023: NSR, Rate 62 - see scanned cardiology    ASSESSMENT & PLAN    1. Paroxysmal atrial fibrillation  - Diagnosed 2020  - [id number] of 0.  - On Flecainide for rhythm control.   - On Metoprolol 50 mg BID for rate control.  - EK23: NSR.  - ED visit 2021 for recurrent AF  - 2022: 14 day MCOT <1% AF burden  - Option of treatment discussed including rate control versus rhythm control. He opts for rhythm control.  - Rhythm control option including using different AAD therapy versus AF ablation. He is deciding.  - A detailed discussion was had regarding the risks, benefits, and alternatives to the atrial fibrillation ablation procedure. The procedure was described in detail. I quoted the patient an overall 4-6% risk of major complications which include but are not limited to: bleeding, infection, blood clot, vascular injury requiring surgical repair, phrenic nerve injury, pulmonary vein stenosis, valvular injury, damage to the cardiac conduction system requiring pacemaker implantation, cardiac perforation and tamponade, heart attack, stroke, atrioesophgeal fistula, and death. The patient understands these risks and wishes to think about whether or not to undergo the procedure. They will need a preop cardiac CT within 1 month, we have discussed charlee-procedural anticoagulation management and the potential need for a trans-esophageal echocardiogram on the day of the procedure. We have discussed the use of adjunctive technologies including cryo-ablation and contact force guided ablation. Based on this patient's individual needs, we have chosen to utilize Cryo-ballon AF ablation.     - Education on importance of well controlled HTN (goal BP < 130/80), adequate weight control (goal BMI of < 27), physical activity consisting of moderate cardiopulmonary exercise up to a goal of 250 min/wk, daily compliance with CPAP in treating sleep apnea, smoking cessation and limited ETOH intake. 2. Anxiety  - Valium, Desyrel and Buspar. 3. Schizophrenia    4. GERD  - On Prilosec. Plan:   Obtain an echocardiogram for updated cardiac structure and function. Patient to consider proceeding with Cryo-balloon AF ablation after at least 3 weeks of uninterrupted Eliquis. He is deciding. Start Eliquis 5 mg BID if he decides to move forward with ablation. Hold Eliquis 2 doses before the procedure. Continue Flecainide 100 mg BID. Hold Flecainide 3 days before the procedure. Continue Toprol XL 50 mg BID. Cardiac CTA before the procedure. FORD on the day of the procedure. Follow up after the procedure or in 3 months. Encouraged the patient to call the office for any questions or concerns.'    Thank you for allowing me to participate in your patient's care. I have spent a total of 40 minutes with the patient and the family reviewing the above stated recommendations. And a total of >50% of that time involved face-to-face time providing counseling and/or coordination of care with the other providers, preparation for the clinic visit, reviewing records/tests, counseling/education of the patient, ordering medications/tests/procedures, coordinating care, and documenting clinical information in the EHR.       Chen Roberson MD  Cardiac Electrophysiology  [address]  [facility]  8:38 AM  2/16/2023

## 2023-02-16 ENCOUNTER — OFFICE VISIT (OUTPATIENT)
Dept: NON INVASIVE DIAGNOSTICS | Age: 48
End: 2023-02-16
Payer: COMMERCIAL

## 2023-02-16 VITALS
SYSTOLIC BLOOD PRESSURE: 128 MMHG | WEIGHT: 160 LBS | BODY MASS INDEX: 24.25 KG/M2 | HEART RATE: 62 BPM | HEIGHT: 68 IN | DIASTOLIC BLOOD PRESSURE: 64 MMHG | RESPIRATION RATE: 18 BRPM

## 2023-02-16 DIAGNOSIS — R94.31 EKG ABNORMALITIES: ICD-10-CM

## 2023-02-16 DIAGNOSIS — I48.0 PAROXYSMAL ATRIAL FIBRILLATION (HCC): Primary | ICD-10-CM

## 2023-02-16 PROCEDURE — 4004F PT TOBACCO SCREEN RCVD TLK: CPT | Performed by: INTERNAL MEDICINE

## 2023-02-16 PROCEDURE — G8427 DOCREV CUR MEDS BY ELIG CLIN: HCPCS | Performed by: INTERNAL MEDICINE

## 2023-02-16 PROCEDURE — G8484 FLU IMMUNIZE NO ADMIN: HCPCS | Performed by: INTERNAL MEDICINE

## 2023-02-16 PROCEDURE — 99215 OFFICE O/P EST HI 40 MIN: CPT | Performed by: INTERNAL MEDICINE

## 2023-02-16 PROCEDURE — 93000 ELECTROCARDIOGRAM COMPLETE: CPT | Performed by: INTERNAL MEDICINE

## 2023-02-16 PROCEDURE — G8420 CALC BMI NORM PARAMETERS: HCPCS | Performed by: INTERNAL MEDICINE

## 2023-02-16 RX ORDER — METOPROLOL SUCCINATE 50 MG/1
50 TABLET, EXTENDED RELEASE ORAL 2 TIMES DAILY
Qty: 180 TABLET | Refills: 3 | Status: SHIPPED | OUTPATIENT
Start: 2023-02-16

## 2023-02-20 DIAGNOSIS — I48.0 PAROXYSMAL ATRIAL FIBRILLATION (HCC): ICD-10-CM

## 2023-02-20 RX ORDER — METOPROLOL SUCCINATE 50 MG/1
TABLET, EXTENDED RELEASE ORAL
Qty: 180 TABLET | Refills: 3 | OUTPATIENT
Start: 2023-02-20

## 2023-03-10 ENCOUNTER — TELEPHONE (OUTPATIENT)
Dept: CARDIOLOGY | Age: 48
End: 2023-03-10

## 2023-03-24 ENCOUNTER — HOSPITAL ENCOUNTER (OUTPATIENT)
Dept: CARDIOLOGY | Age: 48
Discharge: HOME | End: 2023-03-24
Payer: COMMERCIAL

## 2023-03-24 DIAGNOSIS — R94.31 EKG ABNORMALITIES: ICD-10-CM

## 2023-03-24 PROCEDURE — 93306 TTE W/DOPPLER COMPLETE: CPT | Performed by: PSYCHIATRY & NEUROLOGY

## 2023-03-27 ENCOUNTER — TELEPHONE (OUTPATIENT)
Dept: NON INVASIVE DIAGNOSTICS | Age: 48
End: 2023-03-27

## 2023-03-27 DIAGNOSIS — R93.1 ABNORMAL ECHOCARDIOGRAM: Primary | ICD-10-CM

## 2023-03-27 DIAGNOSIS — I48.0 PAROXYSMAL ATRIAL FIBRILLATION (HCC): ICD-10-CM

## 2023-03-27 NOTE — TELEPHONE ENCOUNTER
Patient given recommendations per  and agreed to the testing and medication as well as the ablation. CTA of Heart order in Epic. Script for Eliquis sent to Dr for filling and will schedule AF ablation with patient later this week. Patient verbalized understanding.        Electronically signed by Augusta Javed MA on 3/27/2023 at 8:33 AM

## 2023-04-15 VITALS
WEIGHT: 160 LBS | TEMPERATURE: 98.8 F | BODY MASS INDEX: 24.25 KG/M2 | SYSTOLIC BLOOD PRESSURE: 114 MMHG | HEIGHT: 68 IN | DIASTOLIC BLOOD PRESSURE: 84 MMHG | RESPIRATION RATE: 16 BRPM | HEART RATE: 79 BPM | OXYGEN SATURATION: 99 %

## 2023-04-16 ENCOUNTER — HOSPITAL ENCOUNTER (EMERGENCY)
Age: 48
Discharge: LEFT BEFORE DISCHARGE | End: 2023-04-16
Attending: EMERGENCY MEDICINE

## 2023-05-19 RX ORDER — FLECAINIDE ACETATE 100 MG/1
TABLET ORAL
Qty: 180 TABLET | Refills: 3 | Status: SHIPPED | OUTPATIENT
Start: 2023-05-19

## 2023-06-30 ENCOUNTER — HOSPITAL ENCOUNTER (OUTPATIENT)
Age: 48
Discharge: HOME | End: 2023-06-30
Payer: COMMERCIAL

## 2023-06-30 DIAGNOSIS — R93.1 ABNORMAL ECHOCARDIOGRAM: ICD-10-CM

## 2023-06-30 DIAGNOSIS — I48.0 PAROXYSMAL ATRIAL FIBRILLATION (HCC): ICD-10-CM

## 2023-06-30 LAB
ALBUMIN SERPL-MCNC: 4.2 G/DL (ref 3.5–5.2)
ALP SERPL-CCNC: 75 U/L (ref 40–129)
ALT SERPL-CCNC: 24 U/L (ref 0–40)
ANION GAP SERPL CALCULATED.3IONS-SCNC: 10 MMOL/L (ref 7–16)
AST SERPL-CCNC: 17 U/L (ref 0–39)
BILIRUB SERPL-MCNC: 0.3 MG/DL (ref 0–1.2)
BUN SERPL-MCNC: 12 MG/DL (ref 6–20)
CALCIUM SERPL-MCNC: 9.2 MG/DL (ref 8.6–10.2)
CHLORIDE SERPL-SCNC: 104 MMOL/L (ref 98–107)
CO2 SERPL-SCNC: 25 MMOL/L (ref 22–29)
CREAT SERPL-MCNC: 1.2 MG/DL (ref 0.7–1.2)
GLUCOSE SERPL-MCNC: 133 MG/DL (ref 74–99)
POTASSIUM SERPL-SCNC: 4.4 MMOL/L (ref 3.5–5)
PROT SERPL-MCNC: 6.6 G/DL (ref 6.4–8.3)
SODIUM SERPL-SCNC: 139 MMOL/L (ref 132–146)

## 2023-06-30 PROCEDURE — 36415 COLL VENOUS BLD VENIPUNCTURE: CPT

## 2023-06-30 PROCEDURE — 80053 COMPREHEN METABOLIC PANEL: CPT

## 2023-07-02 ENCOUNTER — HOSPITAL ENCOUNTER (OUTPATIENT)
Dept: CT IMAGING | Age: 48
Discharge: HOME | End: 2023-07-04
Attending: INTERNAL MEDICINE
Payer: COMMERCIAL

## 2023-07-02 DIAGNOSIS — R93.1 ABNORMAL ECHOCARDIOGRAM: ICD-10-CM

## 2023-07-02 PROCEDURE — 75572 CT HRT W/3D IMAGE: CPT

## 2023-07-02 PROCEDURE — 6360000004 HC RX CONTRAST MEDICATION: Performed by: RADIOLOGY

## 2023-07-02 RX ORDER — SODIUM CHLORIDE 0.9 % (FLUSH) 0.9 %
10 SYRINGE (ML) INJECTION
Status: ACTIVE | OUTPATIENT
Start: 2023-07-02 | End: 2023-07-03

## 2023-07-02 RX ADMIN — IOPAMIDOL 70 ML: 755 INJECTION, SOLUTION INTRAVENOUS at 07:25

## 2023-07-19 ENCOUNTER — TELEPHONE (OUTPATIENT)
Dept: NON INVASIVE DIAGNOSTICS | Age: 48
End: 2023-07-19

## 2023-07-19 NOTE — TELEPHONE ENCOUNTER
The patient will need next available with CK    Overdue 3 mo OV (05/17/2023) no device on flecainide w/ CK

## 2023-08-07 ENCOUNTER — HOSPITAL ENCOUNTER (INPATIENT)
Age: 48
LOS: 1 days | Discharge: LEFT BEFORE DISCHARGE | DRG: 058 | End: 2023-08-07
Attending: STUDENT IN AN ORGANIZED HEALTH CARE EDUCATION/TRAINING PROGRAM | Admitting: INTERNAL MEDICINE
Payer: COMMERCIAL

## 2023-08-07 ENCOUNTER — APPOINTMENT (OUTPATIENT)
Dept: GENERAL RADIOLOGY | Age: 48
DRG: 058 | End: 2023-08-07
Payer: COMMERCIAL

## 2023-08-07 ENCOUNTER — APPOINTMENT (OUTPATIENT)
Dept: CT IMAGING | Age: 48
DRG: 058 | End: 2023-08-07
Payer: COMMERCIAL

## 2023-08-07 VITALS
DIASTOLIC BLOOD PRESSURE: 96 MMHG | HEIGHT: 68 IN | OXYGEN SATURATION: 96 % | RESPIRATION RATE: 16 BRPM | WEIGHT: 160 LBS | TEMPERATURE: 98.1 F | BODY MASS INDEX: 24.25 KG/M2 | SYSTOLIC BLOOD PRESSURE: 133 MMHG | HEART RATE: 61 BPM

## 2023-08-07 DIAGNOSIS — R29.90 STROKE-LIKE SYMPTOMS: Primary | ICD-10-CM

## 2023-08-07 LAB
ANION GAP SERPL CALCULATED.3IONS-SCNC: 11 MMOL/L (ref 7–16)
APAP SERPL-MCNC: <5 UG/ML (ref 10–30)
BASOPHILS # BLD: 0.09 K/UL (ref 0–0.2)
BASOPHILS NFR BLD: 1 % (ref 0–2)
BUN SERPL-MCNC: 13 MG/DL (ref 6–20)
CALCIUM SERPL-MCNC: 9.1 MG/DL (ref 8.6–10.2)
CHLORIDE SERPL-SCNC: 103 MMOL/L (ref 98–107)
CHP ED QC CHECK: NORMAL
CO2 SERPL-SCNC: 23 MMOL/L (ref 22–29)
CREAT SERPL-MCNC: 1.1 MG/DL (ref 0.7–1.2)
EOSINOPHIL # BLD: 0.4 K/UL (ref 0.05–0.5)
EOSINOPHILS RELATIVE PERCENT: 3 % (ref 0–6)
ERYTHROCYTE [DISTWIDTH] IN BLOOD BY AUTOMATED COUNT: 13.8 % (ref 11.5–15)
ETHANOLAMINE SERPL-MCNC: <10 MG/DL
GFR SERPL CREATININE-BSD FRML MDRD: >60 ML/MIN/1.73M2
GLUCOSE BLD-MCNC: 94 MG/DL
GLUCOSE SERPL-MCNC: 97 MG/DL (ref 74–99)
HCT VFR BLD AUTO: 42.8 % (ref 37–54)
HGB BLD-MCNC: 14.3 G/DL (ref 12.5–16.5)
IMM GRANULOCYTES # BLD AUTO: 0.05 K/UL (ref 0–0.58)
IMM GRANULOCYTES NFR BLD: 0 % (ref 0–5)
INR PPP: 1
LYMPHOCYTES NFR BLD: 3.89 K/UL (ref 1.5–4)
LYMPHOCYTES RELATIVE PERCENT: 30 % (ref 20–42)
MCH RBC QN AUTO: 30.2 PG (ref 26–35)
MCHC RBC AUTO-ENTMCNC: 33.4 G/DL (ref 32–34.5)
MCV RBC AUTO: 90.5 FL (ref 80–99.9)
METER GLUCOSE: 94 MG/DL (ref 74–99)
MONOCYTES NFR BLD: 0.88 K/UL (ref 0.1–0.95)
MONOCYTES NFR BLD: 7 % (ref 2–12)
NEUTROPHILS NFR BLD: 60 % (ref 43–80)
NEUTS SEG NFR BLD: 7.81 K/UL (ref 1.8–7.3)
PARTIAL THROMBOPLASTIN TIME: 36.2 SEC (ref 24.5–35.1)
PLATELET # BLD AUTO: 258 K/UL (ref 130–450)
PMV BLD AUTO: 10.1 FL (ref 7–12)
POTASSIUM SERPL-SCNC: 4.1 MMOL/L (ref 3.5–5)
PROTHROMBIN TIME: 11.1 SEC (ref 9.3–12.4)
RBC # BLD AUTO: 4.73 M/UL (ref 3.8–5.8)
SALICYLATES SERPL-MCNC: <0.3 MG/DL (ref 0–30)
SODIUM SERPL-SCNC: 137 MMOL/L (ref 132–146)
TOXIC TRICYCLIC SC,BLOOD: NEGATIVE
TROPONIN I SERPL HS-MCNC: <6 NG/L (ref 0–11)
WBC OTHER # BLD: 13.1 K/UL (ref 4.5–11.5)

## 2023-08-07 PROCEDURE — G0480 DRUG TEST DEF 1-7 CLASSES: HCPCS

## 2023-08-07 PROCEDURE — 70498 CT ANGIOGRAPHY NECK: CPT

## 2023-08-07 PROCEDURE — 71045 X-RAY EXAM CHEST 1 VIEW: CPT

## 2023-08-07 PROCEDURE — 99443 PR PHYS/QHP TELEPHONE EVALUATION 21-30 MIN: CPT | Performed by: PSYCHIATRY & NEUROLOGY

## 2023-08-07 PROCEDURE — 85025 COMPLETE CBC W/AUTO DIFF WBC: CPT

## 2023-08-07 PROCEDURE — 80048 BASIC METABOLIC PNL TOTAL CA: CPT

## 2023-08-07 PROCEDURE — 80179 DRUG ASSAY SALICYLATE: CPT

## 2023-08-07 PROCEDURE — 70496 CT ANGIOGRAPHY HEAD: CPT

## 2023-08-07 PROCEDURE — 80143 DRUG ASSAY ACETAMINOPHEN: CPT

## 2023-08-07 PROCEDURE — 82947 ASSAY GLUCOSE BLOOD QUANT: CPT

## 2023-08-07 PROCEDURE — 80307 DRUG TEST PRSMV CHEM ANLYZR: CPT

## 2023-08-07 PROCEDURE — 93005 ELECTROCARDIOGRAM TRACING: CPT | Performed by: STUDENT IN AN ORGANIZED HEALTH CARE EDUCATION/TRAINING PROGRAM

## 2023-08-07 PROCEDURE — 6360000004 HC RX CONTRAST MEDICATION: Performed by: RADIOLOGY

## 2023-08-07 PROCEDURE — 85610 PROTHROMBIN TIME: CPT

## 2023-08-07 PROCEDURE — 99285 EMERGENCY DEPT VISIT HI MDM: CPT

## 2023-08-07 PROCEDURE — 1200000000 HC SEMI PRIVATE

## 2023-08-07 PROCEDURE — 85730 THROMBOPLASTIN TIME PARTIAL: CPT

## 2023-08-07 PROCEDURE — 84484 ASSAY OF TROPONIN QUANT: CPT

## 2023-08-07 PROCEDURE — 70450 CT HEAD/BRAIN W/O DYE: CPT

## 2023-08-07 RX ADMIN — IOPAMIDOL 75 ML: 755 INJECTION, SOLUTION INTRAVENOUS at 17:35

## 2023-08-07 ASSESSMENT — ENCOUNTER SYMPTOMS
BACK PAIN: 0
NAUSEA: 0
VOMITING: 0
ABDOMINAL PAIN: 0
EYE REDNESS: 0
DIARRHEA: 0
SORE THROAT: 0
WHEEZING: 0
EYE DISCHARGE: 0
COUGH: 0
SHORTNESS OF BREATH: 0
EYE PAIN: 0
SINUS PRESSURE: 0

## 2023-08-07 ASSESSMENT — LIFESTYLE VARIABLES
HOW MANY STANDARD DRINKS CONTAINING ALCOHOL DO YOU HAVE ON A TYPICAL DAY: PATIENT DOES NOT DRINK
HOW OFTEN DO YOU HAVE A DRINK CONTAINING ALCOHOL: NEVER

## 2023-08-07 ASSESSMENT — PAIN - FUNCTIONAL ASSESSMENT: PAIN_FUNCTIONAL_ASSESSMENT: NONE - DENIES PAIN

## 2023-08-07 NOTE — ED PROVIDER NOTES
per the Radiologist below, if available at the time of this note:    XR CHEST PORTABLE   Final Result   Coarsened interstitial markings. No acute cardiopulmonary pathology. CT HEAD WO CONTRAST   Final Result   Addendum (preliminary) 1 of 1   ADDENDUM:   The impression of this report was read to [address] at 6:12 p.m. on   8/7/2023 by a member of the core team support staff. Final   No evidence of acute intracranial hemorrhage or mass effect. CTA HEAD W CONTRAST   Final Result   Unremarkable CTA of the head and neck. CTA NECK W CONTRAST   Final Result   Unremarkable CTA of the head and neck. CT HEAD WO CONTRAST    Addendum Date: 8/7/2023    ADDENDUM: The impression of this report was read to [address] at 6:12 p.m. on 8/7/2023 by a member of the core team support staff. Result Date: 8/7/2023  EXAMINATION: CT OF THE HEAD WITHOUT CONTRAST  8/7/2023 5:36 pm TECHNIQUE: CT of the head was performed without the administration of intravenous contrast. Automated exposure control, iterative reconstruction, and/or weight based adjustment of the mA/kV was utilized to reduce the radiation dose to as low as reasonably achievable. COMPARISON: None. HISTORY: ORDERING SYSTEM PROVIDED HISTORY: ams abnormal finger to nose TECHNOLOGIST PROVIDED HISTORY: Reason for exam:->ams abnormal finger to nose Has a \"code stroke\" or \"stroke alert\" been called? ->Yes Decision Support Exception - unselect if not a suspected or confirmed emergency medical condition->Emergency Medical Condition (MA) FINDINGS: Mild motion artifact. BRAIN/VENTRICLES: There is no acute intracranial hemorrhage, mass effect or midline shift. No abnormal extra-axial fluid collection. The gray-white differentiation is maintained without evidence of an acute infarct. There is no evidence of hydrocephalus.  Age related changes of brain volume loss and nonspecific white matter hypodensity most often ascribed to chronic

## 2023-08-07 NOTE — ED NOTES
Spoke with Dorita Oden in the lab and asked about the red top for serum drug, and she stated usually it is two tubes, but they are able to share it as long as the sticker is in the bag and also informed her that we changed the trop to an add on.      Georgina Alcocer RN  08/07/23 [id number]

## 2023-08-08 LAB
EKG ATRIAL RATE: 59 BPM
EKG P AXIS: 53 DEGREES
EKG P-R INTERVAL: 156 MS
EKG Q-T INTERVAL: 396 MS
EKG QRS DURATION: 90 MS
EKG QTC CALCULATION (BAZETT): 392 MS
EKG R AXIS: 7 DEGREES
EKG T AXIS: 43 DEGREES
EKG VENTRICULAR RATE: 59 BPM

## 2023-08-08 PROCEDURE — 93010 ELECTROCARDIOGRAM REPORT: CPT | Performed by: INTERNAL MEDICINE

## 2023-08-08 NOTE — VIRTUAL HEALTH
further treatment and evaluation. This Virtual Visit was conducted with patient's (and/or legal guardian's) consent, to provide telestroke consultation and necessary medical care. Time spent examining patient, reviewing the images personally, reviewing the chart, perform high complexity decision making and speaking with the nursing staff regarding recommendations    This is a Phone Consult, I have not seen the patient face to face, the telemedicine device was not utilized.     Neftali Brown DO,  Stroke, Neurocritical Care And/or [address] [facility]  [address] [place] [address]  Electronically signed 8/7/2023 at 8:44 PM

## 2023-08-08 NOTE — H&P
Patient was to be admitted sp being seen sp confusion episode  Rule out CVA    On ellquis for afib- so TPA not given  CT head negative  Prior to being seen he left AMA:  Per ER:  \"   Patient states he is to be admitted to the hospital due to his daughters want to get further advice on patient symptoms and concern possible stroke and need for MRI and admission he does not want to do this we will follow-up with his neurologist outpatient and strongly advised he need to be admitted to the hospital.  He again strongly denied this and will follow up with physical sign out against medical        ?RECURRENT SEIZURES?  NOTED mri LAST TIME OCCURRED NEGATIVE    Noted this is simillar to prior ER/AMA encoutnter 8 months ago- DEC 2022:  \" 8:00 am this morning when he felt his pulse and realized he was in atrial fibrillation. He also began complaining of left arm pain at this time. The patient and girlfriend (provided history) called Dr. Cathy Johnson and the patient's EP who instructed him to come to ED. In the car on way to ED around 8:30 am the patient lost consciousness. Arrived at ED and in room around 9:00 am. NIH score 17. Current everyday smoker. Girlfriend (Rosalba) reports patient recently had remote heart monitor placed by Dr. Dieudonne Butler for atrial fibrillation. Lamictal on med list, reports he doesn't take anymore, had seizures decades ago as kids.    On presentation to ED: +aphasia, facial droop, left extremity weakness  Given Narcan 9:00 am without relief, stroke alert called.'    I WILL DEFER DC summary, uncertain status, not seen -- will defer charge

## 2023-10-09 ENCOUNTER — APPOINTMENT (OUTPATIENT)
Dept: GENERAL RADIOLOGY | Age: 48
End: 2023-10-09
Payer: COMMERCIAL

## 2023-10-09 ENCOUNTER — HOSPITAL ENCOUNTER (EMERGENCY)
Age: 48
Discharge: HOME | End: 2023-10-09
Attending: EMERGENCY MEDICINE
Payer: COMMERCIAL

## 2023-10-09 VITALS
OXYGEN SATURATION: 98 % | BODY MASS INDEX: 24.25 KG/M2 | TEMPERATURE: 98.4 F | HEIGHT: 68 IN | SYSTOLIC BLOOD PRESSURE: 110 MMHG | WEIGHT: 160 LBS | DIASTOLIC BLOOD PRESSURE: 79 MMHG | RESPIRATION RATE: 16 BRPM | HEART RATE: 61 BPM

## 2023-10-09 DIAGNOSIS — R55 NEAR SYNCOPE: Primary | ICD-10-CM

## 2023-10-09 LAB
ANION GAP SERPL CALCULATED.3IONS-SCNC: 9 MMOL/L (ref 7–16)
BASOPHILS # BLD: 0.09 K/UL (ref 0–0.2)
BASOPHILS NFR BLD: 1 % (ref 0–2)
BUN SERPL-MCNC: 12 MG/DL (ref 6–20)
CALCIUM SERPL-MCNC: 8.9 MG/DL (ref 8.6–10.2)
CHLORIDE SERPL-SCNC: 104 MMOL/L (ref 98–107)
CO2 SERPL-SCNC: 24 MMOL/L (ref 22–29)
CREAT SERPL-MCNC: 1 MG/DL (ref 0.7–1.2)
EOSINOPHIL # BLD: 0.23 K/UL (ref 0.05–0.5)
EOSINOPHILS RELATIVE PERCENT: 2 % (ref 0–6)
ERYTHROCYTE [DISTWIDTH] IN BLOOD BY AUTOMATED COUNT: 13.5 % (ref 11.5–15)
GFR SERPL CREATININE-BSD FRML MDRD: >60 ML/MIN/1.73M2
GLUCOSE SERPL-MCNC: 94 MG/DL (ref 74–99)
HCT VFR BLD AUTO: 45 % (ref 37–54)
HGB BLD-MCNC: 14.8 G/DL (ref 12.5–16.5)
IMM GRANULOCYTES # BLD AUTO: 0.08 K/UL (ref 0–0.58)
IMM GRANULOCYTES NFR BLD: 1 % (ref 0–5)
LYMPHOCYTES NFR BLD: 3.01 K/UL (ref 1.5–4)
LYMPHOCYTES RELATIVE PERCENT: 20 % (ref 20–42)
MCH RBC QN AUTO: 29.8 PG (ref 26–35)
MCHC RBC AUTO-ENTMCNC: 32.9 G/DL (ref 32–34.5)
MCV RBC AUTO: 90.5 FL (ref 80–99.9)
MONOCYTES NFR BLD: 0.62 K/UL (ref 0.1–0.95)
MONOCYTES NFR BLD: 4 % (ref 2–12)
NEUTROPHILS NFR BLD: 73 % (ref 43–80)
NEUTS SEG NFR BLD: 11.01 K/UL (ref 1.8–7.3)
PLATELET # BLD AUTO: 269 K/UL (ref 130–450)
PMV BLD AUTO: 9.9 FL (ref 7–12)
POTASSIUM SERPL-SCNC: 4.1 MMOL/L (ref 3.5–5)
RBC # BLD AUTO: 4.97 M/UL (ref 3.8–5.8)
SODIUM SERPL-SCNC: 137 MMOL/L (ref 132–146)
TROPONIN I SERPL HS-MCNC: <6 NG/L (ref 0–11)
WBC OTHER # BLD: 15 K/UL (ref 4.5–11.5)

## 2023-10-09 PROCEDURE — 85025 COMPLETE CBC W/AUTO DIFF WBC: CPT

## 2023-10-09 PROCEDURE — 93005 ELECTROCARDIOGRAM TRACING: CPT | Performed by: PHYSICIAN ASSISTANT

## 2023-10-09 PROCEDURE — 71045 X-RAY EXAM CHEST 1 VIEW: CPT

## 2023-10-09 PROCEDURE — 99285 EMERGENCY DEPT VISIT HI MDM: CPT

## 2023-10-09 PROCEDURE — 80048 BASIC METABOLIC PNL TOTAL CA: CPT

## 2023-10-09 PROCEDURE — 2580000003 HC RX 258: Performed by: EMERGENCY MEDICINE

## 2023-10-09 PROCEDURE — 84484 ASSAY OF TROPONIN QUANT: CPT

## 2023-10-09 RX ORDER — 0.9 % SODIUM CHLORIDE 0.9 %
1000 INTRAVENOUS SOLUTION INTRAVENOUS ONCE
Status: COMPLETED | OUTPATIENT
Start: 2023-10-09 | End: 2023-10-09

## 2023-10-09 RX ADMIN — SODIUM CHLORIDE 1000 ML: 9 INJECTION, SOLUTION INTRAVENOUS at 15:17

## 2023-10-09 ASSESSMENT — LIFESTYLE VARIABLES
HOW OFTEN DO YOU HAVE A DRINK CONTAINING ALCOHOL: NEVER
HOW MANY STANDARD DRINKS CONTAINING ALCOHOL DO YOU HAVE ON A TYPICAL DAY: PATIENT DOES NOT DRINK

## 2023-10-09 ASSESSMENT — PAIN DESCRIPTION - LOCATION: LOCATION: SHOULDER

## 2023-10-09 ASSESSMENT — PAIN DESCRIPTION - DESCRIPTORS: DESCRIPTORS: ACHING

## 2023-10-09 ASSESSMENT — PAIN SCALES - GENERAL: PAINLEVEL_OUTOF10: 5

## 2023-10-09 ASSESSMENT — PAIN DESCRIPTION - ORIENTATION: ORIENTATION: LEFT

## 2023-10-09 NOTE — ED TRIAGE NOTES
Department of Emergency Medicine  FIRST PROVIDER TRIAGE NOTE             Independent MLP           10/9/23  2:16 PM EDT    Date of Encounter: 10/9/23   MRN: [id number]      HPI: Henderson Schilder is a 41 y.o. male who presents to the ED for Dizziness, Loss of Consciousness (States \"In August I was seen here for LOC on the 2nd floor, was asked to stay but left and now it is getting worse, I am having memory issues as well. \"), and Fall (L shoulder, States \"fell in the parking lot prior to coming in\")  Patient reports that he has not had improvement since August, but symptoms worsened yesterday and that is why he is here today. Patient reports 1 episode of dizziness yesterday with slight loss of consciousness. He reports that he was on his way in today and he fell injuring his shoulder. He states that he has associated symptoms of \"problems with my memory\". He has not followed up with a doctor since last evaluation. ROS: Negative for cp, sob, abd pain, back pain, fever, cough, vomiting, diarrhea, urinary complaints, or rash. PE: Gen Appearance/Constitutional: alert  CV: regular rate  Pulm: CTA bilat  GI: soft and NT     Initial Plan of Care: All treatment areas with department are currently occupied. Plan to order/Initiate the following while awaiting opening in ED: labs, EKG, and imaging studies.   Initiate Treatment-Testing, Proceed toTreatment Area When Bed Available for ED Attending/MLP to Continue Care    Electronically signed by GLENN Ling   DD: 10/9/23

## 2023-10-09 NOTE — ED PROVIDER NOTES
HPI:  10/9/23,   Time: 2:51 PM EDT         Prince Cisneros III is a 41 y.o. male presenting to the ED for evaluation of intermittent episodes of near syncope. Patient states that in August of this year his daughter was having a baby and he had a syncopal episode while in the hospital.  He states since that time has had a few episodes where he is having near syncope. Denies any injuries. Denies chest pain. He states he drinks a good amount of water daily. He eats once or twice daily. He had no fevers or chills. Denies headache or neck pain or back pain. Denies palpitations. ROS:   Pertinent positives and negatives are stated within HPI, all other systems reviewed and are negative.  --------------------------------------------- PAST HISTORY ---------------------------------------------  Past Medical History:  has a past medical history of A-fib ([address]), Abdominal fullness, Anxiety with depression, Arthritis, Atrial fibrillation ([address]), Hand pain, right, History of Helicobacter pylori infection, Hyperlipidemia, Restless leg syndrome, Scoliosis, and Screening for colon cancer. Past Surgical History:  has a past surgical history that includes Colonoscopy (2016); hernia repair (09/19/2017); pr secondary closure surg wound/dehsn extsv/complic (N/A, 4/48/5172); Upper gastrointestinal endoscopy (4/9/2018); Abdomen surgery (N/A, 03/15/2018); Upper gastrointestinal endoscopy (N/A, 5/21/2018); pr laparoscopy surg cholecystectomy (N/A, 5/29/2018); Cholecystectomy (2017); Colonoscopy (N/A, 2/16/2022); and Upper gastrointestinal endoscopy (N/A, 5/6/2022). Social History:  reports that he has been smoking cigarettes. He has a 18.00 pack-year smoking history. He has never used smokeless tobacco. He reports that he does not currently use alcohol. He reports that he does not currently use drugs after having used the following drugs: Marijuana Gerhardt Salle).     Family History: family history includes Crohn's Disease in his

## 2023-10-10 LAB
EKG ATRIAL RATE: 55 BPM
EKG P AXIS: 63 DEGREES
EKG P-R INTERVAL: 156 MS
EKG Q-T INTERVAL: 416 MS
EKG QRS DURATION: 102 MS
EKG QTC CALCULATION (BAZETT): 397 MS
EKG R AXIS: 63 DEGREES
EKG T AXIS: 66 DEGREES
EKG VENTRICULAR RATE: 55 BPM

## 2023-10-10 PROCEDURE — 93010 ELECTROCARDIOGRAM REPORT: CPT | Performed by: INTERNAL MEDICINE

## 2023-10-12 ENCOUNTER — HOSPITAL ENCOUNTER (EMERGENCY)
Age: 48
Discharge: LEFT BEFORE DISCHARGE | End: 2023-10-12
Attending: EMERGENCY MEDICINE
Payer: COMMERCIAL

## 2023-10-12 VITALS
BODY MASS INDEX: 23.49 KG/M2 | SYSTOLIC BLOOD PRESSURE: 106 MMHG | TEMPERATURE: 97.9 F | WEIGHT: 155 LBS | DIASTOLIC BLOOD PRESSURE: 84 MMHG | RESPIRATION RATE: 16 BRPM | HEART RATE: 65 BPM | OXYGEN SATURATION: 100 % | HEIGHT: 68 IN

## 2023-10-12 DIAGNOSIS — R55 NEAR SYNCOPE: Primary | ICD-10-CM

## 2023-10-12 LAB
EKG ATRIAL RATE: 56 BPM
EKG P AXIS: 72 DEGREES
EKG P-R INTERVAL: 156 MS
EKG Q-T INTERVAL: 414 MS
EKG QRS DURATION: 104 MS
EKG QTC CALCULATION (BAZETT): 399 MS
EKG R AXIS: 74 DEGREES
EKG T AXIS: 66 DEGREES
EKG VENTRICULAR RATE: 56 BPM

## 2023-10-12 PROCEDURE — 93010 ELECTROCARDIOGRAM REPORT: CPT | Performed by: INTERNAL MEDICINE

## 2023-10-12 PROCEDURE — 96375 TX/PRO/DX INJ NEW DRUG ADDON: CPT

## 2023-10-12 PROCEDURE — 2580000003 HC RX 258

## 2023-10-12 PROCEDURE — 99284 EMERGENCY DEPT VISIT MOD MDM: CPT

## 2023-10-12 PROCEDURE — 4500000002 HC ER NO CHARGE

## 2023-10-12 PROCEDURE — 93005 ELECTROCARDIOGRAM TRACING: CPT

## 2023-10-12 PROCEDURE — 96374 THER/PROPH/DIAG INJ IV PUSH: CPT

## 2023-10-12 PROCEDURE — 6370000000 HC RX 637 (ALT 250 FOR IP)

## 2023-10-12 PROCEDURE — 6360000002 HC RX W HCPCS

## 2023-10-12 PROCEDURE — 96361 HYDRATE IV INFUSION ADD-ON: CPT

## 2023-10-12 RX ORDER — DIPHENHYDRAMINE HYDROCHLORIDE 50 MG/ML
25 INJECTION INTRAMUSCULAR; INTRAVENOUS ONCE
Status: COMPLETED | OUTPATIENT
Start: 2023-10-12 | End: 2023-10-12

## 2023-10-12 RX ORDER — ACETAMINOPHEN 500 MG
1000 TABLET ORAL
Status: COMPLETED | OUTPATIENT
Start: 2023-10-12 | End: 2023-10-12

## 2023-10-12 RX ORDER — 0.9 % SODIUM CHLORIDE 0.9 %
1000 INTRAVENOUS SOLUTION INTRAVENOUS ONCE
Status: COMPLETED | OUTPATIENT
Start: 2023-10-12 | End: 2023-10-12

## 2023-10-12 RX ORDER — PROCHLORPERAZINE EDISYLATE 5 MG/ML
10 INJECTION INTRAMUSCULAR; INTRAVENOUS ONCE
Status: COMPLETED | OUTPATIENT
Start: 2023-10-12 | End: 2023-10-12

## 2023-10-12 RX ADMIN — DIPHENHYDRAMINE HYDROCHLORIDE 25 MG: 50 INJECTION INTRAMUSCULAR; INTRAVENOUS at 13:59

## 2023-10-12 RX ADMIN — SODIUM CHLORIDE 1000 ML: 9 INJECTION, SOLUTION INTRAVENOUS at 13:38

## 2023-10-12 RX ADMIN — PROCHLORPERAZINE EDISYLATE 10 MG: 5 INJECTION INTRAMUSCULAR; INTRAVENOUS at 13:59

## 2023-10-12 RX ADMIN — ACETAMINOPHEN 1000 MG: 500 TABLET ORAL at 13:37

## 2023-10-12 ASSESSMENT — PAIN SCALES - GENERAL
PAINLEVEL_OUTOF10: 7
PAINLEVEL_OUTOF10: 4

## 2023-10-12 ASSESSMENT — PAIN DESCRIPTION - LOCATION: LOCATION: CHEST

## 2023-10-12 ASSESSMENT — PAIN - FUNCTIONAL ASSESSMENT: PAIN_FUNCTIONAL_ASSESSMENT: 0-10

## 2023-10-12 NOTE — ED NOTES
Requesting to leave ama. States that he is fine. States that he wants his iv removed and states that he is going home. Teaching provided about the risks involved with leaving ama including the severity of death. Pt reports he is going home. Pt signed out ama. Dr notified. Upon going into room to speak with pt with  pt was gone.       Ruben Spencer RN  10/12/23 1938

## 2023-11-16 NOTE — PROGRESS NOTES
Cardiac Electrophysiology Outpatient Progress Note    Evi Dee III  1975  Date of Service: 11/17/2023  Referring Provider/PCP: Ines Chowdhury  Chief Complaint:   Chief Complaint   Patient presents with    Atrial Fibrillation     Overdue 3 mo visit, on flecainide        HISTORY  [employer] III presents to the office today for the management of paroxysmal atrial fibrillation. He was diagnosed with new onset atrial fibrillation 11/2020 that self converted. He had new onset chest burning at that time. He was started on oral cardizem 120 mg daily which caused side effects. He had a negative KAYLEN study at [facility] last year. ER visit for palpitation and chest pain 1/30/21, 5/3/21 and 5/7/21. ECGs were wnl by the time he got to ER. He was switched to metoprolol 25 mg daily 1/4/21, raised to 50 mg BID. Hospitalized 7/24/21 for AF rate 120's, he was given flecainide and metoprolol and he converted to NSR. . Nuclear stress test on 12/22/22 showed low risk result. Also he wore a 14 day MCOT 11/22/22 that showed a <1% AF burden. Solis Morrow presents today for follow up. He reports syncopal episode in August 2023 while he was in the hospital during birth of his grandson and since then he is having episodes of near syncope, these occur sporadically,  to 3 time a week, and to relieves symptoms he will rest. He did not have any of these symptoms when wearing the [facility] in December 2022. The patient presents today in [place]. The patient denies any dyspnea, orthopnea or paroxysmal nocturnal dyspnea.     Patient Active Problem List    Diagnosis Date Noted    Stroke-like symptoms 08/07/2023    Mild episode of recurrent major depressive disorder ([address]) 03/17/2022    Diarrhea 07/24/2021    Atrial fibrillation with RVR ([address]) 05/22/2021    Chest pain 12/05/2020    Leukocytosis     Chronic obstructive pulmonary disease ([address])     Schizophrenia (HCC)     Atypical chest pain 11/10/2020    A-fib ([address])

## 2023-11-17 ENCOUNTER — OFFICE VISIT (OUTPATIENT)
Dept: NON INVASIVE DIAGNOSTICS | Age: 48
End: 2023-11-17
Payer: COMMERCIAL

## 2023-11-17 VITALS
BODY MASS INDEX: 24.25 KG/M2 | DIASTOLIC BLOOD PRESSURE: 60 MMHG | SYSTOLIC BLOOD PRESSURE: 102 MMHG | WEIGHT: 160 LBS | HEIGHT: 68 IN

## 2023-11-17 DIAGNOSIS — I48.0 PAROXYSMAL ATRIAL FIBRILLATION (HCC): Primary | ICD-10-CM

## 2023-11-17 PROCEDURE — G8484 FLU IMMUNIZE NO ADMIN: HCPCS | Performed by: INTERNAL MEDICINE

## 2023-11-17 PROCEDURE — 93000 ELECTROCARDIOGRAM COMPLETE: CPT | Performed by: INTERNAL MEDICINE

## 2023-11-17 PROCEDURE — 4004F PT TOBACCO SCREEN RCVD TLK: CPT | Performed by: INTERNAL MEDICINE

## 2023-11-17 PROCEDURE — G8420 CALC BMI NORM PARAMETERS: HCPCS | Performed by: INTERNAL MEDICINE

## 2023-11-17 PROCEDURE — 99215 OFFICE O/P EST HI 40 MIN: CPT | Performed by: INTERNAL MEDICINE

## 2023-11-17 PROCEDURE — G8427 DOCREV CUR MEDS BY ELIG CLIN: HCPCS | Performed by: INTERNAL MEDICINE

## 2023-11-17 NOTE — PROGRESS NOTES
Patient was seen today and ZIO XT 14 day monitor was placed. Monitor was ordered by Dr Angelica Lowe. Monitor was applied and instructions given to patient. Patient stated understanding and gave verbalized readback.     Monitor company: [employer]IO XT 14 day  Serial number : [id number]    Electronically signed by Miriam Cortez MA on 11/17/2023 at 10:04 AM

## 2024-02-27 NOTE — PROGRESS NOTES
Make all postural changes from lying to sitting or sitting to standing slowly.      - Drink to 2.0 -2.5 L of fluids per day.      - Increase sodium in the diet to 3 - 5 g per day.     - Avoid large meals which can cause low blood pressure during digestion.     - Avoid alcohol and excessive caffeine intake.      - Perform lower extremity exercises to improve strength of the leg muscles.      - Use physical counter maneuvers such as leg crossing, or leg raising and resting the leg on a chair.      - Raise the head of the bed by 6 to 10 inches.     - Use custom fitted elastic support stockings.    3. Anxiety  - On Valium, Desyrel and Buspar.    4. Schizophrenia    5. GERD  - On Prilosec.    6. Chest pain  - Ongoing.  - Reported neck and arm pain.   - Will obtain a stress test to rule out CAD.      Plan:   Await MCOT results.  Patient to consider AF ablation if MCOT showed symptoms were correlated with AF.  If he wishes to pursue ablation, he will need a Cardiac CTA before the procedure. FORD on the day of the procedure. Hold Eliquis 2 doses before the procedure.  Obtain a stress test to rule out CAD due to complaints of chest pain - will call with results.   Obtain an echocardiogram for updated cardiac structure and function - will call with results.  Continue Flecainide 100 mg BID.   Continue Toprol XL 50 mg BID.  Continue Eliquis 5 mg BID.  Urged follow up with cardiology for chest pain. If chest pain worsens urged him to go to the ER for evaluation.  Follow up after the procedure or in 3 months. Encouraged the patient to call the office for any questions or concerns.    Thank you for allowing me to participate in your patient's care.    I have spent a total of 40 minutes with the patient and the family reviewing the above stated recommendations.  And a total of >50% of that time involved face-to-face time providing counseling and/or coordination of care with the other providers, preparation for the clinic visit,

## 2024-02-28 ENCOUNTER — TELEPHONE (OUTPATIENT)
Dept: CARDIOLOGY CLINIC | Age: 49
End: 2024-02-28

## 2024-02-28 ENCOUNTER — OFFICE VISIT (OUTPATIENT)
Dept: NON INVASIVE DIAGNOSTICS | Age: 49
End: 2024-02-28
Payer: COMMERCIAL

## 2024-02-28 VITALS
DIASTOLIC BLOOD PRESSURE: 60 MMHG | OXYGEN SATURATION: 98 % | HEART RATE: 58 BPM | BODY MASS INDEX: 22.85 KG/M2 | WEIGHT: 150.8 LBS | SYSTOLIC BLOOD PRESSURE: 102 MMHG | RESPIRATION RATE: 18 BRPM | HEIGHT: 68 IN

## 2024-02-28 DIAGNOSIS — I48.0 PAROXYSMAL ATRIAL FIBRILLATION (HCC): Primary | ICD-10-CM

## 2024-02-28 DIAGNOSIS — R07.9 CHEST PAIN, UNSPECIFIED TYPE: ICD-10-CM

## 2024-02-28 DIAGNOSIS — R94.31 EKG ABNORMALITIES: ICD-10-CM

## 2024-02-28 PROCEDURE — G8427 DOCREV CUR MEDS BY ELIG CLIN: HCPCS | Performed by: INTERNAL MEDICINE

## 2024-02-28 PROCEDURE — 93000 ELECTROCARDIOGRAM COMPLETE: CPT | Performed by: INTERNAL MEDICINE

## 2024-02-28 PROCEDURE — 99215 OFFICE O/P EST HI 40 MIN: CPT | Performed by: INTERNAL MEDICINE

## 2024-02-28 PROCEDURE — G8420 CALC BMI NORM PARAMETERS: HCPCS | Performed by: INTERNAL MEDICINE

## 2024-02-28 PROCEDURE — G8484 FLU IMMUNIZE NO ADMIN: HCPCS | Performed by: INTERNAL MEDICINE

## 2024-02-28 PROCEDURE — 4004F PT TOBACCO SCREEN RCVD TLK: CPT | Performed by: INTERNAL MEDICINE

## 2024-02-28 RX ORDER — REGADENOSON 0.08 MG/ML
0.4 INJECTION, SOLUTION INTRAVENOUS
OUTPATIENT
Start: 2024-02-28

## 2024-02-28 RX ORDER — PRAVASTATIN SODIUM 20 MG
20 TABLET ORAL DAILY
COMMUNITY
Start: 2024-02-13

## 2024-02-28 RX ORDER — BUPROPION HYDROCHLORIDE 300 MG/1
300 TABLET ORAL EVERY MORNING
COMMUNITY
Start: 2024-02-27

## 2024-02-28 NOTE — TELEPHONE ENCOUNTER
Patient was seen by EP this morning and he was instructed to contact our office due to chest pain, I called patient and he said he's been having episodes of chest pain and tightness, pain travels up and down his left arm, patient is over due for his annual visit so I scheduled an appt. next week and instructed him to go to the ER if symptoms persist or get worse, he said symptoms are not bad today but will go to the ER is they become worse.

## 2024-02-29 NOTE — TELEPHONE ENCOUNTER
He underwent a CTA coronary and stress test within the past 2.5 years (negative studies) and EP just ordered another stress test. Will assess next week.

## 2024-03-01 ENCOUNTER — TELEPHONE (OUTPATIENT)
Dept: NON INVASIVE DIAGNOSTICS | Age: 49
End: 2024-03-01

## 2024-03-01 ENCOUNTER — TELEPHONE (OUTPATIENT)
Dept: CARDIOLOGY | Age: 49
End: 2024-03-01

## 2024-03-01 DIAGNOSIS — I48.0 PAROXYSMAL ATRIAL FIBRILLATION (HCC): ICD-10-CM

## 2024-03-01 NOTE — TELEPHONE ENCOUNTER
Left message to schedule echo and Lexiscan stress test.  Electronically signed by Olivia Olsen on 3/1/2024 at 1:33 PM

## 2024-03-01 NOTE — TELEPHONE ENCOUNTER
Pharmacy called for auth on script refill. Auth provided via phone.     Electronically signed by Ngozi Robison MA on 3/1/2024 at 11:21 AM

## 2024-03-04 RX ORDER — METOPROLOL SUCCINATE 50 MG/1
50 TABLET, EXTENDED RELEASE ORAL 2 TIMES DAILY
Qty: 180 TABLET | Refills: 3 | OUTPATIENT
Start: 2024-03-04

## 2024-03-05 ENCOUNTER — OFFICE VISIT (OUTPATIENT)
Dept: CARDIOLOGY CLINIC | Age: 49
End: 2024-03-05

## 2024-03-05 VITALS
WEIGHT: 151 LBS | HEIGHT: 68 IN | BODY MASS INDEX: 22.88 KG/M2 | HEART RATE: 67 BPM | SYSTOLIC BLOOD PRESSURE: 100 MMHG | DIASTOLIC BLOOD PRESSURE: 58 MMHG | RESPIRATION RATE: 18 BRPM

## 2024-03-05 DIAGNOSIS — Z79.01 CHRONIC ANTICOAGULATION: ICD-10-CM

## 2024-03-05 DIAGNOSIS — I48.0 PAROXYSMAL ATRIAL FIBRILLATION (HCC): Primary | ICD-10-CM

## 2024-03-05 DIAGNOSIS — Z72.0 TOBACCO ABUSE: ICD-10-CM

## 2024-03-05 DIAGNOSIS — R07.89 ATYPICAL CHEST PAIN: ICD-10-CM

## 2024-03-12 DIAGNOSIS — I48.0 PAROXYSMAL ATRIAL FIBRILLATION (HCC): ICD-10-CM

## 2024-03-13 ENCOUNTER — TELEPHONE (OUTPATIENT)
Dept: NON INVASIVE DIAGNOSTICS | Age: 49
End: 2024-03-13

## 2024-03-13 NOTE — TELEPHONE ENCOUNTER
Pt notified of results and verbalized understanding.    Electronically signed by Ngozi Robison MA on 3/13/2024 at 8:43 AM

## 2024-03-13 NOTE — TELEPHONE ENCOUNTER
----- Message from Mariama Cantu MD sent at 3/12/2024  1:26 PM EDT -----  Predominant underlying rhythm was Sinus Rhythm. Isolated SVEs were rare (<1.0%, 37). No SVT, VT or PAF. No pause or AV block.Triggered events were correlated with sinus rhythm. No AF. Continue current treatment. Await for stress test result. Thanks.  ----- Message -----  From: Jennifer Robison MA  Sent: 3/12/2024   9:44 AM EDT  To: Mariama Cantu MD

## 2024-03-20 ENCOUNTER — TELEPHONE (OUTPATIENT)
Dept: CARDIOLOGY | Age: 49
End: 2024-03-20

## 2024-03-20 NOTE — TELEPHONE ENCOUNTER
Attempted to reach patient but no answer and voice mail not set up.Left message on voice mail of domestic partner to remind patient of Lexiscan stress test appointment on March 22, 2024 at 0800. Instructions for test,medication instructions, and COVID-19 preprocedure information left on voice mail. Asked that patient to call with any questions, any changes in medications, or if unable to keep appointment.

## 2024-03-22 ENCOUNTER — TELEPHONE (OUTPATIENT)
Dept: CARDIOLOGY | Age: 49
End: 2024-03-22

## 2024-03-22 NOTE — TELEPHONE ENCOUNTER
Called patient as he was a no call/no show for his 8:00 stress test.  No answer and unable to leave message due to voicemail not being set up.

## 2024-03-26 ENCOUNTER — TELEPHONE (OUTPATIENT)
Dept: SURGERY | Age: 49
End: 2024-03-26

## 2024-03-26 NOTE — TELEPHONE ENCOUNTER
Francis called office requesting to speak with Mariana La I returned call and pt does not have a mail box set up his Phone no. [phone]

## 2024-03-26 NOTE — TELEPHONE ENCOUNTER
Patient called back to speak to clinic. Staff unavailable due to patient care. Patient requesting to get in 3/28/24 if possible constipation x 2 weeks. Small bowel movement with mag citrate, XR done [place] that show constipation. Pain in middle of back.  Please advise patient [phone].

## 2024-03-28 ENCOUNTER — OFFICE VISIT (OUTPATIENT)
Dept: SURGERY | Age: 49
End: 2024-03-28

## 2024-03-28 VITALS
HEIGHT: 68 IN | WEIGHT: 146.6 LBS | DIASTOLIC BLOOD PRESSURE: 63 MMHG | TEMPERATURE: 97.2 F | HEART RATE: 61 BPM | BODY MASS INDEX: 22.22 KG/M2 | SYSTOLIC BLOOD PRESSURE: 93 MMHG | OXYGEN SATURATION: 98 %

## 2024-03-28 DIAGNOSIS — K59.00 CONSTIPATION, UNSPECIFIED CONSTIPATION TYPE: Primary | ICD-10-CM

## 2024-03-28 NOTE — PATIENT INSTRUCTIONS
High fiber diet - 20-25 grams of fiber a day  Miralax 1 cap daily  Can add Magnesium oxide 400 mg PO daily  Increase water intake

## 2024-04-08 ENCOUNTER — TELEPHONE (OUTPATIENT)
Dept: CARDIOLOGY | Age: 49
End: 2024-04-08

## 2024-04-08 NOTE — TELEPHONE ENCOUNTER
Spoke with patient and confirmed lexiscan  stress test appointment on 4/10/24 at 0700. Instructions for test given, medications reviewed, and COVID-19 preprocedure information reviewed with patient, questions answered. Patient verbalized understanding.

## 2024-04-10 ENCOUNTER — TELEPHONE (OUTPATIENT)
Dept: CARDIOLOGY | Age: 49
End: 2024-04-10

## 2024-04-10 NOTE — TELEPHONE ENCOUNTER
Called and LM regarding patient NO SHOW for 7a stress on 4/10. Reminded patient about upcoming Echo on 5/8.  Teodora 7:20a

## 2024-05-03 RX ORDER — FLECAINIDE ACETATE 100 MG/1
100 TABLET ORAL 2 TIMES DAILY
Qty: 180 TABLET | Refills: 1 | Status: SHIPPED | OUTPATIENT
Start: 2024-05-03

## 2024-05-07 ENCOUNTER — HOSPITAL ENCOUNTER (EMERGENCY)
Age: 49
Discharge: HOME | End: 2024-05-07
Attending: STUDENT IN AN ORGANIZED HEALTH CARE EDUCATION/TRAINING PROGRAM
Payer: COMMERCIAL

## 2024-05-07 VITALS
OXYGEN SATURATION: 100 % | BODY MASS INDEX: 21.98 KG/M2 | HEIGHT: 68 IN | RESPIRATION RATE: 16 BRPM | HEART RATE: 71 BPM | DIASTOLIC BLOOD PRESSURE: 70 MMHG | SYSTOLIC BLOOD PRESSURE: 103 MMHG | WEIGHT: 145 LBS | TEMPERATURE: 97.6 F

## 2024-05-07 DIAGNOSIS — S05.02XA ABRASION OF LEFT CORNEA, INITIAL ENCOUNTER: Primary | ICD-10-CM

## 2024-05-07 PROCEDURE — 99283 EMERGENCY DEPT VISIT LOW MDM: CPT

## 2024-05-07 PROCEDURE — 6370000000 HC RX 637 (ALT 250 FOR IP): Performed by: STUDENT IN AN ORGANIZED HEALTH CARE EDUCATION/TRAINING PROGRAM

## 2024-05-07 RX ORDER — CIPROFLOXACIN HYDROCHLORIDE 3.5 MG/ML
2 SOLUTION/ DROPS TOPICAL 4 TIMES DAILY
Qty: 1 EACH | Refills: 0 | Status: SHIPPED | OUTPATIENT
Start: 2024-05-07 | End: 2024-05-17

## 2024-05-07 RX ORDER — TETRACAINE HYDROCHLORIDE 5 MG/ML
2 SOLUTION OPHTHALMIC ONCE
Status: COMPLETED | OUTPATIENT
Start: 2024-05-07 | End: 2024-05-07

## 2024-05-07 RX ADMIN — TETRACAINE HYDROCHLORIDE 2 DROP: 5 SOLUTION OPHTHALMIC at 08:57

## 2024-05-07 RX ADMIN — FLUORESCEIN SODIUM 1 MG: 1 STRIP OPHTHALMIC at 08:45

## 2024-05-07 ASSESSMENT — PAIN - FUNCTIONAL ASSESSMENT: PAIN_FUNCTIONAL_ASSESSMENT: NONE - DENIES PAIN

## 2024-05-07 NOTE — DISCHARGE INSTR - COC
Continuity of Care Form    Patient Name: Francis Hernandez III   :  1975  MRN:  [id number]    Admit date:  2024  Discharge date:  ***    Code Status Order: Prior   Advance Directives:     Admitting Physician:  No admitting provider for patient encounter.  PCP: Tiffany Kenyon PA    Discharging Nurse: ***  Discharging Hospital Unit/Room#:   Discharging Unit Phone Number: ***    Emergency Contact:   Extended Emergency Contact Information  Primary Emergency Contact: CasisaRosalba  Address: [address] [place] [address]  Home Phone: [phone]  Mobile Phone: [phone]  Relation: Domestic Partner  Secondary Emergency Contact: Francis Hernandez Jr  Home Phone: [phone]  Mobile Phone: [phone]  Relation: Parent  Preferred language: English   needed? No    Past Surgical History:  Past Surgical History:   Procedure Laterality Date    ABDOMEN SURGERY N/A 03/15/2018    wound exploration umbilicus, excsion suture granuloma    CHOLECYSTECTOMY  2017    COLONOSCOPY  2016    COLONOSCOPY N/A 2022    COLONOSCOPY WITH BIOPSY performed by Jacki You MD at [facility] ENDOSCOPY    HERNIA REPAIR  2017    [place] LAPAROSCOPY SURG CHOLECYSTECTOMY N/A 2018    LAPAROSCOPIC  CHOLECYSTECTOMY performed by Zak Robin MD at [facility] OR    MN SECONDARY CLOSURE SURG WOUND/DEHSN EXTSV/COMPLIC N/A 3/15/2018    ABDOMINAL WOUND EXPLORATION , REMOVAL OF SUTURE GRANULOMA performed by Zak Robin MD at [facility] OR    UPPER GASTROINTESTINAL ENDOSCOPY  2018    EGD BIOPSY performed by Zak Robin MD at [facility] ENDOSCOPY    UPPER GASTROINTESTINAL ENDOSCOPY N/A 2018    EGD BIOPSY performed by Zak Robin MD at [facility] ENDOSCOPY    UPPER GASTROINTESTINAL ENDOSCOPY N/A 2022    EGD BIOPSY performed by Jacki You MD at [facility] ENDOSCOPY       Immunization History:   Immunization History   Administered Date(s) Administered    TDaP, ADACEL (age 10y-64y),

## 2024-05-07 NOTE — ED NOTES
Without corrective lenses with tetracaine drops just prior to exam. Patient does use glasses that were not present on exam  L 20/40  B 20/30  R 20/30

## 2024-05-07 NOTE — DISCHARGE INSTRUCTIONS
Return to ED if any worsening symptoms or alarming changes occur.  Follow-up with ophthalmology outpatient.

## 2024-05-08 ENCOUNTER — HOSPITAL ENCOUNTER (OUTPATIENT)
Dept: CARDIOLOGY | Age: 49
Discharge: HOME | End: 2024-05-10
Attending: INTERNAL MEDICINE
Payer: COMMERCIAL

## 2024-05-08 VITALS
BODY MASS INDEX: 21.98 KG/M2 | HEIGHT: 68 IN | WEIGHT: 145 LBS | DIASTOLIC BLOOD PRESSURE: 70 MMHG | SYSTOLIC BLOOD PRESSURE: 100 MMHG

## 2024-05-08 LAB
ECHO AO ASC DIAM: 2.1 CM
ECHO AO ASCENDING AORTA INDEX: 1.18 CM/M2
ECHO AV AREA PEAK VELOCITY: 2.2 CM2
ECHO AV AREA VTI: 2.3 CM2
ECHO AV AREA/BSA PEAK VELOCITY: 1.2 CM2/M2
ECHO AV AREA/BSA VTI: 1.3 CM2/M2
ECHO AV CUSP MM: 1.9 CM
ECHO AV MEAN GRADIENT: 4 MMHG
ECHO AV MEAN VELOCITY: 0.9 M/S
ECHO AV PEAK GRADIENT: 7 MMHG
ECHO AV PEAK VELOCITY: 1.3 M/S
ECHO AV VELOCITY RATIO: 0.85
ECHO AV VTI: 26.6 CM
ECHO BSA: 1.78 M2
ECHO LA DIAMETER INDEX: 1.69 CM/M2
ECHO LA DIAMETER: 3 CM
ECHO LA VOL A-L A2C: 42 ML (ref 18–58)
ECHO LA VOL A-L A4C: 32 ML (ref 18–58)
ECHO LA VOL MOD A2C: 41 ML (ref 18–58)
ECHO LA VOL MOD A4C: 28 ML (ref 18–58)
ECHO LA VOLUME AREA LENGTH: 38 ML
ECHO LA VOLUME INDEX A-L A2C: 24 ML/M2 (ref 16–34)
ECHO LA VOLUME INDEX A-L A4C: 18 ML/M2 (ref 16–34)
ECHO LA VOLUME INDEX AREA LENGTH: 21 ML/M2 (ref 16–34)
ECHO LA VOLUME INDEX MOD A2C: 23 ML/M2 (ref 16–34)
ECHO LA VOLUME INDEX MOD A4C: 16 ML/M2 (ref 16–34)
ECHO LV EF PHYSICIAN: 60 %
ECHO LV FRACTIONAL SHORTENING: 33 % (ref 28–44)
ECHO LV INTERNAL DIMENSION DIASTOLE INDEX: 2.53 CM/M2
ECHO LV INTERNAL DIMENSION DIASTOLIC: 4.5 CM (ref 4.2–5.9)
ECHO LV INTERNAL DIMENSION SYSTOLIC INDEX: 1.69 CM/M2
ECHO LV INTERNAL DIMENSION SYSTOLIC: 3 CM
ECHO LV ISOVOLUMETRIC RELAXATION TIME (IVRT): 83 MS
ECHO LV IVSD: 0.7 CM (ref 0.6–1)
ECHO LV IVSS: 1.1 CM
ECHO LV MASS 2D: 95.7 G (ref 88–224)
ECHO LV MASS INDEX 2D: 53.7 G/M2 (ref 49–115)
ECHO LV POSTERIOR WALL DIASTOLIC: 0.7 CM (ref 0.6–1)
ECHO LV POSTERIOR WALL SYSTOLIC: 1.1 CM
ECHO LV RELATIVE WALL THICKNESS RATIO: 0.31
ECHO LVOT AREA: 2.8 CM2
ECHO LVOT AV VTI INDEX: 0.82
ECHO LVOT DIAM: 1.9 CM
ECHO LVOT MEAN GRADIENT: 2 MMHG
ECHO LVOT PEAK GRADIENT: 5 MMHG
ECHO LVOT PEAK VELOCITY: 1.1 M/S
ECHO LVOT STROKE VOLUME INDEX: 34.9 ML/M2
ECHO LVOT SV: 62.1 ML
ECHO LVOT VTI: 21.9 CM
ECHO MV "A" WAVE DURATION: 66.9 MSEC
ECHO MV A VELOCITY: 0.71 M/S
ECHO MV AREA PHT: 3.3 CM2
ECHO MV AREA VTI: 2.3 CM2
ECHO MV E DECELERATION TIME (DT): 203.1 MS
ECHO MV E VELOCITY: 0.78 M/S
ECHO MV E/A RATIO: 1.1
ECHO MV LVOT VTI INDEX: 1.26
ECHO MV MAX VELOCITY: 0.9 M/S
ECHO MV MEAN GRADIENT: 1 MMHG
ECHO MV MEAN VELOCITY: 0.4 M/S
ECHO MV PEAK GRADIENT: 3 MMHG
ECHO MV PRESSURE HALF TIME (PHT): 66.7 MS
ECHO MV VTI: 27.5 CM
ECHO PV MAX VELOCITY: 0.8 M/S
ECHO PV MEAN GRADIENT: 1 MMHG
ECHO PV MEAN VELOCITY: 0.5 M/S
ECHO PV PEAK GRADIENT: 3 MMHG
ECHO PV VTI: 18.8 CM
ECHO PVEIN A DURATION: 79.6 MS
ECHO PVEIN A VELOCITY: 0.2 M/S
ECHO PVEIN PEAK D VELOCITY: 0.6 M/S
ECHO PVEIN PEAK S VELOCITY: 0.6 M/S
ECHO PVEIN S/D RATIO: 1
ECHO RV TAPSE: 1.8 CM (ref 1.7–?)

## 2024-05-08 PROCEDURE — 93306 TTE W/DOPPLER COMPLETE: CPT

## 2024-05-08 PROCEDURE — 93306 TTE W/DOPPLER COMPLETE: CPT | Performed by: INTERNAL MEDICINE

## 2024-05-08 ASSESSMENT — ENCOUNTER SYMPTOMS
WHEEZING: 0
PHOTOPHOBIA: 1
ABDOMINAL PAIN: 0
NAUSEA: 0
VOICE CHANGE: 0
VOMITING: 0
SHORTNESS OF BREATH: 0
DIARRHEA: 0
TROUBLE SWALLOWING: 0
EYE PAIN: 1

## 2024-05-08 NOTE — ED PROVIDER NOTES
eye.  Intraocular pressures 14 bilaterally.  plan for supportive care with erythromycin ointment.         [BB]      ED Course User Index  [BB] Lee Escudero DO        ED Course as of 05/08/24 0053   Tue May 07, 2024   0801 ATTENDING PROVIDER ATTESTATION:     I have personally performed and/or participated in the history, exam, medical decision making, and procedures and agree with all pertinent clinical information unless otherwise noted.      I have also reviewed and agree with the past medical, family and social history unless otherwise noted.  I personally reviewed any ECG performed and agree with the resident unless stated below.      I have discussed this patient in detail with the resident, and provided the instruction and education regarding the patient.  My findings/plan: Patient seen and evaluated, briefly is a 48-year-old male presenting for evaluation of eye pain.  Patient notes that upon awakening this morning he had sharp pain in his left eye, states it was stabbing-like in nature.  Notes was hard to open, then start develop pain in the right eye as well.  On examination he is lying bed no acute distress.  Heart regular rate and rhythm.  Eyes were irrigated, stained with fluorescein, he does have a corneal abrasion to the left eye around 6:00, mid eye.  No evidence of corneal abrasion to the right eye.  Intraocular pressures 14 bilaterally.  plan for supportive care with erythromycin ointment.         [BB]      ED Course User Index  [BB] Lee Escudero DO       --------------------------------------------- PAST HISTORY ---------------------------------------------  Past Medical History:  has a past medical history of A-fib (HCC), Abdominal fullness, Anxiety with depression, Arthritis, Atrial fibrillation (HCC), Hand pain, right, History of Helicobacter pylori infection, Hyperlipidemia, Restless leg syndrome, Scoliosis, and Screening for colon cancer.    Past Surgical History:  has a past surgical

## 2024-05-09 ENCOUNTER — TELEPHONE (OUTPATIENT)
Dept: NON INVASIVE DIAGNOSTICS | Age: 49
End: 2024-05-09

## 2024-05-09 ENCOUNTER — OFFICE VISIT (OUTPATIENT)
Dept: SURGERY | Age: 49
End: 2024-05-09
Payer: COMMERCIAL

## 2024-05-09 VITALS
HEART RATE: 61 BPM | SYSTOLIC BLOOD PRESSURE: 101 MMHG | DIASTOLIC BLOOD PRESSURE: 62 MMHG | HEIGHT: 68 IN | BODY MASS INDEX: 22.05 KG/M2 | TEMPERATURE: 98.2 F | RESPIRATION RATE: 18 BRPM

## 2024-05-09 DIAGNOSIS — K58.1 IRRITABLE BOWEL SYNDROME WITH CONSTIPATION: Primary | ICD-10-CM

## 2024-05-09 PROCEDURE — G8420 CALC BMI NORM PARAMETERS: HCPCS | Performed by: SURGERY

## 2024-05-09 PROCEDURE — 4004F PT TOBACCO SCREEN RCVD TLK: CPT | Performed by: SURGERY

## 2024-05-09 PROCEDURE — 99213 OFFICE O/P EST LOW 20 MIN: CPT | Performed by: SURGERY

## 2024-05-09 PROCEDURE — G8427 DOCREV CUR MEDS BY ELIG CLIN: HCPCS | Performed by: SURGERY

## 2024-05-09 NOTE — PROGRESS NOTES
Progress Note - Follow up    Patient's Name/Date of Birth: Francis Hernandez III / 1975    Date: 5/9/2024    PCP: Tiffany Kenyon PA    Referring Physician:   Tiffany Kenyon PA  [phone]    Chief Complaint   Patient presents with    Constipation     Follow up for constipation        HPI:    The patient said he has been having a lot of constipation. He has a lot of pain in his rectum, bad smelling gas. He said he has explosions as well. He stopped the Miralax because it wasn't working. He was taking magnesium citrate and that helped. He said he isn't going as well as he should. He is being worked up by oncology for a high WBC, weight loss.    Patient's medications, allergies, past medical, surgical, social and family histories were reviewed and updated as appropriate.    Review of Systems  Constitutional: negative  Respiratory: negative  Cardiovascular: negative  Gastrointestinal: as in hpi  Genitourinary:negative  Integument/breast: negative    Physical Exam:  /62   Pulse 61   Temp 98.2 °F (36.8 °C) (Infrared)   Resp 18   Ht 1.727 m (5' 8\")   BMI 22.05 kg/m²   General appearance: alert, cooperative and in no acute distress.  Lungs: normal work of breathing  Heart: regular rate  Abdomen: soft, mildly tender at site of umbilical hernia repair, nondistended  Musculoskeletal: symmetrical without edema.  Skin: normal     Impression/Plan:  48 y.o. year old male with IBS with constipation    All available labs and pathology reviewed.  All imaging independently reviewed and interpreted.   All provider notes reviewed.  The above was discussed with the patient at length.    High fiber diet - 20-25 grams of fiber a day  Magnesium oxide 400 mg PO daily  Increase water intake  Provided laxative handout with recommendations    Follow up in 4 weeks    Electronically by Jacki You MD, General Surgery  on 5/9/2024 at 10:13 AM      Send copy of H&P to PCP, Tiffany Kenyon PA and referring physician,

## 2024-05-09 NOTE — PATIENT INSTRUCTIONS
High fiber diet - 20-25 grams of fiber a day  Magnesium oxide 400 mg PO daily  Increase water intake

## 2024-05-09 NOTE — TELEPHONE ENCOUNTER
Pt notified of results and verbalized understanding.    Electronically signed by Ngozi Robison MA on 5/9/2024 at 8:29 AM

## 2024-05-09 NOTE — TELEPHONE ENCOUNTER
----- Message from Mariama Cantu MD sent at 5/8/2024  3:35 PM EDT -----  Echo showed normal LV EF. Thanks.  ----- Message -----  From: Gabe Chin MD  Sent: 5/8/2024   3:29 PM EDT  To: Mariama Cantu MD

## 2024-05-16 ENCOUNTER — HOSPITAL ENCOUNTER (EMERGENCY)
Age: 49
Discharge: HOME | End: 2024-05-16
Attending: STUDENT IN AN ORGANIZED HEALTH CARE EDUCATION/TRAINING PROGRAM
Payer: COMMERCIAL

## 2024-05-16 VITALS
OXYGEN SATURATION: 99 % | WEIGHT: 145 LBS | DIASTOLIC BLOOD PRESSURE: 73 MMHG | HEART RATE: 83 BPM | SYSTOLIC BLOOD PRESSURE: 106 MMHG | TEMPERATURE: 98.4 F | HEIGHT: 68 IN | BODY MASS INDEX: 21.98 KG/M2 | RESPIRATION RATE: 15 BRPM

## 2024-05-16 DIAGNOSIS — K02.9 DENTAL CARIES: ICD-10-CM

## 2024-05-16 DIAGNOSIS — K04.7 TOOTH INFECTION: Primary | ICD-10-CM

## 2024-05-16 PROCEDURE — 6370000000 HC RX 637 (ALT 250 FOR IP): Performed by: STUDENT IN AN ORGANIZED HEALTH CARE EDUCATION/TRAINING PROGRAM

## 2024-05-16 PROCEDURE — 99283 EMERGENCY DEPT VISIT LOW MDM: CPT

## 2024-05-16 RX ORDER — AMOXICILLIN AND CLAVULANATE POTASSIUM 875; 125 MG/1; MG/1
1 TABLET, FILM COATED ORAL ONCE
Status: COMPLETED | OUTPATIENT
Start: 2024-05-16 | End: 2024-05-16

## 2024-05-16 RX ORDER — OXYCODONE HYDROCHLORIDE 5 MG/1
5 TABLET ORAL EVERY 6 HOURS PRN
Qty: 12 TABLET | Refills: 0 | Status: SHIPPED | OUTPATIENT
Start: 2024-05-16 | End: 2024-05-19

## 2024-05-16 RX ORDER — AMOXICILLIN AND CLAVULANATE POTASSIUM 875; 125 MG/1; MG/1
1 TABLET, FILM COATED ORAL 2 TIMES DAILY
Qty: 20 TABLET | Refills: 0 | Status: SHIPPED | OUTPATIENT
Start: 2024-05-16 | End: 2024-05-26

## 2024-05-16 RX ORDER — OXYCODONE HYDROCHLORIDE AND ACETAMINOPHEN 5; 325 MG/1; MG/1
1 TABLET ORAL ONCE
Status: COMPLETED | OUTPATIENT
Start: 2024-05-16 | End: 2024-05-16

## 2024-05-16 RX ADMIN — OXYCODONE HYDROCHLORIDE AND ACETAMINOPHEN 1 TABLET: 5; 325 TABLET ORAL at 07:47

## 2024-05-16 RX ADMIN — AMOXICILLIN AND CLAVULANATE POTASSIUM 1 TABLET: 875; 125 TABLET, FILM COATED ORAL at 07:47

## 2024-05-16 ASSESSMENT — PAIN DESCRIPTION - DESCRIPTORS: DESCRIPTORS: ACHING

## 2024-05-16 ASSESSMENT — PAIN DESCRIPTION - LOCATION
LOCATION: JAW
LOCATION: TEETH

## 2024-05-16 ASSESSMENT — PAIN DESCRIPTION - ORIENTATION: ORIENTATION: RIGHT

## 2024-05-16 ASSESSMENT — PAIN - FUNCTIONAL ASSESSMENT: PAIN_FUNCTIONAL_ASSESSMENT: 0-10

## 2024-05-16 ASSESSMENT — PAIN SCALES - GENERAL
PAINLEVEL_OUTOF10: 7
PAINLEVEL_OUTOF10: 5

## 2024-05-16 NOTE — ED PROVIDER NOTES
[facility] EMERGENCY DEPARTMENT  EMERGENCY DEPARTMENT ENCOUNTER    Pt Name: Francis Hernandez III  MRN: [id number]  Birthdate 1975  Date of evaluation: 5/16/2024  Provider: Ranjit Gamez MD  PCP: Tiffany Kenyon PA  Note Started: 7:41 AM EDT 5/16/24    HPI     Patient is a 48 y.o. male presents with a chief complaint of   Chief Complaint   Patient presents with    Dental Problem     Told by dentist \"due to AFIB\" come to ED   .    Patient presents for dental infection.  Patient reportedly has been having dental swelling over the past 2 days.  Started draining pus today.  Patient called his dentist and was told to come here.  Patient has chronic A-fib and states this is unchanged.  Denies any chest pain or shortness of breath.  Patient states that he is having no issues with his A-fib and because he called the dentist and told that he has a history of A-fib he was told to go to the emergency department.  Patient denies any chest pain, shortness of breath, fevers, chills, nausea, vomiting, abdominal pain, changes in urinary or bowel habits.  No changes in medication.  Patient states that he has difficulty getting into the dentist    Nursing Notes were all reviewed and agreed with or any disagreements were addressed in the HPI.    History From: Patient and patient's wife is at the    Review of Systems   Pertinent positives and negatives as per HPI.     Physical Exam  Vitals and nursing note reviewed.   Constitutional:       Appearance: He is well-developed.   HENT:      Head: Normocephalic and atraumatic.      Mouth/Throat:      Comments: dental caries with dental swelling right lower jaw.  No neck swelling.  Eyes:      Conjunctiva/sclera: Conjunctivae normal.   Cardiovascular:      Rate and Rhythm: Normal rate and regular rhythm.      Heart sounds: Normal heart sounds. No murmur heard.  Pulmonary:      Effort: Pulmonary effort is normal. No respiratory distress.      Breath

## 2024-05-20 ENCOUNTER — HOSPITAL ENCOUNTER (EMERGENCY)
Age: 49
Discharge: LEFT BEFORE DISCHARGE | End: 2024-05-20
Payer: COMMERCIAL

## 2024-05-20 ENCOUNTER — APPOINTMENT (OUTPATIENT)
Dept: GENERAL RADIOLOGY | Age: 49
End: 2024-05-20
Payer: COMMERCIAL

## 2024-05-20 VITALS
OXYGEN SATURATION: 98 % | SYSTOLIC BLOOD PRESSURE: 106 MMHG | BODY MASS INDEX: 21.98 KG/M2 | HEART RATE: 68 BPM | WEIGHT: 145 LBS | TEMPERATURE: 98.3 F | HEIGHT: 68 IN | DIASTOLIC BLOOD PRESSURE: 76 MMHG | RESPIRATION RATE: 16 BRPM

## 2024-05-20 DIAGNOSIS — S63.501A SPRAIN OF RIGHT WRIST, INITIAL ENCOUNTER: ICD-10-CM

## 2024-05-20 DIAGNOSIS — S60.221A CONTUSION OF RIGHT HAND, INITIAL ENCOUNTER: Primary | ICD-10-CM

## 2024-05-20 PROCEDURE — 73090 X-RAY EXAM OF FOREARM: CPT

## 2024-05-20 PROCEDURE — 73130 X-RAY EXAM OF HAND: CPT

## 2024-05-20 PROCEDURE — 99283 EMERGENCY DEPT VISIT LOW MDM: CPT

## 2024-05-20 PROCEDURE — 73110 X-RAY EXAM OF WRIST: CPT

## 2024-05-20 ASSESSMENT — PAIN DESCRIPTION - DESCRIPTORS: DESCRIPTORS: DISCOMFORT

## 2024-05-20 ASSESSMENT — PAIN DESCRIPTION - ORIENTATION: ORIENTATION: RIGHT

## 2024-05-20 ASSESSMENT — PAIN DESCRIPTION - ONSET: ONSET: ON-GOING

## 2024-05-20 ASSESSMENT — PAIN - FUNCTIONAL ASSESSMENT: PAIN_FUNCTIONAL_ASSESSMENT: 0-10

## 2024-05-20 ASSESSMENT — PAIN SCALES - GENERAL: PAINLEVEL_OUTOF10: 6

## 2024-05-20 ASSESSMENT — PAIN DESCRIPTION - PAIN TYPE: TYPE: ACUTE PAIN

## 2024-05-20 ASSESSMENT — PAIN DESCRIPTION - FREQUENCY: FREQUENCY: CONTINUOUS

## 2024-05-20 NOTE — ED PROVIDER NOTES
HPI:  5/20/24,   Time: 3:08 PM EDT         Francis Hernandez III is a 48 y.o. male presenting to the ED for right hand injury, beginning prior to arrival.  The complaint has been persistent, moderate in severity, and worsened by movement of right hand.  Patient states that he was using a drill today to go through concrete when he hit rebar.  Hitting the rebar caused his hand smashed against concrete.  He is right-hand dominant.  He has not taken anything for the pain.  Pain is 6 out of 10.  He is having pain diffusely to the right hand, wrist and mid forearm.    ROS:   Pertinent positives and negatives are stated within HPI, all other systems reviewed and are negative.  --------------------------------------------- PAST HISTORY ---------------------------------------------  Past Medical History:  has a past medical history of A-fib (HCC), Abdominal fullness, Anxiety with depression, Arthritis, Atrial fibrillation (HCC), Hand pain, right, History of Helicobacter pylori infection, Hyperlipidemia, Restless leg syndrome, Scoliosis, and Screening for colon cancer.    Past Surgical History:  has a past surgical history that includes Colonoscopy (2016); hernia repair (09/19/2017); pr secondary closure surg wound/dehsn extsv/complic (N/A, 3/15/2018); Upper gastrointestinal endoscopy (4/9/2018); Abdomen surgery (N/A, 03/15/2018); Upper gastrointestinal endoscopy (N/A, 5/21/2018); pr laparoscopy surg cholecystectomy (N/A, 5/29/2018); Cholecystectomy (2017); Colonoscopy (N/A, 2/16/2022); and Upper gastrointestinal endoscopy (N/A, 5/6/2022).    Social History:  reports that he has been smoking cigarettes. He has a 18.0 pack-year smoking history. He has never used smokeless tobacco. He reports that he does not currently use alcohol. He reports that he does not currently use drugs after having used the following drugs: Marijuana (Weed).    Family History: family history includes Crohn's Disease in his sister; Heart Attack in his

## 2024-06-01 ENCOUNTER — HOSPITAL ENCOUNTER (EMERGENCY)
Age: 49
Discharge: HOME | End: 2024-06-01
Attending: EMERGENCY MEDICINE
Payer: COMMERCIAL

## 2024-06-01 VITALS
BODY MASS INDEX: 21.98 KG/M2 | SYSTOLIC BLOOD PRESSURE: 104 MMHG | OXYGEN SATURATION: 98 % | TEMPERATURE: 97.5 F | WEIGHT: 145 LBS | HEIGHT: 68 IN | RESPIRATION RATE: 14 BRPM | HEART RATE: 65 BPM | DIASTOLIC BLOOD PRESSURE: 79 MMHG

## 2024-06-01 DIAGNOSIS — Z23 NEED FOR TETANUS BOOSTER: ICD-10-CM

## 2024-06-01 DIAGNOSIS — S05.02XA ABRASION OF LEFT CORNEA, INITIAL ENCOUNTER: Primary | ICD-10-CM

## 2024-06-01 PROCEDURE — 6360000002 HC RX W HCPCS: Performed by: NURSE PRACTITIONER

## 2024-06-01 PROCEDURE — 90714 TD VACC NO PRESV 7 YRS+ IM: CPT | Performed by: NURSE PRACTITIONER

## 2024-06-01 PROCEDURE — 99284 EMERGENCY DEPT VISIT MOD MDM: CPT

## 2024-06-01 PROCEDURE — 6370000000 HC RX 637 (ALT 250 FOR IP): Performed by: NURSE PRACTITIONER

## 2024-06-01 PROCEDURE — 90471 IMMUNIZATION ADMIN: CPT | Performed by: NURSE PRACTITIONER

## 2024-06-01 RX ORDER — ACETAMINOPHEN 500 MG
1000 TABLET ORAL ONCE
Status: DISCONTINUED | OUTPATIENT
Start: 2024-06-01 | End: 2024-06-01 | Stop reason: HOSPADM

## 2024-06-01 RX ORDER — TETRACAINE HYDROCHLORIDE 5 MG/ML
1 SOLUTION OPHTHALMIC ONCE
Status: COMPLETED | OUTPATIENT
Start: 2024-06-01 | End: 2024-06-01

## 2024-06-01 RX ORDER — CIPROFLOXACIN HYDROCHLORIDE 3.5 MG/ML
2 SOLUTION/ DROPS TOPICAL EVERY 6 HOURS
Qty: 1 EACH | Refills: 0 | Status: SHIPPED | OUTPATIENT
Start: 2024-06-01 | End: 2024-06-06

## 2024-06-01 RX ADMIN — FLUORESCEIN SODIUM 1 MG: 1 STRIP OPHTHALMIC at 10:23

## 2024-06-01 RX ADMIN — CLOSTRIDIUM TETANI TOXOID ANTIGEN (FORMALDEHYDE INACTIVATED) AND CORYNEBACTERIUM DIPHTHERIAE TOXOID ANTIGEN (FORMALDEHYDE INACTIVATED) 0.5 ML: 5; 2 INJECTION, SUSPENSION INTRAMUSCULAR at 10:24

## 2024-06-01 RX ADMIN — TETRACAINE HYDROCHLORIDE 1 DROP: 5 SOLUTION OPHTHALMIC at 10:23

## 2024-06-01 RX ADMIN — FLUORESCEIN SODIUM 1 MG: 1 STRIP OPHTHALMIC at 10:24

## 2024-06-01 NOTE — ED NOTES
Patient states he is unable to do the visual acuity due to light sensitivity, unable to keep eyes open and does not have his prescription glasses with him.

## 2024-06-01 NOTE — ED PROVIDER NOTES
Shared HELDER-ED Attending Visit.  CC: No           [facility]  Department of Emergency Medicine   ED  Encounter Note  Admit Date/RoomTime: 2024  9:47 AM  ED Room:     NAME: Francis Hernandez III  : 1975  MRN: [id number]     Chief Complaint:  Eye Pain (Bilateral. Pt thinks he may have gotten something in eyes while doing yard work yesterday)    History of Present Illness        Francis Hernandez III is a 48 y.o. old male presenting to the emergency department by private vehicle, for traumatic sudden onset itching and pain to both eyes left worse than right , which began 1 day(s) prior to arrival.  There has been was doing yard work and thinks something flew into his eyes.  Since onset his symptoms have been remaining constant and mild in severity. Associated signs & symptoms of: nothing additional.  Patient has a previous corneal abrasion injury about a month ago when he was placed on Cipro drops but never followed up his last tetanus shot is ago.  Updated today.  Is not a contact lens user.  Wears glasses.    History of: None of the following    []   Glaucoma     []   Recent Eye Surgery     ROS   Pertinent positives and negatives are stated within HPI, all other systems reviewed and are negative.    Past Medical History:  has a past medical history of A-fib (HCC), Abdominal fullness, Anxiety with depression, Arthritis, Atrial fibrillation (HCC), Hand pain, right, History of Helicobacter pylori infection, Hyperlipidemia, Restless leg syndrome, Scoliosis, and Screening for colon cancer.    Surgical History:  has a past surgical history that includes Colonoscopy (); hernia repair (2017); pr secondary closure surg wound/dehsn extsv/complic (N/A, 3/15/2018); Upper gastrointestinal endoscopy (2018); Abdomen surgery (N/A, 03/15/2018); Upper gastrointestinal endoscopy (N/A, 2018); pr laparoscopy surg cholecystectomy (N/A, 2018); Cholecystectomy (); Colonoscopy  History:   Diagnosis Date    A-fib (HCC) 2021    follows with Dr. Chin    Abdominal fullness 2022    Anxiety with depression     Arthritis     Atrial fibrillation (HCC)     Hand pain, right     History of Helicobacter pylori infection     Hyperlipidemia     Restless leg syndrome     Scoliosis     Screening for colon cancer 2022   .    Physical exam right eye was unremarkable.  Normal intraocular pressures.  Corneal abrasion noted to the left..  Vital signs stable.  Differential diagnoses include but not limited to corneal abrasion versus corneal ulcer. Diagnostic studies including labs and imagining which was interpreted by radiologist reveals none. Consults included ophthalmologist that states that he likely really abraised it. Results were discussed with patient.  Tetanus shot was updated today.  Patient will start on ciprofloxacin ophthalmic drops and use ointment at night.  Patient will be discharged home with the following prescriptions, ciprofloxacin ophthalmic drops.  Discussed appropriate use and potential side effects of starting the prescribed medications. Patient continues to be non-toxic on re-evaluation. Findings were discussed with the patient and reasons to immediately return to the ED were articulated to them. They will follow-up with their PMD and ophthalmologist.      Discharge Instructions:   Patient referred to  Mercy Robison MD  [address] [place] [address] [address] [place] [address] [phone]    Call   to schedule an appt for follow up in 1-2 days    [facility]  [address] [facility] [address] [phone]  Go to   If symptoms worsen      MEDICATIONS:   DISCHARGE MEDICATIONS:  Discharge Medication List as of 6/1/2024 11:24 AM        START taking these medications    Details   ciprofloxacin (CILOXAN) 0.3 % ophthalmic solution Place 2 drops into the left eye every 6 hours for 5 days, Disp-1 each, R-0Normal             DISCONTINUED

## 2024-07-01 DIAGNOSIS — I48.0 PAROXYSMAL ATRIAL FIBRILLATION (HCC): ICD-10-CM

## 2024-07-01 RX ORDER — FLECAINIDE ACETATE 100 MG/1
100 TABLET ORAL 2 TIMES DAILY
Qty: 180 TABLET | Refills: 1 | Status: SHIPPED | OUTPATIENT
Start: 2024-07-01

## 2024-07-01 RX ORDER — METOPROLOL SUCCINATE 50 MG/1
50 TABLET, EXTENDED RELEASE ORAL 2 TIMES DAILY
Qty: 180 TABLET | Refills: 3 | Status: SHIPPED | OUTPATIENT
Start: 2024-07-01

## 2024-08-14 NOTE — TELEPHONE ENCOUNTER
I spoke to patient and he requested to see Dr. Anneliese Sin in office to discuss AF ablation or AF management. Pt calling and stating she had an injection done yesterday. Pt would like to know if itching is a side effect. Patient states is very itchy on the upper extremity of her body. Pt has no other side effects. Pt states it started yesterday and would like a call back to further discuss. Pt can be reached at [phone] (Mobile).

## 2024-10-09 ENCOUNTER — APPOINTMENT (OUTPATIENT)
Dept: CT IMAGING | Age: 49
End: 2024-10-09
Payer: COMMERCIAL

## 2024-10-09 ENCOUNTER — APPOINTMENT (OUTPATIENT)
Dept: GENERAL RADIOLOGY | Age: 49
End: 2024-10-09
Payer: COMMERCIAL

## 2024-10-09 ENCOUNTER — HOSPITAL ENCOUNTER (EMERGENCY)
Age: 49
Discharge: HOME | End: 2024-10-09
Attending: EMERGENCY MEDICINE
Payer: COMMERCIAL

## 2024-10-09 VITALS
BODY MASS INDEX: 21.82 KG/M2 | DIASTOLIC BLOOD PRESSURE: 63 MMHG | SYSTOLIC BLOOD PRESSURE: 110 MMHG | HEART RATE: 56 BPM | RESPIRATION RATE: 16 BRPM | WEIGHT: 144 LBS | TEMPERATURE: 98.1 F | OXYGEN SATURATION: 97 % | HEIGHT: 68 IN

## 2024-10-09 DIAGNOSIS — R42 DIZZINESS: Primary | ICD-10-CM

## 2024-10-09 LAB
ALBUMIN SERPL-MCNC: 4.2 G/DL (ref 3.5–5.2)
ALP SERPL-CCNC: 82 U/L (ref 40–129)
ALT SERPL-CCNC: 12 U/L (ref 0–40)
ANION GAP SERPL CALCULATED.3IONS-SCNC: 9 MMOL/L (ref 7–16)
AST SERPL-CCNC: 17 U/L (ref 0–39)
BASOPHILS # BLD: 0.13 K/UL (ref 0–0.2)
BASOPHILS NFR BLD: 1 % (ref 0–2)
BILIRUB SERPL-MCNC: 0.2 MG/DL (ref 0–1.2)
BUN SERPL-MCNC: 14 MG/DL (ref 6–20)
CALCIUM SERPL-MCNC: 9 MG/DL (ref 8.6–10.2)
CHLORIDE SERPL-SCNC: 99 MMOL/L (ref 98–107)
CO2 SERPL-SCNC: 26 MMOL/L (ref 22–29)
CREAT SERPL-MCNC: 1.2 MG/DL (ref 0.7–1.2)
EOSINOPHIL # BLD: 0.51 K/UL (ref 0.05–0.5)
EOSINOPHILS RELATIVE PERCENT: 4 % (ref 0–6)
ERYTHROCYTE [DISTWIDTH] IN BLOOD BY AUTOMATED COUNT: 13.5 % (ref 11.5–15)
GFR, ESTIMATED: 79 ML/MIN/1.73M2
GLUCOSE BLD-MCNC: 98 MG/DL (ref 74–99)
GLUCOSE SERPL-MCNC: 91 MG/DL (ref 74–99)
HCT VFR BLD AUTO: 47.3 % (ref 37–54)
HGB BLD-MCNC: 15.5 G/DL (ref 12.5–16.5)
IMM GRANULOCYTES # BLD AUTO: 0.04 K/UL (ref 0–0.58)
IMM GRANULOCYTES NFR BLD: 0 % (ref 0–5)
INR PPP: 1.1
LYMPHOCYTES NFR BLD: 3.98 K/UL (ref 1.5–4)
LYMPHOCYTES RELATIVE PERCENT: 34 % (ref 20–42)
MAGNESIUM SERPL-MCNC: 1.9 MG/DL (ref 1.6–2.6)
MCH RBC QN AUTO: 29.5 PG (ref 26–35)
MCHC RBC AUTO-ENTMCNC: 32.8 G/DL (ref 32–34.5)
MCV RBC AUTO: 90.1 FL (ref 80–99.9)
MONOCYTES NFR BLD: 0.77 K/UL (ref 0.1–0.95)
MONOCYTES NFR BLD: 7 % (ref 2–12)
NEUTROPHILS NFR BLD: 54 % (ref 43–80)
NEUTS SEG NFR BLD: 6.27 K/UL (ref 1.8–7.3)
PLATELET # BLD AUTO: 278 K/UL (ref 130–450)
PMV BLD AUTO: 10.2 FL (ref 7–12)
POTASSIUM SERPL-SCNC: 4.3 MMOL/L (ref 3.5–5)
PROT SERPL-MCNC: 6.7 G/DL (ref 6.4–8.3)
PROTHROMBIN TIME: 11.3 SEC (ref 9.3–12.4)
RBC # BLD AUTO: 5.25 M/UL (ref 3.8–5.8)
SODIUM SERPL-SCNC: 134 MMOL/L (ref 132–146)
TROPONIN I SERPL HS-MCNC: <6 NG/L (ref 0–11)
WBC OTHER # BLD: 11.7 K/UL (ref 4.5–11.5)

## 2024-10-09 PROCEDURE — 85610 PROTHROMBIN TIME: CPT

## 2024-10-09 PROCEDURE — 84484 ASSAY OF TROPONIN QUANT: CPT

## 2024-10-09 PROCEDURE — 70450 CT HEAD/BRAIN W/O DYE: CPT

## 2024-10-09 PROCEDURE — 71046 X-RAY EXAM CHEST 2 VIEWS: CPT

## 2024-10-09 PROCEDURE — 83735 ASSAY OF MAGNESIUM: CPT

## 2024-10-09 PROCEDURE — 93005 ELECTROCARDIOGRAM TRACING: CPT

## 2024-10-09 PROCEDURE — 85025 COMPLETE CBC W/AUTO DIFF WBC: CPT

## 2024-10-09 PROCEDURE — 99285 EMERGENCY DEPT VISIT HI MDM: CPT

## 2024-10-09 PROCEDURE — 82962 GLUCOSE BLOOD TEST: CPT

## 2024-10-09 PROCEDURE — 2580000003 HC RX 258: Performed by: EMERGENCY MEDICINE

## 2024-10-09 PROCEDURE — 80053 COMPREHEN METABOLIC PANEL: CPT

## 2024-10-09 RX ORDER — MECLIZINE HYDROCHLORIDE 25 MG/1
25 TABLET ORAL 3 TIMES DAILY PRN
Qty: 21 TABLET | Refills: 0 | Status: SHIPPED | OUTPATIENT
Start: 2024-10-09 | End: 2024-10-16

## 2024-10-09 RX ORDER — 0.9 % SODIUM CHLORIDE 0.9 %
1000 INTRAVENOUS SOLUTION INTRAVENOUS ONCE
Status: COMPLETED | OUTPATIENT
Start: 2024-10-09 | End: 2024-10-09

## 2024-10-09 RX ADMIN — SODIUM CHLORIDE 1000 ML: 9 INJECTION, SOLUTION INTRAVENOUS at 18:25

## 2024-10-09 ASSESSMENT — PAIN DESCRIPTION - LOCATION: LOCATION: CHEST

## 2024-10-09 ASSESSMENT — PAIN - FUNCTIONAL ASSESSMENT: PAIN_FUNCTIONAL_ASSESSMENT: 0-10

## 2024-10-09 ASSESSMENT — PAIN SCALES - GENERAL: PAINLEVEL_OUTOF10: 5

## 2024-10-09 NOTE — ED PROVIDER NOTES
HPI:  10/9/24,   Time: 6:00 PM EDT       Francis Hernandez III is a 48 y.o. male presenting to the ED for dizziness/syncope, beginning 1 year ago.  The complaint has been intermittent, moderate in severity, and worsened by nothing.  Intermittent symptoms for past year.  Paroxysmal.  Describes his dizziness vertiginous.  Has had some syncope.  Seen here for same.  Also complains of chest pain.  No shortness of breath.  No nausea vomiting diarrhea.  Brought in by private vehicle.    Review of Systems:   Pertinent positives and negatives are stated within HPI, all other systems reviewed and are negative.          --------------------------------------------- PAST HISTORY ---------------------------------------------  Past Medical History:  has a past medical history of A-fib (HCC), Abdominal fullness, Anxiety with depression, Arthritis, Atrial fibrillation (HCC), Hand pain, right, History of Helicobacter pylori infection, Hyperlipidemia, Restless leg syndrome, Scoliosis, and Screening for colon cancer.    Past Surgical History:  has a past surgical history that includes Colonoscopy (2016); hernia repair (09/19/2017); pr secondary closure surg wound/dehsn extsv/complic (N/A, 3/15/2018); Upper gastrointestinal endoscopy (4/9/2018); Abdomen surgery (N/A, 03/15/2018); Upper gastrointestinal endoscopy (N/A, 5/21/2018); pr laparoscopy surg cholecystectomy (N/A, 5/29/2018); Cholecystectomy (2017); Colonoscopy (N/A, 2/16/2022); and Upper gastrointestinal endoscopy (N/A, 5/6/2022).    Social History:  reports that he has been smoking cigarettes. He has a 18 pack-year smoking history. He has never used smokeless tobacco. He reports that he does not currently use alcohol. He reports that he does not currently use drugs after having used the following drugs: Marijuana (Weed).    Family History: family history includes Crohn's Disease in his sister; Heart Attack in his father; Heart Disease in his father; High Blood Pressure in his

## 2024-10-09 NOTE — ED TRIAGE NOTES
Department of Emergency Medicine  FIRST PROVIDER TRIAGE NOTE             Independent MLP           10/9/24  5:12 PM EDT    Date of Encounter: 10/9/24   MRN: [id number]      HPI: Francis Hernandez III is a 48 y.o. male who presents to the ED for Dizziness (\"Feels like I'm bouncing on a trampoline\"; over the past year and a half has had several episodes of syncope/ near syncope ), Blurred Vision (Blurred vision on and off for the past week ), and Chest Pain (Intermittent chest pain; denies at this time. Hx Afib, takes eliquis )       ROS: Negative for fever, headache, Suicidal ideation, or Homicidal Ideation.    PE: Gen Appearance/Constitutional: alert  CV: regular rate  Pulm: CTA bilat     Initial Plan of Care: All treatment areas with department are currently occupied. Plan to order/Initiate the following while awaiting opening in ED: EKG and imaging studies.  Initiate Treatment-Testing, Proceed toTreatment Area When Bed Available for ED Attending/MLP to Continue Care    Electronically signed by BECK Kilgore CNP   DD: 10/9/24-0

## 2024-10-10 LAB
EKG ATRIAL RATE: 51 BPM
EKG P AXIS: 66 DEGREES
EKG P-R INTERVAL: 156 MS
EKG Q-T INTERVAL: 406 MS
EKG QRS DURATION: 106 MS
EKG QTC CALCULATION (BAZETT): 374 MS
EKG R AXIS: 66 DEGREES
EKG T AXIS: 64 DEGREES
EKG VENTRICULAR RATE: 51 BPM

## 2024-10-10 PROCEDURE — 93010 ELECTROCARDIOGRAM REPORT: CPT | Performed by: INTERNAL MEDICINE

## 2024-12-03 RX ORDER — FLECAINIDE ACETATE 100 MG/1
100 TABLET ORAL 2 TIMES DAILY
Qty: 180 TABLET | Refills: 0 | Status: SHIPPED | OUTPATIENT
Start: 2024-12-03

## 2024-12-13 ENCOUNTER — TELEPHONE (OUTPATIENT)
Dept: NON INVASIVE DIAGNOSTICS | Age: 49
End: 2024-12-13

## 2024-12-13 NOTE — TELEPHONE ENCOUNTER
The patient left a VM about scheduling an ov and getting refills. I returned his call and left him a message stating his refill for flecainide was sent on 12/3/24 but to call back to schedule ov and let us know what other medications he needs refills on.     Electronically signed by Ngozi Robison MA on 12/13/2024 at 9:39 AM

## 2024-12-22 ENCOUNTER — APPOINTMENT (OUTPATIENT)
Dept: CT IMAGING | Age: 49
End: 2024-12-22
Payer: COMMERCIAL

## 2024-12-22 ENCOUNTER — HOSPITAL ENCOUNTER (EMERGENCY)
Age: 49
Discharge: HOME | End: 2024-12-22
Payer: COMMERCIAL

## 2024-12-22 VITALS
SYSTOLIC BLOOD PRESSURE: 110 MMHG | TEMPERATURE: 98.5 F | OXYGEN SATURATION: 97 % | RESPIRATION RATE: 18 BRPM | DIASTOLIC BLOOD PRESSURE: 63 MMHG | BODY MASS INDEX: 21.98 KG/M2 | HEIGHT: 68 IN | HEART RATE: 79 BPM | WEIGHT: 145 LBS

## 2024-12-22 DIAGNOSIS — H66.90 ACUTE OTITIS MEDIA, UNSPECIFIED OTITIS MEDIA TYPE: ICD-10-CM

## 2024-12-22 DIAGNOSIS — J01.90 ACUTE SINUSITIS, RECURRENCE NOT SPECIFIED, UNSPECIFIED LOCATION: Primary | ICD-10-CM

## 2024-12-22 LAB
INFLUENZA A BY PCR: NOT DETECTED
INFLUENZA B BY PCR: NOT DETECTED
SARS-COV-2 RDRP RESP QL NAA+PROBE: NOT DETECTED
SPECIMEN DESCRIPTION: NORMAL
SPECIMEN SOURCE: NORMAL
STREP A, MOLECULAR: NEGATIVE

## 2024-12-22 PROCEDURE — 87635 SARS-COV-2 COVID-19 AMP PRB: CPT

## 2024-12-22 PROCEDURE — 87502 INFLUENZA DNA AMP PROBE: CPT

## 2024-12-22 PROCEDURE — 87651 STREP A DNA AMP PROBE: CPT

## 2024-12-22 PROCEDURE — 6360000002 HC RX W HCPCS: Performed by: NURSE PRACTITIONER

## 2024-12-22 PROCEDURE — 96361 HYDRATE IV INFUSION ADD-ON: CPT

## 2024-12-22 PROCEDURE — 2580000003 HC RX 258: Performed by: NURSE PRACTITIONER

## 2024-12-22 PROCEDURE — 96375 TX/PRO/DX INJ NEW DRUG ADDON: CPT

## 2024-12-22 PROCEDURE — 70450 CT HEAD/BRAIN W/O DYE: CPT

## 2024-12-22 PROCEDURE — 96374 THER/PROPH/DIAG INJ IV PUSH: CPT

## 2024-12-22 PROCEDURE — 99284 EMERGENCY DEPT VISIT MOD MDM: CPT

## 2024-12-22 PROCEDURE — 6370000000 HC RX 637 (ALT 250 FOR IP): Performed by: NURSE PRACTITIONER

## 2024-12-22 RX ORDER — METOCLOPRAMIDE HYDROCHLORIDE 5 MG/ML
10 INJECTION INTRAMUSCULAR; INTRAVENOUS ONCE
Status: COMPLETED | OUTPATIENT
Start: 2024-12-22 | End: 2024-12-22

## 2024-12-22 RX ORDER — ACETAMINOPHEN 500 MG
1000 TABLET ORAL ONCE
Status: COMPLETED | OUTPATIENT
Start: 2024-12-22 | End: 2024-12-22

## 2024-12-22 RX ORDER — DIPHENHYDRAMINE HYDROCHLORIDE 50 MG/ML
25 INJECTION INTRAMUSCULAR; INTRAVENOUS ONCE
Status: COMPLETED | OUTPATIENT
Start: 2024-12-22 | End: 2024-12-22

## 2024-12-22 RX ORDER — 0.9 % SODIUM CHLORIDE 0.9 %
1000 INTRAVENOUS SOLUTION INTRAVENOUS ONCE
Status: COMPLETED | OUTPATIENT
Start: 2024-12-22 | End: 2024-12-22

## 2024-12-22 RX ADMIN — METOCLOPRAMIDE 10 MG: 5 INJECTION, SOLUTION INTRAMUSCULAR; INTRAVENOUS at 11:17

## 2024-12-22 RX ADMIN — SODIUM CHLORIDE 1000 ML: 9 INJECTION, SOLUTION INTRAVENOUS at 11:17

## 2024-12-22 RX ADMIN — DIPHENHYDRAMINE HYDROCHLORIDE 25 MG: 50 INJECTION INTRAMUSCULAR; INTRAVENOUS at 11:17

## 2024-12-22 RX ADMIN — ACETAMINOPHEN 1000 MG: 500 TABLET ORAL at 11:09

## 2024-12-22 ASSESSMENT — PAIN - FUNCTIONAL ASSESSMENT: PAIN_FUNCTIONAL_ASSESSMENT: 0-10

## 2024-12-22 ASSESSMENT — LIFESTYLE VARIABLES
HOW OFTEN DO YOU HAVE A DRINK CONTAINING ALCOHOL: MONTHLY OR LESS
HOW MANY STANDARD DRINKS CONTAINING ALCOHOL DO YOU HAVE ON A TYPICAL DAY: 1 OR 2

## 2024-12-22 ASSESSMENT — PAIN SCALES - GENERAL: PAINLEVEL_OUTOF10: 5

## 2024-12-22 NOTE — ED PROVIDER NOTES
Independent HELDER Visit.       [facility]  Department of Emergency Medicine   ED  Encounter Note  Admit Date/RoomTime: 2024 10:39 AM  ED Room: DISPO/D02    NAME: Francis Hernandez III  : 1975  MRN: [id number]     Chief Complaint:  Ear Problem (Ton of pressure in ears, can't hear, has been sick for a few weeks on and off ) and Headache    History of Present Illness        Francis Hernandez III is a 49 y.o. old male who presenting to the emergency department with complaint of sudden onset, worsening left ear pain and bilateral plugged sensation in both ears L>R. Symptoms began several days ago and have been remaining constant since that time.  He states that he was sick a few weeks ago on and off and that symptoms did improve but then resurfaced a few days ago patient frontal headache/facial , nasal congestion, sneezing, or sore throat.  His symptoms are relieved by nothing. He has recent history of nothing pertinent as it pertains to today's visit.  He denies any shortness breath, chest pain, lightness, dizziness, numbness, tingling, weakness, fevers, chills, abdominal pain, back pain blurred or double vision, syncope.  Patient is on Eliquis.    ROS   Pertinent positives and negatives are stated within HPI, all other systems reviewed and are negative.    Past Medical History:  has a past medical history of A-fib (HCC), Abdominal fullness, Anxiety with depression, Arthritis, Atrial fibrillation (HCC), Hand pain, right, History of Helicobacter pylori infection, Hyperlipidemia, Restless leg syndrome, Scoliosis, and Screening for colon cancer.    Surgical History:  has a past surgical history that includes Colonoscopy (); hernia repair (2017); pr secondary closure surg wound/dehsn xtnsv/comp (N/A, 3/15/2018); Upper gastrointestinal endoscopy (2018); Abdomen surgery (N/A, 03/15/2018); Upper gastrointestinal endoscopy (N/A, 2018); pr laparoscopy surg cholecystectomy (N/A,

## 2024-12-22 NOTE — DISCHARGE INSTRUCTIONS
CT HEAD WO CONTRAST   Final Result   1. There is no acute intracranial hemorrhage or acute intracranial abnormality   2. Pansinusitis greater within the right maxillary sinus.   3. The mastoid air cells are clear.   4. .

## 2025-03-06 ENCOUNTER — TELEPHONE (OUTPATIENT)
Dept: NON INVASIVE DIAGNOSTICS | Age: 50
End: 2025-03-06

## 2025-03-06 DIAGNOSIS — I48.0 PAROXYSMAL ATRIAL FIBRILLATION (HCC): ICD-10-CM

## 2025-03-06 RX ORDER — METOPROLOL SUCCINATE 50 MG/1
50 TABLET, EXTENDED RELEASE ORAL 2 TIMES DAILY
Qty: 180 TABLET | Refills: 0 | Status: SHIPPED | OUTPATIENT
Start: 2025-03-06

## 2025-03-06 NOTE — TELEPHONE ENCOUNTER
----- Message from GABE VERDE MA sent at 3/6/2025 11:34 AM EST -----  Regarding: appointment  Please schedule office visit.     Electronically signed by Ngozi Robison MA on 3/6/2025 at 11:34 AM

## 2025-03-20 ENCOUNTER — OFFICE VISIT (OUTPATIENT)
Dept: CARDIOLOGY CLINIC | Age: 50
End: 2025-03-20

## 2025-03-20 VITALS
WEIGHT: 151.7 LBS | HEART RATE: 59 BPM | TEMPERATURE: 97.3 F | RESPIRATION RATE: 16 BRPM | DIASTOLIC BLOOD PRESSURE: 78 MMHG | BODY MASS INDEX: 22.99 KG/M2 | OXYGEN SATURATION: 97 % | HEIGHT: 68 IN | SYSTOLIC BLOOD PRESSURE: 98 MMHG

## 2025-03-20 DIAGNOSIS — Z79.01 CHRONIC ANTICOAGULATION: ICD-10-CM

## 2025-03-20 DIAGNOSIS — I48.0 PAROXYSMAL ATRIAL FIBRILLATION (HCC): Primary | ICD-10-CM

## 2025-03-20 DIAGNOSIS — Z72.0 TOBACCO ABUSE: ICD-10-CM

## 2025-03-20 DIAGNOSIS — R07.89 ATYPICAL CHEST PAIN: ICD-10-CM

## 2025-03-20 NOTE — PROGRESS NOTES
OUTPATIENT CARDIOLOGY FOLLOW-UP    Name: Francis Hernandez III    Age: 49 y.o.    Primary Care Physician: Tiffnay Kenyon PA    Date of Service: 3/20/2025    Chief Complaint: Follow-up for paroxysmal atrial fibrillation, chest pain    Interim History:  No exertional chest pain or SOB. He previously reported episodes of chest pain at rest (\"pressure\", mid-sternal, episodes last > 10 minutes, no radiation of the pain, no relationship to exertion) -- improved. +still with occasional palpitations (brief episodes) -- improved on increased dose of flecainide. He denies recent syncope, PND, or orthopnea. SR on EKG. He feels better on Toprol XL (+sided effects on cardizem CD). +history of anxiety. He has 3 children and 2 grandchildren.    Normal coronary CTA on 8/23/21 and negative stress test in 12/2022.    Review of Systems:   Cardiac: As per HPI  General: No fever, chills  Pulmonary: As per HPI  HEENT: No visual disturbances, difficult swallowing  GI: No nausea, vomiting  : No dysuria, hematuria  Endocrine: No thyroid disease, +pre-DM per patient (he takes metformin)  Musculoskeletal: HADDAD x 4, no focal motor deficits  Skin: Intact, no rashes  Neuro: No headache, seizures  Psych: Currently with no depression, +anxiety    Past Medical History:  Past Medical History:   Diagnosis Date    A-fib (HCC) 2021    follows with Dr. Chin    Abdominal fullness 2022    Anxiety with depression     Arthritis     Atrial fibrillation (HCC)     Hand pain, right     History of Helicobacter pylori infection     Hyperlipidemia     Restless leg syndrome     Scoliosis     Screening for colon cancer 2022       Past Surgical History:  Past Surgical History:   Procedure Laterality Date    ABDOMEN SURGERY N/A 03/15/2018    wound exploration umbilicus, excsion suture granuloma    CHOLECYSTECTOMY  2017    COLONOSCOPY  2016    COLONOSCOPY N/A 2/16/2022    COLONOSCOPY WITH BIOPSY performed by Jacki You MD at [facility] END[facility]

## 2025-03-20 NOTE — PROGRESS NOTES
of >50% of that time involved face-to-face time providing counseling and/or coordination of care with the other providers, preparation for the clinic visit, reviewing records/tests, counseling/education of the patient, ordering medications/tests/procedures, coordinating care, and documenting clinical information in the EHR.      Thank you for allowing me to participate in your patient's care.    Mariama Cantu MD  Cardiac Electrophysiology  [facility]  [facility]: [facility]  3/21/25   10:36 AM

## 2025-03-21 ENCOUNTER — OFFICE VISIT (OUTPATIENT)
Dept: NON INVASIVE DIAGNOSTICS | Age: 50
End: 2025-03-21
Payer: COMMERCIAL

## 2025-03-21 VITALS
DIASTOLIC BLOOD PRESSURE: 62 MMHG | WEIGHT: 153 LBS | TEMPERATURE: 98 F | HEART RATE: 55 BPM | OXYGEN SATURATION: 96 % | SYSTOLIC BLOOD PRESSURE: 102 MMHG | HEIGHT: 69 IN | RESPIRATION RATE: 18 BRPM | BODY MASS INDEX: 22.66 KG/M2

## 2025-03-21 DIAGNOSIS — I48.0 PAROXYSMAL ATRIAL FIBRILLATION (HCC): Primary | ICD-10-CM

## 2025-03-21 PROCEDURE — G8420 CALC BMI NORM PARAMETERS: HCPCS | Performed by: INTERNAL MEDICINE

## 2025-03-21 PROCEDURE — 99215 OFFICE O/P EST HI 40 MIN: CPT | Performed by: INTERNAL MEDICINE

## 2025-03-21 PROCEDURE — 93000 ELECTROCARDIOGRAM COMPLETE: CPT | Performed by: INTERNAL MEDICINE

## 2025-03-21 PROCEDURE — 4004F PT TOBACCO SCREEN RCVD TLK: CPT | Performed by: INTERNAL MEDICINE

## 2025-03-21 PROCEDURE — G8427 DOCREV CUR MEDS BY ELIG CLIN: HCPCS | Performed by: INTERNAL MEDICINE

## 2025-03-21 RX ORDER — METOPROLOL SUCCINATE 50 MG/1
50 TABLET, EXTENDED RELEASE ORAL 2 TIMES DAILY
Qty: 180 TABLET | Refills: 1 | Status: SHIPPED | OUTPATIENT
Start: 2025-03-21

## 2025-03-21 RX ORDER — FLECAINIDE ACETATE 100 MG/1
100 TABLET ORAL 2 TIMES DAILY
Qty: 180 TABLET | Refills: 1 | Status: SHIPPED | OUTPATIENT
Start: 2025-03-21

## 2025-03-28 ENCOUNTER — APPOINTMENT (OUTPATIENT)
Dept: GENERAL RADIOLOGY | Age: 50
End: 2025-03-28
Payer: COMMERCIAL

## 2025-03-28 ENCOUNTER — TELEPHONE (OUTPATIENT)
Dept: NON INVASIVE DIAGNOSTICS | Age: 50
End: 2025-03-28

## 2025-03-28 ENCOUNTER — HOSPITAL ENCOUNTER (EMERGENCY)
Age: 50
Discharge: HOME | End: 2025-03-28
Attending: STUDENT IN AN ORGANIZED HEALTH CARE EDUCATION/TRAINING PROGRAM
Payer: COMMERCIAL

## 2025-03-28 VITALS
HEIGHT: 69 IN | RESPIRATION RATE: 14 BRPM | SYSTOLIC BLOOD PRESSURE: 101 MMHG | TEMPERATURE: 98 F | OXYGEN SATURATION: 95 % | WEIGHT: 153 LBS | DIASTOLIC BLOOD PRESSURE: 73 MMHG | HEART RATE: 77 BPM | BODY MASS INDEX: 22.66 KG/M2

## 2025-03-28 DIAGNOSIS — I48.0 PAROXYSMAL ATRIAL FIBRILLATION (HCC): Primary | ICD-10-CM

## 2025-03-28 LAB
ALBUMIN SERPL-MCNC: 3.9 G/DL (ref 3.5–5.2)
ALP SERPL-CCNC: 82 U/L (ref 40–129)
ALT SERPL-CCNC: 16 U/L (ref 0–40)
ANION GAP SERPL CALCULATED.3IONS-SCNC: 13 MMOL/L (ref 7–16)
AST SERPL-CCNC: 18 U/L (ref 0–39)
BASOPHILS # BLD: 0.11 K/UL (ref 0–0.2)
BASOPHILS NFR BLD: 1 % (ref 0–2)
BILIRUB SERPL-MCNC: 0.2 MG/DL (ref 0–1.2)
BNP SERPL-MCNC: 326 PG/ML (ref 0–125)
BUN SERPL-MCNC: 12 MG/DL (ref 6–20)
CALCIUM SERPL-MCNC: 8.7 MG/DL (ref 8.6–10.2)
CHLORIDE SERPL-SCNC: 105 MMOL/L (ref 98–107)
CO2 SERPL-SCNC: 23 MMOL/L (ref 22–29)
CREAT SERPL-MCNC: 1.1 MG/DL (ref 0.7–1.2)
EKG ATRIAL RATE: 220 BPM
EKG Q-T INTERVAL: 358 MS
EKG QRS DURATION: 96 MS
EKG QTC CALCULATION (BAZETT): 428 MS
EKG R AXIS: 73 DEGREES
EKG T AXIS: 51 DEGREES
EKG VENTRICULAR RATE: 86 BPM
EOSINOPHIL # BLD: 0.41 K/UL (ref 0.05–0.5)
EOSINOPHILS RELATIVE PERCENT: 3 % (ref 0–6)
ERYTHROCYTE [DISTWIDTH] IN BLOOD BY AUTOMATED COUNT: 14.4 % (ref 11.5–15)
GFR, ESTIMATED: 83 ML/MIN/1.73M2
GLUCOSE SERPL-MCNC: 96 MG/DL (ref 74–99)
HCT VFR BLD AUTO: 44.8 % (ref 37–54)
HGB BLD-MCNC: 14.7 G/DL (ref 12.5–16.5)
IMM GRANULOCYTES # BLD AUTO: 0.06 K/UL (ref 0–0.58)
IMM GRANULOCYTES NFR BLD: 0 % (ref 0–5)
LYMPHOCYTES NFR BLD: 3.55 K/UL (ref 1.5–4)
LYMPHOCYTES RELATIVE PERCENT: 23 % (ref 20–42)
MAGNESIUM SERPL-MCNC: 1.9 MG/DL (ref 1.6–2.6)
MCH RBC QN AUTO: 30.2 PG (ref 26–35)
MCHC RBC AUTO-ENTMCNC: 32.8 G/DL (ref 32–34.5)
MCV RBC AUTO: 92.2 FL (ref 80–99.9)
MONOCYTES NFR BLD: 1.06 K/UL (ref 0.1–0.95)
MONOCYTES NFR BLD: 7 % (ref 2–12)
NEUTROPHILS NFR BLD: 67 % (ref 43–80)
NEUTS SEG NFR BLD: 10.39 K/UL (ref 1.8–7.3)
PLATELET # BLD AUTO: 258 K/UL (ref 130–450)
PMV BLD AUTO: 10.1 FL (ref 7–12)
POTASSIUM SERPL-SCNC: 3.8 MMOL/L (ref 3.5–5)
PROT SERPL-MCNC: 6.5 G/DL (ref 6.4–8.3)
RBC # BLD AUTO: 4.86 M/UL (ref 3.8–5.8)
SODIUM SERPL-SCNC: 141 MMOL/L (ref 132–146)
TROPONIN I SERPL HS-MCNC: <6 NG/L (ref 0–11)
TROPONIN I SERPL HS-MCNC: <6 NG/L (ref 0–11)
WBC OTHER # BLD: 15.6 K/UL (ref 4.5–11.5)

## 2025-03-28 PROCEDURE — 80053 COMPREHEN METABOLIC PANEL: CPT

## 2025-03-28 PROCEDURE — 71045 X-RAY EXAM CHEST 1 VIEW: CPT

## 2025-03-28 PROCEDURE — 99285 EMERGENCY DEPT VISIT HI MDM: CPT

## 2025-03-28 PROCEDURE — 93005 ELECTROCARDIOGRAM TRACING: CPT | Performed by: STUDENT IN AN ORGANIZED HEALTH CARE EDUCATION/TRAINING PROGRAM

## 2025-03-28 PROCEDURE — 83880 ASSAY OF NATRIURETIC PEPTIDE: CPT

## 2025-03-28 PROCEDURE — 83735 ASSAY OF MAGNESIUM: CPT

## 2025-03-28 PROCEDURE — 84484 ASSAY OF TROPONIN QUANT: CPT

## 2025-03-28 PROCEDURE — 85025 COMPLETE CBC W/AUTO DIFF WBC: CPT

## 2025-03-28 PROCEDURE — 93010 ELECTROCARDIOGRAM REPORT: CPT | Performed by: INTERNAL MEDICINE

## 2025-03-28 ASSESSMENT — PAIN SCALES - GENERAL: PAINLEVEL_OUTOF10: 4

## 2025-03-28 ASSESSMENT — PAIN - FUNCTIONAL ASSESSMENT: PAIN_FUNCTIONAL_ASSESSMENT: 0-10

## 2025-03-28 ASSESSMENT — PAIN DESCRIPTION - FREQUENCY: FREQUENCY: INTERMITTENT

## 2025-03-28 NOTE — TELEPHONE ENCOUNTER
LM to call office for results.    Electronically signed by Ngozi Robison MA on 3/28/2025 at 2:33 PM

## 2025-03-28 NOTE — ED PROVIDER NOTES
5/29/2018); Cholecystectomy (2017); Colonoscopy (N/A, 2/16/2022); and Upper gastrointestinal endoscopy (N/A, 5/6/2022).    Social History:  reports that he has been smoking cigarettes. He has a 18 pack-year smoking history. He has never used smokeless tobacco. He reports that he does not currently use alcohol. He reports that he does not currently use drugs after having used the following drugs: Marijuana (Weed).    Family History: family history includes Crohn's Disease in his sister; Heart Attack in his father; Heart Disease in his father; High Blood Pressure in his father; Mental Illness in his father.     The patient’s home medications have been reviewed.    Allergies: Biaxin [clarithromycin], Effexor [venlafaxine hydrochloride], Cardizem [diltiazem hcl], and Aspirin    -------------------------------------------------- RESULTS -------------------------------------------------  Labs:  Results for orders placed or performed during the hospital encounter of 03/28/25   CBC with Auto Differential   Result Value Ref Range    WBC 15.6 (H) 4.5 - 11.5 k/uL    RBC 4.86 3.80 - 5.80 m/uL    Hemoglobin 14.7 12.5 - 16.5 g/dL    Hematocrit 44.8 37.0 - 54.0 %    MCV 92.2 80.0 - 99.9 fL    MCH 30.2 26.0 - 35.0 pg    MCHC 32.8 32.0 - 34.5 g/dL    RDW 14.4 11.5 - 15.0 %    Platelets 258 130 - 450 k/uL    MPV 10.1 7.0 - 12.0 fL    Neutrophils % 67 43.0 - 80.0 %    Lymphocytes % 23 20.0 - 42.0 %    Monocytes % 7 2.0 - 12.0 %    Eosinophils % 3 0 - 6 %    Basophils % 1 0.0 - 2.0 %    Immature Granulocytes % 0 0.0 - 5.0 %    Neutrophils Absolute 10.39 (H) 1.80 - 7.30 k/uL    Lymphocytes Absolute 3.55 1.50 - 4.00 k/uL    Monocytes Absolute 1.06 (H) 0.10 - 0.95 k/uL    Eosinophils Absolute 0.41 0.05 - 0.50 k/uL    Basophils Absolute 0.11 0.00 - 0.20 k/uL    Immature Granulocytes Absolute 0.06 0.00 - 0.58 k/uL   CMP   Result Value Ref Range    Sodium 141 132 - 146 mmol/L    Potassium 3.8 3.5 - 5.0 mmol/L    Chloride 105 98 - 107 mmol/L     CO2 23 22 - 29 mmol/L    Anion Gap 13 7 - 16 mmol/L    Glucose 96 74 - 99 mg/dL    BUN 12 6 - 20 mg/dL    Creatinine 1.1 0.70 - 1.20 mg/dL    EstServando Filt Rate 83 >60 mL/min/1.73m2    Calcium 8.7 8.6 - 10.2 mg/dL    Total Protein 6.5 6.4 - 8.3 g/dL    Albumin 3.9 3.5 - 5.2 g/dL    Total Bilirubin 0.2 0.0 - 1.2 mg/dL    Alkaline Phosphatase 82 40 - 129 U/L    ALT 16 0 - 40 U/L    AST 18 0 - 39 U/L   Troponin   Result Value Ref Range    Troponin, High Sensitivity <6 0 - 11 ng/L   Magnesium   Result Value Ref Range    Magnesium 1.9 1.6 - 2.6 mg/dL   Brain Natriuretic Peptide   Result Value Ref Range    NT Pro- (H) 0 - 125 pg/mL   Troponin   Result Value Ref Range    Troponin, High Sensitivity <6 0 - 11 ng/L   EKG 12 Lead   Result Value Ref Range    Ventricular Rate 86 BPM    Atrial Rate 220 BPM    QRS Duration 96 ms    Q-T Interval 358 ms    QTc Calculation (Bazett) 428 ms    R Axis 73 degrees    T Axis 51 degrees       Radiology:  XR CHEST PORTABLE   Final Result   No acute process.             ------------------------- NURSING NOTES AND VITALS REVIEWED ---------------------------  Date / Time Roomed:  3/28/2025  1:04 AM  ED Bed Assignment:  12/12    The nursing notes within the ED encounter and vital signs as below have been reviewed.   /73   Pulse 77   Temp 98 °F (36.7 °C) (Temporal)   Resp 14   Ht 1.753 m (5' 9\")   Wt 69.4 kg (153 lb)   SpO2 95%   BMI 22.59 kg/m²   Oxygen Saturation Interpretation: Normal      ------------------------------------------ PROGRESS NOTES ------------------------------------------  4:10 AM EDT  I have spoken with the patient and family if present and discussed today’s results, in addition to providing specific details for the plan of care and counseling regarding the diagnosis and prognosis.  Their questions are answered at this time and they are agreeable with the plan. I discussed at length with them reasons for immediate return here for re evaluation. They

## 2025-03-28 NOTE — TELEPHONE ENCOUNTER
Patient notified of recommendations and verbalized understanding.     Electronically signed by Ngozi Robison MA on 3/28/2025 at 2:36 PM

## 2025-03-28 NOTE — TELEPHONE ENCOUNTER
Patient called stated last night he started feeling clammy and sweaty his heart rate was all over and headache, stated he went to ER the did a Chest XR, EKG, and labs, was advised to follow up with Dr. Cantu and sent him home.   States he is still symptomatic.     Please advise

## 2025-04-23 ENCOUNTER — APPOINTMENT (OUTPATIENT)
Dept: GENERAL RADIOLOGY | Age: 50
End: 2025-04-23
Payer: COMMERCIAL

## 2025-04-23 ENCOUNTER — HOSPITAL ENCOUNTER (EMERGENCY)
Age: 50
Discharge: HOME | End: 2025-04-23
Attending: EMERGENCY MEDICINE
Payer: COMMERCIAL

## 2025-04-23 VITALS
BODY MASS INDEX: 22.96 KG/M2 | DIASTOLIC BLOOD PRESSURE: 77 MMHG | OXYGEN SATURATION: 99 % | SYSTOLIC BLOOD PRESSURE: 113 MMHG | HEIGHT: 69 IN | RESPIRATION RATE: 18 BRPM | WEIGHT: 155 LBS | TEMPERATURE: 98.4 F | HEART RATE: 60 BPM

## 2025-04-23 DIAGNOSIS — S29.019A THORACIC MYOFASCIAL STRAIN, INITIAL ENCOUNTER: Primary | ICD-10-CM

## 2025-04-23 LAB
ANION GAP SERPL CALCULATED.3IONS-SCNC: 11 MMOL/L (ref 7–16)
BASOPHILS # BLD: 0.1 K/UL (ref 0–0.2)
BASOPHILS NFR BLD: 1 % (ref 0–2)
BUN SERPL-MCNC: 10 MG/DL (ref 6–20)
CALCIUM SERPL-MCNC: 9.1 MG/DL (ref 8.6–10.2)
CHLORIDE SERPL-SCNC: 103 MMOL/L (ref 98–107)
CK SERPL-CCNC: 50 U/L (ref 20–200)
CO2 SERPL-SCNC: 22 MMOL/L (ref 22–29)
CREAT SERPL-MCNC: 0.9 MG/DL (ref 0.7–1.2)
CRP SERPL HS-MCNC: 11.4 MG/L (ref 0–5)
EOSINOPHIL # BLD: 0.37 K/UL (ref 0.05–0.5)
EOSINOPHILS RELATIVE PERCENT: 4 % (ref 0–6)
ERYTHROCYTE [DISTWIDTH] IN BLOOD BY AUTOMATED COUNT: 14.1 % (ref 11.5–15)
ERYTHROCYTE [SEDIMENTATION RATE] IN BLOOD BY WESTERGREN METHOD: 3 MM/HR (ref 0–15)
GFR, ESTIMATED: >90 ML/MIN/1.73M2
GLUCOSE SERPL-MCNC: 83 MG/DL (ref 74–99)
HCT VFR BLD AUTO: 46.6 % (ref 37–54)
HGB BLD-MCNC: 15.2 G/DL (ref 12.5–16.5)
IMM GRANULOCYTES # BLD AUTO: 0.03 K/UL (ref 0–0.58)
IMM GRANULOCYTES NFR BLD: 0 % (ref 0–5)
LYMPHOCYTES NFR BLD: 3.34 K/UL (ref 1.5–4)
LYMPHOCYTES RELATIVE PERCENT: 37 % (ref 20–42)
MCH RBC QN AUTO: 29.7 PG (ref 26–35)
MCHC RBC AUTO-ENTMCNC: 32.6 G/DL (ref 32–34.5)
MCV RBC AUTO: 91.2 FL (ref 80–99.9)
MONOCYTES NFR BLD: 0.78 K/UL (ref 0.1–0.95)
MONOCYTES NFR BLD: 9 % (ref 2–12)
NEUTROPHILS NFR BLD: 48 % (ref 43–80)
NEUTS SEG NFR BLD: 4.3 K/UL (ref 1.8–7.3)
PLATELET # BLD AUTO: 238 K/UL (ref 130–450)
PMV BLD AUTO: 10.4 FL (ref 7–12)
POTASSIUM SERPL-SCNC: 4.5 MMOL/L (ref 3.5–5)
RBC # BLD AUTO: 5.11 M/UL (ref 3.8–5.8)
SODIUM SERPL-SCNC: 136 MMOL/L (ref 132–146)
TROPONIN I SERPL HS-MCNC: <6 NG/L (ref 0–22)
WBC OTHER # BLD: 8.9 K/UL (ref 4.5–11.5)

## 2025-04-23 PROCEDURE — 85025 COMPLETE CBC W/AUTO DIFF WBC: CPT

## 2025-04-23 PROCEDURE — 6360000002 HC RX W HCPCS: Performed by: EMERGENCY MEDICINE

## 2025-04-23 PROCEDURE — 82550 ASSAY OF CK (CPK): CPT

## 2025-04-23 PROCEDURE — 85652 RBC SED RATE AUTOMATED: CPT

## 2025-04-23 PROCEDURE — 96372 THER/PROPH/DIAG INJ SC/IM: CPT

## 2025-04-23 PROCEDURE — 93005 ELECTROCARDIOGRAM TRACING: CPT | Performed by: EMERGENCY MEDICINE

## 2025-04-23 PROCEDURE — 72040 X-RAY EXAM NECK SPINE 2-3 VW: CPT

## 2025-04-23 PROCEDURE — 99285 EMERGENCY DEPT VISIT HI MDM: CPT

## 2025-04-23 PROCEDURE — 71046 X-RAY EXAM CHEST 2 VIEWS: CPT

## 2025-04-23 PROCEDURE — 86140 C-REACTIVE PROTEIN: CPT

## 2025-04-23 PROCEDURE — 96374 THER/PROPH/DIAG INJ IV PUSH: CPT

## 2025-04-23 PROCEDURE — 84484 ASSAY OF TROPONIN QUANT: CPT

## 2025-04-23 PROCEDURE — 80048 BASIC METABOLIC PNL TOTAL CA: CPT

## 2025-04-23 PROCEDURE — 6370000000 HC RX 637 (ALT 250 FOR IP): Performed by: EMERGENCY MEDICINE

## 2025-04-23 RX ORDER — ORPHENADRINE CITRATE 30 MG/ML
60 INJECTION INTRAMUSCULAR; INTRAVENOUS ONCE
Status: COMPLETED | OUTPATIENT
Start: 2025-04-23 | End: 2025-04-23

## 2025-04-23 RX ORDER — DIAZEPAM 5 MG/1
10 TABLET ORAL EVERY 8 HOURS PRN
Qty: 20 TABLET | Refills: 0 | Status: SHIPPED | OUTPATIENT
Start: 2025-04-23 | End: 2025-05-03

## 2025-04-23 RX ORDER — KETOROLAC TROMETHAMINE 15 MG/ML
15 INJECTION, SOLUTION INTRAMUSCULAR; INTRAVENOUS ONCE
Status: COMPLETED | OUTPATIENT
Start: 2025-04-23 | End: 2025-04-23

## 2025-04-23 RX ORDER — HYDROCODONE BITARTRATE AND ACETAMINOPHEN 5; 325 MG/1; MG/1
1 TABLET ORAL EVERY 4 HOURS PRN
Qty: 30 TABLET | Refills: 0 | Status: SHIPPED | OUTPATIENT
Start: 2025-04-23 | End: 2025-04-28

## 2025-04-23 RX ORDER — HYDROCODONE BITARTRATE AND ACETAMINOPHEN 5; 325 MG/1; MG/1
1 TABLET ORAL ONCE
Status: COMPLETED | OUTPATIENT
Start: 2025-04-23 | End: 2025-04-23

## 2025-04-23 RX ADMIN — ORPHENADRINE CITRATE 60 MG: 60 INJECTION INTRAMUSCULAR; INTRAVENOUS at 10:35

## 2025-04-23 RX ADMIN — HYDROCODONE BITARTRATE AND ACETAMINOPHEN 1 TABLET: 5; 325 TABLET ORAL at 13:15

## 2025-04-23 RX ADMIN — KETOROLAC TROMETHAMINE 15 MG: 15 INJECTION, SOLUTION INTRAMUSCULAR; INTRAVENOUS at 10:35

## 2025-04-23 ASSESSMENT — PAIN DESCRIPTION - LOCATION
LOCATION: NECK
LOCATION: BACK

## 2025-04-23 ASSESSMENT — PAIN DESCRIPTION - DESCRIPTORS: DESCRIPTORS: ACHING;SHOOTING

## 2025-04-23 ASSESSMENT — PAIN SCALES - GENERAL
PAINLEVEL_OUTOF10: 5
PAINLEVEL_OUTOF10: 7

## 2025-04-23 ASSESSMENT — PAIN DESCRIPTION - ORIENTATION: ORIENTATION: RIGHT;LEFT

## 2025-04-23 ASSESSMENT — PAIN - FUNCTIONAL ASSESSMENT: PAIN_FUNCTIONAL_ASSESSMENT: PREVENTS OR INTERFERES SOME ACTIVE ACTIVITIES AND ADLS

## 2025-04-23 NOTE — ED PROVIDER NOTES
HPI:  4/23/25, Time: 10:23 AM EDT         Francis Hernandez III is a 49 y.o. male presenting to the ED for back pain mid thoracic rating to the shoulders and upper abdomen.  Onset when he woke up this morning.  Reports no injury.  He states he is having chills muscle aches.  States unable to move his head.  No close contacts are ill.  States he cannot take a deep breath.  He has a history of A-fib on flecainide and apixaban.  He also takes pravastatin and Questran., beginning several hours ago.  The complaint has been persistent, moderate in severity, and worsened by kneeling, bending, twisting.  Patient studies had similar episode in the past that went away on its own.  Today had trouble getting out of bed.  Some trouble moving his spine.  He has no bowel or bladder incontinence or urinary retention.    Review of Systems:   A complete review of systems was performed and pertinent positives and negatives are stated within HPI, all other systems reviewed and are negative.    --------------------------------------------- PAST HISTORY ---------------------------------------------  Past Medical History:  has a past medical history of A-fib (HCC), Abdominal fullness, Anxiety with depression, Arthritis, Atrial fibrillation (HCC), Hand pain, right, History of Helicobacter pylori infection, Hyperlipidemia, Restless leg syndrome, Scoliosis, and Screening for colon cancer.    Past Surgical History:  has a past surgical history that includes Colonoscopy (2016); hernia repair (09/19/2017); pr secondary closure surg wound/dehsn xtnsv/comp (N/A, 3/15/2018); Upper gastrointestinal endoscopy (4/9/2018); Abdomen surgery (N/A, 03/15/2018); Upper gastrointestinal endoscopy (N/A, 5/21/2018); pr laparoscopy surg cholecystectomy (N/A, 5/29/2018); Cholecystectomy (2017); Colonoscopy (N/A, 2/16/2022); and Upper gastrointestinal endoscopy (N/A, 5/6/2022).    Social History:  reports that he has been smoking cigarettes. He has a 20

## 2025-04-23 NOTE — ED NOTES
Pt approached desk frustrated that he has seen people come and leave andhe is still waiting, also relates that he has his results on my chart, and is freezing warm blanket given.

## 2025-04-25 LAB
EKG ATRIAL RATE: 53 BPM
EKG P AXIS: 68 DEGREES
EKG P-R INTERVAL: 178 MS
EKG Q-T INTERVAL: 414 MS
EKG QRS DURATION: 96 MS
EKG QTC CALCULATION (BAZETT): 388 MS
EKG R AXIS: 97 DEGREES
EKG T AXIS: 29 DEGREES
EKG VENTRICULAR RATE: 53 BPM

## 2025-04-25 PROCEDURE — 93010 ELECTROCARDIOGRAM REPORT: CPT | Performed by: INTERNAL MEDICINE

## (undated) DEVICE — BLOCK BITE 60FR RUBBER ADLT DENTAL

## (undated) DEVICE — GRADUATE TRIANG MEASURE 1000ML BLK PRNT

## (undated) DEVICE — INSUFFLATION NEEDLE TO ESTABLISH PNEUMOPERITONEUM.: Brand: INSUFFLATION NEEDLE

## (undated) DEVICE — SPONGE GZ W4XL4IN RAYON POLY FILL CVR W/ NONWOVEN FAB

## (undated) DEVICE — FORCEPS BX OVL CUP FEN DISPOSABLE CAP L 160CM RAD JAW 4

## (undated) DEVICE — SOLUTION IV IRRIG POUR BRL 0.9% SODIUM CHL 2F7124

## (undated) DEVICE — GLOVE ORANGE PI 7 1/2   MSG9075

## (undated) DEVICE — SKIN AFFIX SURG ADHESIVE 72/CS 0.55ML: Brand: MEDLINE

## (undated) DEVICE — STANDARD HYPODERMIC NEEDLE,ALUMINUM HUB: Brand: MONOJECT

## (undated) DEVICE — BAG: SPONGE CT 10.25X32 2.0ML BLU 250/CS: Brand: MEDICAL ACTION INDUSTRIES

## (undated) DEVICE — PLUMEPORT LAPAROSCOPIC SMOKE FILTRATION DEVICE: Brand: PLUMEPORT ACTIV

## (undated) DEVICE — CHLORAPREP 26ML ORANGE

## (undated) DEVICE — KIT,ANTI FOG,W/SPONGE & FLUID,SOFT PACK: Brand: MEDLINE

## (undated) DEVICE — INTENDED FOR TISSUE SEPARATION, AND OTHER PROCEDURES THAT REQUIRE A SHARP SURGICAL BLADE TO PUNCTURE OR CUT.: Brand: BARD-PARKER ® STAINLESS STEEL BLADES

## (undated) DEVICE — TROCAR: Brand: KII SHIELDED BLADED ACCESS SYSTEM

## (undated) DEVICE — LAPAROSCOPIC SCISSORS: Brand: EPIX LAPAROSCOPIC SCISSORS

## (undated) DEVICE — 4-PORT MANIFOLD: Brand: NEPTUNE 2

## (undated) DEVICE — NEEDLE CLOSURE OMNICLOSE

## (undated) DEVICE — GLOVE SURG SZ 6 L12IN FNGR THK94MIL TRNSLUC YEL LTX HYDRGEL

## (undated) DEVICE — SYRINGE MED 20ML STD CLR PLAS LUERLOCK TIP N CTRL DISP

## (undated) DEVICE — SUTURE VCRL SZ 1 L27IN ABSRB VLT L70MM XLH 1/2 CIR J583G

## (undated) DEVICE — GLOVE ORANGE PI 7   MSG9070

## (undated) DEVICE — SINGLE-USE BIOPSY FORCEPS: Brand: RADIAL JAW 4

## (undated) DEVICE — DOUBLE BASIN SET: Brand: MEDLINE INDUSTRIES, INC.

## (undated) DEVICE — INSUFFLATION TUBING SET WITH FILTER, FUNNEL CONNECTOR AND LUER LOCK: Brand: JOSNOE MEDICAL INC

## (undated) DEVICE — FORCEPS BX L240CM JAW DIA2.4MM ORNG L CAP W/ NDL DISP RAD

## (undated) DEVICE — PACK PROCEDURE SURG GEN CUST

## (undated) DEVICE — DRAPE,CHEST,FENES,15X10,STERIL: Brand: MEDLINE

## (undated) DEVICE — TROCAR: Brand: KII® SLEEVE

## (undated) DEVICE — ELECTRODE PT RET AD L9FT HI MOIST COND ADH HYDRGEL CORDED

## (undated) DEVICE — SHEET, T, LAPAROTOMY, STERILE: Brand: MEDLINE

## (undated) DEVICE — BLADE CLIPPER GEN PURP NS

## (undated) DEVICE — Z DISCONTINUED NO SUB IDED BAG SPEC RETRV M C240ML MOUTH 7.3MM L17CM SHFT 10MM NYL EZEE

## (undated) DEVICE — APPLIER CLP M L L11.4IN DIA10MM ENDOSCP ROT MULT FOR LIG

## (undated) DEVICE — GOWN,SIRUS,FABRNF,XL,20/CS: Brand: MEDLINE

## (undated) DEVICE — TOWEL,OR,DSP,ST,BLUE,STD,6/PK,12PK/CS: Brand: MEDLINE

## (undated) DEVICE — GOWN,SIRUS,FABRNF,L,20/CS: Brand: MEDLINE